# Patient Record
Sex: FEMALE | Race: WHITE | NOT HISPANIC OR LATINO | Employment: FULL TIME | URBAN - METROPOLITAN AREA
[De-identification: names, ages, dates, MRNs, and addresses within clinical notes are randomized per-mention and may not be internally consistent; named-entity substitution may affect disease eponyms.]

---

## 2024-11-04 ENCOUNTER — HOSPITAL ENCOUNTER (OUTPATIENT)
Dept: GENERAL RADIOLOGY | Facility: HOSPITAL | Age: 69
Discharge: HOME | End: 2024-11-04
Payer: MEDICARE

## 2024-11-04 DIAGNOSIS — R05.2 SUBACUTE COUGH: ICD-10-CM

## 2024-11-04 DIAGNOSIS — M15.0 PRIMARY OSTEOARTHRITIS INVOLVING MULTIPLE JOINTS: ICD-10-CM

## 2024-11-04 PROCEDURE — 73562 X-RAY EXAM OF KNEE 3: CPT

## 2024-11-04 PROCEDURE — 71046 X-RAY EXAM CHEST 2 VIEWS: CPT

## 2025-05-17 ENCOUNTER — TRANSCRIBE ORDERS (OUTPATIENT)
Dept: ADMINISTRATIVE | Facility: HOSPITAL | Age: 70
End: 2025-05-17
Payer: MEDICARE

## 2025-05-17 DIAGNOSIS — R94.31 ABNORMAL ELECTROCARDIOGRAM: Primary | ICD-10-CM

## 2025-05-22 ENCOUNTER — APPOINTMENT (OUTPATIENT)
Dept: CT IMAGING | Facility: HOSPITAL | Age: 70
DRG: 329 | End: 2025-05-22
Payer: MEDICARE

## 2025-05-22 ENCOUNTER — APPOINTMENT (OUTPATIENT)
Dept: GENERAL RADIOLOGY | Facility: HOSPITAL | Age: 70
DRG: 329 | End: 2025-05-22
Payer: MEDICARE

## 2025-05-22 ENCOUNTER — HOSPITAL ENCOUNTER (INPATIENT)
Facility: HOSPITAL | Age: 70
LOS: 15 days | Discharge: HOME | DRG: 329 | End: 2025-06-07
Attending: EMERGENCY MEDICINE | Admitting: INTERNAL MEDICINE
Payer: MEDICARE

## 2025-05-22 DIAGNOSIS — R79.89 ELEVATED LACTIC ACID LEVEL: ICD-10-CM

## 2025-05-22 DIAGNOSIS — K92.2 GASTROINTESTINAL HEMORRHAGE, UNSPECIFIED GASTROINTESTINAL HEMORRHAGE TYPE: Primary | ICD-10-CM

## 2025-05-22 DIAGNOSIS — R10.84 GENERALIZED ABDOMINAL PAIN: ICD-10-CM

## 2025-05-22 DIAGNOSIS — R79.9 ELEVATED BUN: ICD-10-CM

## 2025-05-22 DIAGNOSIS — D62 ACUTE BLOOD LOSS ANEMIA: ICD-10-CM

## 2025-05-22 LAB
ABO GROUP BLD: NORMAL
ALBUMIN SERPL-MCNC: 3.5 G/DL (ref 3.5–5.2)
ALBUMIN/GLOB SERPL: 1.9 G/DL
ALP SERPL-CCNC: 54 U/L (ref 39–117)
ALT SERPL W P-5'-P-CCNC: 14 U/L (ref 1–33)
ANION GAP SERPL CALCULATED.3IONS-SCNC: 10 MMOL/L (ref 5–15)
AST SERPL-CCNC: 13 U/L (ref 1–32)
BASOPHILS # BLD AUTO: 0.04 10*3/MM3 (ref 0–0.2)
BASOPHILS NFR BLD AUTO: 0.3 % (ref 0–1.5)
BILIRUB SERPL-MCNC: 0.5 MG/DL (ref 0–1.2)
BLD GP AB SCN SERPL QL: NEGATIVE
BUN SERPL-MCNC: 30 MG/DL (ref 8–23)
BUN/CREAT SERPL: 44.8 (ref 7–25)
CALCIUM SPEC-SCNC: 8.1 MG/DL (ref 8.6–10.5)
CHLORIDE SERPL-SCNC: 106 MMOL/L (ref 98–107)
CO2 SERPL-SCNC: 22 MMOL/L (ref 22–29)
CREAT SERPL-MCNC: 0.67 MG/DL (ref 0.57–1)
D-LACTATE SERPL-SCNC: 2.4 MMOL/L (ref 0.5–2)
DEPRECATED RDW RBC AUTO: 44.3 FL (ref 37–54)
EGFRCR SERPLBLD CKD-EPI 2021: 93.6 ML/MIN/1.73
EOSINOPHIL # BLD AUTO: 0.03 10*3/MM3 (ref 0–0.4)
EOSINOPHIL NFR BLD AUTO: 0.2 % (ref 0.3–6.2)
ERYTHROCYTE [DISTWIDTH] IN BLOOD BY AUTOMATED COUNT: 13 % (ref 12.3–15.4)
GEN 5 1HR TROPONIN T REFLEX: 7 NG/L
GLOBULIN UR ELPH-MCNC: 1.8 GM/DL
GLUCOSE SERPL-MCNC: 138 MG/DL (ref 65–99)
HCT VFR BLD AUTO: 22.1 % (ref 34–46.6)
HGB BLD-MCNC: 7.1 G/DL (ref 12–15.9)
HOLD SPECIMEN: NORMAL
HOLD SPECIMEN: NORMAL
IMM GRANULOCYTES # BLD AUTO: 0.04 10*3/MM3 (ref 0–0.05)
IMM GRANULOCYTES NFR BLD AUTO: 0.3 % (ref 0–0.5)
LIPASE SERPL-CCNC: 17 U/L (ref 13–60)
LYMPHOCYTES # BLD AUTO: 2.03 10*3/MM3 (ref 0.7–3.1)
LYMPHOCYTES NFR BLD AUTO: 15.6 % (ref 19.6–45.3)
MAGNESIUM SERPL-MCNC: 1.7 MG/DL (ref 1.6–2.4)
MCH RBC QN AUTO: 30.2 PG (ref 26.6–33)
MCHC RBC AUTO-ENTMCNC: 32.1 G/DL (ref 31.5–35.7)
MCV RBC AUTO: 94 FL (ref 79–97)
MONOCYTES # BLD AUTO: 0.73 10*3/MM3 (ref 0.1–0.9)
MONOCYTES NFR BLD AUTO: 5.6 % (ref 5–12)
NEUTROPHILS NFR BLD AUTO: 10.11 10*3/MM3 (ref 1.7–7)
NEUTROPHILS NFR BLD AUTO: 78 % (ref 42.7–76)
NRBC BLD AUTO-RTO: 0 /100 WBC (ref 0–0.2)
PLATELET # BLD AUTO: 222 10*3/MM3 (ref 140–450)
PMV BLD AUTO: 10.6 FL (ref 6–12)
POTASSIUM SERPL-SCNC: 3.8 MMOL/L (ref 3.5–5.2)
PROT SERPL-MCNC: 5.3 G/DL (ref 6–8.5)
RBC # BLD AUTO: 2.35 10*6/MM3 (ref 3.77–5.28)
RH BLD: POSITIVE
SODIUM SERPL-SCNC: 138 MMOL/L (ref 136–145)
T&S EXPIRATION DATE: NORMAL
TROPONIN T NUMERIC DELTA: NORMAL
TROPONIN T SERPL HS-MCNC: <6 NG/L
WBC NRBC COR # BLD AUTO: 12.98 10*3/MM3 (ref 3.4–10.8)
WHOLE BLOOD HOLD COAG: NORMAL
WHOLE BLOOD HOLD SPECIMEN: NORMAL

## 2025-05-22 PROCEDURE — 86923 COMPATIBILITY TEST ELECTRIC: CPT

## 2025-05-22 PROCEDURE — 74177 CT ABD & PELVIS W/CONTRAST: CPT

## 2025-05-22 PROCEDURE — 83690 ASSAY OF LIPASE: CPT | Performed by: EMERGENCY MEDICINE

## 2025-05-22 PROCEDURE — 36430 TRANSFUSION BLD/BLD COMPNT: CPT

## 2025-05-22 PROCEDURE — 86920 COMPATIBILITY TEST SPIN: CPT

## 2025-05-22 PROCEDURE — 71045 X-RAY EXAM CHEST 1 VIEW: CPT

## 2025-05-22 PROCEDURE — 99285 EMERGENCY DEPT VISIT HI MDM: CPT

## 2025-05-22 PROCEDURE — 84484 ASSAY OF TROPONIN QUANT: CPT | Performed by: EMERGENCY MEDICINE

## 2025-05-22 PROCEDURE — 25010000002 ONDANSETRON PER 1 MG: Performed by: EMERGENCY MEDICINE

## 2025-05-22 PROCEDURE — 85025 COMPLETE CBC W/AUTO DIFF WBC: CPT | Performed by: EMERGENCY MEDICINE

## 2025-05-22 PROCEDURE — 25510000001 IOPAMIDOL 61 % SOLUTION: Performed by: EMERGENCY MEDICINE

## 2025-05-22 PROCEDURE — G0378 HOSPITAL OBSERVATION PER HR: HCPCS

## 2025-05-22 PROCEDURE — 86901 BLOOD TYPING SEROLOGIC RH(D): CPT | Performed by: EMERGENCY MEDICINE

## 2025-05-22 PROCEDURE — 93010 ELECTROCARDIOGRAM REPORT: CPT | Performed by: INTERNAL MEDICINE

## 2025-05-22 PROCEDURE — 36415 COLL VENOUS BLD VENIPUNCTURE: CPT

## 2025-05-22 PROCEDURE — 83605 ASSAY OF LACTIC ACID: CPT | Performed by: EMERGENCY MEDICINE

## 2025-05-22 PROCEDURE — 83735 ASSAY OF MAGNESIUM: CPT | Performed by: EMERGENCY MEDICINE

## 2025-05-22 PROCEDURE — 80053 COMPREHEN METABOLIC PANEL: CPT | Performed by: EMERGENCY MEDICINE

## 2025-05-22 PROCEDURE — 86850 RBC ANTIBODY SCREEN: CPT | Performed by: EMERGENCY MEDICINE

## 2025-05-22 PROCEDURE — 25810000003 SODIUM CHLORIDE 0.9 % SOLUTION: Performed by: EMERGENCY MEDICINE

## 2025-05-22 PROCEDURE — 93005 ELECTROCARDIOGRAM TRACING: CPT | Performed by: EMERGENCY MEDICINE

## 2025-05-22 PROCEDURE — 86900 BLOOD TYPING SEROLOGIC ABO: CPT | Performed by: EMERGENCY MEDICINE

## 2025-05-22 PROCEDURE — P9016 RBC LEUKOCYTES REDUCED: HCPCS

## 2025-05-22 PROCEDURE — 86900 BLOOD TYPING SEROLOGIC ABO: CPT

## 2025-05-22 RX ORDER — ONDANSETRON 2 MG/ML
8 INJECTION INTRAMUSCULAR; INTRAVENOUS ONCE
Status: COMPLETED | OUTPATIENT
Start: 2025-05-22 | End: 2025-05-22

## 2025-05-22 RX ORDER — ONDANSETRON 4 MG/1
4 TABLET, ORALLY DISINTEGRATING ORAL EVERY 6 HOURS PRN
Status: DISCONTINUED | OUTPATIENT
Start: 2025-05-22 | End: 2025-05-31

## 2025-05-22 RX ORDER — SODIUM CHLORIDE 0.9 % (FLUSH) 0.9 %
10 SYRINGE (ML) INJECTION AS NEEDED
Status: DISCONTINUED | OUTPATIENT
Start: 2025-05-22 | End: 2025-06-04

## 2025-05-22 RX ORDER — ACETAMINOPHEN 160 MG/5ML
650 SOLUTION ORAL EVERY 4 HOURS PRN
Status: DISCONTINUED | OUTPATIENT
Start: 2025-05-22 | End: 2025-06-04

## 2025-05-22 RX ORDER — TRAZODONE HYDROCHLORIDE 150 MG/1
300 TABLET ORAL NIGHTLY
COMMUNITY

## 2025-05-22 RX ORDER — DICLOFENAC SODIUM 75 MG/1
75 TABLET, DELAYED RELEASE ORAL 2 TIMES DAILY
COMMUNITY
End: 2025-06-07 | Stop reason: HOSPADM

## 2025-05-22 RX ORDER — ATORVASTATIN CALCIUM 40 MG/1
40 TABLET, FILM COATED ORAL
COMMUNITY

## 2025-05-22 RX ORDER — ACETAMINOPHEN 650 MG/1
650 SUPPOSITORY RECTAL EVERY 4 HOURS PRN
Status: DISCONTINUED | OUTPATIENT
Start: 2025-05-22 | End: 2025-06-04

## 2025-05-22 RX ORDER — PANTOPRAZOLE SODIUM 40 MG/10ML
80 INJECTION, POWDER, LYOPHILIZED, FOR SOLUTION INTRAVENOUS ONCE
Status: COMPLETED | OUTPATIENT
Start: 2025-05-22 | End: 2025-05-22

## 2025-05-22 RX ORDER — SODIUM CHLORIDE 0.9 % (FLUSH) 0.9 %
10 SYRINGE (ML) INJECTION EVERY 12 HOURS SCHEDULED
Status: DISCONTINUED | OUTPATIENT
Start: 2025-05-22 | End: 2025-06-01 | Stop reason: SDUPTHER

## 2025-05-22 RX ORDER — SODIUM CHLORIDE 9 MG/ML
75 INJECTION, SOLUTION INTRAVENOUS CONTINUOUS
Status: DISCONTINUED | OUTPATIENT
Start: 2025-05-22 | End: 2025-05-23

## 2025-05-22 RX ORDER — ACETAMINOPHEN 325 MG/1
650 TABLET ORAL EVERY 4 HOURS PRN
Status: DISCONTINUED | OUTPATIENT
Start: 2025-05-22 | End: 2025-06-04

## 2025-05-22 RX ORDER — SODIUM CHLORIDE 9 MG/ML
40 INJECTION, SOLUTION INTRAVENOUS AS NEEDED
Status: DISCONTINUED | OUTPATIENT
Start: 2025-05-22 | End: 2025-06-04

## 2025-05-22 RX ORDER — IOPAMIDOL 612 MG/ML
100 INJECTION, SOLUTION INTRAVASCULAR
Status: COMPLETED | OUTPATIENT
Start: 2025-05-22 | End: 2025-05-22

## 2025-05-22 RX ORDER — ONDANSETRON 2 MG/ML
4 INJECTION INTRAMUSCULAR; INTRAVENOUS EVERY 6 HOURS PRN
Status: DISCONTINUED | OUTPATIENT
Start: 2025-05-22 | End: 2025-06-07 | Stop reason: HOSPADM

## 2025-05-22 RX ADMIN — IOPAMIDOL 85 ML: 612 INJECTION, SOLUTION INTRAVENOUS at 17:31

## 2025-05-22 RX ADMIN — PANTOPRAZOLE SODIUM 8 MG/HR: 40 INJECTION, POWDER, FOR SOLUTION INTRAVENOUS at 16:47

## 2025-05-22 RX ADMIN — ONDANSETRON 8 MG: 2 INJECTION, SOLUTION INTRAMUSCULAR; INTRAVENOUS at 16:15

## 2025-05-22 RX ADMIN — SODIUM CHLORIDE 1000 ML: 9 INJECTION, SOLUTION INTRAVENOUS at 16:14

## 2025-05-22 RX ADMIN — PANTOPRAZOLE SODIUM 80 MG: 40 INJECTION, POWDER, FOR SOLUTION INTRAVENOUS at 16:18

## 2025-05-22 NOTE — ED NOTES
Pt to ED from home due to generalized weakness and nausea and vomiting black. EMS stated pt did have an episode of vomit however emesis appeared to be stomach bile. Mild abd pain.     Pt was trying to come by [facility] but became too weak to get into the car and felt too weak and fell to the ground. Pt denies injury from fall.

## 2025-05-22 NOTE — ED PROVIDER NOTES
EMERGENCY DEPARTMENT ENCOUNTER  Room Number:  35/35  Date of encounter:  5/22/2025  PCP: Cristiano Logan DO  Patient Care Team:  Cristiano Logan DO as PCP - General (Family Medicine)     HPI:  Context: Samia Gaming is a 69 y.o. female who presents to the ED c/o chief complaint of abdominal pain with nausea vomiting.  Patient reports that she has been having symptoms since Monday.  Patient reports 3 times emesis today, emesis has been black with reddish tinge.  Patient denies any gross blood.  Patient endorses generalized abdominal pain, intermittent, denies any radiation of pain, no aggravating or ameliorating factors.  Patient reports that she had bowel movement this morning that was black and tarry and had traces of blood in it.  No urinary symptoms, no fever or systemic symptoms.  Patient denies any history of GI bleed in the past, no history of hepatitis or cirrhosis, patient reports she is not on anticoagulation, does not take NSAIDs daily, does not drink alcohol daily.    MEDICAL HISTORY REVIEW  Reviewed in EPIC    PAST MEDICAL HISTORY  Active Ambulatory Problems     Diagnosis Date Noted    No Active Ambulatory Problems     Resolved Ambulatory Problems     Diagnosis Date Noted    No Resolved Ambulatory Problems     No Additional Past Medical History       PAST SURGICAL HISTORY  No past surgical history on file.    FAMILY HISTORY  No family history on file.    SOCIAL HISTORY  Social History     Socioeconomic History    Marital status: Single       ALLERGIES  Patient has no known allergies.    The patient's allergies have been reviewed    PHYSICAL EXAM  I have reviewed the triage vital signs and nursing notes.  ED Triage Vitals [05/22/25 1528]   Temp Heart Rate Resp BP SpO2   97.7 °F (36.5 °C) 99 19 112/58 98 %      Temp src Heart Rate Source Patient Position BP Location FiO2 (%)   Oral -- -- -- --       General: No acute distress.  HENT: NCAT, PERRL, Nares patent.  Eyes: no scleral icterus.  Neck: trachea  midline, no ROM limitations.  CV: regular rhythm, regular rate.  Respiratory: normal effort, CTAB.  Abdomen: soft, nondistended, NTTP, no rebound tenderness, no guarding or rigidity.  Musculoskeletal: no deformity.  Neuro: alert, moves all extremities, follows commands.  Skin: warm, dry.    LAB RESULTS  Recent Results (from the past 24 hours)   ECG 12 Lead Other; NV    Collection Time: 05/22/25  3:58 PM   Result Value Ref Range    QT Interval 284 ms    QTC Interval 370 ms   Comprehensive Metabolic Panel    Collection Time: 05/22/25  4:11 PM    Specimen: Blood   Result Value Ref Range    Glucose 138 (H) 65 - 99 mg/dL    BUN 30 (H) 8 - 23 mg/dL    Creatinine 0.67 0.57 - 1.00 mg/dL    Sodium 138 136 - 145 mmol/L    Potassium 3.8 3.5 - 5.2 mmol/L    Chloride 106 98 - 107 mmol/L    CO2 22.0 22.0 - 29.0 mmol/L    Calcium 8.1 (L) 8.6 - 10.5 mg/dL    Total Protein 5.3 (L) 6.0 - 8.5 g/dL    Albumin 3.5 3.5 - 5.2 g/dL    ALT (SGPT) 14 1 - 33 U/L    AST (SGOT) 13 1 - 32 U/L    Alkaline Phosphatase 54 39 - 117 U/L    Total Bilirubin 0.5 0.0 - 1.2 mg/dL    Globulin 1.8 gm/dL    A/G Ratio 1.9 g/dL    BUN/Creatinine Ratio 44.8 (H) 7.0 - 25.0    Anion Gap 10.0 5.0 - 15.0 mmol/L    eGFR 93.6 >60.0 mL/min/1.73   Type & Screen    Collection Time: 05/22/25  4:11 PM    Specimen: Blood   Result Value Ref Range    ABO Type O     RH type Positive     Antibody Screen Negative     T&S Expiration Date 5/25/2025 11:59:59 PM    Lactic Acid, Plasma    Collection Time: 05/22/25  4:11 PM    Specimen: Blood   Result Value Ref Range    Lactate 2.4 (C) 0.5 - 2.0 mmol/L   Green Top (Gel)    Collection Time: 05/22/25  4:11 PM   Result Value Ref Range    Extra Tube Hold for add-ons.    Lavender Top    Collection Time: 05/22/25  4:11 PM   Result Value Ref Range    Extra Tube hold for add-on    Gold Top - SST    Collection Time: 05/22/25  4:11 PM   Result Value Ref Range    Extra Tube Hold for add-ons.    Light Blue Top    Collection Time: 05/22/25  4:11  PM   Result Value Ref Range    Extra Tube Hold for add-ons.    CBC Auto Differential    Collection Time: 05/22/25  4:11 PM    Specimen: Blood   Result Value Ref Range    WBC 12.98 (H) 3.40 - 10.80 10*3/mm3    RBC 2.35 (L) 3.77 - 5.28 10*6/mm3    Hemoglobin 7.1 (L) 12.0 - 15.9 g/dL    Hematocrit 22.1 (L) 34.0 - 46.6 %    MCV 94.0 79.0 - 97.0 fL    MCH 30.2 26.6 - 33.0 pg    MCHC 32.1 31.5 - 35.7 g/dL    RDW 13.0 12.3 - 15.4 %    RDW-SD 44.3 37.0 - 54.0 fl    MPV 10.6 6.0 - 12.0 fL    Platelets 222 140 - 450 10*3/mm3    Neutrophil % 78.0 (H) 42.7 - 76.0 %    Lymphocyte % 15.6 (L) 19.6 - 45.3 %    Monocyte % 5.6 5.0 - 12.0 %    Eosinophil % 0.2 (L) 0.3 - 6.2 %    Basophil % 0.3 0.0 - 1.5 %    Immature Grans % 0.3 0.0 - 0.5 %    Neutrophils, Absolute 10.11 (H) 1.70 - 7.00 10*3/mm3    Lymphocytes, Absolute 2.03 0.70 - 3.10 10*3/mm3    Monocytes, Absolute 0.73 0.10 - 0.90 10*3/mm3    Eosinophils, Absolute 0.03 0.00 - 0.40 10*3/mm3    Basophils, Absolute 0.04 0.00 - 0.20 10*3/mm3    Immature Grans, Absolute 0.04 0.00 - 0.05 10*3/mm3    nRBC 0.0 0.0 - 0.2 /100 WBC   Lipase    Collection Time: 05/22/25  4:11 PM    Specimen: Blood   Result Value Ref Range    Lipase 17 13 - 60 U/L   High Sensitivity Troponin T    Collection Time: 05/22/25  4:11 PM    Specimen: Blood   Result Value Ref Range    HS Troponin T <6 <14 ng/L       I ordered the above labs and reviewed the results.    RADIOLOGY  CT Abdomen Pelvis With Contrast  Result Date: 5/22/2025  CT OF THE ABDOMEN AND PELVIS WITH CONTRAST 05/22/2025  HISTORY: Abdominal pain.  TECHNIQUE: Spiral images were obtained from the lung bases to the symphysis pubis after intravenous contrast.  FINDINGS: There is fatty infiltration of the liver. Gallbladder has been removed. The spleen, pancreas, adrenals and kidneys appear unremarkable.  No bowel wall thickening or bowel dilatation is seen. There is colonic diverticulosis. Uterus and urinary bladder appear unremarkable. There are  degenerative changes in the spine. There is some aortic calcification with no evidence of aneurysm.      1. Colonic diverticulosis. There is no evidence of acute diverticulitis. 2. Status post cholecystectomy. 3. Fatty infiltration of the liver.  Radiation dose reduction techniques were utilized, including automated exposure control and exposure modulation based on body size.       XR Chest 1 View  Result Date: 5/22/2025  XR CHEST 1 VW-  HISTORY: Female who is 69 years-old, nausea, vomiting  TECHNIQUE: Frontal view of the chest  COMPARISON: 11/4/2024  FINDINGS: The heart size is borderline. Aorta is calcified. Pulmonary vasculature is unremarkable. No focal pulmonary consolidation, pleural effusion, or pneumothorax. No acute osseous process.      No evidence for acute pulmonary process. Follow-up as clinical indications persist.  This report was finalized on 5/22/2025 4:28 PM by Dr. Teja Mckinnon M.D on Workstation: [id number]        I ordered the above noted radiological studies. I reviewed the images and results. I agree with the radiologist interpretation.    PROCEDURES  Procedures    MEDICATIONS GIVEN IN ER  Medications   sodium chloride 0.9 % flush 10 mL (has no administration in time range)   pantoprazole (PROTONIX) 40 mg in sodium chloride 0.9 % 100 mL (0.4 mg/mL) MBP (8 mg/hr Intravenous Currently Infusing 5/22/25 1700)   sodium chloride 0.9 % flush 10 mL (has no administration in time range)   sodium chloride 0.9 % flush 10 mL (has no administration in time range)   sodium chloride 0.9 % infusion 40 mL (has no administration in time range)   Potassium Replacement - Follow Nurse / BPA Driven Protocol (has no administration in time range)   Magnesium Standard Dose Replacement - Follow Nurse / BPA Driven Protocol (has no administration in time range)   Phosphorus Replacement - Follow Nurse / BPA Driven Protocol (has no administration in time range)   Calcium Replacement - Follow Nurse / BPA Driven Protocol  (has no administration in time range)   acetaminophen (TYLENOL) tablet 650 mg (has no administration in time range)     Or   acetaminophen (TYLENOL) 160 MG/5ML oral solution 650 mg (has no administration in time range)     Or   acetaminophen (TYLENOL) suppository 650 mg (has no administration in time range)   ondansetron ODT (ZOFRAN-ODT) disintegrating tablet 4 mg (has no administration in time range)     Or   ondansetron (ZOFRAN) injection 4 mg (has no administration in time range)   ondansetron (ZOFRAN) injection 8 mg (8 mg Intravenous Given 5/22/25 1615)   sodium chloride 0.9 % bolus 1,000 mL (1,000 mL Intravenous New Bag 5/22/25 1614)   pantoprazole (PROTONIX) injection 80 mg (80 mg Intravenous Given 5/22/25 1618)   iopamidol (ISOVUE-300) 61 % injection 100 mL (85 mL Intravenous Given by Other 5/22/25 1731)       PROGRESS, DATA ANALYSIS, CONSULTS, AND MEDICAL DECISION MAKING  A complete history and physical exam have been performed.  All available laboratory and imaging results have been reviewed by myself prior to disposition.    MDM    After the initial H&P, I discussed pertinent information from history and physical exam with patient/family.  Discussed differential diagnosis.  Discussed plan for ED evaluation/workup/treatment.  All questions answered.  Patient/family is agreeable with plan.  ED Course as of 05/22/25 1821   Thu May 22, 2025   2465 My differential diagnosis for abdominal pain includes but is not limited to:  Gastritis, gastroenteritis, peptic ulcer disease, GERD, esophageal perforation, acute appendicitis, mesenteric adenitis, Meckel's diverticulum, epiploic appendagitis, diverticulitis, colon cancer, ulcerative colitis, Crohn's disease, intussusception, small bowel obstruction, adhesions, ischemic bowel, perforated viscus, ileus, obstipation, biliary colic, cholecystitis, cholelithiasis, Maxim-Nilesh Dequan, hepatitis, pancreatitis, common bile duct obstruction, cholangitis, bile leak, splenic  trauma, splenic rupture, splenic infarction, splenic abscess, abdominal abscess, ascites, spontaneous bacterial peritonitis, hernia, UTI, cystitis, prostatitis, ureterolithiasis, urinary obstruction, ovarian cyst, torsion, pregnancy, ectopic pregnancy, PID, pelvic abscess, mittelschmerz, endometriosis, AAA, myocardial infarction, pneumonia, cancer, porphyria, DKA, medications, sickle cell, viral syndrome, zoster   [JG]   1557 Rectal exam performed, ED tech present throughout as chaperone.  Digital rectal exam performed, stool is dark and tarry with traces of bright red blood, Hemoccult positive,  passed. [JG]   1600 EKG independently viewed and contemporaneously interpreted by ED physician. Time: 1558.  Rate 102.  Interpretation: Sinus tachycardia, normal axis, normal QRS, nonspecific ST changes. [JG]   1714 Reviewed chest x-ray in PACS, no pulmonary infiltrates per my read. [JG]   1752 I reviewed CT abdomen pelvis in PACS, no pancreatitis per my read. [JG]   1811 CT abdomen pelvis negative for acute pathology. [JG]   1813 Phone call with Yanira, NOHEMI with [facility].  Discussed the patient, relevant history, exam, diagnostics, ED findings/progress, and concerns. They agree to admit the patient to observation under Dr. Logan. Care assumed by the admitting physician at this time.     [JG]   1820 Phone call with COURTNEY Almendarez.  Discussed the patient, relevant history, exam, diagnostics, ED findings/progress, and concerns.  They agree to consult.    [JG]      ED Course User Index  [JG] Sanjeev Bennett MD       AS OF 18:21 EDT VITALS:    BP - 120/75  HR - 89  TEMP - 97.7 °F (36.5 °C) (Oral)  O2 SATS - 91%    DIAGNOSIS  Final diagnoses:   Gastrointestinal hemorrhage, unspecified gastrointestinal hemorrhage type   Acute blood loss anemia   Elevated BUN   Elevated lactic acid level   Generalized abdominal pain         DISPOSITION  ADMISSION    Discussed treatment plan and reason for admission with pt/family and  admitting physician.  Pt/family voiced understanding of the plan for admission for further testing/treatment as needed.        Sanjeev Bennett MD  05/22/25 1819       Sanjeev Bennett MD  05/22/25 1822

## 2025-05-23 ENCOUNTER — APPOINTMENT (OUTPATIENT)
Dept: CT IMAGING | Facility: HOSPITAL | Age: 70
DRG: 329 | End: 2025-05-23
Payer: MEDICARE

## 2025-05-23 ENCOUNTER — ANESTHESIA (OUTPATIENT)
Dept: GASTROENTEROLOGY | Facility: HOSPITAL | Age: 70
End: 2025-05-23
Payer: MEDICARE

## 2025-05-23 ENCOUNTER — APPOINTMENT (OUTPATIENT)
Dept: GENERAL RADIOLOGY | Facility: HOSPITAL | Age: 70
DRG: 329 | End: 2025-05-23
Payer: MEDICARE

## 2025-05-23 ENCOUNTER — ANESTHESIA EVENT (OUTPATIENT)
Dept: GASTROENTEROLOGY | Facility: HOSPITAL | Age: 70
End: 2025-05-23
Payer: MEDICARE

## 2025-05-23 PROBLEM — K92.2 GIB (GASTROINTESTINAL BLEEDING): Status: ACTIVE | Noted: 2025-05-23

## 2025-05-23 LAB
ALBUMIN SERPL-MCNC: 2.6 G/DL (ref 3.5–5.2)
ALBUMIN/GLOB SERPL: 1.7 G/DL
ALP SERPL-CCNC: 39 U/L (ref 39–117)
ALT SERPL W P-5'-P-CCNC: 9 U/L (ref 1–33)
ANION GAP SERPL CALCULATED.3IONS-SCNC: 7.2 MMOL/L (ref 5–15)
ANION GAP SERPL CALCULATED.3IONS-SCNC: 8 MMOL/L (ref 5–15)
AST SERPL-CCNC: 8 U/L (ref 1–32)
BASOPHILS # BLD AUTO: 0.03 10*3/MM3 (ref 0–0.2)
BASOPHILS # BLD AUTO: 0.04 10*3/MM3 (ref 0–0.2)
BASOPHILS NFR BLD AUTO: 0.2 % (ref 0–1.5)
BASOPHILS NFR BLD AUTO: 0.3 % (ref 0–1.5)
BH BB BLOOD EXPIRATION DATE: NORMAL
BH BB BLOOD EXPIRATION DATE: NORMAL
BH BB BLOOD TYPE BARCODE: 5100
BH BB BLOOD TYPE BARCODE: 5100
BH BB DISPENSE STATUS: NORMAL
BH BB DISPENSE STATUS: NORMAL
BH BB PRODUCT CODE: NORMAL
BH BB PRODUCT CODE: NORMAL
BH BB UNIT NUMBER: NORMAL
BH BB UNIT NUMBER: NORMAL
BILIRUB SERPL-MCNC: 0.7 MG/DL (ref 0–1.2)
BUN SERPL-MCNC: 24 MG/DL (ref 8–23)
BUN SERPL-MCNC: 30 MG/DL (ref 8–23)
BUN/CREAT SERPL: 38.1 (ref 7–25)
BUN/CREAT SERPL: 53.6 (ref 7–25)
CALCIUM SPEC-SCNC: 7.3 MG/DL (ref 8.6–10.5)
CALCIUM SPEC-SCNC: 7.5 MG/DL (ref 8.6–10.5)
CHLORIDE SERPL-SCNC: 111 MMOL/L (ref 98–107)
CHLORIDE SERPL-SCNC: 114 MMOL/L (ref 98–107)
CO2 SERPL-SCNC: 19 MMOL/L (ref 22–29)
CO2 SERPL-SCNC: 19.8 MMOL/L (ref 22–29)
CREAT SERPL-MCNC: 0.56 MG/DL (ref 0.57–1)
CREAT SERPL-MCNC: 0.63 MG/DL (ref 0.57–1)
CROSSMATCH INTERPRETATION: NORMAL
CROSSMATCH INTERPRETATION: NORMAL
D-LACTATE SERPL-SCNC: 1.4 MMOL/L (ref 0.5–2)
DEPRECATED RDW RBC AUTO: 48.5 FL (ref 37–54)
DEPRECATED RDW RBC AUTO: 48.7 FL (ref 37–54)
DEPRECATED RDW RBC AUTO: 49.7 FL (ref 37–54)
EGFRCR SERPLBLD CKD-EPI 2021: 95 ML/MIN/1.73
EGFRCR SERPLBLD CKD-EPI 2021: 97.7 ML/MIN/1.73
EOSINOPHIL # BLD AUTO: 0.01 10*3/MM3 (ref 0–0.4)
EOSINOPHIL # BLD AUTO: 0.02 10*3/MM3 (ref 0–0.4)
EOSINOPHIL NFR BLD AUTO: 0.1 % (ref 0.3–6.2)
EOSINOPHIL NFR BLD AUTO: 0.1 % (ref 0.3–6.2)
ERYTHROCYTE [DISTWIDTH] IN BLOOD BY AUTOMATED COUNT: 14.8 % (ref 12.3–15.4)
ERYTHROCYTE [DISTWIDTH] IN BLOOD BY AUTOMATED COUNT: 15 % (ref 12.3–15.4)
ERYTHROCYTE [DISTWIDTH] IN BLOOD BY AUTOMATED COUNT: 15.8 % (ref 12.3–15.4)
FERRITIN SERPL-MCNC: 160 NG/ML (ref 13–150)
FOLATE SERPL-MCNC: 10.6 NG/ML (ref 4.78–24.2)
GLOBULIN UR ELPH-MCNC: 1.5 GM/DL
GLUCOSE BLDC GLUCOMTR-MCNC: 137 MG/DL (ref 70–130)
GLUCOSE BLDC GLUCOMTR-MCNC: 153 MG/DL (ref 70–130)
GLUCOSE BLDC GLUCOMTR-MCNC: 155 MG/DL (ref 70–130)
GLUCOSE SERPL-MCNC: 150 MG/DL (ref 65–99)
GLUCOSE SERPL-MCNC: 156 MG/DL (ref 65–99)
HCT VFR BLD AUTO: 18.3 % (ref 34–46.6)
HCT VFR BLD AUTO: 22.9 % (ref 34–46.6)
HCT VFR BLD AUTO: 22.9 % (ref 34–46.6)
HGB BLD-MCNC: 6.1 G/DL (ref 12–15.9)
HGB BLD-MCNC: 7.5 G/DL (ref 12–15.9)
HGB BLD-MCNC: 7.6 G/DL (ref 12–15.9)
IMM GRANULOCYTES # BLD AUTO: 0.08 10*3/MM3 (ref 0–0.05)
IMM GRANULOCYTES # BLD AUTO: 0.19 10*3/MM3 (ref 0–0.05)
IMM GRANULOCYTES NFR BLD AUTO: 0.5 % (ref 0–0.5)
IMM GRANULOCYTES NFR BLD AUTO: 1.3 % (ref 0–0.5)
IRON 24H UR-MRATE: 85 MCG/DL (ref 37–145)
IRON SATN MFR SERPL: 44 % (ref 20–50)
LYMPHOCYTES # BLD AUTO: 3.16 10*3/MM3 (ref 0.7–3.1)
LYMPHOCYTES # BLD AUTO: 3.89 10*3/MM3 (ref 0.7–3.1)
LYMPHOCYTES NFR BLD AUTO: 20.8 % (ref 19.6–45.3)
LYMPHOCYTES NFR BLD AUTO: 25.7 % (ref 19.6–45.3)
MCH RBC QN AUTO: 28.7 PG (ref 26.6–33)
MCH RBC QN AUTO: 29.9 PG (ref 26.6–33)
MCH RBC QN AUTO: 29.9 PG (ref 26.6–33)
MCHC RBC AUTO-ENTMCNC: 32.8 G/DL (ref 31.5–35.7)
MCHC RBC AUTO-ENTMCNC: 33.2 G/DL (ref 31.5–35.7)
MCHC RBC AUTO-ENTMCNC: 33.3 G/DL (ref 31.5–35.7)
MCV RBC AUTO: 87.7 FL (ref 79–97)
MCV RBC AUTO: 89.7 FL (ref 79–97)
MCV RBC AUTO: 90.2 FL (ref 79–97)
MONOCYTES # BLD AUTO: 1.18 10*3/MM3 (ref 0.1–0.9)
MONOCYTES # BLD AUTO: 1.53 10*3/MM3 (ref 0.1–0.9)
MONOCYTES NFR BLD AUTO: 10.1 % (ref 5–12)
MONOCYTES NFR BLD AUTO: 7.8 % (ref 5–12)
NEUTROPHILS NFR BLD AUTO: 10.72 10*3/MM3 (ref 1.7–7)
NEUTROPHILS NFR BLD AUTO: 62.6 % (ref 42.7–76)
NEUTROPHILS NFR BLD AUTO: 70.5 % (ref 42.7–76)
NEUTROPHILS NFR BLD AUTO: 9.51 10*3/MM3 (ref 1.7–7)
NRBC BLD AUTO-RTO: 0.2 /100 WBC (ref 0–0.2)
PLAT MORPH BLD: NORMAL
PLATELET # BLD AUTO: 131 10*3/MM3 (ref 140–450)
PLATELET # BLD AUTO: 145 10*3/MM3 (ref 140–450)
PLATELET # BLD AUTO: 148 10*3/MM3 (ref 140–450)
PMV BLD AUTO: 11 FL (ref 6–12)
PMV BLD AUTO: 11.2 FL (ref 6–12)
PMV BLD AUTO: 11.7 FL (ref 6–12)
POTASSIUM SERPL-SCNC: 3.5 MMOL/L (ref 3.5–5.2)
POTASSIUM SERPL-SCNC: 4.1 MMOL/L (ref 3.5–5.2)
PROT SERPL-MCNC: 4.1 G/DL (ref 6–8.5)
QT INTERVAL: 284 MS
QT INTERVAL: 369 MS
QT INTERVAL: 370 MS
QTC INTERVAL: 370 MS
QTC INTERVAL: 455 MS
QTC INTERVAL: 456 MS
RBC # BLD AUTO: 2.04 10*6/MM3 (ref 3.77–5.28)
RBC # BLD AUTO: 2.54 10*6/MM3 (ref 3.77–5.28)
RBC # BLD AUTO: 2.61 10*6/MM3 (ref 3.77–5.28)
RBC MORPH BLD: NORMAL
SODIUM SERPL-SCNC: 138 MMOL/L (ref 136–145)
SODIUM SERPL-SCNC: 141 MMOL/L (ref 136–145)
TIBC SERPL-MCNC: 195 MCG/DL (ref 298–536)
TRANSFERRIN SERPL-MCNC: 131 MG/DL (ref 200–360)
UNIT  ABO: NORMAL
UNIT  ABO: NORMAL
UNIT  RH: NORMAL
UNIT  RH: NORMAL
VIT B12 BLD-MCNC: 206 PG/ML (ref 211–946)
WBC MORPH BLD: NORMAL
WBC NRBC COR # BLD AUTO: 15.16 10*3/MM3 (ref 3.4–10.8)
WBC NRBC COR # BLD AUTO: 15.2 10*3/MM3 (ref 3.4–10.8)
WBC NRBC COR # BLD AUTO: 22.19 10*3/MM3 (ref 3.4–10.8)

## 2025-05-23 PROCEDURE — 25810000003 SODIUM CHLORIDE 0.9 % SOLUTION: Performed by: INTERNAL MEDICINE

## 2025-05-23 PROCEDURE — 36430 TRANSFUSION BLD/BLD COMPNT: CPT

## 2025-05-23 PROCEDURE — 86901 BLOOD TYPING SEROLOGIC RH(D): CPT

## 2025-05-23 PROCEDURE — 99222 1ST HOSP IP/OBS MODERATE 55: CPT | Performed by: INTERNAL MEDICINE

## 2025-05-23 PROCEDURE — 85025 COMPLETE CBC W/AUTO DIFF WBC: CPT | Performed by: NURSE PRACTITIONER

## 2025-05-23 PROCEDURE — 83605 ASSAY OF LACTIC ACID: CPT | Performed by: EMERGENCY MEDICINE

## 2025-05-23 PROCEDURE — 86900 BLOOD TYPING SEROLOGIC ABO: CPT

## 2025-05-23 PROCEDURE — 84466 ASSAY OF TRANSFERRIN: CPT | Performed by: NURSE PRACTITIONER

## 2025-05-23 PROCEDURE — 25810000003 SODIUM CHLORIDE 0.9 % SOLUTION

## 2025-05-23 PROCEDURE — 25010000002 ONDANSETRON PER 1 MG: Performed by: EMERGENCY MEDICINE

## 2025-05-23 PROCEDURE — 25010000002 LIDOCAINE 2% SOLUTION: Performed by: NURSE ANESTHETIST, CERTIFIED REGISTERED

## 2025-05-23 PROCEDURE — 43235 EGD DIAGNOSTIC BRUSH WASH: CPT | Performed by: INTERNAL MEDICINE

## 2025-05-23 PROCEDURE — 83540 ASSAY OF IRON: CPT | Performed by: NURSE PRACTITIONER

## 2025-05-23 PROCEDURE — 70450 CT HEAD/BRAIN W/O DYE: CPT

## 2025-05-23 PROCEDURE — 82948 REAGENT STRIP/BLOOD GLUCOSE: CPT

## 2025-05-23 PROCEDURE — P9016 RBC LEUKOCYTES REDUCED: HCPCS

## 2025-05-23 PROCEDURE — 25810000003 SODIUM CHLORIDE 0.9 % SOLUTION: Performed by: NURSE PRACTITIONER

## 2025-05-23 PROCEDURE — 63710000001 INSULIN REGULAR HUMAN PER 5 UNITS: Performed by: INTERNAL MEDICINE

## 2025-05-23 PROCEDURE — 82746 ASSAY OF FOLIC ACID SERUM: CPT | Performed by: NURSE PRACTITIONER

## 2025-05-23 PROCEDURE — 80053 COMPREHEN METABOLIC PANEL: CPT | Performed by: NURSE PRACTITIONER

## 2025-05-23 PROCEDURE — 0DJ08ZZ INSPECTION OF UPPER INTESTINAL TRACT, VIA NATURAL OR ARTIFICIAL OPENING ENDOSCOPIC: ICD-10-PCS | Performed by: INTERNAL MEDICINE

## 2025-05-23 PROCEDURE — 73610 X-RAY EXAM OF ANKLE: CPT

## 2025-05-23 PROCEDURE — 25010000002 ONDANSETRON PER 1 MG: Performed by: INTERNAL MEDICINE

## 2025-05-23 PROCEDURE — 85007 BL SMEAR W/DIFF WBC COUNT: CPT | Performed by: NURSE PRACTITIONER

## 2025-05-23 PROCEDURE — 25010000002 METOCLOPRAMIDE PER 10 MG: Performed by: NURSE PRACTITIONER

## 2025-05-23 PROCEDURE — 93005 ELECTROCARDIOGRAM TRACING: CPT | Performed by: NURSE PRACTITIONER

## 2025-05-23 PROCEDURE — 25010000002 PROPOFOL 10 MG/ML EMULSION: Performed by: NURSE ANESTHETIST, CERTIFIED REGISTERED

## 2025-05-23 PROCEDURE — 93010 ELECTROCARDIOGRAM REPORT: CPT | Performed by: INTERNAL MEDICINE

## 2025-05-23 PROCEDURE — 82728 ASSAY OF FERRITIN: CPT | Performed by: NURSE PRACTITIONER

## 2025-05-23 PROCEDURE — 85027 COMPLETE CBC AUTOMATED: CPT | Performed by: INTERNAL MEDICINE

## 2025-05-23 PROCEDURE — 82607 VITAMIN B-12: CPT | Performed by: NURSE PRACTITIONER

## 2025-05-23 RX ORDER — IBUPROFEN 600 MG/1
1 TABLET ORAL
Status: DISCONTINUED | OUTPATIENT
Start: 2025-05-23 | End: 2025-06-07 | Stop reason: HOSPADM

## 2025-05-23 RX ORDER — DEXTROSE MONOHYDRATE 25 G/50ML
25 INJECTION, SOLUTION INTRAVENOUS
Status: DISCONTINUED | OUTPATIENT
Start: 2025-05-23 | End: 2025-06-07 | Stop reason: HOSPADM

## 2025-05-23 RX ORDER — LIDOCAINE HYDROCHLORIDE 20 MG/ML
INJECTION, SOLUTION INFILTRATION; PERINEURAL AS NEEDED
Status: DISCONTINUED | OUTPATIENT
Start: 2025-05-23 | End: 2025-05-23 | Stop reason: SURG

## 2025-05-23 RX ORDER — SODIUM CHLORIDE 9 MG/ML
30 INJECTION, SOLUTION INTRAVENOUS CONTINUOUS PRN
Status: DISCONTINUED | OUTPATIENT
Start: 2025-05-23 | End: 2025-05-23

## 2025-05-23 RX ORDER — PANTOPRAZOLE SODIUM 40 MG/1
40 TABLET, DELAYED RELEASE ORAL
Status: DISCONTINUED | OUTPATIENT
Start: 2025-05-23 | End: 2025-05-23

## 2025-05-23 RX ORDER — SODIUM CHLORIDE 9 MG/ML
40 INJECTION, SOLUTION INTRAVENOUS AS NEEDED
Status: DISCONTINUED | OUTPATIENT
Start: 2025-05-23 | End: 2025-06-04

## 2025-05-23 RX ORDER — PROPOFOL 10 MG/ML
VIAL (ML) INTRAVENOUS AS NEEDED
Status: DISCONTINUED | OUTPATIENT
Start: 2025-05-23 | End: 2025-05-23 | Stop reason: SURG

## 2025-05-23 RX ORDER — NITROGLYCERIN 0.4 MG/1
0.4 TABLET SUBLINGUAL
Status: DISCONTINUED | OUTPATIENT
Start: 2025-05-23 | End: 2025-06-07 | Stop reason: HOSPADM

## 2025-05-23 RX ORDER — SODIUM CHLORIDE 0.9 % (FLUSH) 0.9 %
10 SYRINGE (ML) INJECTION AS NEEDED
Status: DISCONTINUED | OUTPATIENT
Start: 2025-05-23 | End: 2025-06-04

## 2025-05-23 RX ORDER — SODIUM CHLORIDE 9 MG/ML
100 INJECTION, SOLUTION INTRAVENOUS CONTINUOUS
Status: ACTIVE | OUTPATIENT
Start: 2025-05-23 | End: 2025-05-23

## 2025-05-23 RX ORDER — SODIUM CHLORIDE 0.9 % (FLUSH) 0.9 %
10 SYRINGE (ML) INJECTION EVERY 12 HOURS SCHEDULED
Status: DISCONTINUED | OUTPATIENT
Start: 2025-05-23 | End: 2025-06-01 | Stop reason: SDUPTHER

## 2025-05-23 RX ORDER — POTASSIUM CHLORIDE 1500 MG/1
40 TABLET, EXTENDED RELEASE ORAL EVERY 4 HOURS
Status: COMPLETED | OUTPATIENT
Start: 2025-05-23 | End: 2025-05-24

## 2025-05-23 RX ORDER — METOCLOPRAMIDE HYDROCHLORIDE 5 MG/ML
5 INJECTION INTRAMUSCULAR; INTRAVENOUS EVERY 6 HOURS PRN
Status: DISCONTINUED | OUTPATIENT
Start: 2025-05-23 | End: 2025-06-04

## 2025-05-23 RX ORDER — NICOTINE POLACRILEX 4 MG
15 LOZENGE BUCCAL
Status: DISCONTINUED | OUTPATIENT
Start: 2025-05-23 | End: 2025-06-07 | Stop reason: HOSPADM

## 2025-05-23 RX ADMIN — Medication 10 ML: at 20:10

## 2025-05-23 RX ADMIN — PROPOFOL 100 MG: 10 INJECTION, EMULSION INTRAVENOUS at 11:59

## 2025-05-23 RX ADMIN — ONDANSETRON 4 MG: 2 INJECTION, SOLUTION INTRAMUSCULAR; INTRAVENOUS at 03:39

## 2025-05-23 RX ADMIN — Medication 10 ML: at 00:07

## 2025-05-23 RX ADMIN — INSULIN HUMAN 2 UNITS: 100 INJECTION, SOLUTION PARENTERAL at 21:11

## 2025-05-23 RX ADMIN — ONDANSETRON 4 MG: 2 INJECTION, SOLUTION INTRAMUSCULAR; INTRAVENOUS at 16:17

## 2025-05-23 RX ADMIN — ONDANSETRON 4 MG: 2 INJECTION, SOLUTION INTRAMUSCULAR; INTRAVENOUS at 08:27

## 2025-05-23 RX ADMIN — LIDOCAINE HYDROCHLORIDE 100 MG: 20 INJECTION, SOLUTION INFILTRATION; PERINEURAL at 11:59

## 2025-05-23 RX ADMIN — PANTOPRAZOLE SODIUM 8 MG/HR: 40 INJECTION, POWDER, FOR SOLUTION INTRAVENOUS at 00:11

## 2025-05-23 RX ADMIN — METOCLOPRAMIDE 5 MG: 5 INJECTION, SOLUTION INTRAMUSCULAR; INTRAVENOUS at 22:11

## 2025-05-23 RX ADMIN — SODIUM CHLORIDE 1000 ML: 9 INJECTION, SOLUTION INTRAVENOUS at 16:30

## 2025-05-23 RX ADMIN — PROPOFOL 20 MG: 10 INJECTION, EMULSION INTRAVENOUS at 12:03

## 2025-05-23 RX ADMIN — PANTOPRAZOLE SODIUM 8 MG/HR: 40 INJECTION, POWDER, FOR SOLUTION INTRAVENOUS at 04:32

## 2025-05-23 RX ADMIN — PANTOPRAZOLE SODIUM 8 MG/HR: 40 INJECTION, POWDER, FOR SOLUTION INTRAVENOUS at 22:11

## 2025-05-23 RX ADMIN — ONDANSETRON 4 MG: 2 INJECTION, SOLUTION INTRAMUSCULAR; INTRAVENOUS at 19:01

## 2025-05-23 RX ADMIN — ACETAMINOPHEN 650 MG: 325 TABLET, FILM COATED ORAL at 22:11

## 2025-05-23 RX ADMIN — ACETAMINOPHEN 650 MG: 325 TABLET, FILM COATED ORAL at 14:22

## 2025-05-23 RX ADMIN — Medication 10 ML: at 08:29

## 2025-05-23 RX ADMIN — PANTOPRAZOLE SODIUM 8 MG/HR: 40 INJECTION, POWDER, FOR SOLUTION INTRAVENOUS at 19:11

## 2025-05-23 RX ADMIN — POTASSIUM CHLORIDE 40 MEQ: 1500 TABLET, EXTENDED RELEASE ORAL at 20:08

## 2025-05-23 RX ADMIN — SODIUM CHLORIDE 100 ML/HR: 9 INJECTION, SOLUTION INTRAVENOUS at 20:08

## 2025-05-23 RX ADMIN — SODIUM CHLORIDE 75 ML/HR: 9 INJECTION, SOLUTION INTRAVENOUS at 00:32

## 2025-05-23 RX ADMIN — POLYETHYLENE GLYCOL-3350 AND ELECTROLYTES 2000 ML: 236; 6.74; 5.86; 2.97; 22.74 POWDER, FOR SOLUTION ORAL at 19:58

## 2025-05-23 RX ADMIN — SODIUM CHLORIDE 30 ML/HR: 9 INJECTION, SOLUTION INTRAVENOUS at 10:58

## 2025-05-23 RX ADMIN — PANTOPRAZOLE SODIUM 8 MG/HR: 40 INJECTION, POWDER, FOR SOLUTION INTRAVENOUS at 10:18

## 2025-05-23 NOTE — CONSULTS
Patient Care Team:  Cristiano Logan DO as PCP - General (Family Medicine)      Subjective     Patient is a 69 y.o. female.  Asked to transfer to ICU for GI bleed that becoming less stable.  Apparently patient was then got transfused, had EGD with a few small erosions in gastric antrum with no evidence of bleeding or stigmata of recent bleeding and a 4 mm duodenal bulb ulcer also with no evidence of active bleeding or stigmata of recent bleeding.  Apparently plan is for colonoscopy in the morning.  Tonight she had a syncopal spell going to the bathroom or near syncope fall hit her head had CT head that was negative, stool was bloody hemoglobin down from 7.5 this morning to 6.1.  Mildly tachycardic with a heart rate of 106 blood pressure 120/67.  Looks like other than some arthritis and super morbid obesity with a BMI greater than 50 does not have a whole lot of medical problems.  She does have a history of sleep apnea PAP intolerant.  No known medication allergies does not smoke drink or use illicit drug.  Her stools have been dark blood she does not know probably blackish with some clots.  Admixed with some brighter red blood.  She had a bowel movement yesterday and then 1 just recently.      Review of Systems:  Head is little sore where she fell and hit it no acute visual changes or motor changes.  When she fell she had a bit of pain in her right ankle that has resolved.  No chest pain or palpitation she has some mild shortness of breath currently no cough she has had chills for couple days no fever.  No kidney disease dysuria hematuria any urgency or frequency does bruise easily no history of liver disease hepatitis or yellow jaundice no she has had some nausea and some emesis of small amount of bilious looking material, no hematemesis.      History  Past Medical History:   Diagnosis Date    Arthritis     Elevated cholesterol      Past Surgical History:   Procedure Laterality Date    CHOLECYSTECTOMY  " 2005    COLONOSCOPY       Social History     Socioeconomic History    Marital status: Single   Tobacco Use    Smoking status: Never    Smokeless tobacco: Never   Vaping Use    Vaping status: Never Used   Substance and Sexual Activity    Alcohol use: Never    Drug use: Never    Sexual activity: Defer     Partners: Male     History reviewed. No pertinent family history.      Allergies:  Patient has no known allergies.    Medications:  Prior to Admission medications    Medication Sig Start Date End Date Taking? Authorizing Provider   atorvastatin (LIPITOR) 40 MG tablet Take 1 tablet by mouth every night at bedtime.   Yes ProviderRemi MD   diclofenac (VOLTAREN) 75 MG EC tablet Take 1 tablet by mouth 2 (Two) Times a Day.   Yes ProviderRemi MD   traZODone (DESYREL) 150 MG tablet Take 2 tablets by mouth Every Night.   Yes ProviderRemi MD     polyethylene glycol, 2,000 mL, Oral, Q12H  sodium chloride, 10 mL, Intravenous, Q12H      pantoprazole, 8 mg/hr        Objective     Vital Signs  Vital Sign Min/Max for last 24 hours  Temp  Min: 97.9 °F (36.6 °C)  Max: 98.8 °F (37.1 °C)   BP  Min: 96/70  Max: 136/74   Pulse  Min: 87  Max: 103   Resp  Min: 16  Max: 18   SpO2  Min: 92 %  Max: 100 %   Flow (L/min) (Oxygen Therapy)  Min: 10  Max: 10   Weight  Min: 124 kg (274 lb 6.4 oz)  Max: 124 kg (274 lb 6.4 oz)       Intake/Output Summary (Last 24 hours) at 5/23/2025 1835  Last data filed at 5/23/2025 0604  Gross per 24 hour   Intake 600 ml   Output --   Net 600 ml     No intake/output data recorded.  Last Weight and Admission Weight        05/22/25 2108   Weight: 124 kg (274 lb 6.4 oz)     Flowsheet Rows      Flowsheet Row First Filed Value   Admission Height 157.5 cm (62\") Documented at 05/22/2025 2108   Admission Weight 124 kg (274 lb 6.4 oz) Documented at 05/22/2025 2108            Body mass index is 50.19 kg/m².           Physical Exam:  General Appearance: Morbidly obese white female resting in bed " she does not look in acute distress  Eyes: Conjunctiva are clear and anicteric, pupils are equal and reactive to light  ENT: Mucous membranes are moist no erythema no exudates she has a Mallampati 2 almost 3 airway, nasal septum midline.  Small contusion right forehead just about the scalp line she has a Band-Aid over this I did not remove it  Neck: No palpable adenopathy or thyromegaly no jugular venous tension trachea midline  Lungs: Clear nonlabored symmetric expansion no wheezes no rales no rhonchi and oxygen saturations on room air 99%  Cardiac: Tachycardic heart rates right around 100 104 no murmur  Abdomen: Soft obese no palpable hepatosplenomegaly or masses she reports some mild tenderness all over when I push no rebound or guarding  : Not examined  Musculoskeletal: Morbidly obese no right ankle deformity no ankle tenderness or pain with motion  Skin: Warm and dry no jaundice no petechia  Neuro: She is alert and oriented cooperative following commands and grossly intact  Extremities/P Vascular: No clubbing no cyanosis no edema palpable radial and dorsalis pedis pulses  MSE: She seems to be appropriate      Labs:  Results from last 7 days   Lab Units 05/23/25  1629 05/23/25  0735 05/22/25  1802 05/22/25  1611   GLUCOSE mg/dL 150* 156*  --  138*   SODIUM mmol/L 138 141  --  138   POTASSIUM mmol/L 3.5 4.1  --  3.8   MAGNESIUM mg/dL  --   --  1.7  --    CO2 mmol/L 19.0* 19.8*  --  22.0   CHLORIDE mmol/L 111* 114*  --  106   ANION GAP mmol/L 8.0 7.2  --  10.0   CREATININE mg/dL 0.63 0.56*  --  0.67   BUN mg/dL 24* 30*  --  30*   BUN / CREAT RATIO  38.1* 53.6*  --  44.8*   CALCIUM mg/dL 7.5* 7.3*  --  8.1*   ALK PHOS U/L  --  39  --  54   TOTAL PROTEIN g/dL  --  4.1*  --  5.3*   ALT (SGPT) U/L  --  9  --  14   AST (SGOT) U/L  --  8  --  13   BILIRUBIN mg/dL  --  0.7  --  0.5   ALBUMIN g/dL  --  2.6*  --  3.5   GLOBULIN gm/dL  --  1.5  --  1.8     Estimated Creatinine Clearance: 103.1 mL/min (by C-G formula based  on SCr of 0.63 mg/dL).      Results from last 7 days   Lab Units 05/23/25  1628 05/23/25  0735 05/22/25  1611   WBC 10*3/mm3 15.16* 15.20* 12.98*   RBC 10*6/mm3 2.04* 2.61* 2.35*   HEMOGLOBIN g/dL 6.1* 7.5* 7.1*   HEMATOCRIT % 18.3* 22.9* 22.1*   MCV fL 89.7 87.7 94.0   MCH pg 29.9 28.7 30.2   MCHC g/dL 33.3 32.8 32.1   RDW % 15.0 15.8* 13.0   RDW-SD fl 48.7 49.7 44.3   MPV fL 11.2 11.7 10.6   PLATELETS 10*3/mm3 145 131* 222   NEUTROPHIL % % 62.6 70.5 78.0*   LYMPHOCYTE % % 25.7 20.8 15.6*   MONOCYTES % % 10.1 7.8 5.6   EOSINOPHIL % % 0.1* 0.1* 0.2*   BASOPHIL % % 0.2 0.3 0.3   IMM GRAN % % 1.3* 0.5 0.3   NEUTROS ABS 10*3/mm3 9.51* 10.72* 10.11*   LYMPHS ABS 10*3/mm3 3.89* 3.16* 2.03   MONOS ABS 10*3/mm3 1.53* 1.18* 0.73   EOS ABS 10*3/mm3 0.01 0.02 0.03   BASOS ABS 10*3/mm3 0.03 0.04 0.04   IMMATURE GRANS (ABS) 10*3/mm3 0.19* 0.08* 0.04   NRBC /100 WBC 0.2  --  0.0         Results from last 7 days   Lab Units 05/22/25  1802 05/22/25  1611   HSTROP T ng/L 7 <6             Results from last 7 days   Lab Units 05/23/25  0007 05/22/25  1611   LACTATE mmol/L 1.4 2.4*         Microbiology Results (last 10 days)       ** No results found for the last 240 hours. **              Diagnostics:  XR Ankle 3+ View Right  Result Date: 5/23/2025  XR ANKLE 3+ VW RIGHT-5/23/2025  HISTORY: Fell, right ankle pain.  There is artifact from the patient's sock. No fractures are seen. Calcaneal spurs are seen.   This report was finalized on 5/23/2025 6:25 PM by Dr. Tod Avina M.D on Workstation: [id number]      CT Head Without Contrast  Result Date: 5/23/2025  STAT CT SCAN OF THE HEAD WITHOUT CONTRAST ON 05/23/2025  CLINICAL HISTORY: This is a 69-year-old female patient who had a fall with head injury with an abrasion to the forehead.  TECHNIQUE: Spiral CTA images were obtained from the base of the skull to the vertex without intravenous contrast. The images were reformatted and are submitted in 3 mm thick axial, sagittal and  coronal CT sections with brain algorithm and 2 mm thick axial CT sections with high-resolution bone algorithm.  COMPARISON: There are no prior head CTs from [facility] for comparison.  FINDINGS: The brain parenchyma is normal in attenuation. The ventricles are normal in size. I see no focal mass effect. There is no midline shift. No extra axial fluid collections are identified. There is no evidence of acute intracranial hemorrhage. There is a scalp hematoma overlying the anterior right frontal bone from today's head trauma. No underlying acute skull fracture is identified. The calvarium and the skull base are normal in appearance. There is partial opacification of the visualized superior maxillary sinuses with fluid and mucosal thickening and mild anterior ethmoid sinus mucosal thickening. Otherwise, the paranasal sinuses, mastoid air cells and the middle ear cavities are clear. The dural venous sinuses are hypodense which can be seen if the patient is anemic.      1. No acute intracranial abnormality is identified.  2. There is partial opacification of the visualized superior aspect of the maxillary sinuses with fluid and mucosal thickening bilaterally.  3. The dural venous sinuses are hypodense suggesting that the patient is most probably anemic.  4. There is a small scalp hematoma over the anterior right frontal bone from today's head trauma. The remainder of the head CT is within normal limits with no acute skull fracture or intracranial hemorrhage identified.  Radiation dose reduction techniques were utilized, including automated exposure control and exposure modulation based on body size.       CT Abdomen Pelvis With Contrast  Result Date: 5/22/2025  CT OF THE ABDOMEN AND PELVIS WITH CONTRAST 05/22/2025  HISTORY: Abdominal pain.  TECHNIQUE: Spiral images were obtained from the lung bases to the symphysis pubis after intravenous contrast.  FINDINGS: There is fatty infiltration of the liver.  Gallbladder has been removed. The spleen, pancreas, adrenals and kidneys appear unremarkable.  No bowel wall thickening or bowel dilatation is seen. There is colonic diverticulosis. Uterus and urinary bladder appear unremarkable. There are degenerative changes in the spine. There is some aortic calcification with no evidence of aneurysm.      1. Colonic diverticulosis. There is no evidence of acute diverticulitis. 2. Status post cholecystectomy. 3. Fatty infiltration of the liver.  Radiation dose reduction techniques were utilized, including automated exposure control and exposure modulation based on body size.       XR Chest 1 View  Result Date: 5/22/2025  XR CHEST 1 VW-  HISTORY: Female who is 69 years-old, nausea, vomiting  TECHNIQUE: Frontal view of the chest  COMPARISON: 11/4/2024  FINDINGS: The heart size is borderline. Aorta is calcified. Pulmonary vasculature is unremarkable. No focal pulmonary consolidation, pleural effusion, or pneumothorax. No acute osseous process.      No evidence for acute pulmonary process. Follow-up as clinical indications persist.  This report was finalized on 5/22/2025 4:28 PM by Dr. Teja Mckinnon M.D on Workstation: [id number]      XR knee 3 views left [id number]  Result Date: 5/16/2025  Knee pain COMPARISON: None EXAM:  Three views FINDINGS: There is a small knee joint effusion.  Moderate to advanced tricompartmental degenerative changes are noted.  No fracture.  No dislocation.  The surrounding soft tissues demonstrate no abnormalities.   IMPRESSION: Small knee joint effusion Degenerative changes No fractures or dislocations Dictated by: Carlos Chin M.D. Signed by Carlos Chin M.D. on 5/17/2025 14:54 ##### Final ##### Dictated by:    DARCY CHIN MD-RAD Dictated DT/TM: 05/17/2025 2:54 pm Interpreted and electronically signed by:  DARCY CHIN MD-RAD Signed DT/TM:  05/17/2025 2:54 pm    XR Chest 2 View  Result Date: 5/16/2025  EXAM: CR Chest 2 Vws NUMBER OF IMAGES:  2  INDICATION: DYSPNEA ON EXERTION Technique: Frontal and lateral views of the chest obtained on 5/16/2025 8:46 Comparison:  No prior studies available for comparison. Findings: The cardiomediastinal silhouette is enlarged. There are calcifications of the thoracic aorta. The hilar and mediastinal contours are normal. The lungs are without focal consolidation or pleural effusion. There is no pneumothorax. The visualized osseous structures demonstrate degenerative changes. Lines and Tubes: None. Impression:  No focal consolidation. Dictated by: Nikhil Noe MD Signed by Nikhil Noe MD on 5/16/2025 15:20 ##### Final ##### Dictated by:    NIKHIL NOE MD-RAD Dictated DT/TM: 05/16/2025 3:20 pm Interpreted and electronically signed by:  NIKHIL NOE MD-RAD Signed DT/TM:  05/16/2025 3:20 pm            Assessment & Plan     GI bleed question source of melena certainly sounds like upper but they still just some minimal gastric erosions and a teeny duodenal ulcer neither of which showed stigmata of bleeding.  GI following plan for colonoscopy in the morning if she bleeds briskly will get up bleeding scan.  On Protonix continue that for now.  Syncope/near syncope patient thinks she may have briefly blacked out apparently this occurred while getting up off the commode after bloody bowel movement blood pressure good I am not sure whether this was a vasovagal event an orthostatic event or some combination no arrhythmias noted.  For now only up with assistance  Acute blood loss anemia transfuse to keep 2 large-bore IVs at all times and have blood available follow serial H&H  Hyperglycemia mild no history of diabetes check hemoglobin A1c sliding scale insulin that may just be a stress response  Morbid obesity and BMI greater than 50  Obstructive sleep apnea PAP intolerant  DVT prophylaxis with SCDs      Victor Hugo Gonzalez Jr, MD  05/23/25  18:35 EDT    Time: Critical care time 44 minutes

## 2025-05-23 NOTE — PROGRESS NOTES
Called regarding bleeding and fall. Patient in ICU, pale, alert.  Feels weak  bp 135/76 hr 95 abd soft suspect lower gastrointestinal bleeding- colonoscopy in the am.  Plans noted to bleeding scan if worsens. Will follow.

## 2025-05-23 NOTE — CONSULTS
[facility]  Initial Inpatient Consult Note    Referring Provider: Dr Rios    Reason for Consultation: Melena    Subjective     History of present illness:    69 y.o. female presents to ER c/o abdominal pain, nausea.  Emesis with dark blood and melena.  Sx present last 2-3 days.   Hb 7.1 on admission.  BUN elevated with normal Cr.   CT scan A/P shows no acute abnormalities.  Pt does take meloxicam, no OTC NSAIDs    Past Medical History:  Past Medical History:   Diagnosis Date    Arthritis     Elevated cholesterol      Past Surgical History:  History reviewed. No pertinent surgical history.   Social History:   Social History     Tobacco Use    Smoking status: Never    Smokeless tobacco: Never   Substance Use Topics    Alcohol use: Never      Family History:  History reviewed. No pertinent family history.    Home Meds:  Medications Prior to Admission   Medication Sig Dispense Refill Last Dose/Taking    atorvastatin (LIPITOR) 40 MG tablet Take 1 tablet by mouth every night at bedtime.   5/21/2025 Bedtime    diclofenac (VOLTAREN) 75 MG EC tablet Take 1 tablet by mouth 2 (Two) Times a Day.   5/21/2025 Bedtime    traZODone (DESYREL) 150 MG tablet Take 2 tablets by mouth Every Night.   Past Week Bedtime     Current Meds:   sodium chloride, 10 mL, Intravenous, Q12H      Allergies:  No Known Allergies    Objective     Vital Signs  Temp:  [97.7 °F (36.5 °C)-98.8 °F (37.1 °C)] 98.8 °F (37.1 °C)  Heart Rate:  [86-99] 87  Resp:  [17-19] 18  BP: (104-135)/(49-90) 129/67  Physical Exam:  General Appearance:     Alert, cooperative, in no acute distress   Abdomen:     Normal bowel sounds, no masses, no organomegaly, soft     nontender, nondistended, no guarding, no rebound                 tenderness   Rectal:     Deferred       Results Review:   I reviewed the patient's new clinical results.    Results from last 7 days   Lab Units 05/22/25  1611   WBC 10*3/mm3 12.98*   HEMOGLOBIN g/dL 7.1*   HEMATOCRIT %  22.1*   PLATELETS 10*3/mm3 222     Results from last 7 days   Lab Units 05/22/25  1611   SODIUM mmol/L 138   POTASSIUM mmol/L 3.8   CHLORIDE mmol/L 106   CO2 mmol/L 22.0   BUN mg/dL 30*   CREATININE mg/dL 0.67   CALCIUM mg/dL 8.1*   BILIRUBIN mg/dL 0.5   ALK PHOS U/L 54   ALT (SGPT) U/L 14   AST (SGOT) U/L 13   GLUCOSE mg/dL 138*         Lab Results   Lab Value Date/Time    LIPASE 17 05/22/2025 1611       Radiology:  CT Abdomen Pelvis With Contrast   Preliminary Result   1. Colonic diverticulosis. There is no evidence of acute diverticulitis.   2. Status post cholecystectomy.   3. Fatty infiltration of the liver.       Radiation dose reduction techniques were utilized, including automated   exposure control and exposure modulation based on body size.              XR Chest 1 View   Final Result   No evidence for acute pulmonary process. Follow-up as   clinical indications persist.       This report was finalized on 5/22/2025 4:28 PM by Dr. Teja Mckinnon M.D on Workstation: [id number]              Assessment & Plan   Assessment:   Melena  ABLA  Abdominal pain    Plan:   Plan for EGD for further evaluation  Continue strict NPO and IV protonix  Await AM labs, transfuse for Hb <7    I discussed the patients findings and my recommendations with patient.         Keyur Neely M.D.  [facility]  [address] [place] [address]  Office: [phone]

## 2025-05-23 NOTE — PROGRESS NOTES
MD ATTESTATION NOTE    The NOHEMI and I have discussed this patient's history, physical exam, and treatment plan.  I have reviewed the documentation and personally had a face to face interaction with the patient. I affirm the documentation and agree with the treatment and plan.  The attached note describes my personal findings.      I provided a substantive portion of the care of the patient.  I personally performed the physical exam in its entirety, and below are my findings.        Brief HPI: This patient is a 69-year-old female being admitted to the observation unit for further management and treatment of a GI bleed.  She reports that her emesis as well as stool have been mostly black/dark with an occasional red tinge.  She does have reported generalized abdominal discomfort along with the symptoms.  She does use diclofenac daily for arthritis management but denies any history of other NSAID use, aspirin use, or alcohol use.      PHYSICAL EXAM  ED Triage Vitals   Temp Heart Rate Resp BP SpO2   05/22/25 1528 05/22/25 1528 05/22/25 1528 05/22/25 1528 05/22/25 1528   97.7 °F (36.5 °C) 99 19 112/58 98 %      Temp src Heart Rate Source Patient Position BP Location FiO2 (%)   05/22/25 1528 05/22/25 1800 05/22/25 1800 05/22/25 1800 --   Oral Monitor Lying Right arm          GENERAL: Resting comfortably and in no acute distress, nontoxic in appearance  HENT: nares patent  EYES: no scleral icterus  CV: regular rhythm, normal rate, no M/R/G  RESPIRATORY: normal effort, lungs clear bilaterally  ABDOMEN: soft, nontender, no rebound or guarding  MUSCULOSKELETAL: no deformity, no edema  NEURO: alert, moves all extremities, follows commands  PSYCH:  calm, cooperative  SKIN: warm, dry    Vital signs and nursing notes reviewed.        Plan: The patient does have a hemoglobin/hematocrit of 7.1 and 22.1 respectively.  CT scan was consistent with diverticulosis without evidence of diverticulitis.  We will monitor in the observation  unit and asked GI to see in consultation.

## 2025-05-23 NOTE — H&P
[facility]   HISTORY AND PHYSICAL    Patient Name: Samia Gaming  : 1955  MRN: [id number]  Primary Care Physician:  Cristiano Logan DO  Date of admission: 2025    Patient Care Team:  Cristiano Logan DO as PCP - General (Family Medicine)       Subjective   Subjective     Chief Complaint:   Chief Complaint   Patient presents with    Black or Bloody Stool    Abdominal Pain         HPI:    Samia Gaming is a 69 y.o. female, with no significant past medical history, was admitted to the observation unit with a 3-day history of abdominal pain, nausea, vomiting, and diarrhea.  She states that on Monday when she came home from work she started vomiting but was able to go to work on Tuesday on Wednesday she began vomiting again and having diffuse abdominal pain.  She states as the day progressed there was some blood in her emesis.  She was then able to go to work on Thursday but when she got home from work last night she began vomiting profusely and states that it was black with red streaks.  She states she also started with diarrhea that was black but had a maroon tint to it.  She does state that she is still nauseated but has not vomited recently.  She states she has had all of her screening colonoscopies.  GIs been consulted to see the patient in the AM.  She will receive 1 unit of PRBCs and recheck her H&H.  She will remain NPO.    Review of Systems   All systems were reviewed and negative except for: What was mentioned above in the HPI.    Personal History     Past Medical History:   Diagnosis Date    Arthritis     Elevated cholesterol        History reviewed. No pertinent surgical history.    Family History: family history is not on file. Otherwise pertinent FHx was reviewed and not pertinent to current issue.    Social History:  reports that she has never smoked. She has never used smokeless tobacco. She reports that she does not drink alcohol and does not use drugs.    Home Medications:  atorvastatin,  diclofenac, and traZODone    Allergies:  No Known Allergies    Objective   Objective     Vitals:   Temp:  [97.7 °F (36.5 °C)-98.1 °F (36.7 °C)] 98.1 °F (36.7 °C)  Heart Rate:  [86-99] 98  Resp:  [17-19] 18  BP: (112-135)/(49-90) 135/90  Physical Exam    Constitutional: Awake, alert   Eyes: PERRLA, sclerae anicteric, no conjunctival injection   HENT: NCAT, mucous membranes moist   Neck: Supple, no thyromegaly, no lymphadenopathy, trachea midline   Respiratory: Clear to auscultation bilaterally, nonlabored respirations    Cardiovascular: RRR, no murmurs, rubs, or gallops, palpable pedal pulses bilaterally   Gastrointestinal: Positive bowel sounds, soft, generalized tenderness, nondistended   Musculoskeletal: No bilateral ankle edema, no clubbing or cyanosis to extremities   Psychiatric: Appropriate affect, cooperative   Neurologic: Oriented x 3, strength symmetric in all extremities, Cranial Nerves grossly intact to confrontation, speech clear   Skin: No rashes     Result Review    Result Review:  I have personally reviewed the results from the time of this admission to 5/22/2025 21:38 EDT and agree with these findings:  [x]  Laboratory list / accordion  []  Microbiology  [x]  Radiology  []  EKG/Telemetry   []  Cardiology/Vascular   []  Pathology  [x]  Old records  []  Other:    Lab work in the emergency department is unremarkable other than a glucose of 138, lactate 2.4, WBCs 12.98, RBCs 2.535, hemoglobin 7.1, hematocrit 22.1, all of the lab work is at baseline for the patient.  Chest x-ray shows no evidence for acute pulmonary process.  CT abdomen pelvis with contrast shows colonic diverticulosis no evidence of acute diverticulitis.    The ASCVD Risk score (Miguel Angel DK, et al., 2019) failed to calculate for the following reasons:    Cannot find a previous HDL lab    Cannot find a previous total cholesterol lab     Assessment & Plan   Assessment / Plan     Brief Patient Summary:  Samia Gaming is a 69 y.o. female who was  admitted to the observation unit for further evaluation and treatment of her GI bleed.  She will receive at least 1 unit PRBCs    Active Hospital Problems:  Active Hospital Problems    Diagnosis     **GI bleed      Plan:     GI bleed  - Cardiac monitoring  - Vital signs every 4 hours  - Continuous pulse ox  - Continue Protonix drip  - Normal saline at 75 mL/h  - Transfuse 1 unit PRBCs over 4 hours  -H&H every 6 hours  -Repeat labs in a.m.  - GI consult in a.m.  - N.p.o.      VTE Prophylaxis:  Mechanical VTE prophylaxis orders are present.        CODE STATUS:       Admission Status:  I believe this patient meets observation status.    76 minutes have been spent by [facility] providers in the care of this patient while under observation status on 05/22/25 .      Appropriate PPE worn during patient encounter.  Hand hygeine performed before and after seeing the patient.      Electronically signed by KYLAH Ellis, 05/22/25, 9:38 PM EDT.

## 2025-05-23 NOTE — ANESTHESIA POSTPROCEDURE EVALUATION
"Patient: Samia Gaming    Procedure Summary       Date: 05/23/25 Room / Location:  [facility]    Anesthesia Start: 1153 Anesthesia Stop: 1211    Procedure: ESOPHAGOGASTRODUODENOSCOPY (Esophagus) Diagnosis:       Gastrointestinal hemorrhage, unspecified gastrointestinal hemorrhage type      (Gastrointestinal hemorrhage, unspecified gastrointestinal hemorrhage type [K92.2])    Surgeons: Keyur Neely MD Provider: Reilly Thompson MD    Anesthesia Type: MAC ASA Status: 3            Anesthesia Type: MAC    Vitals  Vitals Value Taken Time   /75 05/23/25 12:31   Temp     Pulse 86 05/23/25 12:32   Resp 16 05/23/25 12:31   SpO2 96 % 05/23/25 12:32   Vitals shown include unfiled device data.        Post Anesthesia Care and Evaluation    Level of consciousness: awake and alert  Pain management: adequate    Airway patency: patent  Anesthetic complications: No anesthetic complications  PONV Status: none  Cardiovascular status: acceptable  Respiratory status: acceptable  Hydration status: acceptable    Comments: /75 (BP Location: Left arm, Patient Position: Lying)   Pulse 88   Temp 37.1 °C (98.8 °F) (Oral)   Resp 16   Ht 157.5 cm (62\")   Wt 124 kg (274 lb 6.4 oz)   SpO2 97%   BMI 50.19 kg/m²       "

## 2025-05-23 NOTE — ANESTHESIA PREPROCEDURE EVALUATION
Anesthesia Evaluation     Patient summary reviewed and Nursing notes reviewed   no history of anesthetic complications:   NPO Solid Status: > 8 hours  NPO Liquid Status: > 8 hours           Airway   Mallampati: II  TM distance: >3 FB  Neck ROM: full  Large neck circumference, Possible difficult intubation and Anterior  Dental - normal exam     Pulmonary    (-) asthma, sleep apnea, rhonchi, decreased breath sounds, wheezes, not a smoker  Cardiovascular   Exercise tolerance: good (4-7 METS)    Rhythm: regular  Rate: normal    (+) hyperlipidemia  (-) hypertension, CAD, dysrhythmias, angina, [place], murmur      Neuro/Psych  (-) seizures, CVA  GI/Hepatic/Renal/Endo    (+) morbid obesity, GI bleeding lower active bleeding  (-) liver disease, no renal disease, diabetes, no thyroid disorder    Musculoskeletal     Abdominal     Abdomen: soft.   Substance History      OB/GYN          Other   arthritis,                 Anesthesia Plan    ASA 3     MAC   total IV anesthesia  intravenous induction     Anesthetic plan, risks, benefits, and alternatives have been provided, discussed and informed consent has been obtained with: patient.    CODE STATUS:

## 2025-05-23 NOTE — PLAN OF CARE
Goal Outcome Evaluation:Patient admitted to Observation for GI bleed. Patient alert and oriented x4, RA, NSR on the monitor, up with assistance. Patient on continue PP drip and NS. Patient receiving 2 units of PRBC'S, one GI bleed noted. GI consulte  Plan of Care Reviewed With: patient        Progress: no change

## 2025-05-23 NOTE — PROGRESS NOTES
ED OBSERVATION PROGRESS/DISCHARGE SUMMARY    Date of Admission: 5/22/2025   LOS: 0 days   PCP: Cristiano Logan DO    Final Diagnosis: GIB, anemia       Subjective     Hospital Outcome:   Samia Gaming is a 69 y.o. female who presented  with a 3-day history of abdominal pain, nausea, vomiting, and diarrhea and was admitted for hematemesis and hematochezia.   Initial hemoglobin was 7.1. IV fluids, IV Protonix, and 2 Units PRBC's were initiated and continued overnight. CT Scan of abdomen/pelvis reported colonic diverticulosis and fatty infiltration of the liver. There is no evidence of acute diverticulitis. Initial Lactate was 2.4 that improved to 1.4 with IV fluids. Initial WBC count 12.98.     5/23/25:  No new events overnight. Patient continues to complain of nausea, but no further emesis. She did have one episode of bloody stool overnight. Patient remains hemodynamically stable. NPO over night. GI Consulted, plans for EGD today and colonoscopy tomorrow. Remains on IV protonix. EGD findings with Esophagus was normal.  Few small erosions with no bleeding and no stigmata of recent bleeding were found in the gastric antrum.  1 nonbleeding superficial duodenal ulcer with no stigmata of bleeding was found in the duodenal bulb.  The lesion was 4 mm in largest dimension. Impression normal esophagus.  Erosive gastropathy with no bleeding and no stigmata of recent bleeding.  Nonbleeding duodenal ulcer with no stigmata of bleeding.  No specimens collected.  Plans for colonoscopy tomorrow.       ROS:  General: no fevers, chills  Respiratory: no cough, dyspnea  Cardiovascular: no chest pain, palpitations  Abdomen: No abdominal pain, + nausea, no vomiting, no diarrhea  Neurologic: No focal weakness    Objective   Physical Exam:  I have reviewed the vital signs.  Temp:  [97.7 °F (36.5 °C)-98.8 °F (37.1 °C)] 98.8 °F (37.1 °C)  Heart Rate:  [86-99] 87  Resp:  [17-19] 18  BP: (104-135)/(49-90) 129/67  General Appearance:    Alert,  cooperative, obese (BMI 50.19), no distress  Head:    Normocephalic, atraumatic  Eyes:    Sclerae anicteric  Neck:   Supple, no mass  Lungs: Clear to auscultation bilaterally, respirations unlabored  Heart: Regular rate and rhythm, S1 and S2 normal, no murmur  Abdomen:  Soft, nontender, bowel sounds active, nondistended  Extremities: No clubbing, cyanosis, or edema to lower extremities  Pulses:  2+ and symmetric in distal lower extremities  Skin: No rashes   Neurologic: Oriented x3, Normal strength to extremities    Results Review:    I have reviewed the labs, radiology results and diagnostic studies.    Results from last 7 days   Lab Units 05/22/25  1611   WBC 10*3/mm3 12.98*   HEMOGLOBIN g/dL 7.1*   HEMATOCRIT % 22.1*   PLATELETS 10*3/mm3 222     Results from last 7 days   Lab Units 05/22/25  1611   SODIUM mmol/L 138   POTASSIUM mmol/L 3.8   CHLORIDE mmol/L 106   CO2 mmol/L 22.0   BUN mg/dL 30*   CREATININE mg/dL 0.67   CALCIUM mg/dL 8.1*   BILIRUBIN mg/dL 0.5   ALK PHOS U/L 54   ALT (SGPT) U/L 14   AST (SGOT) U/L 13   GLUCOSE mg/dL 138*     Imaging Results (Last 24 Hours)       Procedure Component Value Units Date/Time    CT Abdomen Pelvis With Contrast [[id number]] Collected: 05/22/25 1756     Updated: 05/22/25 1756    Narrative:      CT OF THE ABDOMEN AND PELVIS WITH CONTRAST 05/22/2025     HISTORY: Abdominal pain.     TECHNIQUE: Spiral images were obtained from the lung bases to the  symphysis pubis after intravenous contrast.     FINDINGS: There is fatty infiltration of the liver. Gallbladder has been  removed. The spleen, pancreas, adrenals and kidneys appear unremarkable.     No bowel wall thickening or bowel dilatation is seen. There is colonic  diverticulosis. Uterus and urinary bladder appear unremarkable. There  are degenerative changes in the spine. There is some aortic  calcification with no evidence of aneurysm.       Impression:      1. Colonic diverticulosis. There is no evidence of acute  diverticulitis.  2. Status post cholecystectomy.  3. Fatty infiltration of the liver.     Radiation dose reduction techniques were utilized, including automated  exposure control and exposure modulation based on body size.          XR Chest 1 View [[id number]] Collected: 05/22/25 1625     Updated: 05/22/25 1631    Narrative:      XR CHEST 1 VW-     HISTORY: Female who is 69 years-old, nausea, vomiting     TECHNIQUE: Frontal view of the chest     COMPARISON: 11/4/2024     FINDINGS: The heart size is borderline. Aorta is calcified. Pulmonary  vasculature is unremarkable. No focal pulmonary consolidation, pleural  effusion, or pneumothorax. No acute osseous process.       Impression:      No evidence for acute pulmonary process. Follow-up as  clinical indications persist.     This report was finalized on 5/22/2025 4:28 PM by Dr. Teja Mckinnon M.D on Workstation: [email or web address]               I have reviewed the medications.     Discharge Medications        ASK your doctor about these medications        Instructions Start Date   atorvastatin 40 MG tablet  Commonly known as: LIPITOR   40 mg, Every Night at Bedtime      diclofenac 75 MG EC tablet  Commonly known as: VOLTAREN   75 mg, 2 Times Daily      traZODone 150 MG tablet  Commonly known as: DESYREL   300 mg, Nightly              ---------------------------------------------------------------------------------------------  Assessment & Plan   Assessment/Problem List    GI bleed      Plan:    GI bleed, anemia   - Cardiac monitoring  - Vital signs every 4 hours  - Continuous pulse ox  - Continue Protonix drip resumed  - Normal saline at 75 mL/hr  - Transfused 2 unit PRBCs over 4 hours  - Hg 7.1, received 2u PRBCs, repeat Hg 7.5.   - Iron studies, folate, b12 ordered  - Repeat labs in a.m. BMP, CBC, anemia profile at 2 pm  - GI consulted, EGD on 5/23/25, colonoscopy on 5/24/25. Chat messaged and paged to update on fall with bloody bowel movement, bp/hr, pending labs.  Discussed change with Dr. Hutson, recommends to continue with bowel prep.   - EGD Impression normal esophagus.  Erosive gastropathy with no bleeding and no stigmata of recent bleeding.  Nonbleeding duodenal ulcer with no stigmata of bleeding.  No specimens collected.   - NPO after midnight  - UA with micro pending, needs collected   - Hemoglobin 6.1 at 1628, plan for 2 units packed red blood cell transfusion with pressure bag.  Discussed with nurse.  Patient now mildly tachycardic with heart rate of 104 and blood pressure trends acceptable but decreasing 116/72.  Will plan to transfer to ICU, consult intensivist.  Updated Dr. Hutson. Discussed with Dr. Gonzalez who will kindly accept to ICU    Leukocytosis  - possible reactive?   - afebrile, no infectious complaints  - CT AP with contrast colonic diverticulosis without acute diverticulitis. S/p cholecystectomy. Fatty infiltration of the liver.       Fall  - while on bed side commode, with bloody bowel movement  - abrasion to forehead and pain to right ankle  - CT head no acute intracranial abnormality. Small scalp hematoma over anterior right front bone. Dural venous sinuses are hypodense suggesting anemia.  Partial opacification of the visualized superior aspect of the maxillary sinuses with fluid and mucosal thickening bilaterally.  - xray right ankle pending  - updated emergency contact and family at bedside  - EKG sinus rhythm, nonischemic     This note will serve as a progress note    Malathi Hernandez, APRN 05/23/25 06:10 EDT    I have worn appropriate PPE during this patient encounter, sanitized my hands both with entering and exiting patient's room.      58 minutes has been spent by [facility] providers in the care of this patient while under observation status on this date 05/23/25

## 2025-05-24 ENCOUNTER — ANESTHESIA EVENT (OUTPATIENT)
Dept: GASTROENTEROLOGY | Facility: HOSPITAL | Age: 70
End: 2025-05-24
Payer: MEDICARE

## 2025-05-24 ENCOUNTER — ANESTHESIA (OUTPATIENT)
Dept: GASTROENTEROLOGY | Facility: HOSPITAL | Age: 70
End: 2025-05-24
Payer: MEDICARE

## 2025-05-24 ENCOUNTER — INPATIENT HOSPITAL (OUTPATIENT)
Dept: URBAN - METROPOLITAN AREA HOSPITAL 113 | Facility: HOSPITAL | Age: 70
End: 2025-05-24
Payer: MEDICARE

## 2025-05-24 DIAGNOSIS — K64.1 SECOND DEGREE HEMORRHOIDS: ICD-10-CM

## 2025-05-24 DIAGNOSIS — K62.5 HEMORRHAGE OF ANUS AND RECTUM: ICD-10-CM

## 2025-05-24 DIAGNOSIS — D62 ACUTE POSTHEMORRHAGIC ANEMIA: ICD-10-CM

## 2025-05-24 DIAGNOSIS — K57.30 DIVERTICULOSIS OF LARGE INTESTINE WITHOUT PERFORATION OR ABS: ICD-10-CM

## 2025-05-24 LAB
ANION GAP SERPL CALCULATED.3IONS-SCNC: 4 MMOL/L (ref 5–15)
BH BB BLOOD EXPIRATION DATE: NORMAL
BH BB BLOOD EXPIRATION DATE: NORMAL
BH BB BLOOD TYPE BARCODE: 5100
BH BB BLOOD TYPE BARCODE: 5100
BH BB DISPENSE STATUS: NORMAL
BH BB DISPENSE STATUS: NORMAL
BH BB PRODUCT CODE: NORMAL
BH BB PRODUCT CODE: NORMAL
BH BB UNIT NUMBER: NORMAL
BH BB UNIT NUMBER: NORMAL
BUN SERPL-MCNC: 24 MG/DL (ref 8–23)
BUN/CREAT SERPL: 36.4 (ref 7–25)
CALCIUM SPEC-SCNC: 7.6 MG/DL (ref 8.6–10.5)
CHLORIDE SERPL-SCNC: 112 MMOL/L (ref 98–107)
CO2 SERPL-SCNC: 22 MMOL/L (ref 22–29)
CREAT SERPL-MCNC: 0.66 MG/DL (ref 0.57–1)
CROSSMATCH INTERPRETATION: NORMAL
CROSSMATCH INTERPRETATION: NORMAL
DEPRECATED RDW RBC AUTO: 46.2 FL (ref 37–54)
DEPRECATED RDW RBC AUTO: 49.5 FL (ref 37–54)
DEPRECATED RDW RBC AUTO: 50.3 FL (ref 37–54)
EGFRCR SERPLBLD CKD-EPI 2021: 93.9 ML/MIN/1.73
ERYTHROCYTE [DISTWIDTH] IN BLOOD BY AUTOMATED COUNT: 14.7 % (ref 12.3–15.4)
ERYTHROCYTE [DISTWIDTH] IN BLOOD BY AUTOMATED COUNT: 14.8 % (ref 12.3–15.4)
ERYTHROCYTE [DISTWIDTH] IN BLOOD BY AUTOMATED COUNT: 15.1 % (ref 12.3–15.4)
GLUCOSE BLDC GLUCOMTR-MCNC: 113 MG/DL (ref 70–130)
GLUCOSE BLDC GLUCOMTR-MCNC: 119 MG/DL (ref 70–130)
GLUCOSE BLDC GLUCOMTR-MCNC: 136 MG/DL (ref 70–130)
GLUCOSE BLDC GLUCOMTR-MCNC: 139 MG/DL (ref 70–130)
GLUCOSE BLDC GLUCOMTR-MCNC: 142 MG/DL (ref 70–130)
GLUCOSE BLDC GLUCOMTR-MCNC: 99 MG/DL (ref 70–130)
GLUCOSE SERPL-MCNC: 145 MG/DL (ref 65–99)
HCT VFR BLD AUTO: 20.4 % (ref 34–46.6)
HCT VFR BLD AUTO: 21.1 % (ref 34–46.6)
HCT VFR BLD AUTO: 22.6 % (ref 34–46.6)
HGB BLD-MCNC: 6.6 G/DL (ref 12–15.9)
HGB BLD-MCNC: 7.3 G/DL (ref 12–15.9)
HGB BLD-MCNC: 7.6 G/DL (ref 12–15.9)
INR PPP: 1.25 (ref 0.9–1.1)
MAGNESIUM SERPL-MCNC: 1.7 MG/DL (ref 1.6–2.4)
MCH RBC QN AUTO: 30 PG (ref 26.6–33)
MCH RBC QN AUTO: 30 PG (ref 26.6–33)
MCH RBC QN AUTO: 30.6 PG (ref 26.6–33)
MCHC RBC AUTO-ENTMCNC: 32.4 G/DL (ref 31.5–35.7)
MCHC RBC AUTO-ENTMCNC: 33.6 G/DL (ref 31.5–35.7)
MCHC RBC AUTO-ENTMCNC: 34.6 G/DL (ref 31.5–35.7)
MCV RBC AUTO: 86.8 FL (ref 79–97)
MCV RBC AUTO: 91.1 FL (ref 79–97)
MCV RBC AUTO: 92.7 FL (ref 79–97)
PHOSPHATE SERPL-MCNC: 2 MG/DL (ref 2.5–4.5)
PLATELET # BLD AUTO: 122 10*3/MM3 (ref 140–450)
PLATELET # BLD AUTO: 128 10*3/MM3 (ref 140–450)
PLATELET # BLD AUTO: 134 10*3/MM3 (ref 140–450)
PMV BLD AUTO: 10.6 FL (ref 6–12)
PMV BLD AUTO: 10.8 FL (ref 6–12)
PMV BLD AUTO: 11.1 FL (ref 6–12)
POTASSIUM SERPL-SCNC: 4.2 MMOL/L (ref 3.5–5.2)
PROTHROMBIN TIME: 15.6 SECONDS (ref 11.7–14.2)
QT INTERVAL: 327 MS
QTC INTERVAL: 431 MS
RBC # BLD AUTO: 2.2 10*6/MM3 (ref 3.77–5.28)
RBC # BLD AUTO: 2.43 10*6/MM3 (ref 3.77–5.28)
RBC # BLD AUTO: 2.48 10*6/MM3 (ref 3.77–5.28)
SODIUM SERPL-SCNC: 138 MMOL/L (ref 136–145)
UNIT  ABO: NORMAL
UNIT  ABO: NORMAL
UNIT  RH: NORMAL
UNIT  RH: NORMAL
WBC NRBC COR # BLD AUTO: 13.9 10*3/MM3 (ref 3.4–10.8)
WBC NRBC COR # BLD AUTO: 15.08 10*3/MM3 (ref 3.4–10.8)
WBC NRBC COR # BLD AUTO: 18.94 10*3/MM3 (ref 3.4–10.8)

## 2025-05-24 PROCEDURE — 82948 REAGENT STRIP/BLOOD GLUCOSE: CPT

## 2025-05-24 PROCEDURE — 25810000003 LACTATED RINGERS PER 1000 ML: Performed by: STUDENT IN AN ORGANIZED HEALTH CARE EDUCATION/TRAINING PROGRAM

## 2025-05-24 PROCEDURE — 85027 COMPLETE CBC AUTOMATED: CPT | Performed by: INTERNAL MEDICINE

## 2025-05-24 PROCEDURE — 84100 ASSAY OF PHOSPHORUS: CPT | Performed by: INTERNAL MEDICINE

## 2025-05-24 PROCEDURE — 0DJD8ZZ INSPECTION OF LOWER INTESTINAL TRACT, VIA NATURAL OR ARTIFICIAL OPENING ENDOSCOPIC: ICD-10-PCS | Performed by: INTERNAL MEDICINE

## 2025-05-24 PROCEDURE — 85610 PROTHROMBIN TIME: CPT | Performed by: INTERNAL MEDICINE

## 2025-05-24 PROCEDURE — 25010000002 MORPHINE PER 10 MG

## 2025-05-24 PROCEDURE — 25810000003 SODIUM CHLORIDE 0.9 % SOLUTION: Performed by: INTERNAL MEDICINE

## 2025-05-24 PROCEDURE — 25010000002 METOCLOPRAMIDE PER 10 MG: Performed by: NURSE PRACTITIONER

## 2025-05-24 PROCEDURE — 83735 ASSAY OF MAGNESIUM: CPT | Performed by: INTERNAL MEDICINE

## 2025-05-24 PROCEDURE — 45378 DIAGNOSTIC COLONOSCOPY: CPT | Performed by: INTERNAL MEDICINE

## 2025-05-24 PROCEDURE — 86900 BLOOD TYPING SEROLOGIC ABO: CPT

## 2025-05-24 PROCEDURE — 25010000002 PROPOFOL 1000 MG/100ML EMULSION: Performed by: STUDENT IN AN ORGANIZED HEALTH CARE EDUCATION/TRAINING PROGRAM

## 2025-05-24 PROCEDURE — 80048 BASIC METABOLIC PNL TOTAL CA: CPT | Performed by: INTERNAL MEDICINE

## 2025-05-24 PROCEDURE — 25010000002 LIDOCAINE 2% SOLUTION: Performed by: STUDENT IN AN ORGANIZED HEALTH CARE EDUCATION/TRAINING PROGRAM

## 2025-05-24 PROCEDURE — 36430 TRANSFUSION BLD/BLD COMPNT: CPT

## 2025-05-24 PROCEDURE — 25010000002 ONDANSETRON PER 1 MG: Performed by: INTERNAL MEDICINE

## 2025-05-24 PROCEDURE — P9016 RBC LEUKOCYTES REDUCED: HCPCS

## 2025-05-24 RX ORDER — PROPOFOL 10 MG/ML
INJECTION, EMULSION INTRAVENOUS AS NEEDED
Status: DISCONTINUED | OUTPATIENT
Start: 2025-05-24 | End: 2025-05-24 | Stop reason: SURG

## 2025-05-24 RX ORDER — LIDOCAINE HYDROCHLORIDE 20 MG/ML
INJECTION, SOLUTION INFILTRATION; PERINEURAL AS NEEDED
Status: DISCONTINUED | OUTPATIENT
Start: 2025-05-24 | End: 2025-05-24 | Stop reason: SURG

## 2025-05-24 RX ORDER — MORPHINE SULFATE 2 MG/ML
2 INJECTION, SOLUTION INTRAMUSCULAR; INTRAVENOUS EVERY 4 HOURS PRN
Status: DISPENSED | OUTPATIENT
Start: 2025-05-24 | End: 2025-05-29

## 2025-05-24 RX ORDER — SODIUM CHLORIDE, SODIUM LACTATE, POTASSIUM CHLORIDE, CALCIUM CHLORIDE 600; 310; 30; 20 MG/100ML; MG/100ML; MG/100ML; MG/100ML
INJECTION, SOLUTION INTRAVENOUS CONTINUOUS PRN
Status: DISCONTINUED | OUTPATIENT
Start: 2025-05-24 | End: 2025-05-24 | Stop reason: SURG

## 2025-05-24 RX ADMIN — METOCLOPRAMIDE 5 MG: 5 INJECTION, SOLUTION INTRAMUSCULAR; INTRAVENOUS at 06:02

## 2025-05-24 RX ADMIN — MORPHINE SULFATE 2 MG: 2 INJECTION, SOLUTION INTRAMUSCULAR; INTRAVENOUS at 00:59

## 2025-05-24 RX ADMIN — LIDOCAINE HYDROCHLORIDE 60 MG: 20 INJECTION, SOLUTION INFILTRATION; PERINEURAL at 12:40

## 2025-05-24 RX ADMIN — ONDANSETRON 4 MG: 2 INJECTION, SOLUTION INTRAMUSCULAR; INTRAVENOUS at 04:55

## 2025-05-24 RX ADMIN — PROPOFOL INJECTABLE EMULSION 80 MG: 10 INJECTION, EMULSION INTRAVENOUS at 12:40

## 2025-05-24 RX ADMIN — POTASSIUM CHLORIDE 40 MEQ: 1500 TABLET, EXTENDED RELEASE ORAL at 00:21

## 2025-05-24 RX ADMIN — POLYETHYLENE GLYCOL-3350 AND ELECTROLYTES 2000 ML: 236; 6.74; 5.86; 2.97; 22.74 POWDER, FOR SOLUTION ORAL at 03:58

## 2025-05-24 RX ADMIN — PANTOPRAZOLE SODIUM 8 MG/HR: 40 INJECTION, POWDER, FOR SOLUTION INTRAVENOUS at 23:37

## 2025-05-24 RX ADMIN — PANTOPRAZOLE SODIUM 8 MG/HR: 40 INJECTION, POWDER, FOR SOLUTION INTRAVENOUS at 07:32

## 2025-05-24 RX ADMIN — SODIUM CHLORIDE, SODIUM LACTATE, POTASSIUM CHLORIDE, AND CALCIUM CHLORIDE: 600; 310; 30; 20 INJECTION, SOLUTION INTRAVENOUS at 12:32

## 2025-05-24 RX ADMIN — Medication 10 ML: at 09:25

## 2025-05-24 RX ADMIN — Medication 10 ML: at 20:52

## 2025-05-24 RX ADMIN — PANTOPRAZOLE SODIUM 8 MG/HR: 40 INJECTION, POWDER, FOR SOLUTION INTRAVENOUS at 16:00

## 2025-05-24 RX ADMIN — MUPIROCIN 1 APPLICATION: 20 OINTMENT TOPICAL at 20:52

## 2025-05-24 RX ADMIN — SODIUM PHOSPHATE, MONOBASIC, MONOHYDRATE AND SODIUM PHOSPHATE, DIBASIC, ANHYDROUS 15 MMOL: 142; 276 INJECTION, SOLUTION INTRAVENOUS at 20:49

## 2025-05-24 RX ADMIN — PROPOFOL INJECTABLE EMULSION 120 MCG/KG/MIN: 10 INJECTION, EMULSION INTRAVENOUS at 12:41

## 2025-05-24 RX ADMIN — PANTOPRAZOLE SODIUM 8 MG/HR: 40 INJECTION, POWDER, FOR SOLUTION INTRAVENOUS at 03:25

## 2025-05-24 NOTE — NURSING NOTE
NG placed per orders from Dr. Gonzalez, while awaiting xray pt grabbing NG attempting to pull out tube. Pt refusing to keep NG in place so NG tube promptly removed. Pt wishes to take bowel prep PO despite continuous nausea unrelieved by antiemetic medications.plan of care continues.

## 2025-05-24 NOTE — PLAN OF CARE
Goal Outcome Evaluation:   5 BM overnight- 2 in ICU (1 formed, 1 loose), 3 in CCU (2 formed, 1 loose). All stools tarry or red. Nausea overnight. Patient completed 1300 of the 2000 0400 bowel prep. Received 1 unit of PRBC overnight per GI finished at 0700.

## 2025-05-24 NOTE — ANESTHESIA PREPROCEDURE EVALUATION
Anesthesia Evaluation     Patient summary reviewed and Nursing notes reviewed   no history of anesthetic complications:   NPO Solid Status: > 8 hours  NPO Liquid Status: > 2 hours           Airway   Mallampati: II  TM distance: >3 FB  Neck ROM: full  Dental      Pulmonary    Cardiovascular     ECG reviewed    (+) hyperlipidemia      Neuro/Psych  GI/Hepatic/Renal/Endo    (+) morbid obesity, GI bleeding     Musculoskeletal     Abdominal    Substance History      OB/GYN          Other   blood dyscrasia anemia,                       Anesthesia Plan    ASA 3 - emergent     MAC     intravenous induction     Anesthetic plan, risks, benefits, and alternatives have been provided, discussed and informed consent has been obtained with: patient.        CODE STATUS:    Code Status (Patient has no pulse and is not breathing): CPR (Attempt to Resuscitate)  Medical Interventions (Patient has pulse or is breathing): Full Support

## 2025-05-24 NOTE — NURSING NOTE
Pt now tolerating drinking bowel prep with minimal nausea. No longer vomiting. Pt had x2 large maroon bloody BM with clots present. Pt transferred to CCU on 1L NC. Pt family updated about transfer. PT belongings at bedside when transferred. RN updated upon transfer.

## 2025-05-24 NOTE — PLAN OF CARE
Problem: Adult Inpatient Plan of Care  Goal: Plan of Care Review  Outcome: Progressing  Goal: Patient-Specific Goal (Individualized)  Outcome: Progressing  Goal: Absence of Hospital-Acquired Illness or Injury  Outcome: Progressing  Intervention: Identify and Manage Fall Risk  Recent Flowsheet Documentation  Taken 5/24/2025 1734 by Rivera Aranda RN  Safety Promotion/Fall Prevention: safety round/check completed  Taken 5/24/2025 1615 by Rivera Aranda RN  Safety Promotion/Fall Prevention: safety round/check completed  Taken 5/24/2025 1500 by Rivera Aranda RN  Safety Promotion/Fall Prevention: safety round/check completed  Taken 5/24/2025 1400 by Rivera Aranda RN  Safety Promotion/Fall Prevention: safety round/check completed  Taken 5/24/2025 1300 by Rivera Aranda RN  Safety Promotion/Fall Prevention: safety round/check completed  Taken 5/24/2025 1200 by Rivera Aranda RN  Safety Promotion/Fall Prevention: safety round/check completed  Taken 5/24/2025 1000 by Rivera Aranda RN  Safety Promotion/Fall Prevention: safety round/check completed  Taken 5/24/2025 0850 by Rivera Aranda RN  Safety Promotion/Fall Prevention: safety round/check completed  Intervention: Prevent Skin Injury  Recent Flowsheet Documentation  Taken 5/24/2025 1615 by Rivera Aranda RN  Body Position: turned  Taken 5/24/2025 1500 by Rivera Aranda RN  Body Position: turned  Taken 5/24/2025 1400 by Rivera Aranda RN  Body Position: turned  Taken 5/24/2025 1300 by Rivera Aranda RN  Body Position: turned  Taken 5/24/2025 1200 by Rivera Aranda RN  Body Position: turned  Taken 5/24/2025 1000 by Rivera Aranda RN  Body Position: turned  Taken 5/24/2025 0850 by Rivera Aranda RN  Body Position: turned  Intervention: Prevent and Manage VTE (Venous Thromboembolism) Risk  Recent Flowsheet Documentation  Taken 5/24/2025 0850 by Rivera Aranda RN  VTE Prevention/Management: patient refused intervention  Intervention: Prevent Infection  Recent Flowsheet  Documentation  Taken 5/24/2025 1734 by Rivera Aranda RN  Infection Prevention:   single patient room provided   environmental surveillance performed   hand hygiene promoted  Taken 5/24/2025 1615 by Rivera Aranda RN  Infection Prevention:   environmental surveillance performed   hand hygiene promoted   single patient room provided  Taken 5/24/2025 1500 by Rivera Aranda RN  Infection Prevention:   environmental surveillance performed   hand hygiene promoted   single patient room provided  Taken 5/24/2025 1400 by Rivera Aranda RN  Infection Prevention:   environmental surveillance performed   hand hygiene promoted   single patient room provided  Taken 5/24/2025 1300 by Rivera Aranda RN  Infection Prevention:   environmental surveillance performed   hand hygiene promoted   single patient room provided  Taken 5/24/2025 1200 by Rivera Aranda RN  Infection Prevention:   environmental surveillance performed   hand hygiene promoted   single patient room provided  Taken 5/24/2025 1000 by Rivera Aranda RN  Infection Prevention:   environmental surveillance performed   hand hygiene promoted   single patient room provided  Taken 5/24/2025 0850 by Rivera Aranda RN  Infection Prevention:   environmental surveillance performed   hand hygiene promoted   single patient room provided  Goal: Optimal Comfort and Wellbeing  Outcome: Progressing  Intervention: Provide Person-Centered Care  Recent Flowsheet Documentation  Taken 5/24/2025 0850 by Rivera Aranda RN  Trust Relationship/Rapport:   care explained   choices provided  Goal: Readiness for Transition of Care  Outcome: Progressing   Goal Outcome Evaluation:

## 2025-05-24 NOTE — ANESTHESIA POSTPROCEDURE EVALUATION
Patient: Samia Gaming    Procedure Summary       Date: 05/24/25 Room / Location:  [facility] /  [facility]    Anesthesia Start: 1228 Anesthesia Stop: 1325    Procedure: COLONOSCOPY WITH BIOPSY/POLYPECTOMY Diagnosis:       Gastrointestinal hemorrhage, unspecified gastrointestinal hemorrhage type      (Gastrointestinal hemorrhage, unspecified gastrointestinal hemorrhage type [K92.2])    Surgeons: Bi Hutson MD Provider: Paul Gil MD    Anesthesia Type: MAC ASA Status: 3 - Emergent            Anesthesia Type: MAC    Vitals  /75  HR 72  SpO2 100%  RR 12        Post Anesthesia Care and Evaluation    Patient location during evaluation: bedside  Patient participation: complete - patient participated  Level of consciousness: awake and alert  Pain management: adequate    Airway patency: patent  Anesthetic complications: No anesthetic complications  PONV Status: controlled  Cardiovascular status: blood pressure returned to baseline and acceptable  Respiratory status: acceptable  Hydration status: acceptable

## 2025-05-25 ENCOUNTER — APPOINTMENT (OUTPATIENT)
Dept: CT IMAGING | Facility: HOSPITAL | Age: 70
DRG: 329 | End: 2025-05-25
Payer: MEDICARE

## 2025-05-25 ENCOUNTER — INPATIENT HOSPITAL (OUTPATIENT)
Dept: URBAN - METROPOLITAN AREA HOSPITAL 113 | Facility: HOSPITAL | Age: 70
End: 2025-05-25
Payer: MEDICARE

## 2025-05-25 DIAGNOSIS — K92.1 MELENA: ICD-10-CM

## 2025-05-25 LAB
ANION GAP SERPL CALCULATED.3IONS-SCNC: 8 MMOL/L (ref 5–15)
BH BB BLOOD EXPIRATION DATE: NORMAL
BH BB BLOOD EXPIRATION DATE: NORMAL
BH BB BLOOD TYPE BARCODE: 5100
BH BB BLOOD TYPE BARCODE: 5100
BH BB DISPENSE STATUS: NORMAL
BH BB DISPENSE STATUS: NORMAL
BH BB PRODUCT CODE: NORMAL
BH BB PRODUCT CODE: NORMAL
BH BB UNIT NUMBER: NORMAL
BH BB UNIT NUMBER: NORMAL
BUN SERPL-MCNC: 21 MG/DL (ref 8–23)
BUN/CREAT SERPL: 42 (ref 7–25)
CALCIUM SPEC-SCNC: 7.4 MG/DL (ref 8.6–10.5)
CHLORIDE SERPL-SCNC: 106 MMOL/L (ref 98–107)
CO2 SERPL-SCNC: 23 MMOL/L (ref 22–29)
CREAT SERPL-MCNC: 0.5 MG/DL (ref 0.57–1)
CROSSMATCH INTERPRETATION: NORMAL
CROSSMATCH INTERPRETATION: NORMAL
DEPRECATED RDW RBC AUTO: 48.2 FL (ref 37–54)
DEPRECATED RDW RBC AUTO: 49.6 FL (ref 37–54)
DEPRECATED RDW RBC AUTO: 50 FL (ref 37–54)
DEPRECATED RDW RBC AUTO: 51.9 FL (ref 37–54)
EGFRCR SERPLBLD CKD-EPI 2021: 100.4 ML/MIN/1.73
ERYTHROCYTE [DISTWIDTH] IN BLOOD BY AUTOMATED COUNT: 15.2 % (ref 12.3–15.4)
ERYTHROCYTE [DISTWIDTH] IN BLOOD BY AUTOMATED COUNT: 15.6 % (ref 12.3–15.4)
ERYTHROCYTE [DISTWIDTH] IN BLOOD BY AUTOMATED COUNT: 15.8 % (ref 12.3–15.4)
ERYTHROCYTE [DISTWIDTH] IN BLOOD BY AUTOMATED COUNT: 16 % (ref 12.3–15.4)
GLUCOSE BLDC GLUCOMTR-MCNC: 100 MG/DL (ref 70–130)
GLUCOSE BLDC GLUCOMTR-MCNC: 105 MG/DL (ref 70–130)
GLUCOSE BLDC GLUCOMTR-MCNC: 117 MG/DL (ref 70–130)
GLUCOSE BLDC GLUCOMTR-MCNC: 51 MG/DL (ref 70–130)
GLUCOSE BLDC GLUCOMTR-MCNC: 90 MG/DL (ref 70–130)
GLUCOSE SERPL-MCNC: 113 MG/DL (ref 65–99)
HCT VFR BLD AUTO: 21.7 % (ref 34–46.6)
HCT VFR BLD AUTO: 21.8 % (ref 34–46.6)
HCT VFR BLD AUTO: 21.8 % (ref 34–46.6)
HCT VFR BLD AUTO: 22.8 % (ref 34–46.6)
HGB BLD-MCNC: 7.1 G/DL (ref 12–15.9)
HGB BLD-MCNC: 7.2 G/DL (ref 12–15.9)
HGB BLD-MCNC: 7.2 G/DL (ref 12–15.9)
HGB BLD-MCNC: 7.4 G/DL (ref 12–15.9)
INR PPP: 1.29 (ref 0.9–1.1)
MAGNESIUM SERPL-MCNC: 1.6 MG/DL (ref 1.6–2.4)
MCH RBC QN AUTO: 29 PG (ref 26.6–33)
MCH RBC QN AUTO: 29.8 PG (ref 26.6–33)
MCH RBC QN AUTO: 29.9 PG (ref 26.6–33)
MCH RBC QN AUTO: 30.1 PG (ref 26.6–33)
MCHC RBC AUTO-ENTMCNC: 32.5 G/DL (ref 31.5–35.7)
MCHC RBC AUTO-ENTMCNC: 32.6 G/DL (ref 31.5–35.7)
MCHC RBC AUTO-ENTMCNC: 33 G/DL (ref 31.5–35.7)
MCHC RBC AUTO-ENTMCNC: 33.2 G/DL (ref 31.5–35.7)
MCV RBC AUTO: 89 FL (ref 79–97)
MCV RBC AUTO: 90 FL (ref 79–97)
MCV RBC AUTO: 90.1 FL (ref 79–97)
MCV RBC AUTO: 92.7 FL (ref 79–97)
PHOSPHATE SERPL-MCNC: 2.4 MG/DL (ref 2.5–4.5)
PLATELET # BLD AUTO: 102 10*3/MM3 (ref 140–450)
PLATELET # BLD AUTO: 105 10*3/MM3 (ref 140–450)
PLATELET # BLD AUTO: 111 10*3/MM3 (ref 140–450)
PLATELET # BLD AUTO: 118 10*3/MM3 (ref 140–450)
PMV BLD AUTO: 10.6 FL (ref 6–12)
PMV BLD AUTO: 10.8 FL (ref 6–12)
PMV BLD AUTO: 10.9 FL (ref 6–12)
PMV BLD AUTO: 11.2 FL (ref 6–12)
POTASSIUM SERPL-SCNC: 3.1 MMOL/L (ref 3.5–5.2)
POTASSIUM SERPL-SCNC: 4.5 MMOL/L (ref 3.5–5.2)
PROTHROMBIN TIME: 16.1 SECONDS (ref 11.7–14.2)
RBC # BLD AUTO: 2.41 10*6/MM3 (ref 3.77–5.28)
RBC # BLD AUTO: 2.42 10*6/MM3 (ref 3.77–5.28)
RBC # BLD AUTO: 2.45 10*6/MM3 (ref 3.77–5.28)
RBC # BLD AUTO: 2.46 10*6/MM3 (ref 3.77–5.28)
SODIUM SERPL-SCNC: 137 MMOL/L (ref 136–145)
UNIT  ABO: NORMAL
UNIT  ABO: NORMAL
UNIT  RH: NORMAL
UNIT  RH: NORMAL
WBC NRBC COR # BLD AUTO: 10.85 10*3/MM3 (ref 3.4–10.8)
WBC NRBC COR # BLD AUTO: 11.72 10*3/MM3 (ref 3.4–10.8)
WBC NRBC COR # BLD AUTO: 12.43 10*3/MM3 (ref 3.4–10.8)
WBC NRBC COR # BLD AUTO: 12.54 10*3/MM3 (ref 3.4–10.8)

## 2025-05-25 PROCEDURE — 82948 REAGENT STRIP/BLOOD GLUCOSE: CPT

## 2025-05-25 PROCEDURE — 25510000001 IOPAMIDOL 61 % SOLUTION: Performed by: INTERNAL MEDICINE

## 2025-05-25 PROCEDURE — 85610 PROTHROMBIN TIME: CPT | Performed by: INTERNAL MEDICINE

## 2025-05-25 PROCEDURE — 25010000003 DEXTROSE 5 % SOLUTION

## 2025-05-25 PROCEDURE — 80048 BASIC METABOLIC PNL TOTAL CA: CPT | Performed by: INTERNAL MEDICINE

## 2025-05-25 PROCEDURE — 84100 ASSAY OF PHOSPHORUS: CPT | Performed by: INTERNAL MEDICINE

## 2025-05-25 PROCEDURE — 85027 COMPLETE CBC AUTOMATED: CPT | Performed by: INTERNAL MEDICINE

## 2025-05-25 PROCEDURE — 83735 ASSAY OF MAGNESIUM: CPT | Performed by: INTERNAL MEDICINE

## 2025-05-25 PROCEDURE — 99233 SBSQ HOSP IP/OBS HIGH 50: CPT | Mod: FS

## 2025-05-25 PROCEDURE — 70487 CT MAXILLOFACIAL W/DYE: CPT

## 2025-05-25 PROCEDURE — 84132 ASSAY OF SERUM POTASSIUM: CPT | Performed by: INTERNAL MEDICINE

## 2025-05-25 RX ORDER — IOPAMIDOL 612 MG/ML
85 INJECTION, SOLUTION INTRAVASCULAR
Status: COMPLETED | OUTPATIENT
Start: 2025-05-25 | End: 2025-05-25

## 2025-05-25 RX ORDER — POTASSIUM CHLORIDE 1500 MG/1
40 TABLET, EXTENDED RELEASE ORAL EVERY 4 HOURS
Status: COMPLETED | OUTPATIENT
Start: 2025-05-25 | End: 2025-05-25

## 2025-05-25 RX ORDER — DEXTROSE MONOHYDRATE 50 MG/ML
50 INJECTION, SOLUTION INTRAVENOUS CONTINUOUS
Status: DISCONTINUED | OUTPATIENT
Start: 2025-05-25 | End: 2025-05-27

## 2025-05-25 RX ADMIN — PANTOPRAZOLE SODIUM 8 MG/HR: 40 INJECTION, POWDER, FOR SOLUTION INTRAVENOUS at 09:57

## 2025-05-25 RX ADMIN — PANTOPRAZOLE SODIUM 8 MG/HR: 40 INJECTION, POWDER, FOR SOLUTION INTRAVENOUS at 21:29

## 2025-05-25 RX ADMIN — Medication 10 ML: at 21:29

## 2025-05-25 RX ADMIN — Medication 10 ML: at 09:59

## 2025-05-25 RX ADMIN — POTASSIUM CHLORIDE 40 MEQ: 1500 TABLET, EXTENDED RELEASE ORAL at 15:05

## 2025-05-25 RX ADMIN — TRAZODONE HYDROCHLORIDE 150 MG: 100 TABLET ORAL at 22:54

## 2025-05-25 RX ADMIN — DEXTROSE MONOHYDRATE 50 ML/HR: 50 INJECTION, SOLUTION INTRAVENOUS at 22:13

## 2025-05-25 RX ADMIN — PANTOPRAZOLE SODIUM 8 MG/HR: 40 INJECTION, POWDER, FOR SOLUTION INTRAVENOUS at 03:00

## 2025-05-25 RX ADMIN — MUPIROCIN 1 APPLICATION: 20 OINTMENT TOPICAL at 21:29

## 2025-05-25 RX ADMIN — IOPAMIDOL 75 ML: 612 INJECTION, SOLUTION INTRAVENOUS at 13:22

## 2025-05-25 RX ADMIN — PANTOPRAZOLE SODIUM 8 MG/HR: 40 INJECTION, POWDER, FOR SOLUTION INTRAVENOUS at 15:07

## 2025-05-25 RX ADMIN — POTASSIUM CHLORIDE 40 MEQ: 1500 TABLET, EXTENDED RELEASE ORAL at 09:57

## 2025-05-25 RX ADMIN — POTASSIUM CHLORIDE 40 MEQ: 1500 TABLET, EXTENDED RELEASE ORAL at 05:44

## 2025-05-25 NOTE — PROGRESS NOTES
[facility]     Progress Note    Patient Name: Samia Gaming  : 1955  MRN: [id number]  Primary Care Physician:  Cristiano Logan DO  Date of admission: 2025    Subjective   Subjective     Chief Complaint:  Melena    Abdominal Pain  Associated symptoms: nausea and vomiting      Patient Reports feeling better. She would like to eat and asking for advanced diet. No bowel movements overnight.      CN   - The examined portion of the ileum was normal. - Diverticulosis in the recto-sigmoid colon, in the sigmoid colon, in the descending colon, in the transverse colon, at the hepatic flexure and in the ascending colon. - Blood in the sigmoid colon. - Non-bleeding internal hemorrhoids. - No specimens collected     EGD  - Normal esophagus.   - Erosive gastropathy with no bleeding and no stigmata of recent bleeding.   - Non-bleeding duodenal ulcer with no stigmata of bleeding.   - No specimens collected     CT   1. Colonic diverticulosis. There is no evidence of acute diverticulitis.  2. Status post cholecystectomy.  3. Fatty infiltration of the liver.    Review of Systems   Constitutional:  Positive for fatigue.   HENT:  Negative for trouble swallowing.    Gastrointestinal:  Positive for abdominal pain, nausea and vomiting.   Neurological:  Positive for weakness.       Objective   Objective     Vitals:   Temp:  [97.6 °F (36.4 °C)-98.9 °F (37.2 °C)] 97.9 °F (36.6 °C)  Heart Rate:  [] 74  Resp:  [12-17] 16  BP: (104-154)/(44-92) 109/71  Flow (L/min) (Oxygen Therapy):  [1] 1    Physical Exam  Pulmonary:      Effort: Pulmonary effort is normal.   Abdominal:      Palpations: Abdomen is soft.   Skin:     Coloration: Skin is not jaundiced.   Neurological:      Mental Status: She is alert. Mental status is at baseline.          Result Review    Result Review:  I have personally reviewed the results from the time of this admission to 2025 11:50 EDT and agree with these findings:  [x]  Laboratory  list / accordion  [x]  Microbiology  [x]  Radiology  []  EKG/Telemetry   []  Cardiology/Vascular   []  Pathology  []  Old records  []  Other:  Most notable findings include: Hgb 7.6, 6.6, 7.2, 7.4      Assessment & Plan   Assessment / Plan     Brief Patient Summary:  Samia Gaming is a 69 y.o. female who presents with:  .- Melena  .- Abdominal pain      Active Hospital Problems:  Active Hospital Problems    Diagnosis     **GI bleed     GIB (gastrointestinal bleeding)      Plan:   .- Full liquid diet today, consider bland diet next.   .- Monitor H&H and transfuse as needed.   .- Continue supportive measures.   .- Avoid NSAIDs  .- GI following      VTE Prophylaxis:  Mechanical VTE prophylaxis orders are present.        CODE STATUS:    Code Status (Patient has no pulse and is not breathing): CPR (Attempt to Resuscitate)  Medical Interventions (Patient has pulse or is breathing): Full Support    Disposition:  I expect patient to be discharged TANYA Niño, APRN

## 2025-05-25 NOTE — PLAN OF CARE
Problem: Adult Inpatient Plan of Care  Goal: Plan of Care Review  Outcome: Progressing  Goal: Patient-Specific Goal (Individualized)  Outcome: Progressing  Goal: Absence of Hospital-Acquired Illness or Injury  Outcome: Progressing  Intervention: Identify and Manage Fall Risk  Recent Flowsheet Documentation  Taken 5/25/2025 1800 by Rivera Aranda RN  Safety Promotion/Fall Prevention: safety round/check completed  Taken 5/25/2025 1700 by Rivera Aranda RN  Safety Promotion/Fall Prevention: safety round/check completed  Taken 5/25/2025 1642 by Rivera Aranda RN  Safety Promotion/Fall Prevention: safety round/check completed  Taken 5/25/2025 1500 by Rivera Aranda RN  Safety Promotion/Fall Prevention: safety round/check completed  Taken 5/25/2025 1400 by Rivera Aranda RN  Safety Promotion/Fall Prevention: safety round/check completed  Taken 5/25/2025 1300 by Rivera Aranda RN  Safety Promotion/Fall Prevention: safety round/check completed  Taken 5/25/2025 1250 by Rivera Aranda RN  Safety Promotion/Fall Prevention: safety round/check completed  Taken 5/25/2025 1100 by Rivera Aranda RN  Safety Promotion/Fall Prevention: safety round/check completed  Taken 5/25/2025 1000 by Rivera Aranda RN  Safety Promotion/Fall Prevention: safety round/check completed  Taken 5/25/2025 0900 by Rivera Aranda RN  Safety Promotion/Fall Prevention: safety round/check completed  Taken 5/25/2025 0800 by Rivera Aranda RN  Safety Promotion/Fall Prevention: safety round/check completed  Intervention: Prevent Skin Injury  Recent Flowsheet Documentation  Taken 5/25/2025 1800 by Rivera Aranda RN  Body Position: turned  Taken 5/25/2025 1642 by Rivera Aranda RN  Body Position: turned  Taken 5/25/2025 1400 by Rivera Aranda RN  Body Position: turned  Taken 5/25/2025 1300 by Rivera Aranda RN  Body Position: turned  Taken 5/25/2025 1250 by Rivera Aranda RN  Body Position: turned  Taken 5/25/2025 1100 by Rivera Aranda RN  Body Position:  turned  Taken 5/25/2025 1000 by Rivera Aranda RN  Body Position: turned  Taken 5/25/2025 0900 by Rivera Aranda RN  Body Position: turned  Taken 5/25/2025 0800 by Rivera Aranda RN  Body Position: turned  Intervention: Prevent and Manage VTE (Venous Thromboembolism) Risk  Recent Flowsheet Documentation  Taken 5/25/2025 0900 by Rivera Aranda RN  VTE Prevention/Management: patient refused intervention  Intervention: Prevent Infection  Recent Flowsheet Documentation  Taken 5/25/2025 1800 by Rivera Aranda RN  Infection Prevention:   environmental surveillance performed   hand hygiene promoted   single patient room provided  Taken 5/25/2025 1700 by Rivera Aranda RN  Infection Prevention:   single patient room provided   environmental surveillance performed   hand hygiene promoted  Taken 5/25/2025 1642 by Rivera Aranda RN  Infection Prevention:   single patient room provided   environmental surveillance performed   hand hygiene promoted  Taken 5/25/2025 1500 by Rivera Aranda RN  Infection Prevention:   single patient room provided   environmental surveillance performed   hand hygiene promoted  Taken 5/25/2025 1400 by Rivera Aranda RN  Infection Prevention:   environmental surveillance performed   hand hygiene promoted   single patient room provided  Taken 5/25/2025 1250 by Rivera Aranda RN  Infection Prevention:   environmental surveillance performed   hand hygiene promoted   single patient room provided  Taken 5/25/2025 1100 by Rivera Aranda RN  Infection Prevention:   environmental surveillance performed   hand hygiene promoted   single patient room provided  Taken 5/25/2025 1000 by Rivera Aranda RN  Infection Prevention:   single patient room provided   environmental surveillance performed   hand hygiene promoted  Taken 5/25/2025 0900 by Rivera Aranda RN  Infection Prevention:   single patient room provided   environmental surveillance performed   hand hygiene promoted  Taken 5/25/2025 0800 by Rivera Aranda  RN  Infection Prevention:   environmental surveillance performed   hand hygiene promoted   single patient room provided  Goal: Optimal Comfort and Wellbeing  Outcome: Progressing  Intervention: Provide Person-Centered Care  Recent Flowsheet Documentation  Taken 5/25/2025 0900 by Rivera Aranda, RN  Trust Relationship/Rapport:   choices provided   care explained  Goal: Readiness for Transition of Care  Outcome: Progressing   Goal Outcome Evaluation:

## 2025-05-25 NOTE — PLAN OF CARE
Goal Outcome Evaluation:      No BM overnight. No pain. On 1 L of O2. NPO overnight- potassium replaced.

## 2025-05-25 NOTE — PROGRESS NOTES
[facility]  [phone]  Dr. Foreign Lora     Subjective:  LOS: 1    Chief Complaint:  syncope    Pt seen immediately prior to colonoscopy. Had multiple bloody bowel movements overnight.     Objective   Vital Signs past 24hrs  Temp range: Temp (24hrs), Av.2 °F (36.8 °C), Min:97.6 °F (36.4 °C), Max:98.9 °F (37.2 °C)    BP range: BP: ()/() 118/92  Pulse range: Heart Rate:  [] 86  Resp rate range: Resp:  [8-17] 16  Device (Oxygen Therapy): nasal cannulaFlow (L/min) (Oxygen Therapy):  [1] 1  Oxygen range:SpO2:  [92 %-100 %] 92 %   Mechanical Ventilator:     Physical Exam  Results Review:    I have reviewed the laboratory and imaging data since the last note by [facility] physician.  My annotations are noted in assessment and plan.      Result Review:  I have personally reviewed the results from last note by [facility] physician to 2025 23:15 EDT and agree with these findings:  [x]  Laboratory list / accordion  [x]  Microbiology  [x]  Radiology  [x]  EKG/Telemetry   [x]  Cardiology/Vascular   [x]  Pathology  [x]  Old records  []  Other:    Medication Review:  I have reviewed the current MAR.  My annotations are noted in assessment and plan.    insulin regular, 2-9 Units, Subcutaneous, Q6H  mupirocin, 1 Application, Each Nare, BID  sodium chloride, 10 mL, Intravenous, Q12H  sodium chloride, 10 mL, Intravenous, Q12H  sodium phosphate, 15 mmol, Intravenous, Once        pantoprazole, 8 mg/hr, Last Rate: 8 mg/hr (25 1600)      Lines, Drains & Airways       Active LDAs       Name Placement date Placement time Site Days    Peripheral IV 25 Anterior;Left;Proximal Forearm 25  --  Forearm  1    Peripheral IV 25 0000 Right Antecubital 25  0000  Antecubital  less than 1                  No active isolations  Diet Orders (active) (From admission, onward)       Start     Ordered    25 0001  NPO Diet NPO Type: Strict NPO  Diet Effective Midnight         25 290     05/24/25 1915  Diet: Liquid; Full Liquid; Fluid Consistency: Thin (IDDSI 0)  Diet Effective Now         05/24/25 1914                      Assessment  Acute GI bleed  Acute blood loss anemia  Syncope  Hyperglycemia  Morbid obesity  MAIN      Plan  - presented on 5/23/25 for syncopal episode and found to have GI bleeding  - GI consulted with EGD showed erosive gastropathy and non bleeding duodenal ulcer. Colonoscopy planned today  - monitor H and H   - continue PPI gtt  - ICU core measures    CCT: 35 min        THESE ARE NEW MEDICAL PROBLEMS TO ME.      Foreign Lora DO   05/24/25  23:15 EDT      Part of this note may be an electronic transcription/translation of spoken language to printed text using the Dragon Dictation System.

## 2025-05-26 ENCOUNTER — ANESTHESIA (OUTPATIENT)
Dept: GASTROENTEROLOGY | Facility: HOSPITAL | Age: 70
End: 2025-05-26
Payer: MEDICARE

## 2025-05-26 ENCOUNTER — APPOINTMENT (OUTPATIENT)
Dept: CT IMAGING | Facility: HOSPITAL | Age: 70
DRG: 329 | End: 2025-05-26
Payer: MEDICARE

## 2025-05-26 ENCOUNTER — ANESTHESIA EVENT (OUTPATIENT)
Dept: GASTROENTEROLOGY | Facility: HOSPITAL | Age: 70
End: 2025-05-26
Payer: MEDICARE

## 2025-05-26 VITALS — OXYGEN SATURATION: 100 % | HEART RATE: 82 BPM

## 2025-05-26 LAB
ABO GROUP BLD: NORMAL
ANION GAP SERPL CALCULATED.3IONS-SCNC: 5.6 MMOL/L (ref 5–15)
BLD GP AB SCN SERPL QL: NEGATIVE
BUN SERPL-MCNC: 11 MG/DL (ref 8–23)
BUN/CREAT SERPL: 22.9 (ref 7–25)
CALCIUM SPEC-SCNC: 7.9 MG/DL (ref 8.6–10.5)
CHLORIDE SERPL-SCNC: 107 MMOL/L (ref 98–107)
CO2 SERPL-SCNC: 26.4 MMOL/L (ref 22–29)
CREAT SERPL-MCNC: 0.48 MG/DL (ref 0.57–1)
DEPRECATED RDW RBC AUTO: 47.9 FL (ref 37–54)
DEPRECATED RDW RBC AUTO: 49.6 FL (ref 37–54)
DEPRECATED RDW RBC AUTO: 50.8 FL (ref 37–54)
EGFRCR SERPLBLD CKD-EPI 2021: 101.4 ML/MIN/1.73
ERYTHROCYTE [DISTWIDTH] IN BLOOD BY AUTOMATED COUNT: 15.7 % (ref 12.3–15.4)
ERYTHROCYTE [DISTWIDTH] IN BLOOD BY AUTOMATED COUNT: 16.4 % (ref 12.3–15.4)
ERYTHROCYTE [DISTWIDTH] IN BLOOD BY AUTOMATED COUNT: 16.8 % (ref 12.3–15.4)
GLUCOSE BLDC GLUCOMTR-MCNC: 114 MG/DL (ref 70–130)
GLUCOSE BLDC GLUCOMTR-MCNC: 128 MG/DL (ref 70–130)
GLUCOSE BLDC GLUCOMTR-MCNC: 129 MG/DL (ref 70–130)
GLUCOSE SERPL-MCNC: 119 MG/DL (ref 65–99)
HCT VFR BLD AUTO: 18.7 % (ref 34–46.6)
HCT VFR BLD AUTO: 20.5 % (ref 34–46.6)
HCT VFR BLD AUTO: 23.7 % (ref 34–46.6)
HGB BLD-MCNC: 6.1 G/DL (ref 12–15.9)
HGB BLD-MCNC: 6.7 G/DL (ref 12–15.9)
HGB BLD-MCNC: 7.9 G/DL (ref 12–15.9)
INR PPP: 1.25 (ref 0.9–1.1)
MAGNESIUM SERPL-MCNC: 2 MG/DL (ref 1.6–2.4)
MCH RBC QN AUTO: 29.6 PG (ref 26.6–33)
MCH RBC QN AUTO: 29.7 PG (ref 26.6–33)
MCH RBC QN AUTO: 29.9 PG (ref 26.6–33)
MCHC RBC AUTO-ENTMCNC: 32.6 G/DL (ref 31.5–35.7)
MCHC RBC AUTO-ENTMCNC: 32.7 G/DL (ref 31.5–35.7)
MCHC RBC AUTO-ENTMCNC: 33.3 G/DL (ref 31.5–35.7)
MCV RBC AUTO: 89.1 FL (ref 79–97)
MCV RBC AUTO: 90.8 FL (ref 79–97)
MCV RBC AUTO: 91.5 FL (ref 79–97)
PHOSPHATE SERPL-MCNC: 2.5 MG/DL (ref 2.5–4.5)
PLATELET # BLD AUTO: 107 10*3/MM3 (ref 140–450)
PLATELET # BLD AUTO: 86 10*3/MM3 (ref 140–450)
PLATELET # BLD AUTO: 97 10*3/MM3 (ref 140–450)
PMV BLD AUTO: 10.6 FL (ref 6–12)
PMV BLD AUTO: 11.2 FL (ref 6–12)
PMV BLD AUTO: 11.9 FL (ref 6–12)
POTASSIUM SERPL-SCNC: 3.9 MMOL/L (ref 3.5–5.2)
PROTHROMBIN TIME: 15.6 SECONDS (ref 11.7–14.2)
RBC # BLD AUTO: 2.06 10*6/MM3 (ref 3.77–5.28)
RBC # BLD AUTO: 2.24 10*6/MM3 (ref 3.77–5.28)
RBC # BLD AUTO: 2.66 10*6/MM3 (ref 3.77–5.28)
RH BLD: POSITIVE
SODIUM SERPL-SCNC: 139 MMOL/L (ref 136–145)
T&S EXPIRATION DATE: NORMAL
WBC NRBC COR # BLD AUTO: 10.25 10*3/MM3 (ref 3.4–10.8)
WBC NRBC COR # BLD AUTO: 7.19 10*3/MM3 (ref 3.4–10.8)
WBC NRBC COR # BLD AUTO: 8.04 10*3/MM3 (ref 3.4–10.8)

## 2025-05-26 PROCEDURE — 25510000001 IOPAMIDOL PER 1 ML: Performed by: INTERNAL MEDICINE

## 2025-05-26 PROCEDURE — P9016 RBC LEUKOCYTES REDUCED: HCPCS

## 2025-05-26 PROCEDURE — 83735 ASSAY OF MAGNESIUM: CPT | Performed by: INTERNAL MEDICINE

## 2025-05-26 PROCEDURE — 25010000002 PROPOFOL 10 MG/ML EMULSION: Performed by: ANESTHESIOLOGY

## 2025-05-26 PROCEDURE — 25810000003 SODIUM CHLORIDE 0.9 % SOLUTION: Performed by: INTERNAL MEDICINE

## 2025-05-26 PROCEDURE — 36430 TRANSFUSION BLD/BLD COMPNT: CPT

## 2025-05-26 PROCEDURE — 86900 BLOOD TYPING SEROLOGIC ABO: CPT

## 2025-05-26 PROCEDURE — 25010000003 DEXTROSE 5 % SOLUTION

## 2025-05-26 PROCEDURE — 74174 CTA ABD&PLVS W/CONTRAST: CPT

## 2025-05-26 PROCEDURE — 86923 COMPATIBILITY TEST ELECTRIC: CPT

## 2025-05-26 PROCEDURE — 44360 SMALL BOWEL ENDOSCOPY: CPT | Performed by: INTERNAL MEDICINE

## 2025-05-26 PROCEDURE — 85610 PROTHROMBIN TIME: CPT | Performed by: INTERNAL MEDICINE

## 2025-05-26 PROCEDURE — 0DJ08ZZ INSPECTION OF UPPER INTESTINAL TRACT, VIA NATURAL OR ARTIFICIAL OPENING ENDOSCOPIC: ICD-10-PCS | Performed by: INTERNAL MEDICINE

## 2025-05-26 PROCEDURE — 80048 BASIC METABOLIC PNL TOTAL CA: CPT | Performed by: INTERNAL MEDICINE

## 2025-05-26 PROCEDURE — 86901 BLOOD TYPING SEROLOGIC RH(D): CPT | Performed by: STUDENT IN AN ORGANIZED HEALTH CARE EDUCATION/TRAINING PROGRAM

## 2025-05-26 PROCEDURE — 25810000003 LACTATED RINGERS PER 1000 ML: Performed by: ANESTHESIOLOGY

## 2025-05-26 PROCEDURE — 86900 BLOOD TYPING SEROLOGIC ABO: CPT | Performed by: STUDENT IN AN ORGANIZED HEALTH CARE EDUCATION/TRAINING PROGRAM

## 2025-05-26 PROCEDURE — 0DJD8ZZ INSPECTION OF LOWER INTESTINAL TRACT, VIA NATURAL OR ARTIFICIAL OPENING ENDOSCOPIC: ICD-10-PCS | Performed by: INTERNAL MEDICINE

## 2025-05-26 PROCEDURE — 82948 REAGENT STRIP/BLOOD GLUCOSE: CPT

## 2025-05-26 PROCEDURE — 25010000002 HALOPERIDOL LACTATE PER 5 MG: Performed by: INTERNAL MEDICINE

## 2025-05-26 PROCEDURE — 86850 RBC ANTIBODY SCREEN: CPT | Performed by: STUDENT IN AN ORGANIZED HEALTH CARE EDUCATION/TRAINING PROGRAM

## 2025-05-26 PROCEDURE — 85027 COMPLETE CBC AUTOMATED: CPT | Performed by: STUDENT IN AN ORGANIZED HEALTH CARE EDUCATION/TRAINING PROGRAM

## 2025-05-26 PROCEDURE — 84100 ASSAY OF PHOSPHORUS: CPT | Performed by: INTERNAL MEDICINE

## 2025-05-26 RX ORDER — IOPAMIDOL 755 MG/ML
100 INJECTION, SOLUTION INTRAVASCULAR
Status: COMPLETED | OUTPATIENT
Start: 2025-05-27 | End: 2025-05-26

## 2025-05-26 RX ORDER — HALOPERIDOL 5 MG/ML
2 INJECTION INTRAMUSCULAR ONCE
Status: COMPLETED | OUTPATIENT
Start: 2025-05-27 | End: 2025-05-26

## 2025-05-26 RX ORDER — SODIUM CHLORIDE, SODIUM LACTATE, POTASSIUM CHLORIDE, CALCIUM CHLORIDE 600; 310; 30; 20 MG/100ML; MG/100ML; MG/100ML; MG/100ML
INJECTION, SOLUTION INTRAVENOUS CONTINUOUS PRN
Status: DISCONTINUED | OUTPATIENT
Start: 2025-05-26 | End: 2025-05-26 | Stop reason: SURG

## 2025-05-26 RX ORDER — SODIUM CHLORIDE 9 MG/ML
150 INJECTION, SOLUTION INTRAVENOUS CONTINUOUS
Status: DISCONTINUED | OUTPATIENT
Start: 2025-05-27 | End: 2025-05-27

## 2025-05-26 RX ADMIN — MUPIROCIN 1 APPLICATION: 20 OINTMENT TOPICAL at 09:09

## 2025-05-26 RX ADMIN — Medication 10 ML: at 09:09

## 2025-05-26 RX ADMIN — Medication 10 ML: at 20:32

## 2025-05-26 RX ADMIN — PANTOPRAZOLE SODIUM 8 MG/HR: 40 INJECTION, POWDER, FOR SOLUTION INTRAVENOUS at 05:53

## 2025-05-26 RX ADMIN — DEXTROSE MONOHYDRATE 50 ML/HR: 50 INJECTION, SOLUTION INTRAVENOUS at 19:00

## 2025-05-26 RX ADMIN — HALOPERIDOL LACTATE 2 MG: 5 INJECTION, SOLUTION INTRAMUSCULAR at 23:26

## 2025-05-26 RX ADMIN — IOPAMIDOL 85 ML: 755 INJECTION, SOLUTION INTRAVENOUS at 23:40

## 2025-05-26 RX ADMIN — SODIUM CHLORIDE, POTASSIUM CHLORIDE, SODIUM LACTATE AND CALCIUM CHLORIDE: 600; 310; 30; 20 INJECTION, SOLUTION INTRAVENOUS at 13:06

## 2025-05-26 RX ADMIN — SODIUM CHLORIDE 150 ML/HR: 9 INJECTION, SOLUTION INTRAVENOUS at 23:21

## 2025-05-26 RX ADMIN — PANTOPRAZOLE SODIUM 8 MG/HR: 40 INJECTION, POWDER, FOR SOLUTION INTRAVENOUS at 01:37

## 2025-05-26 RX ADMIN — PROPOFOL 200 MCG/KG/MIN: 10 INJECTION, EMULSION INTRAVENOUS at 13:03

## 2025-05-26 NOTE — CONSULTS
[facility]   Consult Note    Patient Name: Samia Gaming  : 1955  MRN: [id number]  Primary Care Physician:  Cristiano Logan DO  Referring Physician: Rocael Logan MD  Date of admission: 2025    Inpatient Oral & Maxillofacial Surgery Consult  Consult performed by: Cuate Corona DMD  Consult ordered by: Rosalinda Milan APRN        Subjective   Subjective     Reason for Consult/ Chief Complaint: numbness right face    History of Present Illness  Samia Gaming is a 69 y.o. female who fell while in hospital for GI bleed 2 days ago.  C/O numb right face    Review of Systems paresthesia right midface    Personal History     Past Medical History:   Diagnosis Date    Arthritis     Elevated cholesterol        Past Surgical History:   Procedure Laterality Date    CHOLECYSTECTOMY      COLONOSCOPY         Family History: family history is not on file. Otherwise pertinent FHx was reviewed and not pertinent to current issue.    Social History:  reports that she has never smoked. She has never used smokeless tobacco. She reports that she does not drink alcohol and does not use drugs.    Home Medications:   atorvastatin, diclofenac, and traZODone    Allergies:  No Known Allergies    Objective    Objective     Vitals:  Temp:  [97.5 °F (36.4 °C)-98.3 °F (36.8 °C)] 97.6 °F (36.4 °C)  Heart Rate:  [60-92] 60  Resp:  [18-24] 20  BP: ()/() 104/66  Flow (L/min) (Oxygen Therapy):  [1-2] 2    Physical Exam  Right periorbital ecchymosis, PERRLA, EOMI, no diploplia, no enophthalmos, no palpable steps along inferior orbital rim, paresthesia along distribution of right infraorbital nerve, NROM of mandible, no malocclusion, oral mucosa pink and healthy, nasal septum in midline  Result Review    Result Review:  I have personally reviewed the results from the time of this admission to 2025 10:46 EDT and agree with these findings:  []  Laboratory list / accordion  []  Microbiology  [x]  Radiology  []   EKG/Telemetry   []  Cardiology/Vascular   []  Pathology  []  Old records  []  Other:  Most notable findings include: right orbital floor fracture      Assessment & Plan   Assessment / Plan     Brief Patient Summary:  Samia Gaming is a 69 y.o. female who who has a right orbital floor fracture    Active Hospital Problems:  Active Hospital Problems    Diagnosis     **GI bleed     GIB (gastrointestinal bleeding)      Plan: Given lack of visual deficit or entrapment of the globe, no surgical intervention indicated.  Pt can follow up as need as outpatient.  Advised patient that paresthesia will likely gradually improve but may take months.      Cuate Corona, DMD

## 2025-05-26 NOTE — PLAN OF CARE
Problem: Adult Inpatient Plan of Care  Goal: Plan of Care Review  Outcome: Progressing  Flowsheets (Taken 5/26/2025 0544)  Progress: improving  Outcome Evaluation: Bowel movement this AM slightly red/tarry/small bowel movement. Up with one assist to bathroom. NPO at midnight. Dr Pat had suggested patient would have a small bowel scope/study. No orders in at this time.  Plan of Care Reviewed With: patient   Goal Outcome Evaluation:  Plan of Care Reviewed With: patient        Progress: improving  Outcome Evaluation: Bowel movement this AM slightly red/tarry/small bowel movement. Up with one assist to bathroom. NPO at midnight. Dr Pat had suggested patient would have a small bowel scope/study. No orders in at this time.

## 2025-05-26 NOTE — ANESTHESIA PREPROCEDURE EVALUATION
Anesthesia Evaluation     NPO Solid Status: > 8 hours             Airway   Mallampati: III  TM distance: >3 FB  Neck ROM: full  Possible difficult intubation  Dental      Pulmonary    (-) wheezes  Cardiovascular     Rhythm: regular    (+) hyperlipidemia      Neuro/Psych  GI/Hepatic/Renal/Endo    (+) GI bleeding active bleeding    ROS Comment: No hematemisis or N/V at this time  S/P transfusion, feels much better.  Recent EGD and colonoscopy    Musculoskeletal     Abdominal    Substance History      OB/GYN          Other                    Anesthesia Plan    ASA 3 - emergent     MAC     (D/W pt. MAC and possible awareness intra op.  Pt understands MAC and GA are not the same and the possibility of GA being required for failed MAC)    Anesthetic plan, risks, benefits, and alternatives have been provided, discussed and informed consent has been obtained with: patient.    CODE STATUS:    Code Status (Patient has no pulse and is not breathing): CPR (Attempt to Resuscitate)  Medical Interventions (Patient has pulse or is breathing): Full Support

## 2025-05-26 NOTE — ANESTHESIA POSTPROCEDURE EVALUATION
"Patient: Samia Gaming    Procedure Summary       Date: 05/26/25 Room / Location:  [facility] /  [facility]    Anesthesia Start: 1301 Anesthesia Stop: 1317    Procedure: ENTEROSCOPY SMALL BOWEL at bedside (Esophagus) Diagnosis:     Surgeons: Justin Garza MD Provider: Bobby Zuleta MD    Anesthesia Type: MAC ASA Status: 3 - Emergent            Anesthesia Type: MAC    Vitals  Vitals Value Taken Time   BP     Temp     Pulse 82 05/26/25 13:16   Resp     SpO2 100 % 05/26/25 13:16           Post Anesthesia Care and Evaluation    Patient location during evaluation: bedside  Patient participation: complete - patient participated  Level of consciousness: awake and alert  Pain management: adequate    Airway patency: patent  Anesthetic complications: No anesthetic complications    Cardiovascular status: acceptable  Respiratory status: acceptable  Hydration status: acceptable    Comments: /68   Pulse 74   Temp 36.7 °C (98 °F) (Oral)   Resp 14   Ht 157.5 cm (62\")   Wt 121 kg (266 lb 3.2 oz)   SpO2 97%   BMI 48.69 kg/m²     "

## 2025-05-26 NOTE — PROGRESS NOTES
[facility]  [phone]  Dr. Foreign Lora     Subjective:  LOS: 2    Chief Complaint:  syncompe    Pt doing well today. She is complaining of some facial numbness on the right side after her fall several days ago. No visual disturbances.     Objective   Vital Signs past 24hrs  Temp range: Temp (24hrs), Av.9 °F (36.6 °C), Min:97.5 °F (36.4 °C), Max:98.3 °F (36.8 °C)    BP range: BP: (104-169)/() 152/67  Pulse range: Heart Rate:  [] 86  Resp rate range: Resp:  [16-22] 22  Device (Oxygen Therapy): nasal cannulaFlow (L/min) (Oxygen Therapy):  [1-2] 2  Oxygen range:SpO2:  [75 %-100 %] 100 %   Mechanical Ventilator:     Physical Exam  Vitals reviewed.   Constitutional:       General: She is not in acute distress.     Appearance: Normal appearance.   HENT:      Head: Normocephalic and atraumatic.   Eyes:      Extraocular Movements: Extraocular movements intact.      Pupils: Pupils are equal, round, and reactive to light.   Cardiovascular:      Rate and Rhythm: Normal rate and regular rhythm.      Heart sounds: No murmur heard.     No friction rub. No gallop.   Pulmonary:      Effort: Pulmonary effort is normal. No respiratory distress.      Breath sounds: No wheezing, rhonchi or rales.   Skin:     General: Skin is warm and dry.      Findings: No rash.   Neurological:      General: No focal deficit present.      Mental Status: She is alert and oriented to person, place, and time.       Results Review:    I have reviewed the laboratory and imaging data since the last note by [facility] physician.  My annotations are noted in assessment and plan.      Result Review:  I have personally reviewed the results from last note by [facility] physician to 2025 22:30 EDT and agree with these findings:  [x]  Laboratory list / accordion  [x]  Microbiology  [x]  Radiology  [x]  EKG/Telemetry   [x]  Cardiology/Vascular   [x]  Pathology  [x]  Old records  []  Other:    Medication Review:  I have reviewed the  current MAR.  My annotations are noted in assessment and plan.    insulin regular, 2-9 Units, Subcutaneous, Q6H  mupirocin, 1 Application, Each Nare, BID  sodium chloride, 10 mL, Intravenous, Q12H  sodium chloride, 10 mL, Intravenous, Q12H        dextrose, 50 mL/hr, Last Rate: 50 mL/hr (05/25/25 2213)  pantoprazole, 8 mg/hr, Last Rate: 8 mg/hr (05/25/25 2129)      Lines, Drains & Airways       Active LDAs       Name Placement date Placement time Site Days    Peripheral IV 05/23/25 Anterior;Left;Proximal Forearm 05/23/25  --  Forearm  2    Peripheral IV 05/25/25 0116 20 G Anterior;Left;Upper Arm 05/25/25  0116  Arm  less than 1                  No active isolations  Diet Orders (active) (From admission, onward)       Start     Ordered    05/25/25 2228  NPO Diet NPO Type: Strict NPO, Sips with Meds  Diet Effective Now         05/25/25 2228                      Assessment  Acute GI bleed  Acute blood loss anemia  Orbital wall fracture, comminuted  Facial numbness due to right infraorbital nerve compromise from orbital fracture  Syncope  Fall  Hyperglycemia  Morbid obesity  MAIN        Plan  - presented on 5/23/25 for syncopal episode and found to have GI bleeding  - GI consulted with EGD showed erosive gastropathy and non bleeding duodenal ulcer. Colonoscopy with diverticulosis   - monitor H and H   - continue PPI gtt  - consulted OMFS STAT for the findings on CT facial bones. Not sure exactly which specialty will deal with this but will find out and get her appropriate care. Obviously her GI bleed is still most pressing issue at present.   - ICU core measures    CCT: 35 min    Foreign Lora DO   05/25/25  22:30 EDT      Part of this note may be an electronic transcription/translation of spoken language to printed text using the Dragon Dictation System.

## 2025-05-26 NOTE — PLAN OF CARE
Goal Outcome Evaluation: Pt remains in CCU in critical care status on room air. Morning Hgb 6.7; 1 unit RBCs transfused. Enteroscopy Small Bowel completed this shift (no intervention). Full liquid diet resumed and to be advanced as tolerated.

## 2025-05-27 ENCOUNTER — APPOINTMENT (OUTPATIENT)
Dept: NUCLEAR MEDICINE | Facility: HOSPITAL | Age: 70
DRG: 329 | End: 2025-05-27
Payer: MEDICARE

## 2025-05-27 LAB
ALBUMIN SERPL-MCNC: 2.1 G/DL (ref 3.5–5.2)
ALBUMIN/GLOB SERPL: 1.4 G/DL
ALP SERPL-CCNC: 36 U/L (ref 39–117)
ALT SERPL W P-5'-P-CCNC: 16 U/L (ref 1–33)
ANION GAP SERPL CALCULATED.3IONS-SCNC: 5.5 MMOL/L (ref 5–15)
APTT PPP: 23.3 SECONDS (ref 22.7–35.4)
AST SERPL-CCNC: 17 U/L (ref 1–32)
BH BB BLOOD EXPIRATION DATE: NORMAL
BH BB BLOOD TYPE BARCODE: 5100
BH BB DISPENSE STATUS: NORMAL
BH BB PRODUCT CODE: NORMAL
BH BB UNIT NUMBER: NORMAL
BILIRUB SERPL-MCNC: 0.7 MG/DL (ref 0–1.2)
BUN SERPL-MCNC: 14 MG/DL (ref 8–23)
BUN/CREAT SERPL: 30.4 (ref 7–25)
CALCIUM SPEC-SCNC: 7.1 MG/DL (ref 8.6–10.5)
CHLORIDE SERPL-SCNC: 107 MMOL/L (ref 98–107)
CO2 SERPL-SCNC: 24.5 MMOL/L (ref 22–29)
CREAT SERPL-MCNC: 0.46 MG/DL (ref 0.57–1)
CROSSMATCH INTERPRETATION: NORMAL
DEPRECATED RDW RBC AUTO: 47.5 FL (ref 37–54)
DEPRECATED RDW RBC AUTO: 49.6 FL (ref 37–54)
DEPRECATED RDW RBC AUTO: 52.1 FL (ref 37–54)
EGFRCR SERPLBLD CKD-EPI 2021: 102.5 ML/MIN/1.73
ERYTHROCYTE [DISTWIDTH] IN BLOOD BY AUTOMATED COUNT: 15.4 % (ref 12.3–15.4)
ERYTHROCYTE [DISTWIDTH] IN BLOOD BY AUTOMATED COUNT: 15.4 % (ref 12.3–15.4)
ERYTHROCYTE [DISTWIDTH] IN BLOOD BY AUTOMATED COUNT: 16.5 % (ref 12.3–15.4)
GLOBULIN UR ELPH-MCNC: 1.5 GM/DL
GLUCOSE BLDC GLUCOMTR-MCNC: 131 MG/DL (ref 70–130)
GLUCOSE BLDC GLUCOMTR-MCNC: 138 MG/DL (ref 70–130)
GLUCOSE BLDC GLUCOMTR-MCNC: 139 MG/DL (ref 70–130)
GLUCOSE SERPL-MCNC: 145 MG/DL (ref 65–99)
HCT VFR BLD AUTO: 21.7 % (ref 34–46.6)
HCT VFR BLD AUTO: 22.2 % (ref 34–46.6)
HCT VFR BLD AUTO: 23.7 % (ref 34–46.6)
HGB BLD-MCNC: 7.2 G/DL (ref 12–15.9)
HGB BLD-MCNC: 7.3 G/DL (ref 12–15.9)
HGB BLD-MCNC: 7.7 G/DL (ref 12–15.9)
MCH RBC QN AUTO: 30 PG (ref 26.6–33)
MCH RBC QN AUTO: 30 PG (ref 26.6–33)
MCH RBC QN AUTO: 30.4 PG (ref 26.6–33)
MCHC RBC AUTO-ENTMCNC: 32.5 G/DL (ref 31.5–35.7)
MCHC RBC AUTO-ENTMCNC: 32.9 G/DL (ref 31.5–35.7)
MCHC RBC AUTO-ENTMCNC: 33.2 G/DL (ref 31.5–35.7)
MCV RBC AUTO: 90.4 FL (ref 79–97)
MCV RBC AUTO: 92.2 FL (ref 79–97)
MCV RBC AUTO: 92.5 FL (ref 79–97)
PLATELET # BLD AUTO: 80 10*3/MM3 (ref 140–450)
PLATELET # BLD AUTO: 91 10*3/MM3 (ref 140–450)
PLATELET # BLD AUTO: 93 10*3/MM3 (ref 140–450)
PMV BLD AUTO: 10.9 FL (ref 6–12)
PMV BLD AUTO: 11.6 FL (ref 6–12)
PMV BLD AUTO: 11.7 FL (ref 6–12)
POTASSIUM SERPL-SCNC: 4.1 MMOL/L (ref 3.5–5.2)
PROT SERPL-MCNC: 3.6 G/DL (ref 6–8.5)
RBC # BLD AUTO: 2.4 10*6/MM3 (ref 3.77–5.28)
RBC # BLD AUTO: 2.4 10*6/MM3 (ref 3.77–5.28)
RBC # BLD AUTO: 2.57 10*6/MM3 (ref 3.77–5.28)
SODIUM SERPL-SCNC: 137 MMOL/L (ref 136–145)
UNIT  ABO: NORMAL
UNIT  RH: NORMAL
WBC NRBC COR # BLD AUTO: 7.56 10*3/MM3 (ref 3.4–10.8)
WBC NRBC COR # BLD AUTO: 8.42 10*3/MM3 (ref 3.4–10.8)
WBC NRBC COR # BLD AUTO: 8.59 10*3/MM3 (ref 3.4–10.8)

## 2025-05-27 PROCEDURE — 25010000002 MORPHINE PER 10 MG

## 2025-05-27 PROCEDURE — 80053 COMPREHEN METABOLIC PANEL: CPT | Performed by: INTERNAL MEDICINE

## 2025-05-27 PROCEDURE — 82948 REAGENT STRIP/BLOOD GLUCOSE: CPT

## 2025-05-27 PROCEDURE — 99222 1ST HOSP IP/OBS MODERATE 55: CPT | Performed by: SURGERY

## 2025-05-27 PROCEDURE — 99232 SBSQ HOSP IP/OBS MODERATE 35: CPT | Performed by: INTERNAL MEDICINE

## 2025-05-27 PROCEDURE — 25010000002 HEPARIN LOCK FLUSH PER 10 UNITS: Performed by: INTERNAL MEDICINE

## 2025-05-27 PROCEDURE — 78278 ACUTE GI BLOOD LOSS IMAGING: CPT

## 2025-05-27 PROCEDURE — 34310000005 TECHNETIUM LABELED RED BLOOD CELLS: Performed by: INTERNAL MEDICINE

## 2025-05-27 PROCEDURE — 85027 COMPLETE CBC AUTOMATED: CPT | Performed by: INTERNAL MEDICINE

## 2025-05-27 PROCEDURE — 85730 THROMBOPLASTIN TIME PARTIAL: CPT | Performed by: SURGERY

## 2025-05-27 PROCEDURE — 36430 TRANSFUSION BLD/BLD COMPNT: CPT

## 2025-05-27 PROCEDURE — 86900 BLOOD TYPING SEROLOGIC ABO: CPT

## 2025-05-27 PROCEDURE — A9560 TC99M LABELED RBC: HCPCS | Performed by: INTERNAL MEDICINE

## 2025-05-27 PROCEDURE — P9016 RBC LEUKOCYTES REDUCED: HCPCS

## 2025-05-27 RX ORDER — HEPARIN SODIUM (PORCINE) LOCK FLUSH IV SOLN 100 UNIT/ML 100 UNIT/ML
0.3 SOLUTION INTRAVENOUS
Status: COMPLETED | OUTPATIENT
Start: 2025-05-27 | End: 2025-05-27

## 2025-05-27 RX ADMIN — ACETAMINOPHEN 650 MG: 325 TABLET, FILM COATED ORAL at 21:26

## 2025-05-27 RX ADMIN — TRAZODONE HYDROCHLORIDE 150 MG: 100 TABLET ORAL at 21:25

## 2025-05-27 RX ADMIN — MUPIROCIN 1 APPLICATION: 20 OINTMENT TOPICAL at 21:14

## 2025-05-27 RX ADMIN — TRAZODONE HYDROCHLORIDE 150 MG: 100 TABLET ORAL at 01:03

## 2025-05-27 RX ADMIN — HEPARIN 30 UNITS: 100 SYRINGE at 07:17

## 2025-05-27 RX ADMIN — Medication 10 ML: at 09:14

## 2025-05-27 RX ADMIN — MORPHINE SULFATE 2 MG: 2 INJECTION, SOLUTION INTRAMUSCULAR; INTRAVENOUS at 09:14

## 2025-05-27 RX ADMIN — TECHNETIUM TC 99M-LABELED RED BLOOD CELLS 1 DOSE: KIT at 07:04

## 2025-05-27 RX ADMIN — Medication 10 ML: at 20:21

## 2025-05-27 RX ADMIN — Medication 10 ML: at 20:22

## 2025-05-27 NOTE — PLAN OF CARE
Goal Outcome Evaluation:         Remain in CCU A&Ox4. Continued having bloody stool hemoglobin is down to 6.1 overnight 2 unit of PRBC given. CTA obtained see results. Genera; surgery consulted plan is to NM blood scan this morning. Started on NS. VSS will continued to monitor.

## 2025-05-27 NOTE — PROGRESS NOTES
[facility]  [phone]  Dr. Foreign Lora     Subjective:  LOS: 3    Chief Complaint:  syncope    Pt doing same today. No change in facial numbness. No visual changes.     Objective   Vital Signs past 24hrs  Temp range: Temp (24hrs), Av.9 °F (36.6 °C), Min:97.6 °F (36.4 °C), Max:98.3 °F (36.8 °C)    BP range: BP: ()/(45-79) 124/60  Pulse range: Heart Rate:  [] 84  Resp rate range: Resp:  [14-24] 15  Device (Oxygen Therapy): room airFlow (L/min) (Oxygen Therapy):  [1-2] 2  Oxygen range:SpO2:  [89 %-100 %] 94 %   Mechanical Ventilator:     Physical Exam  Results Review:    I have reviewed the laboratory and imaging data since the last note by [facility] physician.  My annotations are noted in assessment and plan.      Result Review:  I have personally reviewed the results from last note by [facility] physician to 2025 21:44 EDT and agree with these findings:  [x]  Laboratory list / accordion  [x]  Microbiology  [x]  Radiology  [x]  EKG/Telemetry   [x]  Cardiology/Vascular   [x]  Pathology  [x]  Old records  []  Other:    Medication Review:  I have reviewed the current MAR.  My annotations are noted in assessment and plan.    insulin regular, 2-9 Units, Subcutaneous, Q6H  mupirocin, 1 Application, Each Nare, BID  sodium chloride, 10 mL, Intravenous, Q12H  sodium chloride, 10 mL, Intravenous, Q12H        dextrose, 50 mL/hr, Last Rate: 50 mL/hr (25 1900)      Lines, Drains & Airways       Active LDAs       Name Placement date Placement time Site Days    Peripheral IV 25 Anterior;Left;Proximal Forearm 25  --  Forearm  3    Peripheral IV 25 0116 20 G Anterior;Left;Upper Arm 25  0116  Arm  1                  No active isolations  Diet Orders (active) (From admission, onward)       Start     Ordered    25 1410  Diet: Liquid; Full Liquid; Fluid Consistency: Thin (IDDSI 0)  Diet Effective Now         25 1409                      Assessment  Acute GI  bleed  Acute blood loss anemia  Orbital wall fracture, comminuted  Facial numbness due to right infraorbital nerve compromise from orbital fracture  Syncope  Fall  Hyperglycemia  Morbid obesity  MAIN        Plan  - presented on 5/23/25 for syncopal episode and found to have GI bleeding  - GI consulted with EGD showed erosive gastropathy and non bleeding duodenal ulcer. Colonoscopy with diverticulosis. Getting push enteroscopy today  - monitor H and H   - continue PPI gtt  - consulted [facility] STAT for the findings on CT facial bones. Not sure exactly which specialty will deal with this but will find out and get her appropriate care. Obviously her GI bleed is still most pressing issue at present.   - ICU core measures      Foreign Lora DO   05/26/25  21:44 EDT      Part of this note may be an electronic transcription/translation of spoken language to printed text using the Dragon Dictation System.

## 2025-05-27 NOTE — CONSULTS
General Surgery H&P/Consultation      Impression/Plan: Ms. Gaming is a 69-year-old lady with a GI bleed that is nonlocalized.  She is hemodynamically stable.  Recommend nuclear medicine GI blood loss study as soon as possible for attempt at localizing bleeding.  Workup thus far has been unsuccessful in determining the source of her bleeding.  No acute surgical intervention indicated at this time. I discussed with her and her daughter indication for total abdominal colectomy with end ileostomy for empiric management of a nonlocalized GI bleed.  This is reserved for patients with hemodynamic instability with active bleeding and no other source can be identified.  General surgery will follow.      VTE Prophylaxis:  Mechanical VTE prophylaxis orders are present.        CC: GI bleed    HPI:   Miss Samia Gaming is a 69 y.o. female that presented to the hospital with hematemesis and melena on 5/23/2025.  She has undergone upper endoscopy on 5/23/2025 which showed erosive gastropathy and a single nonbleeding duodenal ulcer without any stigmata of recent bleeding.  She underwent colonoscopy on 5/24/2025 which showed normal terminal ileum, diverticulosis in the rectosigmoid, sigmoid, descending colon, transverse colon, hepatic flexure, and in the ascending colon, blood noted in the sigmoid colon without active source of bleeding.  Yesterday she underwent small bowel enteroscopy which was unrevealing.  She had another bloody bowel movement and hemoglobin today started at 6.7.  She was transfused 1 unit and it responded to 7.9.  Recheck this evening and it was back to 6.1.  She has a little bit of tachycardia but a slightly hypertensive blood pressure at time of my evaluation.  She only complains of back pain.  Denies any abdominal pain at present.    Past Medical History:   Past Medical History:   Diagnosis Date    Arthritis     Elevated cholesterol        Past Surgical History:   Past Surgical History:   Procedure Laterality  Date    CHOLECYSTECTOMY 2005    COLONOSCOPY         Medications:  Medications Prior to Admission   Medication Sig Dispense Refill Last Dose/Taking    atorvastatin (LIPITOR) 40 MG tablet Take 1 tablet by mouth every night at bedtime.   5/21/2025 Bedtime    diclofenac (VOLTAREN) 75 MG EC tablet Take 1 tablet by mouth 2 (Two) Times a Day.   5/21/2025 Bedtime    traZODone (DESYREL) 150 MG tablet Take 2 tablets by mouth Every Night.   Past Week Bedtime         Current Facility-Administered Medications:     acetaminophen (TYLENOL) tablet 650 mg, 650 mg, Oral, Q4H PRN, 650 mg at 05/23/25 2211 **OR** acetaminophen (TYLENOL) 160 MG/5ML oral solution 650 mg, 650 mg, Oral, Q4H PRN **OR** acetaminophen (TYLENOL) suppository 650 mg, 650 mg, Rectal, Q4H PRN, Keyur Neely MD    Calcium Replacement - Follow Nurse / BPA Driven Protocol, , Not Applicable, PRN, Keyur Neely MD    dextrose (D50W) (25 g/50 mL) IV injection 25 g, 25 g, Intravenous, Q15 Min PRN, Victor Hugo Gonzalez Jr., MD    dextrose (D5W) 5 % infusion, 50 mL/hr, Intravenous, Continuous, Rosalinda Milan APRN, Last Rate: 50 mL/hr at 05/26/25 1900, 50 mL/hr at 05/26/25 1900    dextrose (GLUTOSE) oral gel 15 g, 15 g, Oral, Q15 Min PRN, Victor Hugo Gonzalez Jr., MD    glucagon (GLUCAGEN) injection 1 mg, 1 mg, Intramuscular, Q15 Min PRN, Victor Hugo Gonzalez Jr., MD    insulin regular (humuLIN R,novoLIN R) injection 2-9 Units, 2-9 Units, Subcutaneous, Q6H, Victor Hugo Gonzalez Jr., MD, 2 Units at 05/23/25 2111    Magnesium Standard Dose Replacement - Follow Nurse / BPA Driven Protocol, , Not Applicable, PRN, Keyur Neely MD    metoclopramide (REGLAN) injection 5 mg, 5 mg, Intravenous, Q6H PRN, Farzana Rios APRN, 5 mg at 05/24/25 0602    morphine injection 2 mg, 2 mg, Intravenous, Q4H PRN, Rosalinda Milan, APRN, 2 mg at 05/24/25 0059    mupirocin (BACTROBAN) 2 % nasal ointment 1 Application, 1 Application, Each Nare, BID, Victor Hugo Gonzalez Jr.,  MD, 1 Application at 05/26/25 0909    nitroglycerin (NITROSTAT) SL tablet 0.4 mg, 0.4 mg, Sublingual, Q5 Min PRN, Victor Hugo Gonzalez Jr., MD    ondansetron ODT (ZOFRAN-ODT) disintegrating tablet 4 mg, 4 mg, Oral, Q6H PRN **OR** ondansetron (ZOFRAN) injection 4 mg, 4 mg, Intravenous, Q6H PRN, Keyur Neely MD, 4 mg at 05/24/25 0455    Phosphorus Replacement - Follow Nurse / BPA Driven Protocol, , Not Applicable, PRChin MASON Brian David, MD    Potassium Replacement - Follow Nurse / BPA Driven Protocol, , Not Applicable, Chin DOWELL Brian David, MD    sodium chloride 0.9 % flush 10 mL, 10 mL, Intravenous, PRN, Keyur Neely MD    sodium chloride 0.9 % flush 10 mL, 10 mL, Intravenous, Q12H, Keyur Neely MD, 10 mL at 05/26/25 2032    sodium chloride 0.9 % flush 10 mL, 10 mL, Intravenous, PRN, Keyur Neely MD    sodium chloride 0.9 % flush 10 mL, 10 mL, Intravenous, Q12H, Victor Hugo Gonzalez Jr., MD, 10 mL at 05/26/25 2032    sodium chloride 0.9 % flush 10 mL, 10 mL, Intravenous, PRN, Victor Hugo Gonzalez Jr., MD    sodium chloride 0.9 % infusion 40 mL, 40 mL, Intravenous, PRN, Keyur Neely MD    sodium chloride 0.9 % infusion 40 mL, 40 mL, Intravenous, PRN, Victor Hugo Gonzalez Jr., MD    sodium chloride 0.9 % infusion, 150 mL/hr, Intravenous, Continuous, Jerrell Anaya MD, Last Rate: 150 mL/hr at 05/26/25 2321, 150 mL/hr at 05/26/25 2321    traZODone (DESYREL) tablet 150 mg, 150 mg, Oral, Nightly PRN, Rosalinda Milan, APRN, 150 mg at 05/25/25 225     Allergies: No Known Allergies    Social History:   Social History     Socioeconomic History    Marital status: Single   Tobacco Use    Smoking status: Never    Smokeless tobacco: Never   Vaping Use    Vaping status: Never Used   Substance and Sexual Activity    Alcohol use: Never    Drug use: Never    Sexual activity: Defer     Partners: Male       Family History:   Family History   Family history unknown: Yes       Review  of Systems:  A comprehensive review of systems was negative except for the following positives: Per HPI    Physical Exam:   Vitals:    05/27/25 0014   BP: 138/90   Pulse: 107   Resp: 19   Temp: 99.4 °F (37.4 °C)   SpO2: 100%     BMI: Body mass index is 48.69 kg/m².   121 kg (266 lb 3.2 oz)      Intake/Output Summary (Last 24 hours) at 5/27/2025 0059  Last data filed at 5/26/2025 1324  Gross per 24 hour   Intake 900.17 ml   Output --   Net 900.17 ml       GENERAL: no acute distress, awake and alert  RESPIRATORY: symmetric excursion bilaterally, normal work of breathing  CARDIOVASCULAR: Cardia, well perfused  GASTROINTESTINAL: Obese, soft, nontender    Pertinent labs:   Results from last 7 days   Lab Units 05/26/25  2240 05/26/25  1440 05/26/25  0627 05/25/25 1955 05/25/25  1153 05/25/25  0550 05/25/25  0119   WBC 10*3/mm3 10.25 8.04 7.19 11.72* 10.85* 12.43* 12.54*   HEMOGLOBIN g/dL 6.1* 7.9* 6.7* 7.2* 7.1* 7.4* 7.2*   HEMATOCRIT % 18.7* 23.7* 20.5* 21.7* 21.8* 22.8* 21.8*   PLATELETS 10*3/mm3 86* 107* 97* 118* 105* 102* 111*     Results from last 7 days   Lab Units 05/26/25  0627 05/25/25 1955 05/25/25  0119 05/24/25  1113 05/23/25  1629 05/23/25  0735 05/22/25  1611   SODIUM mmol/L 139  --  137 138   < > 141 138   POTASSIUM mmol/L 3.9 4.5 3.1* 4.2   < > 4.1 3.8   CHLORIDE mmol/L 107  --  106 112*   < > 114* 106   CO2 mmol/L 26.4  --  23.0 22.0   < > 19.8* 22.0   BUN mg/dL 11  --  21 24*   < > 30* 30*   CREATININE mg/dL 0.48*  --  0.50* 0.66   < > 0.56* 0.67   CALCIUM mg/dL 7.9*  --  7.4* 7.6*   < > 7.3* 8.1*   BILIRUBIN mg/dL  --   --   --   --   --  0.7 0.5   ALK PHOS U/L  --   --   --   --   --  39 54   ALT (SGPT) U/L  --   --   --   --   --  9 14   AST (SGOT) U/L  --   --   --   --   --  8 13   GLUCOSE mg/dL 119*  --  113* 145*   < > 156* 138*    < > = values in this interval not displayed.       IMAGING:  CTA of abdomen and pelvis reviewed without identification of the source of bleeding.          Quan  MD Brandi  General and Endoscopic Surgery  [facility]    [address], [address]  [place] [address]  P: [phone]  F: [phone]

## 2025-05-27 NOTE — PROGRESS NOTES
"Nutrition Services    Patient Name: Samia Gaming  YOB: 1955  MRN: [id number]  Admission date: 5/22/2025    Assessment Date:  05/27/25    NUTRITION EVALUATION      Reason for Encounter NPO/Clear Liquid Diet Status   Diagnosis/Problem Admission Diagnosis:  Acute blood loss anemia [D62]  Generalized abdominal pain [R10.84]  GI bleed [K92.2]  Elevated BUN [R79.9]  Elevated lactic acid level [R79.89]  Gastrointestinal hemorrhage, unspecified gastrointestinal hemorrhage type [K92.2]  GIB (gastrointestinal bleeding) [K92.2]    Problem List:    GI bleed    GIB (gastrointestinal bleeding)     Narrative 5/27: Pt admitted to [facility] with abdominal pain, N/V, diarrhea. Eventual blood in emesis and maroon stools. S/p EGD, small bowel enteroscopy, and colonoscopy. Still unable to determine source of bleeding. Diet has been advanced to soft to chew: whole meat.        PO Diet Diet: Liquid; Full Liquid; Fluid Consistency: Thin (IDDSI 0)   Allergies NKFA   Supplements n/a   PO Intake % NPO/liquids x 5 days       Chewing/Swallowing Difficulty no issues identified at this time       Medications reviewed   Labs  reviewed       Physical Findings on oxygen therapy     Edema 1+ (trace)   Generalized    GI Function last bowel movement: 5/26   Skin Status skin intact    Lines/Drains none   I/O net fluid gain and amount/timeframe:~6.2L since admission per I/O documentation          Height  Weight  BMI  Weight Trend     Height: 157.5 cm (62\")  Weight: 128 kg (282 lb 13.6 oz) (05/27/25 0400)  Body mass index is 51.73 kg/m².  Unknown  Wt fluctuation this admission (266-282#)       NFPE Not indicated at this time       Nutrition Problem (PES) Problem: Inadequate Oral Intake  Etiology: Medical Diagnosis - GI bleed    Signs/Symptoms: Other (comment) NPO/liquids x 5 days this admission       Intervention/Plan Diet advanced to soft to chew: whole meat. Monitor for diet tolerance.     RD to follow up per protocol.     Results from last 7 " days   Lab Units 05/27/25  0542 05/26/25  0627 05/25/25  1955 05/25/25  0119 05/23/25  1629 05/23/25  0735 05/22/25  1611   SODIUM mmol/L 137 139  --  137   < > 141 138   POTASSIUM mmol/L 4.1 3.9 4.5 3.1*   < > 4.1 3.8   CHLORIDE mmol/L 107 107  --  106   < > 114* 106   CO2 mmol/L 24.5 26.4  --  23.0   < > 19.8* 22.0   BUN mg/dL 14 11  --  21   < > 30* 30*   CREATININE mg/dL 0.46* 0.48*  --  0.50*   < > 0.56* 0.67   CALCIUM mg/dL 7.1* 7.9*  --  7.4*   < > 7.3* 8.1*   BILIRUBIN mg/dL 0.7  --   --   --   --  0.7 0.5   ALK PHOS U/L 36*  --   --   --   --  39 54   ALT (SGPT) U/L 16  --   --   --   --  9 14   AST (SGOT) U/L 17  --   --   --   --  8 13   GLUCOSE mg/dL 145* 119*  --  113*   < > 156* 138*    < > = values in this interval not displayed.     Results from last 7 days   Lab Units 05/27/25  0542 05/26/25  1440 05/26/25  0627 05/25/25  0550 05/25/25  0119 05/24/25  1611 05/24/25  1113   MAGNESIUM mg/dL  --   --  2.0  --  1.6  --  1.7   PHOSPHORUS mg/dL  --   --  2.5  --  2.4*  --  2.0*   HEMOGLOBIN g/dL 7.7*   < > 6.7*   < > 7.2*   < > 7.6*   HEMATOCRIT % 23.7*   < > 20.5*   < > 21.8*   < > 22.6*    < > = values in this interval not displayed.     Lab Results   Component Value Date    HGBA1C 6.4 (H) 09/17/2024     Wt Readings from Last 10 Encounters:   05/27/25 128 kg (282 lb 13.6 oz)       Electronically signed by:  Azucena Santiago RD  05/27/25 09:15 EDT

## 2025-05-27 NOTE — CASE MANAGEMENT/SOCIAL WORK
Discharge Planning Assessment  [facility]     Patient Name: Samia Gaming  MRN: [id number]  Today's Date: 5/27/2025    Admit Date: 5/22/2025    Plan: Home, family to transport   Discharge Needs Assessment       Row Name 05/27/25 1330       Living Environment    People in Home child(jolly), adult    Current Living Arrangements home    Primary Care Provided by self    Provides Primary Care For no one    Family Caregiver if Needed child(jolly), adult    Able to Return to Prior Arrangements yes       Resource/Environmental Concerns    Resource/Environmental Concerns none       Transition Planning    Patient/Family Anticipates Transition to home with family    Patient/Family Anticipated Services at Transition none    Transportation Anticipated family or friend will provide       Discharge Needs Assessment    Equipment Currently Used at Home none    Concerns to be Addressed discharge planning                   Discharge Plan       Row Name 05/27/25 7410       Plan    Plan Home, family to transport    Patient/Family in Agreement with Plan yes    Plan Comments CCP spoke with patient at bedside; explained role, verified facesheet, and discussed dc plan. Patient uses no DME and is independent for mobility at baseline. Patient works full time. She lives with her daughter in a 4 level home with no steps to enter. She reports no trouble with ambulation throughout her home. She has no history of HH or SNF. Patient denies any HH or DME needs and reports plan is home via family transport. KEENA, CSW                    Expected Discharge Date and Time       Expected Discharge Date Expected Discharge Time    May 29, 2025            Demographic Summary       Row Name 05/27/25 1329       General Information    Admission Type inpatient    Arrived From home    Referral Source admission list    Reason for Consult discharge planning    Preferred Language English       Contact Information    Permission Granted to Share Info With family/designee                    Functional Status       Row Name 05/27/25 1329       Functional Status    Usual Activity Tolerance good    Current Activity Tolerance good       Functional Status, IADL    Medications independent    Meal Preparation independent    Housekeeping independent    Laundry independent    Shopping independent       Mental Status    General Appearance WDL WDL                   Psychosocial    No documentation.                  Abuse/Neglect    No documentation.                  Legal    No documentation.                  Substance Abuse    No documentation.                  Patient Forms    No documentation.                     SARIKA Araiza

## 2025-05-27 NOTE — PLAN OF CARE
Goal Outcome Evaluation: Pt remains in CCU in critical care status on room air. NM Blood Loss Scan completed this shift. CBC every 6 hours still ongoing. Diet advanced to regular/mechanical soft.No bloody bowel movements noted on this shift. However there is the presence of scant dark red blood upon wiping after use of the bathroom. Last Hgb 7.2; pt made a lab collect due to inability to draw off the existing IV's. PRN morphine given one time for complaints of back pain following tagged RBC scan.

## 2025-05-27 NOTE — PROGRESS NOTES
[facility]  Inpatient Progress Note    History of Present Illness    Her daughter reports that the patient was inconsolable last night, exhibiting signs of confusion and expressing distress over her condition. She has been hospitalized three times in her life. She continues to experience blood loss, with episodes of rectal bleeding occurring approximately an hour after each blood transfusion. Dark-colored blood was passed last night. Additionally, she expressed paranoid thoughts, believing that the hospital staff were attempting to harm her. Haldol was administered, which slightly alleviated her agitation, and subsequently she received trazodone, which induced sleep. No pain or nausea and vomiting    Subjective       Current Facility-Administered Medications:     acetaminophen (TYLENOL) tablet 650 mg, 650 mg, Oral, Q4H PRN, 650 mg at 05/23/25 2211 **OR** acetaminophen (TYLENOL) 160 MG/5ML oral solution 650 mg, 650 mg, Oral, Q4H PRN **OR** acetaminophen (TYLENOL) suppository 650 mg, 650 mg, Rectal, Q4H PRN, Keyur Neely MD    Calcium Replacement - Follow Nurse / BPA Driven Protocol, , Not Applicable, PRN, Keyur Neely MD    dextrose (D50W) (25 g/50 mL) IV injection 25 g, 25 g, Intravenous, Q15 Min PRN, Victor Hugo Gonzalez Jr., MD    dextrose (D5W) 5 % infusion, 50 mL/hr, Intravenous, Continuous, Rosalinda Milan, APRN, Last Rate: 50 mL/hr at 05/26/25 1900, 50 mL/hr at 05/26/25 1900    dextrose (GLUTOSE) oral gel 15 g, 15 g, Oral, Q15 Min PRN, Victor Hugo Gonzalez Jr., MD    glucagon (GLUCAGEN) injection 1 mg, 1 mg, Intramuscular, Q15 Min PRN, Victor Hugo Gonzalez Jr., MD    insulin regular (humuLIN R,novoLIN R) injection 2-9 Units, 2-9 Units, Subcutaneous, Q6H, Victor Hugo Gonzalez Jr., MD, 2 Units at 05/23/25 2111    Magnesium Standard Dose Replacement - Follow Nurse / BPA Driven Protocol, , Not Applicable, PRN, Keyur Neely MD    metoclopramide (REGLAN) injection 5  mg, 5 mg, Intravenous, Q6H PRN, Farzana Rios APRN, 5 mg at 05/24/25 0602    morphine injection 2 mg, 2 mg, Intravenous, Q4H PRN, Rosalinda Milan APRN, 2 mg at 05/24/25 0059    mupirocin (BACTROBAN) 2 % nasal ointment 1 Application, 1 Application, Each Nare, BID, Victor Hugo Gonzalez Jr., MD, 1 Application at 05/26/25 0909    nitroglycerin (NITROSTAT) SL tablet 0.4 mg, 0.4 mg, Sublingual, Q5 Min PRN, Victor Hugo Gonzalez Jr., MD    ondansetron ODT (ZOFRAN-ODT) disintegrating tablet 4 mg, 4 mg, Oral, Q6H PRN **OR** ondansetron (ZOFRAN) injection 4 mg, 4 mg, Intravenous, Q6H PRN, Keyur Neely MD, 4 mg at 05/24/25 0455    Phosphorus Replacement - Follow Nurse / BPA Driven Protocol, , Not Applicable, Chin DWOELL Brian David, MD    Potassium Replacement - Follow Nurse / BPA Driven Protocol, , Not Applicable, Chin DOWELL Brian David, MD    sodium chloride 0.9 % flush 10 mL, 10 mL, Intravenous, PRNChin Brian David, MD    sodium chloride 0.9 % flush 10 mL, 10 mL, Intravenous, Q12H, Keyur Neely MD, 10 mL at 05/26/25 2032    sodium chloride 0.9 % flush 10 mL, 10 mL, Intravenous, PRChin MASON Brian David, MD    sodium chloride 0.9 % flush 10 mL, 10 mL, Intravenous, Q12H, Victor Hugo Gonzalez Jr., MD, 10 mL at 05/26/25 2032    sodium chloride 0.9 % flush 10 mL, 10 mL, Intravenous, Lisa DOWELL Harold Dale Jr., MD    sodium chloride 0.9 % infusion 40 mL, 40 mL, Intravenous, PRChin MASON Brian David, MD    sodium chloride 0.9 % infusion 40 mL, 40 mL, Intravenous, PRNLisa Harold Dale Jr., MD    sodium chloride 0.9 % infusion, 150 mL/hr, Intravenous, Continuous, Jerrell Anaya MD, Last Rate: 150 mL/hr at 05/26/25 2321, 150 mL/hr at 05/26/25 2321    traZODone (DESYREL) tablet 150 mg, 150 mg, Oral, Nightly PRN, Rosalinda Milan, APRN, 150 mg at 05/27/25 0103  Review of Systems:    Review of systems could not be obtained due to  off floor    Objective     Vital Signs  Temp:  [97.4 °F (36.3  °C)-99.4 °F (37.4 °C)] 97.4 °F (36.3 °C)  Heart Rate:  [] 80  Resp:  [14-19] 16  BP: ()/(45-90) 122/76  Body mass index is 51.73 kg/m².    Intake/Output Summary (Last 24 hours) at 5/27/2025 0721  Last data filed at 5/27/2025 0500  Gross per 24 hour   Intake 1000 ml   Output --   Net 1000 ml     No intake/output data recorded.         Physical Exam          Results Review:     I reviewed the patient's new clinical results.    Results from last 7 days   Lab Units 05/27/25  0542 05/26/25  2240 05/26/25  1440   WBC 10*3/mm3 8.42 10.25 8.04   HEMOGLOBIN g/dL 7.7* 6.1* 7.9*   HEMATOCRIT % 23.7* 18.7* 23.7*   PLATELETS 10*3/mm3 91* 86* 107*     Results from last 7 days   Lab Units 05/27/25  0542 05/26/25  0627 05/25/25  1955 05/25/25  0119 05/23/25  1629 05/23/25  0735 05/22/25  1611   SODIUM mmol/L 137 139  --  137   < > 141 138   POTASSIUM mmol/L 4.1 3.9 4.5 3.1*   < > 4.1 3.8   CHLORIDE mmol/L 107 107  --  106   < > 114* 106   CO2 mmol/L 24.5 26.4  --  23.0   < > 19.8* 22.0   BUN mg/dL 14 11  --  21   < > 30* 30*   CREATININE mg/dL 0.46* 0.48*  --  0.50*   < > 0.56* 0.67   CALCIUM mg/dL 7.1* 7.9*  --  7.4*   < > 7.3* 8.1*   BILIRUBIN mg/dL 0.7  --   --   --   --  0.7 0.5   ALK PHOS U/L 36*  --   --   --   --  39 54   ALT (SGPT) U/L 16  --   --   --   --  9 14   AST (SGOT) U/L 17  --   --   --   --  8 13   GLUCOSE mg/dL 145* 119*  --  113*   < > 156* 138*    < > = values in this interval not displayed.     Results from last 7 days   Lab Units 05/26/25  0627 05/25/25  0119 05/24/25  1113   INR  1.25* 1.29* 1.25*     Lab Results   Lab Value Date/Time    LIPASE 17 05/22/2025 1611       Radiology:  [unfilled]    Results  Labs   - Hemoglobin: 7.7 g/dL    Imaging   - Colonoscopy: Some blood in the left side of the colon, indicating possible diverticula bleeding.   - Upper scope: No significant findings.    Assessment & Plan   Assessment:     Assessment & Plan  1. Gastrointestinal bleeding:  - Hemoglobin level is  7.7; monitor hemoglobin trends.  - Administer transfusions as needed.  - Follow up on nuclear medicine GI study results being completed this am  - Discuss next steps with general surgery.  - If nuclear scan is negative, consider repeating the scan during time of active bleeding.  - Discussed the possibility of a repeat colonoscopy and whether this would be of any utility.  So far the only site that had shown any blood was during the colonoscopy.  Unclear if this would add helpful information but can consider after results of nuclear study    Plan:       I discussed the patient's findings and my recommendations with patient, family, and nursing staff.    Patient or patient representative verbalized consent for the use of Ambient Listening during the visit for chart documentation.    Portions of this document were created with Dragon Dictation, a voice recognition software, and/or CostPrize, an AI-assisted transcription software.          Justin Garza M.D.  [facility], [place], operated [facility]  [address] [place] [address] [address] [address]  [phone]

## 2025-05-27 NOTE — NURSING NOTE
Pt off floor for NM Blood Loss Scan. This RN took report from nightshift RN in nuclear medicine and remains with the patient at this time.

## 2025-05-28 ENCOUNTER — APPOINTMENT (OUTPATIENT)
Dept: INTERVENTIONAL RADIOLOGY/VASCULAR | Facility: HOSPITAL | Age: 70
DRG: 329 | End: 2025-05-28
Payer: MEDICARE

## 2025-05-28 ENCOUNTER — ANESTHESIA (OUTPATIENT)
Dept: INTERVENTIONAL RADIOLOGY/VASCULAR | Facility: HOSPITAL | Age: 70
End: 2025-05-28
Payer: MEDICARE

## 2025-05-28 ENCOUNTER — APPOINTMENT (OUTPATIENT)
Dept: CT IMAGING | Facility: HOSPITAL | Age: 70
DRG: 329 | End: 2025-05-28
Payer: MEDICARE

## 2025-05-28 LAB
BH BB BLOOD EXPIRATION DATE: NORMAL
BH BB BLOOD TYPE BARCODE: 5100
BH BB DISPENSE STATUS: NORMAL
BH BB PRODUCT CODE: NORMAL
BH BB UNIT NUMBER: NORMAL
CROSSMATCH INTERPRETATION: NORMAL
DEPRECATED RDW RBC AUTO: 49.6 FL (ref 37–54)
DEPRECATED RDW RBC AUTO: 49.8 FL (ref 37–54)
DEPRECATED RDW RBC AUTO: 53.3 FL (ref 37–54)
DEPRECATED RDW RBC AUTO: 53.4 FL (ref 37–54)
ERYTHROCYTE [DISTWIDTH] IN BLOOD BY AUTOMATED COUNT: 15.8 % (ref 12.3–15.4)
ERYTHROCYTE [DISTWIDTH] IN BLOOD BY AUTOMATED COUNT: 16.1 % (ref 12.3–15.4)
ERYTHROCYTE [DISTWIDTH] IN BLOOD BY AUTOMATED COUNT: 16.3 % (ref 12.3–15.4)
ERYTHROCYTE [DISTWIDTH] IN BLOOD BY AUTOMATED COUNT: 17.1 % (ref 12.3–15.4)
GLUCOSE BLDC GLUCOMTR-MCNC: 124 MG/DL (ref 70–130)
GLUCOSE BLDC GLUCOMTR-MCNC: 125 MG/DL (ref 70–130)
GLUCOSE BLDC GLUCOMTR-MCNC: 149 MG/DL (ref 70–130)
HCT VFR BLD AUTO: 19.3 % (ref 34–46.6)
HCT VFR BLD AUTO: 21.7 % (ref 34–46.6)
HCT VFR BLD AUTO: 22.9 % (ref 34–46.6)
HCT VFR BLD AUTO: 26.1 % (ref 34–46.6)
HGB BLD-MCNC: 6.4 G/DL (ref 12–15.9)
HGB BLD-MCNC: 7.2 G/DL (ref 12–15.9)
HGB BLD-MCNC: 7.3 G/DL (ref 12–15.9)
HGB BLD-MCNC: 8.3 G/DL (ref 12–15.9)
MCH RBC QN AUTO: 29 PG (ref 26.6–33)
MCH RBC QN AUTO: 29.8 PG (ref 26.6–33)
MCH RBC QN AUTO: 30.5 PG (ref 26.6–33)
MCH RBC QN AUTO: 30.8 PG (ref 26.6–33)
MCHC RBC AUTO-ENTMCNC: 31.8 G/DL (ref 31.5–35.7)
MCHC RBC AUTO-ENTMCNC: 31.9 G/DL (ref 31.5–35.7)
MCHC RBC AUTO-ENTMCNC: 33.2 G/DL (ref 31.5–35.7)
MCHC RBC AUTO-ENTMCNC: 33.2 G/DL (ref 31.5–35.7)
MCV RBC AUTO: 89.7 FL (ref 79–97)
MCV RBC AUTO: 91.3 FL (ref 79–97)
MCV RBC AUTO: 92.8 FL (ref 79–97)
MCV RBC AUTO: 95.8 FL (ref 79–97)
PLATELET # BLD AUTO: 117 10*3/MM3 (ref 140–450)
PLATELET # BLD AUTO: 155 10*3/MM3 (ref 140–450)
PLATELET # BLD AUTO: 92 10*3/MM3 (ref 140–450)
PLATELET # BLD AUTO: 97 10*3/MM3 (ref 140–450)
PMV BLD AUTO: 10.9 FL (ref 6–12)
PMV BLD AUTO: 11.2 FL (ref 6–12)
PMV BLD AUTO: 11.5 FL (ref 6–12)
PMV BLD AUTO: 12.1 FL (ref 6–12)
RBC # BLD AUTO: 2.08 10*6/MM3 (ref 3.77–5.28)
RBC # BLD AUTO: 2.39 10*6/MM3 (ref 3.77–5.28)
RBC # BLD AUTO: 2.42 10*6/MM3 (ref 3.77–5.28)
RBC # BLD AUTO: 2.86 10*6/MM3 (ref 3.77–5.28)
UNIT  ABO: NORMAL
UNIT  RH: NORMAL
WBC NRBC COR # BLD AUTO: 13.04 10*3/MM3 (ref 3.4–10.8)
WBC NRBC COR # BLD AUTO: 13.05 10*3/MM3 (ref 3.4–10.8)
WBC NRBC COR # BLD AUTO: 7.65 10*3/MM3 (ref 3.4–10.8)
WBC NRBC COR # BLD AUTO: 8.74 10*3/MM3 (ref 3.4–10.8)

## 2025-05-28 PROCEDURE — 25010000002 ONDANSETRON PER 1 MG

## 2025-05-28 PROCEDURE — C1760 CLOSURE DEV, VASC: HCPCS

## 2025-05-28 PROCEDURE — P9016 RBC LEUKOCYTES REDUCED: HCPCS

## 2025-05-28 PROCEDURE — 25810000003 LACTATED RINGERS PER 1000 ML

## 2025-05-28 PROCEDURE — 82948 REAGENT STRIP/BLOOD GLUCOSE: CPT

## 2025-05-28 PROCEDURE — 75894 X-RAYS TRANSCATH THERAPY: CPT

## 2025-05-28 PROCEDURE — 85027 COMPLETE CBC AUTOMATED: CPT | Performed by: INTERNAL MEDICINE

## 2025-05-28 PROCEDURE — 25010000002 PHENYLEPHRINE 10 MG/ML SOLUTION 5 ML VIAL

## 2025-05-28 PROCEDURE — C1894 INTRO/SHEATH, NON-LASER: HCPCS

## 2025-05-28 PROCEDURE — 25010000002 GLUCAGON (RDNA) PER 1 MG

## 2025-05-28 PROCEDURE — 25810000003 SODIUM CHLORIDE 0.9 % SOLUTION 250 ML FLEX CONT

## 2025-05-28 PROCEDURE — 99233 SBSQ HOSP IP/OBS HIGH 50: CPT | Performed by: STUDENT IN AN ORGANIZED HEALTH CARE EDUCATION/TRAINING PROGRAM

## 2025-05-28 PROCEDURE — 74174 CTA ABD&PLVS W/CONTRAST: CPT

## 2025-05-28 PROCEDURE — C1769 GUIDE WIRE: HCPCS

## 2025-05-28 PROCEDURE — 25010000002 MORPHINE PER 10 MG

## 2025-05-28 PROCEDURE — 25010000002 DEXAMETHASONE SODIUM PHOSPHATE 20 MG/5ML SOLUTION

## 2025-05-28 PROCEDURE — 99232 SBSQ HOSP IP/OBS MODERATE 35: CPT | Performed by: INTERNAL MEDICINE

## 2025-05-28 PROCEDURE — 36430 TRANSFUSION BLD/BLD COMPNT: CPT

## 2025-05-28 PROCEDURE — 25010000002 PROPOFOL 10 MG/ML EMULSION

## 2025-05-28 PROCEDURE — 85027 COMPLETE CBC AUTOMATED: CPT

## 2025-05-28 PROCEDURE — C1887 CATHETER, GUIDING: HCPCS

## 2025-05-28 PROCEDURE — 25010000002 SUGAMMADEX 200 MG/2ML SOLUTION

## 2025-05-28 PROCEDURE — 25010000002 PHENYLEPHRINE 10 MG/ML SOLUTION

## 2025-05-28 PROCEDURE — 25510000001 IOPAMIDOL PER 1 ML: Performed by: INTERNAL MEDICINE

## 2025-05-28 PROCEDURE — 25010000002 ONDANSETRON PER 1 MG: Performed by: INTERNAL MEDICINE

## 2025-05-28 PROCEDURE — 86900 BLOOD TYPING SEROLOGIC ABO: CPT

## 2025-05-28 PROCEDURE — 76937 US GUIDE VASCULAR ACCESS: CPT

## 2025-05-28 PROCEDURE — 25010000002 LIDOCAINE 2% SOLUTION

## 2025-05-28 RX ORDER — SUCCINYLCHOLINE/SOD CL,ISO/PF 200MG/10ML
SYRINGE (ML) INTRAVENOUS AS NEEDED
Status: DISCONTINUED | OUTPATIENT
Start: 2025-05-28 | End: 2025-05-28 | Stop reason: SURG

## 2025-05-28 RX ORDER — HYDROCODONE BITARTRATE AND ACETAMINOPHEN 7.5; 325 MG/1; MG/1
1 TABLET ORAL EVERY 4 HOURS PRN
Status: DISCONTINUED | OUTPATIENT
Start: 2025-05-28 | End: 2025-05-28 | Stop reason: HOSPADM

## 2025-05-28 RX ORDER — NALOXONE HCL 0.4 MG/ML
0.2 VIAL (ML) INJECTION AS NEEDED
Status: DISCONTINUED | OUTPATIENT
Start: 2025-05-28 | End: 2025-05-28 | Stop reason: HOSPADM

## 2025-05-28 RX ORDER — PROMETHAZINE HYDROCHLORIDE 25 MG/1
25 SUPPOSITORY RECTAL ONCE AS NEEDED
Status: DISCONTINUED | OUTPATIENT
Start: 2025-05-28 | End: 2025-05-28 | Stop reason: HOSPADM

## 2025-05-28 RX ORDER — EPHEDRINE SULFATE 50 MG/ML
5 INJECTION, SOLUTION INTRAVENOUS ONCE AS NEEDED
Status: DISCONTINUED | OUTPATIENT
Start: 2025-05-28 | End: 2025-05-28 | Stop reason: HOSPADM

## 2025-05-28 RX ORDER — SODIUM CHLORIDE, SODIUM LACTATE, POTASSIUM CHLORIDE, CALCIUM CHLORIDE 600; 310; 30; 20 MG/100ML; MG/100ML; MG/100ML; MG/100ML
INJECTION, SOLUTION INTRAVENOUS CONTINUOUS PRN
Status: DISCONTINUED | OUTPATIENT
Start: 2025-05-28 | End: 2025-05-28 | Stop reason: SURG

## 2025-05-28 RX ORDER — IOPAMIDOL 510 MG/ML
300 INJECTION, SOLUTION INTRAVASCULAR
Status: COMPLETED | OUTPATIENT
Start: 2025-05-28 | End: 2025-05-28

## 2025-05-28 RX ORDER — IBUPROFEN 600 MG/1
TABLET ORAL AS NEEDED
Status: DISCONTINUED | OUTPATIENT
Start: 2025-05-28 | End: 2025-05-28 | Stop reason: SURG

## 2025-05-28 RX ORDER — DROPERIDOL 2.5 MG/ML
0.62 INJECTION, SOLUTION INTRAMUSCULAR; INTRAVENOUS
Status: DISCONTINUED | OUTPATIENT
Start: 2025-05-28 | End: 2025-05-28 | Stop reason: HOSPADM

## 2025-05-28 RX ORDER — DIPHENHYDRAMINE HYDROCHLORIDE 50 MG/ML
12.5 INJECTION, SOLUTION INTRAMUSCULAR; INTRAVENOUS
Status: DISCONTINUED | OUTPATIENT
Start: 2025-05-28 | End: 2025-05-28 | Stop reason: HOSPADM

## 2025-05-28 RX ORDER — LIDOCAINE HYDROCHLORIDE 20 MG/ML
INJECTION, SOLUTION INFILTRATION; PERINEURAL AS NEEDED
Status: DISCONTINUED | OUTPATIENT
Start: 2025-05-28 | End: 2025-05-28 | Stop reason: SURG

## 2025-05-28 RX ORDER — PROPOFOL 10 MG/ML
VIAL (ML) INTRAVENOUS AS NEEDED
Status: DISCONTINUED | OUTPATIENT
Start: 2025-05-28 | End: 2025-05-28 | Stop reason: SURG

## 2025-05-28 RX ORDER — HYDROCODONE BITARTRATE AND ACETAMINOPHEN 5; 325 MG/1; MG/1
1 TABLET ORAL ONCE AS NEEDED
Status: DISCONTINUED | OUTPATIENT
Start: 2025-05-28 | End: 2025-05-28 | Stop reason: HOSPADM

## 2025-05-28 RX ORDER — ATROPINE SULFATE 0.4 MG/ML
0.4 INJECTION, SOLUTION INTRAMUSCULAR; INTRAVENOUS; SUBCUTANEOUS ONCE AS NEEDED
Status: DISCONTINUED | OUTPATIENT
Start: 2025-05-28 | End: 2025-05-28 | Stop reason: HOSPADM

## 2025-05-28 RX ORDER — PHENYLEPHRINE HYDROCHLORIDE 10 MG/ML
INJECTION INTRAVENOUS AS NEEDED
Status: DISCONTINUED | OUTPATIENT
Start: 2025-05-28 | End: 2025-05-28 | Stop reason: SURG

## 2025-05-28 RX ORDER — ONDANSETRON 2 MG/ML
INJECTION INTRAMUSCULAR; INTRAVENOUS AS NEEDED
Status: DISCONTINUED | OUTPATIENT
Start: 2025-05-28 | End: 2025-05-28 | Stop reason: SURG

## 2025-05-28 RX ORDER — PROMETHAZINE HYDROCHLORIDE 25 MG/1
25 TABLET ORAL ONCE AS NEEDED
Status: DISCONTINUED | OUTPATIENT
Start: 2025-05-28 | End: 2025-05-28 | Stop reason: HOSPADM

## 2025-05-28 RX ORDER — SODIUM CHLORIDE 9 MG/ML
INJECTION, SOLUTION INTRAVENOUS CONTINUOUS PRN
Status: DISCONTINUED | OUTPATIENT
Start: 2025-05-28 | End: 2025-05-28 | Stop reason: SURG

## 2025-05-28 RX ORDER — PANTOPRAZOLE SODIUM 40 MG/10ML
40 INJECTION, POWDER, LYOPHILIZED, FOR SOLUTION INTRAVENOUS
Status: DISCONTINUED | OUTPATIENT
Start: 2025-05-28 | End: 2025-05-29

## 2025-05-28 RX ORDER — DEXAMETHASONE SODIUM PHOSPHATE 4 MG/ML
INJECTION, SOLUTION INTRA-ARTICULAR; INTRALESIONAL; INTRAMUSCULAR; INTRAVENOUS; SOFT TISSUE AS NEEDED
Status: DISCONTINUED | OUTPATIENT
Start: 2025-05-28 | End: 2025-05-28 | Stop reason: SURG

## 2025-05-28 RX ORDER — FENTANYL CITRATE 50 UG/ML
25 INJECTION, SOLUTION INTRAMUSCULAR; INTRAVENOUS
Status: DISCONTINUED | OUTPATIENT
Start: 2025-05-28 | End: 2025-05-28 | Stop reason: HOSPADM

## 2025-05-28 RX ORDER — IPRATROPIUM BROMIDE AND ALBUTEROL SULFATE 2.5; .5 MG/3ML; MG/3ML
3 SOLUTION RESPIRATORY (INHALATION) ONCE AS NEEDED
Status: DISCONTINUED | OUTPATIENT
Start: 2025-05-28 | End: 2025-05-28 | Stop reason: HOSPADM

## 2025-05-28 RX ORDER — ROCURONIUM BROMIDE 10 MG/ML
INJECTION, SOLUTION INTRAVENOUS AS NEEDED
Status: DISCONTINUED | OUTPATIENT
Start: 2025-05-28 | End: 2025-05-28 | Stop reason: SURG

## 2025-05-28 RX ORDER — ONDANSETRON 2 MG/ML
4 INJECTION INTRAMUSCULAR; INTRAVENOUS ONCE AS NEEDED
Status: DISCONTINUED | OUTPATIENT
Start: 2025-05-28 | End: 2025-05-28 | Stop reason: HOSPADM

## 2025-05-28 RX ORDER — HYDRALAZINE HYDROCHLORIDE 20 MG/ML
5 INJECTION INTRAMUSCULAR; INTRAVENOUS
Status: DISCONTINUED | OUTPATIENT
Start: 2025-05-28 | End: 2025-05-28 | Stop reason: HOSPADM

## 2025-05-28 RX ORDER — FLUMAZENIL 0.1 MG/ML
0.2 INJECTION INTRAVENOUS AS NEEDED
Status: DISCONTINUED | OUTPATIENT
Start: 2025-05-28 | End: 2025-05-28 | Stop reason: HOSPADM

## 2025-05-28 RX ORDER — IOPAMIDOL 755 MG/ML
100 INJECTION, SOLUTION INTRAVASCULAR
Status: COMPLETED | OUTPATIENT
Start: 2025-05-28 | End: 2025-05-28

## 2025-05-28 RX ORDER — LABETALOL HYDROCHLORIDE 5 MG/ML
5 INJECTION, SOLUTION INTRAVENOUS
Status: DISCONTINUED | OUTPATIENT
Start: 2025-05-28 | End: 2025-05-28 | Stop reason: HOSPADM

## 2025-05-28 RX ORDER — HYDROMORPHONE HYDROCHLORIDE 1 MG/ML
0.25 INJECTION, SOLUTION INTRAMUSCULAR; INTRAVENOUS; SUBCUTANEOUS
Status: DISCONTINUED | OUTPATIENT
Start: 2025-05-28 | End: 2025-05-28 | Stop reason: HOSPADM

## 2025-05-28 RX ADMIN — GLUCAGON 1 MG: KIT at 16:34

## 2025-05-28 RX ADMIN — SODIUM CHLORIDE: 9 INJECTION, SOLUTION INTRAVENOUS at 17:05

## 2025-05-28 RX ADMIN — SODIUM CHLORIDE: 900 IRRIGANT IRRIGATION at 16:35

## 2025-05-28 RX ADMIN — PHENYLEPHRINE HYDROCHLORIDE 100 MCG: 10 INJECTION INTRAVENOUS at 16:23

## 2025-05-28 RX ADMIN — ONDANSETRON 4 MG: 2 INJECTION, SOLUTION INTRAMUSCULAR; INTRAVENOUS at 17:39

## 2025-05-28 RX ADMIN — SODIUM CHLORIDE: 900 IRRIGANT IRRIGATION at 16:34

## 2025-05-28 RX ADMIN — PANTOPRAZOLE SODIUM 40 MG: 40 INJECTION, POWDER, FOR SOLUTION INTRAVENOUS at 08:37

## 2025-05-28 RX ADMIN — LIDOCAINE HYDROCHLORIDE 100 MG: 20 INJECTION, SOLUTION INFILTRATION; PERINEURAL at 16:15

## 2025-05-28 RX ADMIN — PHENYLEPHRINE HYDROCHLORIDE 150 MCG: 10 INJECTION INTRAVENOUS at 16:38

## 2025-05-28 RX ADMIN — Medication 140 MG: at 16:15

## 2025-05-28 RX ADMIN — Medication 10 ML: at 08:38

## 2025-05-28 RX ADMIN — PHENYLEPHRINE HYDROCHLORIDE 100 MCG: 10 INJECTION INTRAVENOUS at 17:22

## 2025-05-28 RX ADMIN — ROCURONIUM BROMIDE 10 MG: 10 INJECTION, SOLUTION INTRAVENOUS at 17:02

## 2025-05-28 RX ADMIN — PHENYLEPHRINE HYDROCHLORIDE 200 MCG: 10 INJECTION INTRAVENOUS at 16:29

## 2025-05-28 RX ADMIN — IOPAMIDOL 95 ML: 755 INJECTION, SOLUTION INTRAVENOUS at 13:29

## 2025-05-28 RX ADMIN — DEXAMETHASONE SODIUM PHOSPHATE 4 MG: 4 INJECTION, SOLUTION INTRAMUSCULAR; INTRAVENOUS at 17:05

## 2025-05-28 RX ADMIN — SODIUM CHLORIDE: 900 IRRIGANT IRRIGATION at 16:33

## 2025-05-28 RX ADMIN — PHENYLEPHRINE HYDROCHLORIDE 100 MCG: 10 INJECTION INTRAVENOUS at 16:33

## 2025-05-28 RX ADMIN — PHENYLEPHRINE HYDROCHLORIDE 100 MCG: 10 INJECTION INTRAVENOUS at 16:44

## 2025-05-28 RX ADMIN — ACETAMINOPHEN 650 MG: 325 TABLET, FILM COATED ORAL at 08:38

## 2025-05-28 RX ADMIN — MUPIROCIN 1 APPLICATION: 20 OINTMENT TOPICAL at 08:38

## 2025-05-28 RX ADMIN — Medication 10 ML: at 20:10

## 2025-05-28 RX ADMIN — PHENYLEPHRINE HYDROCHLORIDE 150 MCG: 10 INJECTION INTRAVENOUS at 16:21

## 2025-05-28 RX ADMIN — SUGAMMADEX 400 MG: 100 INJECTION, SOLUTION INTRAVENOUS at 17:42

## 2025-05-28 RX ADMIN — PHENYLEPHRINE HYDROCHLORIDE 0.6 MCG/KG/MIN: 10 INJECTION, SOLUTION INTRAVENOUS at 16:30

## 2025-05-28 RX ADMIN — ONDANSETRON 4 MG: 2 INJECTION, SOLUTION INTRAMUSCULAR; INTRAVENOUS at 08:47

## 2025-05-28 RX ADMIN — PROPOFOL 130 MG: 10 INJECTION, EMULSION INTRAVENOUS at 16:15

## 2025-05-28 RX ADMIN — MORPHINE SULFATE 2 MG: 2 INJECTION, SOLUTION INTRAMUSCULAR; INTRAVENOUS at 20:10

## 2025-05-28 RX ADMIN — IOPAMIDOL 170 ML: 510 INJECTION, SOLUTION INTRAVASCULAR at 16:30

## 2025-05-28 RX ADMIN — MORPHINE SULFATE 2 MG: 2 INJECTION, SOLUTION INTRAMUSCULAR; INTRAVENOUS at 08:38

## 2025-05-28 RX ADMIN — ROCURONIUM BROMIDE 30 MG: 10 INJECTION, SOLUTION INTRAVENOUS at 16:30

## 2025-05-28 RX ADMIN — ROCURONIUM BROMIDE 10 MG: 10 INJECTION, SOLUTION INTRAVENOUS at 16:15

## 2025-05-28 RX ADMIN — SODIUM CHLORIDE, POTASSIUM CHLORIDE, SODIUM LACTATE AND CALCIUM CHLORIDE: 600; 310; 30; 20 INJECTION, SOLUTION INTRAVENOUS at 16:15

## 2025-05-28 NOTE — ANESTHESIA POSTPROCEDURE EVALUATION
Patient: Samia Gaming    Procedure Summary       Date: 05/28/25 Room / Location: [facility] INTERVENTIONAL    Anesthesia Start: 1606 Anesthesia Stop: 1802    Procedure: IR EMBOLIZATION Diagnosis: (gi bleed)    Scheduled Providers:  Provider: Alex Villaseñor MD    Anesthesia Type: general ASA Status: 3 - Emergent            Anesthesia Type: general    Vitals  Vitals Value Taken Time   BP 88/56 05/28/25 18:15   Temp 37.1 °C (98.7 °F) 05/28/25 17:56   Pulse 93 05/28/25 18:17   Resp 16 05/28/25 18:15   SpO2 99 % 05/28/25 18:17   Vitals shown include unfiled device data.        Post Anesthesia Care and Evaluation      Comments: Discharge criteria met per RN

## 2025-05-28 NOTE — ANESTHESIA PREPROCEDURE EVALUATION
Anesthesia Evaluation     Patient summary reviewed and Nursing notes reviewed     NPO Liquid Status: Waived due to emergency           Airway   Mallampati: III  TM distance: >3 FB  Neck ROM: full  Anterior, Large neck circumference and Possible difficult intubation  Dental - normal exam     Pulmonary    (-) COPD, asthma, not a smoker  Cardiovascular     (+) hyperlipidemia  (-) past MI, dysrhythmias, angina      Neuro/Psych  (-) seizures, CVA  GI/Hepatic/Renal/Endo    (+) morbid obesity, GI bleeding active bleeding  (-) GERD, liver disease, no renal disease, diabetes, no thyroid disorder    Musculoskeletal     Abdominal    Substance History      OB/GYN          Other   arthritis, blood dyscrasia anemia,                 Anesthesia Plan    ASA 3 - emergent     general       Anesthetic plan, risks, benefits, and alternatives have been provided, discussed and informed consent has been obtained with: patient.    CODE STATUS:    Code Status (Patient has no pulse and is not breathing): CPR (Attempt to Resuscitate)  Medical Interventions (Patient has pulse or is breathing): Full Support

## 2025-05-28 NOTE — PROGRESS NOTES
Dr. PABLO Quiros    [facility] CORONARY CARE        Patient ID:  Name:  Samia Gaming  MRN:  [id number]  1955  69 y.o.  female            CC/Reason for visit: Acute GI bleed    Interval hx: Patient off the floor currently  Discussed with RN.  Still complaining of some bloody clots in stool, happened last night.  Chart reviewed  Discussed with GI and nurse    ROS: Some nausea, some abdominal pain    I reviewed old medical records.  Past medical history, social history and family history: Unchanged from admission H&P.      Vitals:  Vitals:    05/28/25 1011 05/28/25 1100 05/28/25 1200 05/28/25 1500   BP:  122/71 124/65    BP Location:       Patient Position:       Pulse: 90 85 101    Resp:       Temp:  98 °F (36.7 °C)  98.8 °F (37.1 °C)   TempSrc:    Oral   SpO2: 95% 98% 96%    Weight:       Height:               Body mass index is 51.73 kg/m².    Intake/Output Summary (Last 24 hours) at 5/28/2025 1616  Last data filed at 5/28/2025 0733  Gross per 24 hour   Intake 451.2 ml   Output --   Net 451.2 ml       Exam:  Patient off the floor      Scheduled meds:  insulin regular, 2-9 Units, Subcutaneous, Q6H  mupirocin, 1 Application, Each Nare, BID  pantoprazole, 40 mg, Intravenous, Q AM  sodium chloride, 10 mL, Intravenous, Q12H  sodium chloride, 10 mL, Intravenous, Q12H      IV meds:                           Data Review:   I reviewed the patient's medications and new clinical results.              Results from last 7 days   Lab Units 05/28/25  1152 05/28/25  0822 05/27/25  2350 05/27/25  1343 05/27/25  0542 05/26/25  1440 05/26/25  0627 05/25/25  1955 05/25/25  0550 05/25/25  0119 05/24/25  1611 05/24/25  1113 05/23/25  1628 05/23/25  0735 05/22/25  1611   SODIUM mmol/L  --   --   --   --  137  --  139  --   --  137  --  138   < > 141 138   POTASSIUM mmol/L  --   --   --   --  4.1  --  3.9 4.5  --  3.1*  --  4.2   < > 4.1 3.8   CHLORIDE mmol/L  --   --   --   --  107  --  107  --   --  106  --  112*   < >  114* 106   CO2 mmol/L  --   --   --   --  24.5  --  26.4  --   --  23.0  --  22.0   < > 19.8* 22.0   BUN mg/dL  --   --   --   --  14  --  11  --   --  21  --  24*   < > 30* 30*   CREATININE mg/dL  --   --   --   --  0.46*  --  0.48*  --   --  0.50*  --  0.66   < > 0.56* 0.67   CALCIUM mg/dL  --   --   --   --  7.1*  --  7.9*  --   --  7.4*  --  7.6*   < > 7.3* 8.1*   BILIRUBIN mg/dL  --   --   --   --  0.7  --   --   --   --   --   --   --   --  0.7 0.5   ALK PHOS U/L  --   --   --   --  36*  --   --   --   --   --   --   --   --  39 54   ALT (SGPT) U/L  --   --   --   --  16  --   --   --   --   --   --   --   --  9 14   AST (SGOT) U/L  --   --   --   --  17  --   --   --   --   --   --   --   --  8 13   GLUCOSE mg/dL  --   --   --   --  145*  --  119*  --   --  113*  --  145*   < > 156* 138*   WBC 10*3/mm3 13.05* 8.74 7.65   < > 8.42   < > 7.19 11.72*   < > 12.54*   < > 13.90*   < > 15.20* 12.98*   HEMOGLOBIN g/dL 7.3* 7.2* 6.4*   < > 7.7*   < > 6.7* 7.2*   < > 7.2*   < > 7.6*   < > 7.5* 7.1*   PLATELETS 10*3/mm3 155 117* 92*   < > 91*   < > 97* 118*   < > 111*   < > 128*   < > 131* 222   INR   --   --   --   --   --   --  1.25*  --   --  1.29*  --  1.25*  --   --   --     < > = values in this interval not displayed.            ASSESSMENT:   Acute GI bleed  Acute blood loss anemia  Orbital wall fracture, comminuted  Facial numbness due to right infraorbital nerve compromise from orbital fracture  Syncope  Fall  Hyperglycemia  Morbid obesity  MAIN      PLAN:  Patient and all problems new to me during this admission.  Discussed with GI.  CT angiogram was ordered stat since yesterday nuclear tagged red blood cell scan was negative for bleeding.  Today CT angiogram of abdomen and pelvis shows bleeding, likely below the duodenum.  For this reason, patient will be taken to the operating room by general surgery.  Consult oromaxillary facial surgeon for right orbital fracture seen on CT scan.  Insulin sliding scale for  hyperglycemia.  Avoid CPAP or BiPAP since patient has orbital fracture    This patient has several chronic medical conditions, and now several acute medical illnesses.  Extensive amount of data was reviewed.  Images were directly visualized by me.  This patient warrants high complexity medical decision-making.        I reviewed the chart and other providers notes and reviewed labs.  Copied text in this note has been reviewed and is accurate as of today      Gumaro Quiros MD  5/28/2025

## 2025-05-28 NOTE — PLAN OF CARE
Problem: Adult Inpatient Plan of Care  Goal: Plan of Care Review  Outcome: Progressing  Goal: Patient-Specific Goal (Individualized)  Outcome: Progressing  Goal: Absence of Hospital-Acquired Illness or Injury  Outcome: Progressing  Intervention: Identify and Manage Fall Risk  Recent Flowsheet Documentation  Taken 5/28/2025 1500 by Richar Sams RN  Safety Promotion/Fall Prevention:   safety round/check completed   clutter free environment maintained  Taken 5/28/2025 1400 by Richar Sams RN  Safety Promotion/Fall Prevention:   safety round/check completed   clutter free environment maintained  Taken 5/28/2025 1200 by Richar Sams RN  Safety Promotion/Fall Prevention:   safety round/check completed   room organization consistent   nonskid shoes/slippers when out of bed   lighting adjusted   fall prevention program maintained   clutter free environment maintained  Taken 5/28/2025 1100 by Richar Sams RN  Safety Promotion/Fall Prevention:   safety round/check completed   room organization consistent   nonskid shoes/slippers when out of bed   lighting adjusted   fall prevention program maintained   clutter free environment maintained  Taken 5/28/2025 1000 by Richar Sams RN  Safety Promotion/Fall Prevention:   safety round/check completed   room organization consistent   nonskid shoes/slippers when out of bed   lighting adjusted   fall prevention program maintained   clutter free environment maintained  Taken 5/28/2025 0800 by Richar Sams RN  Safety Promotion/Fall Prevention:   safety round/check completed   room organization consistent   nonskid shoes/slippers when out of bed   lighting adjusted   fall prevention program maintained   clutter free environment maintained  Intervention: Prevent Skin Injury  Recent Flowsheet Documentation  Taken 5/28/2025 1400 by Richar Sams RN  Body Position:   turned   lower extremity elevated   heels elevated  Taken 5/28/2025 1200 by Richar Sams RN  Body  Position:   turned   lower extremity elevated   heels elevated  Taken 5/28/2025 1000 by Richar Sams RN  Body Position:   turned   lower extremity elevated   heels elevated  Taken 5/28/2025 0800 by Richar Sams RN  Body Position:   turned   lower extremity elevated   heels elevated  Skin Protection: incontinence pads utilized  Intervention: Prevent Infection  Recent Flowsheet Documentation  Taken 5/28/2025 1500 by Richar Sams RN  Infection Prevention: visitors restricted/screened  Taken 5/28/2025 1400 by Richar Sams RN  Infection Prevention: visitors restricted/screened  Taken 5/28/2025 1200 by Richar Sams RN  Infection Prevention: visitors restricted/screened  Taken 5/28/2025 1100 by Richar Sams RN  Infection Prevention:   hand hygiene promoted   rest/sleep promoted  Taken 5/28/2025 1000 by Richar Sams RN  Infection Prevention:   hand hygiene promoted   rest/sleep promoted  Taken 5/28/2025 0900 by Richar Sams RN  Infection Prevention:   hand hygiene promoted   rest/sleep promoted  Taken 5/28/2025 0800 by Richar Sams RN  Infection Prevention:   hand hygiene promoted   rest/sleep promoted  Goal: Optimal Comfort and Wellbeing  Outcome: Progressing  Intervention: Provide Person-Centered Care  Recent Flowsheet Documentation  Taken 5/28/2025 0800 by Richar Sams RN  Trust Relationship/Rapport:   care explained   choices provided  Goal: Readiness for Transition of Care  Outcome: Progressing   Goal Outcome Evaluation:

## 2025-05-28 NOTE — ANESTHESIA PROCEDURE NOTES
Airway  Reason: emergent    Date/Time: 5/28/2025 4:17 PM  Airway not difficult    General Information and Staff    Patient location during procedure: OR  Anesthesiologist: Zi Mccall MD  CRNA/CAA: Angeli Sam CRNA    Consent for Airway (if performed for an anesthetic, see related documentation for consents)   Patient identity confirmed: verbally with patient, arm band, hospital-assigned identification number and provided demographic data  Consent: Verbal consent obtained. Written consent obtained  Risks and benefits: risks, benefits and alternatives were discussed  Consent given by: patient    Indications and Patient Condition  Indications for airway management: airway protection    Preoxygenated: yes    Mask difficulty assessment: 0 - not attempted    Final Airway Details    Final airway type: endotracheal airway      Successful airway: ETT  Cuffed: yes   Successful intubation technique: video laryngoscopy and RSI  Adjuncts used in placement: intubating stylet and cricoid pressure  Endotracheal tube insertion site: oral  Blade: CMAC  Blade size: 3  ETT size (mm): 7.5  Cormack-Lehane Classification: grade I - full view of glottis  Placement verified by: chest auscultation and capnometry   Cuff volume (mL): 7  Measured from: teeth  ETT/EBT  to teeth (cm): 21  Number of attempts at approach: 1  Assessment: lips, teeth, and gum same as pre-op and atraumatic intubation

## 2025-05-28 NOTE — PROGRESS NOTES
General Surgery Note    Summary:  69-year-old lady with GI bleed that has been unable to be localized, having undergone EGD with nonbleeding duodenal ulcer, colonoscopy with blood in the sigmoid colon but no active source, and PEG and negative tagged red blood cell scan. CT angiogram today demonstrates possible bleeding within small bowel diverticulum    Interval Status:  Comfortable resting in bed.  Passed some blood clots overnight with some associated dizziness but has not had any bowel movement today.  No abdominal pain.  Hemoglobin has been stable and she is hemodynamically normal.    Physical Exam:    Not sick  Afebrile, heart rate 85, /71  Nonlabored breathing on room air  Abdomen soft, nontender, nondistended, obese      Labs:  Results from last 7 days   Lab Units 05/28/25  1152 05/28/25  0822 05/27/25  2350 05/27/25  2036 05/27/25  1343 05/27/25  0542 05/26/25  2240   WBC 10*3/mm3 13.05* 8.74 7.65 8.59 7.56 8.42 10.25   HEMOGLOBIN g/dL 7.3* 7.2* 6.4* 7.3* 7.2* 7.7* 6.1*   PLATELETS 10*3/mm3 155 117* 92* 80* 93* 91* 86*     Results from last 7 days   Lab Units 05/27/25  0542 05/26/25  0627 05/25/25  1955 05/25/25  0119 05/23/25  1629 05/23/25  0735 05/22/25  1611   SODIUM mmol/L 137 139  --  137   < > 141 138   POTASSIUM mmol/L 4.1 3.9 4.5 3.1*   < > 4.1 3.8   CHLORIDE mmol/L 107 107  --  106   < > 114* 106   CO2 mmol/L 24.5 26.4  --  23.0   < > 19.8* 22.0   BUN mg/dL 14 11  --  21   < > 30* 30*   CREATININE mg/dL 0.46* 0.48*  --  0.50*   < > 0.56* 0.67   CALCIUM mg/dL 7.1* 7.9*  --  7.4*   < > 7.3* 8.1*   BILIRUBIN mg/dL 0.7  --   --   --   --  0.7 0.5   ALK PHOS U/L 36*  --   --   --   --  39 54   ALT (SGPT) U/L 16  --   --   --   --  9 14   AST (SGOT) U/L 17  --   --   --   --  8 13   GLUCOSE mg/dL 145* 119*  --  113*   < > 156* 138*    < > = values in this interval not displayed.       Plan:  - CTA reviewed, with possible bleeding within suspected small bowel diverticulum  - Agree with  interventional radiology consultation for formal angiogram with possible embolization  - If this is unsuccessful and the patient continues to bleed from identified small bowel source will discuss proceeding with surgery        Jose G Ny MD  [facility]  [address], [address]  [place], [place], [address]  P: [phone]  F: [phone]

## 2025-05-28 NOTE — PROGRESS NOTES
[facility]  [phone]  Dr. Foreign Lora     Subjective:  LOS: 5    Chief Complaint:  syncope    Pt doing well today. Had more bloody Bms overnight with drop in Hgb. Scans ordered and surgery consulted.     Objective   Vital Signs past 24hrs  Temp range: Temp (24hrs), Av °F (36.7 °C), Min:97.4 °F (36.3 °C), Max:98.5 °F (36.9 °C)    BP range: BP: (101-158)/(49-88) 101/60  Pulse range: Heart Rate:  [] 87  Resp rate range: Resp:  [15-20] 20  Device (Oxygen Therapy): room airFlow (L/min) (Oxygen Therapy):  [2] 2  Oxygen range:SpO2:  [92 %-100 %] 95 %   Mechanical Ventilator:     Physical Exam  Vitals and nursing note reviewed.   Constitutional:       General: She is not in acute distress.  HENT:      Head: Normocephalic and atraumatic.   Cardiovascular:      Rate and Rhythm: Normal rate and regular rhythm.      Heart sounds: No murmur heard.  Pulmonary:      Effort: Pulmonary effort is normal. No respiratory distress.      Breath sounds: Normal breath sounds. No wheezing, rhonchi or rales.   Abdominal:      General: Abdomen is flat.      Tenderness: There is no abdominal tenderness.   Skin:     General: Skin is warm and dry.      Findings: No rash.   Neurological:      Mental Status: She is alert.       Results Review:    I have reviewed the laboratory and imaging data since the last note by [facility] physician.  My annotations are noted in assessment and plan.      Result Review:  I have personally reviewed the results from last note by [facility] physician to 2025 00:26 EDT and agree with these findings:  [x]  Laboratory list / accordion  [x]  Microbiology  [x]  Radiology  [x]  EKG/Telemetry   [x]  Cardiology/Vascular   [x]  Pathology  [x]  Old records  []  Other:    Medication Review:  I have reviewed the current MAR.  My annotations are noted in assessment and plan.    insulin regular, 2-9 Units, Subcutaneous, Q6H  mupirocin, 1 Application, Each Nare, BID  sodium chloride, 10 mL, Intravenous,  Q12H  sodium chloride, 10 mL, Intravenous, Q12H           Lines, Drains & Airways       Active LDAs       Name Placement date Placement time Site Days    Peripheral IV 05/23/25 Anterior;Left;Proximal Forearm 05/23/25  --  Forearm  5    Peripheral IV 05/25/25 0116 20 G Anterior;Left;Upper Arm 05/25/25  0116  Arm  2    Peripheral IV 05/27/25 0035 20 G Anterior;Right Forearm 05/27/25  0035  Forearm  less than 1                  No active isolations  Diet Orders (active) (From admission, onward)       Start     Ordered    05/27/25 1145  Diet: Regular/House; Texture: Soft to Chew (NDD 3); Soft to Chew: Whole Meat; Fluid Consistency: Thin (IDDSI 0)  Diet Effective Now         05/27/25 1146                      Assessment  Acute GI bleed  Acute blood loss anemia  Orbital wall fracture, comminuted  Facial numbness due to right infraorbital nerve compromise from orbital fracture  Syncope  Fall  Hyperglycemia  Morbid obesity  MAIN        Plan  - presented on 5/23/25 for syncopal episode and found to have GI bleeding  - GI consulted with EGD showed erosive gastropathy and normal duodenum. Colonoscopy with diverticulosis. Push enteroscopy also without source of bleeding. tagged RBC scan without active bleed. CTA A/P showed no active bleed. Capsule done outpatient therefore recs are to advance diet.   - general surgery consulted with no acute surgical intervention needed.   - consulted [facility] STAT for the findings on CT facial bones. Not sure exactly which specialty will deal with this but will find out and get her appropriate care. Obviously her GI bleed is still most pressing issue at present.   - monitor serial CBCs.   - ICU core measures      Foreign Lora DO   05/28/25  00:26 EDT      Part of this note may be an electronic transcription/translation of spoken language to printed text using the Dragon Dictation System.

## 2025-05-28 NOTE — POST-PROCEDURE NOTE
POST PROCEDURE NOTE    Procedure: Mesenteric angiography    Pre-Procedure Diagnosis: GI bleeding, localized to small bowel on CTA earlier today.  Negative upper endoscopy and colonoscopy. Anemia requiring multiple transfusions.    Post-procedure Diagnosis: same    Findings: Mesenteric angiography performed with attention to the small bowel. No active bleeding was identified angiographically. No embolization performed. Hemostasis of right common femoral artery achieved with Angioseal. Distal pulses palpable following procedure. Full report to follow. Discussed with Dr. Garza after procedure.    Complications: None immediate    Blood loss: Mimimal    Specimen Removed: None    Disposition:   PACU

## 2025-05-28 NOTE — PROGRESS NOTES
PT AWAKE AND FOLLOWING COMMANDS , PRIOR TO TRANSFERRED OUT OF IR  SUITE , RT FEMORAL SITE SOFT , OCCLUSIVE DRESSING IN PLACE , CLEAN AND DRY. RLE PULSES PALP, BOUNDING.

## 2025-05-28 NOTE — PROGRESS NOTES
[facility]  Inpatient Progress Note    History of Present Illness  The patient presents for evaluation of gastrointestinal bleeding.  She underwent a nuclear study yesterday that showed no evidence of active GI bleeding during the course of the study. Her hemoglobin this morning is pending, with previous values trending between 7.2, 7.3, and 6.4. The case has been discussed with general surgery. A capsule study could be helpful in this situation, and she will need to have this done as an outpatient. She has been on a regular mechanical soft diet since 6 PM last night, and there were no bloody bowel movements during the shift. The nursing staff reported a small trace of blood in her stool last night, but no significant bloody bowel movements were observed. She continues to exhibit melena intermittently. She received a unit of blood last night. She experienced dizziness upon standing last night, which she attributes to the ongoing blood transfusion at that time.Her last bowel movement was last night, and she has not had any bowel movements this morning.    She reports experiencing lower back pain following the test conducted yesterday.    SOCIAL HISTORY  Diet: Regular mechanical soft diet as of 6 PM last night; full liquid diet.    Subjective       Current Facility-Administered Medications:     acetaminophen (TYLENOL) tablet 650 mg, 650 mg, Oral, Q4H PRN, 650 mg at 05/27/25 2126 **OR** acetaminophen (TYLENOL) 160 MG/5ML oral solution 650 mg, 650 mg, Oral, Q4H PRN **OR** acetaminophen (TYLENOL) suppository 650 mg, 650 mg, Rectal, Q4H PRN, Keyur Neely MD    Calcium Replacement - Follow Nurse / BPA Driven Protocol, , Not Applicable, PRN, Keyur Neley MD    dextrose (D50W) (25 g/50 mL) IV injection 25 g, 25 g, Intravenous, Q15 Min PRN, Victor Hugo Gonzalez Jr., MD    dextrose (GLUTOSE) oral gel 15 g, 15 g, Oral, Q15 Min PRN, Victor Hugo Gonzalez Jr., MD    glucagon (GLUCAGEN)  injection 1 mg, 1 mg, Intramuscular, Q15 Min PRN, Victor Hugo Gonzalez Jr., MD    insulin regular (humuLIN R,novoLIN R) injection 2-9 Units, 2-9 Units, Subcutaneous, Q6H, Victor Hugo Gonzalez Jr., MD, 2 Units at 05/23/25 2111    Magnesium Standard Dose Replacement - Follow Nurse / BPA Driven Protocol, , Not Applicable, PRN, Keyur Neely MD    metoclopramide (REGLAN) injection 5 mg, 5 mg, Intravenous, Q6H PRN, Farzana Rios APRN, 5 mg at 05/24/25 0602    morphine injection 2 mg, 2 mg, Intravenous, Q4H PRN, Rosalinda Milan APRN, 2 mg at 05/27/25 0914    mupirocin (BACTROBAN) 2 % nasal ointment 1 Application, 1 Application, Each Nare, BID, Victor Hugo Gonzalez Jr., MD, 1 Application at 05/27/25 2114    nitroglycerin (NITROSTAT) SL tablet 0.4 mg, 0.4 mg, Sublingual, Q5 Min PRN, Victor Hugo Gonzalez Jr., MD    ondansetron ODT (ZOFRAN-ODT) disintegrating tablet 4 mg, 4 mg, Oral, Q6H PRN **OR** ondansetron (ZOFRAN) injection 4 mg, 4 mg, Intravenous, Q6H PRN, Keyur Neely MD, 4 mg at 05/24/25 0455    pantoprazole (PROTONIX) injection 40 mg, 40 mg, Intravenous, Q AM, Justin Garza MD    Phosphorus Replacement - Follow Nurse / BPA Driven Protocol, , Not Applicable, PRN, Keyur Neely MD    Potassium Replacement - Follow Nurse / BPA Driven Protocol, , Not Applicable, Chin DOWELL Brian David, MD    sodium chloride 0.9 % flush 10 mL, 10 mL, Intravenous, PRN, Keyur Neely MD    sodium chloride 0.9 % flush 10 mL, 10 mL, Intravenous, Q12H, Keyur Neely MD, 10 mL at 05/27/25 2021    sodium chloride 0.9 % flush 10 mL, 10 mL, Intravenous, PRN, Keyur Neely MD    sodium chloride 0.9 % flush 10 mL, 10 mL, Intravenous, Q12H, Victor Hugo Gonzalez Jr., MD, 10 mL at 05/27/25 2022    sodium chloride 0.9 % flush 10 mL, 10 mL, Intravenous, PRN, Victor Hugo Gonzalez Jr., MD    sodium chloride 0.9 % infusion 40 mL, 40 mL, Intravenous, PRN, Keyur Neely MD    sodium chloride  0.9 % infusion 40 mL, 40 mL, Intravenous, PRN, Victor Hugo Gonzalez Jr., MD    traZODone (DESYREL) tablet 150 mg, 150 mg, Oral, Nightly PRN, Rosalinda Milan, KYLAH, 150 mg at 05/27/25 2125  Review of Systems:    The following systems were reviewed and negative;  constitution    Objective     Vital Signs  Temp:  [97.8 °F (36.6 °C)-98.8 °F (37.1 °C)] 98.8 °F (37.1 °C)  Heart Rate:  [71-99] 81  Resp:  [16-20] 16  BP: (101-151)/(49-88) 118/61  Body mass index is 51.73 kg/m².    Intake/Output Summary (Last 24 hours) at 5/28/2025 0827  Last data filed at 5/28/2025 0345  Gross per 24 hour   Intake 211.2 ml   Output --   Net 211.2 ml     No intake/output data recorded.     Physical Exam  Abdomen: No tenderness on palpation.        Results Review:     I reviewed the patient's new clinical results.    Results from last 7 days   Lab Units 05/27/25  2350 05/27/25 2036 05/27/25  1343   WBC 10*3/mm3 7.65 8.59 7.56   HEMOGLOBIN g/dL 6.4* 7.3* 7.2*   HEMATOCRIT % 19.3* 22.2* 21.7*   PLATELETS 10*3/mm3 92* 80* 93*     Results from last 7 days   Lab Units 05/27/25  0542 05/26/25  0627 05/25/25  1955 05/25/25  0119 05/23/25  1629 05/23/25  0735 05/22/25  1611   SODIUM mmol/L 137 139  --  137   < > 141 138   POTASSIUM mmol/L 4.1 3.9 4.5 3.1*   < > 4.1 3.8   CHLORIDE mmol/L 107 107  --  106   < > 114* 106   CO2 mmol/L 24.5 26.4  --  23.0   < > 19.8* 22.0   BUN mg/dL 14 11  --  21   < > 30* 30*   CREATININE mg/dL 0.46* 0.48*  --  0.50*   < > 0.56* 0.67   CALCIUM mg/dL 7.1* 7.9*  --  7.4*   < > 7.3* 8.1*   BILIRUBIN mg/dL 0.7  --   --   --   --  0.7 0.5   ALK PHOS U/L 36*  --   --   --   --  39 54   ALT (SGPT) U/L 16  --   --   --   --  9 14   AST (SGOT) U/L 17  --   --   --   --  8 13   GLUCOSE mg/dL 145* 119*  --  113*   < > 156* 138*    < > = values in this interval not displayed.     Results from last 7 days   Lab Units 05/26/25  0627 05/25/25  0119 05/24/25  1113   INR  1.25* 1.29* 1.25*     Lab Results   Lab Value Date/Time    LIPASE  17 05/22/2025 1611       Radiology:  [unfilled]    Results  Labs   - Hemoglobin: 7.2, 7.3, 6.4 g/dL    Imaging   - Nuclear study: 05/27/2025, No evidence of active GI bleeding    Assessment & Plan   Assessment:     Assessment & Plan  1. Gastrointestinal bleeding:  - Nuclear study showed no active GI bleeding.  - Hemoglobin levels fluctuating between 7.2, 7.3, and 6.4.  - Consultation with general surgery ongoing.  - Regular mechanical soft diet since 6 PM last night; no bloody bowel movements reported.  - Blood observed in colon during recent colonoscopy; small intestine showed no bleeding.  - Two previous upper endoscopic examinations did not identify active bleeding sites.  - Protonix intravenously once daily  - Capsule study recommended as outpatient procedure for further evaluation.  - Repeat nuclear study if further bleeding occurs during time of bleeding.  - Consider repeat colonoscopy if bleeding persists and source remains unidentified but unclear if this will add additional information?  Want to be thoughtful about putting through procedures if limited information to be obtained..  - Close monitoring of blood counts throughout the day.    Plan:       I discussed the patient's findings and my recommendations with patient, family, and nursing staff.    Patient or patient representative verbalized consent for the use of Ambient Listening during the visit for chart documentation.    Addendum patient persisted with bleeding today we could not get a tagged red blood cell scan as it was too close to the previous one we got a CT angiogram that is positive.  I have reviewed the results with the patient the patient's daughter the nursing staff the ICU attending and interventional radiology.  Plans are for attempted IR embolization.  This is a difficult location if successful we will continue to monitor.  If unsuccessful or rebleeding would need to consider surgical options or possible transfer to [facility]  [place] for deep spiral or single balloon enteroscopy if review of that case on their end deems a possible option    Portions of this document were created with Dragon Dictation, a voice recognition software, and/or [employer], an AI-assisted transcription software.          Justin Garza M.D.  [facility]  [address] [place] [address].[facility]. [facility]  [phone]

## 2025-05-28 NOTE — ANESTHESIA POSTPROCEDURE EVALUATION
"Patient: Samia Gaming    Procedure Summary       Date: 05/28/25 Room / Location: [facility] INTERVENTIONAL    Anesthesia Start: 1606 Anesthesia Stop: 1802    Procedure: IR EMBOLIZATION Diagnosis: (gi bleed)    Scheduled Providers:  Provider: Alex Villaseñor MD    Anesthesia Type: general ASA Status: 3 - Emergent            Anesthesia Type: general    Vitals  Vitals Value Taken Time   /59 05/28/25 19:15   Temp 37.1 °C (98.7 °F) 05/28/25 17:56   Pulse 85 05/28/25 19:24   Resp 16 05/28/25 19:15   SpO2 100 % 05/28/25 19:24   Vitals shown include unfiled device data.        Post Anesthesia Care and Evaluation    Patient location during evaluation: bedside  Patient participation: complete - patient participated  Level of consciousness: awake  Pain management: adequate    Airway patency: patent  Anesthetic complications: No anesthetic complications    Cardiovascular status: acceptable  Respiratory status: acceptable  Hydration status: acceptable    Comments: */59   Pulse 87   Temp 37.1 °C (98.7 °F) (Oral)   Resp 16   Ht 157.5 cm (62\")   Wt 128 kg (282 lb 13.6 oz)   SpO2 100%   BMI 51.73 kg/m²       "

## 2025-05-29 ENCOUNTER — ANESTHESIA EVENT (OUTPATIENT)
Dept: PERIOP | Facility: HOSPITAL | Age: 70
End: 2025-05-29
Payer: MEDICARE

## 2025-05-29 ENCOUNTER — ANESTHESIA (OUTPATIENT)
Dept: PERIOP | Facility: HOSPITAL | Age: 70
End: 2025-05-29
Payer: MEDICARE

## 2025-05-29 LAB
ABO GROUP BLD: NORMAL
BH BB BLOOD EXPIRATION DATE: NORMAL
BH BB BLOOD TYPE BARCODE: 5100
BH BB DISPENSE STATUS: NORMAL
BH BB PRODUCT CODE: NORMAL
BH BB UNIT NUMBER: NORMAL
BLD GP AB SCN SERPL QL: NEGATIVE
CROSSMATCH INTERPRETATION: NORMAL
DEPRECATED RDW RBC AUTO: 55.9 FL (ref 37–54)
DEPRECATED RDW RBC AUTO: 57.3 FL (ref 37–54)
DEPRECATED RDW RBC AUTO: 59.4 FL (ref 37–54)
DEPRECATED RDW RBC AUTO: 63.9 FL (ref 37–54)
ERYTHROCYTE [DISTWIDTH] IN BLOOD BY AUTOMATED COUNT: 18 % (ref 12.3–15.4)
ERYTHROCYTE [DISTWIDTH] IN BLOOD BY AUTOMATED COUNT: 18 % (ref 12.3–15.4)
ERYTHROCYTE [DISTWIDTH] IN BLOOD BY AUTOMATED COUNT: 18.7 % (ref 12.3–15.4)
ERYTHROCYTE [DISTWIDTH] IN BLOOD BY AUTOMATED COUNT: 18.8 % (ref 12.3–15.4)
GLUCOSE BLDC GLUCOMTR-MCNC: 100 MG/DL (ref 70–130)
GLUCOSE BLDC GLUCOMTR-MCNC: 112 MG/DL (ref 70–130)
GLUCOSE BLDC GLUCOMTR-MCNC: 131 MG/DL (ref 70–130)
GLUCOSE BLDC GLUCOMTR-MCNC: 157 MG/DL (ref 70–130)
HCT VFR BLD AUTO: 17.5 % (ref 34–46.6)
HCT VFR BLD AUTO: 23.3 % (ref 34–46.6)
HCT VFR BLD AUTO: 24.7 % (ref 34–46.6)
HCT VFR BLD AUTO: 30 % (ref 34–46.6)
HGB BLD-MCNC: 5.7 G/DL (ref 12–15.9)
HGB BLD-MCNC: 7.3 G/DL (ref 12–15.9)
HGB BLD-MCNC: 7.7 G/DL (ref 12–15.9)
HGB BLD-MCNC: 9.2 G/DL (ref 12–15.9)
MCH RBC QN AUTO: 28.5 PG (ref 26.6–33)
MCH RBC QN AUTO: 28.9 PG (ref 26.6–33)
MCH RBC QN AUTO: 29.2 PG (ref 26.6–33)
MCH RBC QN AUTO: 29.4 PG (ref 26.6–33)
MCHC RBC AUTO-ENTMCNC: 30.7 G/DL (ref 31.5–35.7)
MCHC RBC AUTO-ENTMCNC: 31.2 G/DL (ref 31.5–35.7)
MCHC RBC AUTO-ENTMCNC: 31.3 G/DL (ref 31.5–35.7)
MCHC RBC AUTO-ENTMCNC: 32.6 G/DL (ref 31.5–35.7)
MCV RBC AUTO: 90.2 FL (ref 79–97)
MCV RBC AUTO: 91.5 FL (ref 79–97)
MCV RBC AUTO: 92.1 FL (ref 79–97)
MCV RBC AUTO: 95.2 FL (ref 79–97)
PLATELET # BLD AUTO: 107 10*3/MM3 (ref 140–450)
PLATELET # BLD AUTO: 113 10*3/MM3 (ref 140–450)
PLATELET # BLD AUTO: 123 10*3/MM3 (ref 140–450)
PLATELET # BLD AUTO: 232 10*3/MM3 (ref 140–450)
PMV BLD AUTO: 10.8 FL (ref 6–12)
PMV BLD AUTO: 11.5 FL (ref 6–12)
PMV BLD AUTO: 11.7 FL (ref 6–12)
RBC # BLD AUTO: 1.94 10*6/MM3 (ref 3.77–5.28)
RBC # BLD AUTO: 2.53 10*6/MM3 (ref 3.77–5.28)
RBC # BLD AUTO: 2.7 10*6/MM3 (ref 3.77–5.28)
RBC # BLD AUTO: 3.15 10*6/MM3 (ref 3.77–5.28)
RH BLD: POSITIVE
T&S EXPIRATION DATE: NORMAL
UNIT  ABO: NORMAL
UNIT  RH: NORMAL
WBC NRBC COR # BLD AUTO: 10.3 10*3/MM3 (ref 3.4–10.8)
WBC NRBC COR # BLD AUTO: 11.22 10*3/MM3 (ref 3.4–10.8)
WBC NRBC COR # BLD AUTO: 11.45 10*3/MM3 (ref 3.4–10.8)
WBC NRBC COR # BLD AUTO: 8.61 10*3/MM3 (ref 3.4–10.8)

## 2025-05-29 PROCEDURE — 86923 COMPATIBILITY TEST ELECTRIC: CPT

## 2025-05-29 PROCEDURE — 44120 REMOVAL OF SMALL INTESTINE: CPT | Performed by: STUDENT IN AN ORGANIZED HEALTH CARE EDUCATION/TRAINING PROGRAM

## 2025-05-29 PROCEDURE — 25010000002 BUPIVACAINE LIPOSOME 1.3 % SUSPENSION 20 ML VIAL: Performed by: STUDENT IN AN ORGANIZED HEALTH CARE EDUCATION/TRAINING PROGRAM

## 2025-05-29 PROCEDURE — 82948 REAGENT STRIP/BLOOD GLUCOSE: CPT

## 2025-05-29 PROCEDURE — 36430 TRANSFUSION BLD/BLD COMPNT: CPT

## 2025-05-29 PROCEDURE — 99232 SBSQ HOSP IP/OBS MODERATE 35: CPT | Performed by: INTERNAL MEDICINE

## 2025-05-29 PROCEDURE — 25010000002 MORPHINE PER 10 MG

## 2025-05-29 PROCEDURE — 86900 BLOOD TYPING SEROLOGIC ABO: CPT

## 2025-05-29 PROCEDURE — 0DBA0ZZ EXCISION OF JEJUNUM, OPEN APPROACH: ICD-10-PCS | Performed by: STUDENT IN AN ORGANIZED HEALTH CARE EDUCATION/TRAINING PROGRAM

## 2025-05-29 PROCEDURE — 44310 ILEOSTOMY/JEJUNOSTOMY: CPT | Performed by: STUDENT IN AN ORGANIZED HEALTH CARE EDUCATION/TRAINING PROGRAM

## 2025-05-29 PROCEDURE — 86850 RBC ANTIBODY SCREEN: CPT | Performed by: STUDENT IN AN ORGANIZED HEALTH CARE EDUCATION/TRAINING PROGRAM

## 2025-05-29 PROCEDURE — 85027 COMPLETE CBC AUTOMATED: CPT | Performed by: INTERNAL MEDICINE

## 2025-05-29 PROCEDURE — 0D1B0Z4 BYPASS ILEUM TO CUTANEOUS, OPEN APPROACH: ICD-10-PCS | Performed by: STUDENT IN AN ORGANIZED HEALTH CARE EDUCATION/TRAINING PROGRAM

## 2025-05-29 PROCEDURE — 25010000002 LIDOCAINE 2% SOLUTION: Performed by: NURSE ANESTHETIST, CERTIFIED REGISTERED

## 2025-05-29 PROCEDURE — P9016 RBC LEUKOCYTES REDUCED: HCPCS

## 2025-05-29 PROCEDURE — 0DBB0ZZ EXCISION OF ILEUM, OPEN APPROACH: ICD-10-PCS | Performed by: STUDENT IN AN ORGANIZED HEALTH CARE EDUCATION/TRAINING PROGRAM

## 2025-05-29 PROCEDURE — 85018 HEMOGLOBIN: CPT

## 2025-05-29 PROCEDURE — 63710000001 INSULIN REGULAR HUMAN PER 5 UNITS: Performed by: INTERNAL MEDICINE

## 2025-05-29 PROCEDURE — 86900 BLOOD TYPING SEROLOGIC ABO: CPT | Performed by: STUDENT IN AN ORGANIZED HEALTH CARE EDUCATION/TRAINING PROGRAM

## 2025-05-29 PROCEDURE — 82803 BLOOD GASES ANY COMBINATION: CPT

## 2025-05-29 PROCEDURE — 88307 TISSUE EXAM BY PATHOLOGIST: CPT | Performed by: STUDENT IN AN ORGANIZED HEALTH CARE EDUCATION/TRAINING PROGRAM

## 2025-05-29 PROCEDURE — 25810000003 LACTATED RINGERS PER 1000 ML: Performed by: ANESTHESIOLOGY

## 2025-05-29 PROCEDURE — 99233 SBSQ HOSP IP/OBS HIGH 50: CPT | Performed by: STUDENT IN AN ORGANIZED HEALTH CARE EDUCATION/TRAINING PROGRAM

## 2025-05-29 PROCEDURE — B4151ZZ FLUOROSCOPY OF INFERIOR MESENTERIC ARTERY USING LOW OSMOLAR CONTRAST: ICD-10-PCS | Performed by: RADIOLOGY

## 2025-05-29 PROCEDURE — 25010000002 MIDAZOLAM PER 1 MG: Performed by: NURSE ANESTHETIST, CERTIFIED REGISTERED

## 2025-05-29 PROCEDURE — 86901 BLOOD TYPING SEROLOGIC RH(D): CPT | Performed by: STUDENT IN AN ORGANIZED HEALTH CARE EDUCATION/TRAINING PROGRAM

## 2025-05-29 PROCEDURE — 85014 HEMATOCRIT: CPT

## 2025-05-29 PROCEDURE — 25010000002 PROPOFOL 10 MG/ML EMULSION: Performed by: NURSE ANESTHETIST, CERTIFIED REGISTERED

## 2025-05-29 PROCEDURE — B4141ZZ FLUOROSCOPY OF SUPERIOR MESENTERIC ARTERY USING LOW OSMOLAR CONTRAST: ICD-10-PCS | Performed by: RADIOLOGY

## 2025-05-29 PROCEDURE — 25010000002 ESMOLOL 100 MG/10ML SOLUTION: Performed by: NURSE ANESTHETIST, CERTIFIED REGISTERED

## 2025-05-29 PROCEDURE — 82947 ASSAY GLUCOSE BLOOD QUANT: CPT

## 2025-05-29 DEVICE — HORIZON TI LG 6 CLIPS/CART
Type: IMPLANTABLE DEVICE | Site: ABDOMEN | Status: FUNCTIONAL
Brand: WECK

## 2025-05-29 DEVICE — PROXIMATE LINEAR CUTTER RELOAD (STNADARD) , BLUE, 55MM
Type: IMPLANTABLE DEVICE | Site: ABDOMEN | Status: FUNCTIONAL
Brand: PROXIMATE

## 2025-05-29 DEVICE — PROXIMATE RELOADABLE LINEAR CUTTER WITH SAFETY LOCK-OUT.  55MM LINEAR CUTTER.
Type: IMPLANTABLE DEVICE | Site: ABDOMEN | Status: FUNCTIONAL
Brand: PROXIMATE

## 2025-05-29 RX ORDER — CEFOXITIN 2 G/1
INJECTION, POWDER, FOR SOLUTION INTRAVENOUS AS NEEDED
Status: DISCONTINUED | OUTPATIENT
Start: 2025-05-29 | End: 2025-05-30 | Stop reason: SURG

## 2025-05-29 RX ORDER — ROCURONIUM BROMIDE 10 MG/ML
INJECTION, SOLUTION INTRAVENOUS AS NEEDED
Status: DISCONTINUED | OUTPATIENT
Start: 2025-05-29 | End: 2025-05-30 | Stop reason: SURG

## 2025-05-29 RX ORDER — FENTANYL CITRATE 50 UG/ML
50 INJECTION, SOLUTION INTRAMUSCULAR; INTRAVENOUS ONCE AS NEEDED
Status: DISCONTINUED | OUTPATIENT
Start: 2025-05-29 | End: 2025-05-30 | Stop reason: HOSPADM

## 2025-05-29 RX ORDER — FAMOTIDINE 10 MG/ML
20 INJECTION, SOLUTION INTRAVENOUS ONCE
Status: DISCONTINUED | OUTPATIENT
Start: 2025-05-29 | End: 2025-05-30 | Stop reason: HOSPADM

## 2025-05-29 RX ORDER — CALCIUM CHLORIDE 100 MG/ML
INJECTION INTRAVENOUS; INTRAVENTRICULAR AS NEEDED
Status: DISCONTINUED | OUTPATIENT
Start: 2025-05-29 | End: 2025-05-30 | Stop reason: SURG

## 2025-05-29 RX ORDER — MAGNESIUM HYDROXIDE 1200 MG/15ML
LIQUID ORAL AS NEEDED
Status: DISCONTINUED | OUTPATIENT
Start: 2025-05-29 | End: 2025-05-30 | Stop reason: HOSPADM

## 2025-05-29 RX ORDER — PROPOFOL 10 MG/ML
VIAL (ML) INTRAVENOUS AS NEEDED
Status: DISCONTINUED | OUTPATIENT
Start: 2025-05-29 | End: 2025-05-30 | Stop reason: SURG

## 2025-05-29 RX ORDER — PANTOPRAZOLE SODIUM 40 MG/10ML
40 INJECTION, POWDER, LYOPHILIZED, FOR SOLUTION INTRAVENOUS EVERY 12 HOURS SCHEDULED
Status: DISCONTINUED | OUTPATIENT
Start: 2025-05-29 | End: 2025-06-04

## 2025-05-29 RX ORDER — SODIUM CHLORIDE 0.9 % (FLUSH) 0.9 %
3-10 SYRINGE (ML) INJECTION AS NEEDED
Status: DISCONTINUED | OUTPATIENT
Start: 2025-05-29 | End: 2025-05-30 | Stop reason: HOSPADM

## 2025-05-29 RX ORDER — LIDOCAINE HYDROCHLORIDE 20 MG/ML
INJECTION, SOLUTION INFILTRATION; PERINEURAL AS NEEDED
Status: DISCONTINUED | OUTPATIENT
Start: 2025-05-29 | End: 2025-05-30 | Stop reason: SURG

## 2025-05-29 RX ORDER — KETAMINE HCL IN NACL, ISO-OSM 100MG/10ML
SYRINGE (ML) INJECTION AS NEEDED
Status: DISCONTINUED | OUTPATIENT
Start: 2025-05-29 | End: 2025-05-30 | Stop reason: SURG

## 2025-05-29 RX ORDER — LIDOCAINE HYDROCHLORIDE 10 MG/ML
0.5 INJECTION, SOLUTION INFILTRATION; PERINEURAL ONCE AS NEEDED
Status: DISCONTINUED | OUTPATIENT
Start: 2025-05-29 | End: 2025-05-30 | Stop reason: HOSPADM

## 2025-05-29 RX ORDER — MIDAZOLAM HYDROCHLORIDE 1 MG/ML
INJECTION, SOLUTION INTRAMUSCULAR; INTRAVENOUS AS NEEDED
Status: DISCONTINUED | OUTPATIENT
Start: 2025-05-29 | End: 2025-05-30 | Stop reason: SURG

## 2025-05-29 RX ORDER — ESMOLOL HYDROCHLORIDE 10 MG/ML
INJECTION INTRAVENOUS AS NEEDED
Status: DISCONTINUED | OUTPATIENT
Start: 2025-05-29 | End: 2025-05-30 | Stop reason: SURG

## 2025-05-29 RX ORDER — BUPIVACAINE HCL/0.9 % NACL/PF 0.125 %
PLASTIC BAG, INJECTION (ML) EPIDURAL AS NEEDED
Status: DISCONTINUED | OUTPATIENT
Start: 2025-05-29 | End: 2025-05-30 | Stop reason: SURG

## 2025-05-29 RX ORDER — SODIUM CHLORIDE 9 MG/ML
INJECTION, SOLUTION INTRAVENOUS CONTINUOUS PRN
Status: DISCONTINUED | OUTPATIENT
Start: 2025-05-29 | End: 2025-05-30 | Stop reason: SURG

## 2025-05-29 RX ORDER — SODIUM CHLORIDE 0.9 % (FLUSH) 0.9 %
3 SYRINGE (ML) INJECTION EVERY 12 HOURS SCHEDULED
Status: DISCONTINUED | OUTPATIENT
Start: 2025-05-29 | End: 2025-05-30 | Stop reason: HOSPADM

## 2025-05-29 RX ORDER — SODIUM CHLORIDE, SODIUM LACTATE, POTASSIUM CHLORIDE, CALCIUM CHLORIDE 600; 310; 30; 20 MG/100ML; MG/100ML; MG/100ML; MG/100ML
9 INJECTION, SOLUTION INTRAVENOUS CONTINUOUS
Status: ACTIVE | OUTPATIENT
Start: 2025-05-29 | End: 2025-05-30

## 2025-05-29 RX ORDER — FENTANYL CITRATE 50 UG/ML
INJECTION, SOLUTION INTRAMUSCULAR; INTRAVENOUS AS NEEDED
Status: DISCONTINUED | OUTPATIENT
Start: 2025-05-29 | End: 2025-05-30 | Stop reason: SURG

## 2025-05-29 RX ADMIN — CEFOXITIN SODIUM 2 G: 2 POWDER, FOR SOLUTION INTRAVENOUS at 23:36

## 2025-05-29 RX ADMIN — ROCURONIUM BROMIDE 15 MG: 10 INJECTION, SOLUTION INTRAVENOUS at 23:39

## 2025-05-29 RX ADMIN — INSULIN HUMAN 2 UNITS: 100 INJECTION, SOLUTION PARENTERAL at 00:10

## 2025-05-29 RX ADMIN — CALCIUM CHLORIDE 0.5 G: 100 INJECTION INTRAVENOUS; INTRAVENTRICULAR at 22:30

## 2025-05-29 RX ADMIN — MIDAZOLAM 1 MG: 1 INJECTION INTRAMUSCULAR; INTRAVENOUS at 21:35

## 2025-05-29 RX ADMIN — Medication 35 MG: at 22:30

## 2025-05-29 RX ADMIN — MORPHINE SULFATE 2 MG: 2 INJECTION, SOLUTION INTRAMUSCULAR; INTRAVENOUS at 00:10

## 2025-05-29 RX ADMIN — FENTANYL CITRATE 25 MCG: 50 INJECTION, SOLUTION INTRAMUSCULAR; INTRAVENOUS at 22:22

## 2025-05-29 RX ADMIN — PANTOPRAZOLE SODIUM 40 MG: 40 INJECTION, POWDER, FOR SOLUTION INTRAVENOUS at 05:13

## 2025-05-29 RX ADMIN — Medication 10 ML: at 09:23

## 2025-05-29 RX ADMIN — SODIUM CHLORIDE, POTASSIUM CHLORIDE, SODIUM LACTATE AND CALCIUM CHLORIDE: 600; 310; 30; 20 INJECTION, SOLUTION INTRAVENOUS at 21:20

## 2025-05-29 RX ADMIN — ESMOLOL HYDROCHLORIDE 25 MG: 100 INJECTION, SOLUTION INTRAVENOUS at 23:55

## 2025-05-29 RX ADMIN — Medication 100 MCG: at 21:40

## 2025-05-29 RX ADMIN — PROPOFOL 80 MG: 10 INJECTION, EMULSION INTRAVENOUS at 21:26

## 2025-05-29 RX ADMIN — Medication 100 MCG: at 21:29

## 2025-05-29 RX ADMIN — CALCIUM CHLORIDE 0.5 G: 100 INJECTION INTRAVENOUS; INTRAVENTRICULAR at 22:40

## 2025-05-29 RX ADMIN — ROCURONIUM BROMIDE 100 MG: 10 INJECTION, SOLUTION INTRAVENOUS at 21:26

## 2025-05-29 RX ADMIN — LIDOCAINE HYDROCHLORIDE 100 MG: 20 INJECTION, SOLUTION INFILTRATION; PERINEURAL at 21:26

## 2025-05-29 RX ADMIN — CEFOXITIN SODIUM 2 G: 2 POWDER, FOR SOLUTION INTRAVENOUS at 21:40

## 2025-05-29 RX ADMIN — FENTANYL CITRATE 25 MCG: 50 INJECTION, SOLUTION INTRAMUSCULAR; INTRAVENOUS at 21:49

## 2025-05-29 RX ADMIN — Medication 100 MCG: at 21:35

## 2025-05-29 RX ADMIN — Medication 15 MG: at 23:11

## 2025-05-29 RX ADMIN — ESMOLOL HYDROCHLORIDE 20 MG: 100 INJECTION, SOLUTION INTRAVENOUS at 23:25

## 2025-05-29 RX ADMIN — Medication 10 ML: at 09:24

## 2025-05-29 RX ADMIN — FENTANYL CITRATE 50 MCG: 50 INJECTION, SOLUTION INTRAMUSCULAR; INTRAVENOUS at 23:23

## 2025-05-29 RX ADMIN — MIDAZOLAM 1 MG: 1 INJECTION INTRAMUSCULAR; INTRAVENOUS at 22:01

## 2025-05-29 RX ADMIN — SODIUM CHLORIDE: 9 INJECTION, SOLUTION INTRAVENOUS at 22:20

## 2025-05-29 NOTE — PROGRESS NOTES
General Surgery Note    Summary:  69-year-old lady with GI bleed having undergone EGD with nonbleeding duodenal ulcer, colonoscopy with blood in the sigmoid colon but no active source, and negative tagged red blood cell scan.   CTA with suspected bleeding in small bowel, possibly within a small bowel diverticulum.  Subsequent arteriogram without active extravasation.    Interval Status:  Passed a dark blood clot today.  No abdominal pain.  Denies nausea.  Hemoglobin stable.    Physical Exam:    Not sick  Afebrile, heart rate 85, /54  Nonlabored breathing on room air  Abdomen soft, nontender, nondistended, incision clean and dry       Labs:  Results from last 7 days   Lab Units 05/29/25  1149 05/29/25  0612 05/29/25  0000 05/28/25  1918 05/28/25  1152 05/28/25  0822 05/27/25  2350   WBC 10*3/mm3 11.45* 8.61 11.22* 13.04* 13.05* 8.74 7.65   HEMOGLOBIN g/dL 9.2* 7.3* 7.7* 8.3* 7.3* 7.2* 6.4*   PLATELETS 10*3/mm3 232 113* 107* 97* 155 117* 92*     Results from last 7 days   Lab Units 05/27/25  0542 05/26/25  0627 05/25/25  1955 05/25/25  0119 05/23/25  1629 05/23/25  0735   SODIUM mmol/L 137 139  --  137   < > 141   POTASSIUM mmol/L 4.1 3.9 4.5 3.1*   < > 4.1   CHLORIDE mmol/L 107 107  --  106   < > 114*   CO2 mmol/L 24.5 26.4  --  23.0   < > 19.8*   BUN mg/dL 14 11  --  21   < > 30*   CREATININE mg/dL 0.46* 0.48*  --  0.50*   < > 0.56*   CALCIUM mg/dL 7.1* 7.9*  --  7.4*   < > 7.3*   BILIRUBIN mg/dL 0.7  --   --   --   --  0.7   ALK PHOS U/L 36*  --   --   --   --  39   ALT (SGPT) U/L 16  --   --   --   --  9   AST (SGOT) U/L 17  --   --   --   --  8   GLUCOSE mg/dL 145* 119*  --  113*   < > 156*    < > = values in this interval not displayed.       Plan:  -Unclear if blood clot she passed today signifies active bleeding given increasing hemoglobin.  - Okay for liquid diet, advance as tolerated  - Discussed with gastroenterology, single balloon endoscopy unlikely to be helpful  - If has recurrent bleeding with  drop in hemoglobin would recommend TPN and strongly consider proceeding with laparoscopic possibly open small bowel resection if able to identify the bleeding segment, although this may not be apparent intraoperatively.        Jose G Ny MD  [facility]  [address], [address]  [place], [place], [address]  P: [phone]  F: [phone]

## 2025-05-29 NOTE — PROGRESS NOTES
[facility]  Inpatient Progress Note    History of Present Illness  The patient presents for evaluation of gastrointestinal bleeding.    She reports a cessation of bleeding since the previous night, with no observed blood in her stool since 05/28/2025. She is not experiencing any abdominal pain.    Subjective   H/H 7.7, 7.3  IR Embolization (05/28/2025 17:40) no source found/treated  CT Angiogram Abdomen Pelvis (05/28/2025 13:29)   1. Accumulation of high density contrast within the lumen of a short  segment of small bowel in the central and left upper hemipelvis, likely  jejunum, and within a small bowel diverticulum in the central upper  pelvis, likely representing active GI bleed.  2. 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    Current Facility-Administered Medications:     acetaminophen (TYLENOL) tablet 650 mg, 650 mg, Oral, Q4H PRN, 650 mg at 05/28/25 0838 **OR** acetaminophen (TYLENOL) 160 MG/5ML oral solution 650 mg, 650 mg, Oral, Q4H PRN **OR** acetaminophen (TYLENOL) suppository 650 mg, 650 mg, Rectal, Q4H PRN, Keyur Neely MD    Calcium Replacement - Follow Nurse / BPA Driven Protocol, , Not Applicable, PRN, Keyur Neely MD    dextrose (D50W) (25 g/50 mL) IV injection 25 g, 25 g, Intravenous, Q15 Min PRN, Victor Hugo Gonzalez Jr., MD    dextrose (GLUTOSE) oral gel 15 g, 15 g, Oral, Q15 Min PRN, Victor Hugo Gonzalez Jr., MD    glucagon (GLUCAGEN) injection 1 mg, 1 mg, Intramuscular, Q15 Min PRN, Victor Hugo Gonzalez Jr., MD    insulin regular (humuLIN R,novoLIN R) injection 2-9 Units, 2-9 Units, Subcutaneous, Q6H, Victor Hugo Gonzalez Jr., MD, 2 Units at 05/29/25 0010    Magnesium Standard Dose Replacement - Follow Nurse / BPA Driven Protocol, , Not Applicable, PRN, Keyur Neely MD    metoclopramide (REGLAN) injection 5 mg, 5 mg, Intravenous, Q6H PRN, Farzana Rios, APRN, 5  mg at 05/24/25 0602    nitroglycerin (NITROSTAT) SL tablet 0.4 mg, 0.4 mg, Sublingual, Q5 Min PRN, Victor Hugo Gonzalez Jr., MD    ondansetron ODT (ZOFRAN-ODT) disintegrating tablet 4 mg, 4 mg, Oral, Q6H PRN **OR** ondansetron (ZOFRAN) injection 4 mg, 4 mg, Intravenous, Q6H PRN, Keyur Neely MD, 4 mg at 05/28/25 0847    pantoprazole (PROTONIX) injection 40 mg, 40 mg, Intravenous, Q AM, Justin Garza MD, 40 mg at 05/29/25 0513    Phosphorus Replacement - Follow Nurse / BPA Driven Protocol, , Not Applicable, Chin DOWELL Brian David, MD    Potassium Replacement - Follow Nurse / BPA Driven Protocol, , Not Applicable, Chin DOWELL Brian David, MD    sodium chloride 0.9 % flush 10 mL, 10 mL, Intravenous, PRN, Keyur Neely MD    sodium chloride 0.9 % flush 10 mL, 10 mL, Intravenous, Q12H, Keyur Neely MD, 10 mL at 05/28/25 2010    sodium chloride 0.9 % flush 10 mL, 10 mL, Intravenous, PRNChin Brian David, MD    sodium chloride 0.9 % flush 10 mL, 10 mL, Intravenous, Q12H, Victor Hugo Gonzalez Jr., MD, 10 mL at 05/28/25 2010    sodium chloride 0.9 % flush 10 mL, 10 mL, Intravenous, PRNLisa Harold Dale Jr., MD    sodium chloride 0.9 % infusion 40 mL, 40 mL, Intravenous, Chin DOWELL Brian David, MD    sodium chloride 0.9 % infusion 40 mL, 40 mL, Intravenous, PRN, Victor Hugo Gonzalez Jr., MD    traZODone (DESYREL) tablet 150 mg, 150 mg, Oral, Nightly PRN, Rosalinda Milan, APRN, 150 mg at 05/27/25 2125  Review of Systems:    The following systems were reviewed and negative;  gastrointestinal    Objective     Vital Signs  Temp:  [97.2 °F (36.2 °C)-98.8 °F (37.1 °C)] 98.1 °F (36.7 °C)  Heart Rate:  [] 86  Resp:  [12-20] 18  BP: ()/(47-90) 113/54  Body mass index is 51.73 kg/m².    Intake/Output Summary (Last 24 hours) at 5/29/2025 0657  Last data filed at 5/29/2025 0500  Gross per 24 hour   Intake 1140 ml   Output 700 ml   Net 440 ml     I/O this shift:  In: -   Out:  700 [Urine:700]     Physical Exam  Abdomen: Abdomen is non-tender on palpation.        Results Review:     I reviewed the patient's new clinical results.    Results from last 7 days   Lab Units 05/29/25  0612 05/29/25  0000 05/28/25  1918   WBC 10*3/mm3 8.61 11.22* 13.04*   HEMOGLOBIN g/dL 7.3* 7.7* 8.3*   HEMATOCRIT % 23.3* 24.7* 26.1*   PLATELETS 10*3/mm3 113* 107* 97*     Results from last 7 days   Lab Units 05/27/25  0542 05/26/25  0627 05/25/25  1955 05/25/25  0119 05/23/25  1629 05/23/25  0735 05/22/25  1611   SODIUM mmol/L 137 139  --  137   < > 141 138   POTASSIUM mmol/L 4.1 3.9 4.5 3.1*   < > 4.1 3.8   CHLORIDE mmol/L 107 107  --  106   < > 114* 106   CO2 mmol/L 24.5 26.4  --  23.0   < > 19.8* 22.0   BUN mg/dL 14 11  --  21   < > 30* 30*   CREATININE mg/dL 0.46* 0.48*  --  0.50*   < > 0.56* 0.67   CALCIUM mg/dL 7.1* 7.9*  --  7.4*   < > 7.3* 8.1*   BILIRUBIN mg/dL 0.7  --   --   --   --  0.7 0.5   ALK PHOS U/L 36*  --   --   --   --  39 54   ALT (SGPT) U/L 16  --   --   --   --  9 14   AST (SGOT) U/L 17  --   --   --   --  8 13   GLUCOSE mg/dL 145* 119*  --  113*   < > 156* 138*    < > = values in this interval not displayed.     Results from last 7 days   Lab Units 05/26/25 0627 05/25/25  0119 05/24/25  1113   INR  1.25* 1.29* 1.25*     Lab Results   Lab Value Date/Time    LIPASE 17 05/22/2025 1611       Radiology:  [unfilled]    Results  Imaging   - CTA: Showed a diverticulum that might be bleeding.    Assessment & Plan   Assessment:     Assessment & Plan  1. Gastrointestinal bleeding:  - No bleeding since 05/28/2025.  - Small bowel lesions difficult to detect and treat.  - Consultation with a colleague at the [facility] to see if single balloon would be useful to avoid surgery--after consultation they did not feel this would rovide therapeutic help and recommended surgical consideration.  - If bleeding recurs, surgical intervention may be required.  - If rebleeding occurs, further  testing or surgery may be necessary to identify and address the source of the bleeding.  - trend hemoglobin  - transfuse as needed    Plan:   Long discussion with patient and daughter.  Difficult location for intervention.  Correlation between IR, GI, interventional GI and surgery to determine least invasive way to manage.  Surgery may be required if recurrent bleeding.  Today, the patient has a stable hemoglobin and no overt bleeding.    I discussed the patient's findings and my recommendations with patient, family, nursing staff, and consulting provider.    Patient or patient representative verbalized consent for the use of Ambient Listening during the visit for chart documentation.    Portions of this document were created with Dragon Dictation, a voice recognition software, and/or [employer], an AI-assisted transcription software.          Justin Garza M.D.  [facility]  [address] [place] [address]. 350  [phone]

## 2025-05-29 NOTE — CONSULTS
Chp visited Pt. Pt expressed frustrations over illness and awaiting answers. Chp offered supportive presence and active listening. Chp remains available at Pt's request.

## 2025-05-29 NOTE — PROGRESS NOTES
Dr. PABLO Quiros    [facility] CORONARY CARE        Patient ID:  Name:  Samia Gaming  MRN:  [id number]  1955  69 y.o.  female            CC/Reason for visit: Acute GI bleed    Interval hx: No new complaints  No abdominal pain  No bloody stools since yesterday evening  IR could not find source to embolize yesterday    ROS: No cough, no shortness of breath    Vitals:  Vitals:    05/29/25 0650 05/29/25 0725 05/29/25 1127 05/29/25 1500   BP: 113/54      Pulse: 86 79 85    Resp:   18    Temp:  97.7 °F (36.5 °C) 97.8 °F (36.6 °C) 98.2 °F (36.8 °C)   TempSrc:   Axillary    SpO2: 100% 100%     Weight:       Height:               Body mass index is 51.73 kg/m².    Intake/Output Summary (Last 24 hours) at 5/29/2025 1739  Last data filed at 5/29/2025 0500  Gross per 24 hour   Intake 500 ml   Output 700 ml   Net -200 ml       Exam:  GEN:  No distress  Alert, oriented x 3.   LUNGS: Clear breath sounds bilat, no use of accessory muscles  CV:  Normal S1S2, without murmur, there is some leg edema  ABD:  Non tender, obesity hampers exam      Scheduled meds:  insulin regular, 2-9 Units, Subcutaneous, Q6H  pantoprazole, 40 mg, Intravenous, Q AM  sodium chloride, 10 mL, Intravenous, Q12H  sodium chloride, 10 mL, Intravenous, Q12H      IV meds:                           Data Review:   I reviewed the patient's medications and new clinical results.              Results from last 7 days   Lab Units 05/29/25  1149 05/29/25  0612 05/29/25  0000 05/27/25  1343 05/27/25  0542 05/26/25  1440 05/26/25  0627 05/25/25  1955 05/25/25  0550 05/25/25  0119 05/24/25  1611 05/24/25  1113 05/23/25  1628 05/23/25  0735   SODIUM mmol/L  --   --   --   --  137  --  139  --   --  137  --  138   < > 141   POTASSIUM mmol/L  --   --   --   --  4.1  --  3.9 4.5  --  3.1*  --  4.2   < > 4.1   CHLORIDE mmol/L  --   --   --   --  107  --  107  --   --  106  --  112*   < > 114*   CO2 mmol/L  --   --   --   --  24.5  --  26.4  --   --  23.0  --  22.0    < > 19.8*   BUN mg/dL  --   --   --   --  14  --  11  --   --  21  --  24*   < > 30*   CREATININE mg/dL  --   --   --   --  0.46*  --  0.48*  --   --  0.50*  --  0.66   < > 0.56*   CALCIUM mg/dL  --   --   --   --  7.1*  --  7.9*  --   --  7.4*  --  7.6*   < > 7.3*   BILIRUBIN mg/dL  --   --   --   --  0.7  --   --   --   --   --   --   --   --  0.7   ALK PHOS U/L  --   --   --   --  36*  --   --   --   --   --   --   --   --  39   ALT (SGPT) U/L  --   --   --   --  16  --   --   --   --   --   --   --   --  9   AST (SGOT) U/L  --   --   --   --  17  --   --   --   --   --   --   --   --  8   GLUCOSE mg/dL  --   --   --   --  145*  --  119*  --   --  113*  --  145*   < > 156*   WBC 10*3/mm3 11.45* 8.61 11.22*   < > 8.42   < > 7.19 11.72*   < > 12.54*   < > 13.90*   < > 15.20*   HEMOGLOBIN g/dL 9.2* 7.3* 7.7*   < > 7.7*   < > 6.7* 7.2*   < > 7.2*   < > 7.6*   < > 7.5*   PLATELETS 10*3/mm3 232 113* 107*   < > 91*   < > 97* 118*   < > 111*   < > 128*   < > 131*   INR   --   --   --   --   --   --  1.25*  --   --  1.29*  --  1.25*  --   --     < > = values in this interval not displayed.        ASSESSMENT:   Acute GI bleed  Acute blood loss anemia  Orbital wall fracture, comminuted  Facial numbness due to right infraorbital nerve compromise from orbital fracture  Syncope  Fall  Hyperglycemia  Morbid obesity  MAIN      PLAN:  Has improved.  Despite CT angiogram of the abdomen showing bleeding source, IR could not located to embolize it yesterday.  Patient followed by surgery, GI and interventional radiology.  Has no ongoing pulmonary issues.  Was seen for orbital fracture by OMFS but this will heal on its own.  Hemoglobin stable after transfusion.  We will sign off  Transfer to telemetry  Ask [facility] to take over care        Gumaro Quiros MD  5/29/2025

## 2025-05-29 NOTE — PROGRESS NOTES
Nutrition Services    Patient Name: Samia Gaming  YOB: 1955  MRN: [id number]  Admission date: 5/22/2025    Need to consider TPN if unable to use gut for nutrition as today is day 7 of this admission of minimal nutrition plus reported 7 days of limited nutrition and frequent emesis PTA.     PROGRESS NOTE      Encounter Information: Checking in on nutrition POC. Pt made NPO for repeat colonoscopy on 5/28. No observed blood in stool since 5/28. RD visited pt at bedside, family present. Pt and family report poor PO intake and vomiting for 1 week PTA so today mrks ~2 weeks of little to no nutrition. NFPE completed and not consistent with nutrition diagnosis of malnutrition at this time using AND/ASPEN criteria.          PO Diet: NPO Diet NPO Type: Sips with Meds   PO Supplements: n/a   PO Intake:  NPO       Current nutrition support: -   Nutrition support review: -       Weight: Weight:  (bed broken) (05/29/25 0617)       Medications: reviewed protonix   Labs: reviewed        GI Function:  last bowel movement: 5/28       Nutrition Intervention Updates: Need to consider TPN if unable to use gut for nutrition as today is day 7 of this admission of minimal nutrition plus reported 7 days of limited nutrition and frequent emesis PTA.        Results from last 7 days   Lab Units 05/27/25  0542 05/26/25  0627 05/25/25  1955 05/25/25  0119 05/23/25  1629 05/23/25  0735 05/22/25  1611   SODIUM mmol/L 137 139  --  137   < > 141 138   POTASSIUM mmol/L 4.1 3.9 4.5 3.1*   < > 4.1 3.8   CHLORIDE mmol/L 107 107  --  106   < > 114* 106   CO2 mmol/L 24.5 26.4  --  23.0   < > 19.8* 22.0   BUN mg/dL 14 11  --  21   < > 30* 30*   CREATININE mg/dL 0.46* 0.48*  --  0.50*   < > 0.56* 0.67   CALCIUM mg/dL 7.1* 7.9*  --  7.4*   < > 7.3* 8.1*   BILIRUBIN mg/dL 0.7  --   --   --   --  0.7 0.5   ALK PHOS U/L 36*  --   --   --   --  39 54   ALT (SGPT) U/L 16  --   --   --   --  9 14   AST (SGOT) U/L 17  --   --   --   --  8 13    GLUCOSE mg/dL 145* 119*  --  113*   < > 156* 138*    < > = values in this interval not displayed.     Results from last 7 days   Lab Units 05/29/25  1149 05/26/25  1440 05/26/25  0627 05/25/25  0550 05/25/25  0119 05/24/25  1611 05/24/25  1113   MAGNESIUM mg/dL  --   --  2.0  --  1.6  --  1.7   PHOSPHORUS mg/dL  --   --  2.5  --  2.4*  --  2.0*   HEMOGLOBIN g/dL 9.2*   < > 6.7*   < > 7.2*   < > 7.6*   HEMATOCRIT % 30.0*   < > 20.5*   < > 21.8*   < > 22.6*    < > = values in this interval not displayed.     External COVID19   Date Value Ref Range Status   12/18/2020 Detected (A) Not Detected Final     Coronavirus (COVID-19)   Date Value Ref Range Status   12/18/2020 Acceptable Acceptable Final     Lab Results   Component Value Date    HGBA1C 6.4 (H) 09/17/2024       RD to follow up per protocol.    Electronically signed by:  Azucena Santiago RD  05/29/25 12:58 EDT

## 2025-05-29 NOTE — PLAN OF CARE
Goal Outcome Evaluation:      Pt in CCU. Bp sys 130s. No BM overnight. 700ml urine output. Up to bedside commode x1, no dizziness. Hgb 7.7, awaiting recollect.

## 2025-05-30 ENCOUNTER — APPOINTMENT (OUTPATIENT)
Dept: GENERAL RADIOLOGY | Facility: HOSPITAL | Age: 70
DRG: 329 | End: 2025-05-30
Payer: MEDICARE

## 2025-05-30 LAB
ALBUMIN SERPL-MCNC: 2.1 G/DL (ref 3.5–5.2)
ALBUMIN/GLOB SERPL: 1.3 G/DL
ALP SERPL-CCNC: 36 U/L (ref 39–117)
ALT SERPL W P-5'-P-CCNC: 10 U/L (ref 1–33)
ANION GAP SERPL CALCULATED.3IONS-SCNC: 9.1 MMOL/L (ref 5–15)
ARTERIAL PATENCY WRIST A: ABNORMAL
AST SERPL-CCNC: 14 U/L (ref 1–32)
ATMOSPHERIC PRESS: 747.8 MMHG
BASE EXCESS BLDA CALC-SCNC: -1 MMOL/L (ref -5–5)
BASE EXCESS BLDA CALC-SCNC: -13 MMOL/L (ref -5–5)
BASE EXCESS BLDA CALC-SCNC: -2.7 MMOL/L (ref 0–2)
BASOPHILS # BLD AUTO: 0.03 10*3/MM3 (ref 0–0.2)
BASOPHILS NFR BLD AUTO: 0.2 % (ref 0–1.5)
BDY SITE: ABNORMAL
BH BB BLOOD EXPIRATION DATE: NORMAL
BH BB BLOOD TYPE BARCODE: 5100
BH BB DISPENSE STATUS: NORMAL
BH BB PRODUCT CODE: NORMAL
BH BB UNIT NUMBER: NORMAL
BILIRUB CONJ SERPL-MCNC: 0.3 MG/DL (ref 0–0.3)
BILIRUB SERPL-MCNC: 0.7 MG/DL (ref 0–1.2)
BUN SERPL-MCNC: 25 MG/DL (ref 8–23)
BUN/CREAT SERPL: 30.5 (ref 7–25)
CA-I SERPL ISE-MCNC: 1.13 MMOL/L (ref 1.15–1.35)
CALCIUM SPEC-SCNC: 7.6 MG/DL (ref 8.6–10.5)
CHLORIDE SERPL-SCNC: 107 MMOL/L (ref 98–107)
CHOLEST SERPL-MCNC: 118 MG/DL (ref 0–200)
CO2 BLDA-SCNC: 15 MMOL/L (ref 24–29)
CO2 BLDA-SCNC: 25 MMOL/L (ref 24–29)
CO2 SERPL-SCNC: 21.9 MMOL/L (ref 22–29)
CREAT SERPL-MCNC: 0.82 MG/DL (ref 0.57–1)
CROSSMATCH INTERPRETATION: NORMAL
CRP SERPL-MCNC: 1.89 MG/DL (ref 0–0.5)
DEPRECATED RDW RBC AUTO: 48.2 FL (ref 37–54)
DEPRECATED RDW RBC AUTO: 48.8 FL (ref 37–54)
DEPRECATED RDW RBC AUTO: 49 FL (ref 37–54)
DEVICE COMMENT: ABNORMAL
EGFRCR SERPLBLD CKD-EPI 2021: 76.6 ML/MIN/1.73
EOSINOPHIL # BLD AUTO: 0 10*3/MM3 (ref 0–0.4)
EOSINOPHIL NFR BLD AUTO: 0 % (ref 0.3–6.2)
ERYTHROCYTE [DISTWIDTH] IN BLOOD BY AUTOMATED COUNT: 15.7 % (ref 12.3–15.4)
ERYTHROCYTE [DISTWIDTH] IN BLOOD BY AUTOMATED COUNT: 16 % (ref 12.3–15.4)
ERYTHROCYTE [DISTWIDTH] IN BLOOD BY AUTOMATED COUNT: 16.1 % (ref 12.3–15.4)
GLOBULIN UR ELPH-MCNC: 1.6 GM/DL
GLUCOSE BLDC GLUCOMTR-MCNC: 105 MG/DL (ref 70–130)
GLUCOSE BLDC GLUCOMTR-MCNC: 119 MG/DL (ref 70–130)
GLUCOSE BLDC GLUCOMTR-MCNC: 129 MG/DL (ref 70–130)
GLUCOSE BLDC GLUCOMTR-MCNC: 149 MG/DL (ref 70–130)
GLUCOSE BLDC GLUCOMTR-MCNC: 177 MG/DL (ref 70–130)
GLUCOSE BLDC GLUCOMTR-MCNC: 182 MG/DL (ref 70–130)
GLUCOSE BLDC GLUCOMTR-MCNC: 95 MG/DL (ref 70–130)
GLUCOSE SERPL-MCNC: 169 MG/DL (ref 65–99)
HCO3 BLDA-SCNC: 14.3 MMOL/L (ref 22–26)
HCO3 BLDA-SCNC: 24 MMOL/L (ref 22–26)
HCO3 BLDA-SCNC: 24.8 MMOL/L (ref 22–28)
HCT VFR BLD AUTO: 29.6 % (ref 34–46.6)
HCT VFR BLD AUTO: 34.6 % (ref 34–46.6)
HCT VFR BLD AUTO: 38.4 % (ref 34–46.6)
HCT VFR BLDA CALC: 18 % (ref 38–51)
HCT VFR BLDA CALC: 24 % (ref 38–51)
HEMODILUTION: NO
HGB BLD-MCNC: 11.7 G/DL (ref 12–15.9)
HGB BLD-MCNC: 13.1 G/DL (ref 12–15.9)
HGB BLD-MCNC: 9.7 G/DL (ref 12–15.9)
HGB BLDA-MCNC: 6.1 G/DL (ref 12–17)
HGB BLDA-MCNC: 8.2 G/DL (ref 12–17)
IMM GRANULOCYTES # BLD AUTO: 0.11 10*3/MM3 (ref 0–0.05)
IMM GRANULOCYTES NFR BLD AUTO: 0.7 % (ref 0–0.5)
INHALED O2 CONCENTRATION: 100 %
LYMPHOCYTES # BLD AUTO: 0.65 10*3/MM3 (ref 0.7–3.1)
LYMPHOCYTES NFR BLD AUTO: 4.1 % (ref 19.6–45.3)
Lab: ABNORMAL
MAGNESIUM SERPL-MCNC: 1.7 MG/DL (ref 1.6–2.4)
MCH RBC QN AUTO: 29.6 PG (ref 26.6–33)
MCH RBC QN AUTO: 30.3 PG (ref 26.6–33)
MCH RBC QN AUTO: 30.5 PG (ref 26.6–33)
MCHC RBC AUTO-ENTMCNC: 32.8 G/DL (ref 31.5–35.7)
MCHC RBC AUTO-ENTMCNC: 33.8 G/DL (ref 31.5–35.7)
MCHC RBC AUTO-ENTMCNC: 34.1 G/DL (ref 31.5–35.7)
MCV RBC AUTO: 89.3 FL (ref 79–97)
MCV RBC AUTO: 89.6 FL (ref 79–97)
MCV RBC AUTO: 90.2 FL (ref 79–97)
MODALITY: ABNORMAL
MONOCYTES # BLD AUTO: 1.05 10*3/MM3 (ref 0.1–0.9)
MONOCYTES NFR BLD AUTO: 6.6 % (ref 5–12)
NEUTROPHILS NFR BLD AUTO: 14 10*3/MM3 (ref 1.7–7)
NEUTROPHILS NFR BLD AUTO: 88.4 % (ref 42.7–76)
NOTIFIED WHO: ABNORMAL
NRBC BLD AUTO-RTO: 0.3 /100 WBC (ref 0–0.2)
O2 A-A PPRESDIFF RESPIRATORY: 0.2 MMHG
PCO2 BLDA: 34.8 MM HG (ref 35–45)
PCO2 BLDA: 42 MM HG (ref 35–45)
PCO2 BLDA: 54.4 MM HG (ref 35–45)
PEEP RESPIRATORY: 8 CM[H2O]
PH BLDA: 7.22 PH UNITS (ref 7.35–7.6)
PH BLDA: 7.27 PH UNITS (ref 7.35–7.45)
PH BLDA: 7.38 PH UNITS (ref 7.35–7.6)
PHOSPHATE SERPL-MCNC: 5 MG/DL (ref 2.5–4.5)
PLATELET # BLD AUTO: 111 10*3/MM3 (ref 140–450)
PLATELET # BLD AUTO: 128 10*3/MM3 (ref 140–450)
PLATELET # BLD AUTO: 95 10*3/MM3 (ref 140–450)
PMV BLD AUTO: 10.5 FL (ref 6–12)
PMV BLD AUTO: 11.1 FL (ref 6–12)
PMV BLD AUTO: 11.1 FL (ref 6–12)
PO2 BLD: 147 MM[HG] (ref 0–500)
PO2 BLDA: 117 MMHG (ref 80–105)
PO2 BLDA: 147.2 MM HG (ref 80–100)
PO2 BLDA: 196 MMHG (ref 80–105)
POTASSIUM BLDA-SCNC: 2.7 MMOL/L (ref 3.5–4.9)
POTASSIUM BLDA-SCNC: 3.9 MMOL/L (ref 3.5–4.9)
POTASSIUM SERPL-SCNC: 4.6 MMOL/L (ref 3.5–5.2)
PREALB SERPL-MCNC: 14.7 MG/DL (ref 20–40)
PROT SERPL-MCNC: 3.7 G/DL (ref 6–8.5)
RBC # BLD AUTO: 3.28 10*6/MM3 (ref 3.77–5.28)
RBC # BLD AUTO: 3.86 10*6/MM3 (ref 3.77–5.28)
RBC # BLD AUTO: 4.3 10*6/MM3 (ref 3.77–5.28)
READ BACK: YES
SAO2 % BLDA: 100 % (ref 95–98)
SAO2 % BLDA: 98 % (ref 95–98)
SAO2 % BLDCOA: 98.9 % (ref 92–98.5)
SET MECH RESP RATE: 18
SODIUM SERPL-SCNC: 138 MMOL/L (ref 136–145)
TOTAL RATE: 18 BREATHS/MINUTE
TRIGL SERPL-MCNC: 194 MG/DL (ref 0–150)
UNIT  ABO: NORMAL
UNIT  RH: NORMAL
VENTILATOR MODE: AC
VT ON VENT VENT: 400 ML
WBC NRBC COR # BLD AUTO: 11.12 10*3/MM3 (ref 3.4–10.8)
WBC NRBC COR # BLD AUTO: 15.44 10*3/MM3 (ref 3.4–10.8)
WBC NRBC COR # BLD AUTO: 15.84 10*3/MM3 (ref 3.4–10.8)

## 2025-05-30 PROCEDURE — 82803 BLOOD GASES ANY COMBINATION: CPT

## 2025-05-30 PROCEDURE — 83735 ASSAY OF MAGNESIUM: CPT | Performed by: STUDENT IN AN ORGANIZED HEALTH CARE EDUCATION/TRAINING PROGRAM

## 2025-05-30 PROCEDURE — 74018 RADEX ABDOMEN 1 VIEW: CPT

## 2025-05-30 PROCEDURE — 63710000001 INSULIN REGULAR HUMAN PER 5 UNITS: Performed by: INTERNAL MEDICINE

## 2025-05-30 PROCEDURE — 25010000002 FENTANYL CITRATE (PF) 2500 MCG/50ML SOLUTION

## 2025-05-30 PROCEDURE — 80053 COMPREHEN METABOLIC PANEL: CPT | Performed by: STUDENT IN AN ORGANIZED HEALTH CARE EDUCATION/TRAINING PROGRAM

## 2025-05-30 PROCEDURE — C1751 CATH, INF, PER/CENT/MIDLINE: HCPCS

## 2025-05-30 PROCEDURE — 82947 ASSAY GLUCOSE BLOOD QUANT: CPT

## 2025-05-30 PROCEDURE — 88307 TISSUE EXAM BY PATHOLOGIST: CPT | Performed by: STUDENT IN AN ORGANIZED HEALTH CARE EDUCATION/TRAINING PROGRAM

## 2025-05-30 PROCEDURE — 94002 VENT MGMT INPAT INIT DAY: CPT

## 2025-05-30 PROCEDURE — 86140 C-REACTIVE PROTEIN: CPT | Performed by: STUDENT IN AN ORGANIZED HEALTH CARE EDUCATION/TRAINING PROGRAM

## 2025-05-30 PROCEDURE — 99232 SBSQ HOSP IP/OBS MODERATE 35: CPT | Performed by: INTERNAL MEDICINE

## 2025-05-30 PROCEDURE — 25010000002 CALCIUM GLUCONATE PER 10 ML: Performed by: STUDENT IN AN ORGANIZED HEALTH CARE EDUCATION/TRAINING PROGRAM

## 2025-05-30 PROCEDURE — 94799 UNLISTED PULMONARY SVC/PX: CPT

## 2025-05-30 PROCEDURE — 25010000002 POTASSIUM CHLORIDE 10 MEQ/100ML SOLUTION: Performed by: ANESTHESIOLOGY

## 2025-05-30 PROCEDURE — 25010000002 POTASSIUM CHLORIDE PER 2 MEQ OF POTASSIUM: Performed by: STUDENT IN AN ORGANIZED HEALTH CARE EDUCATION/TRAINING PROGRAM

## 2025-05-30 PROCEDURE — 85018 HEMOGLOBIN: CPT

## 2025-05-30 PROCEDURE — 99024 POSTOP FOLLOW-UP VISIT: CPT | Performed by: STUDENT IN AN ORGANIZED HEALTH CARE EDUCATION/TRAINING PROGRAM

## 2025-05-30 PROCEDURE — 25010000002 PROPOFOL 10 MG/ML EMULSION

## 2025-05-30 PROCEDURE — 94003 VENT MGMT INPAT SUBQ DAY: CPT

## 2025-05-30 PROCEDURE — 84134 ASSAY OF PREALBUMIN: CPT | Performed by: STUDENT IN AN ORGANIZED HEALTH CARE EDUCATION/TRAINING PROGRAM

## 2025-05-30 PROCEDURE — 85014 HEMATOCRIT: CPT

## 2025-05-30 PROCEDURE — 82330 ASSAY OF CALCIUM: CPT | Performed by: STUDENT IN AN ORGANIZED HEALTH CARE EDUCATION/TRAINING PROGRAM

## 2025-05-30 PROCEDURE — 25010000002 FENTANYL CITRATE (PF) 50 MCG/ML SOLUTION: Performed by: NURSE ANESTHETIST, CERTIFIED REGISTERED

## 2025-05-30 PROCEDURE — 84100 ASSAY OF PHOSPHORUS: CPT | Performed by: STUDENT IN AN ORGANIZED HEALTH CARE EDUCATION/TRAINING PROGRAM

## 2025-05-30 PROCEDURE — 82948 REAGENT STRIP/BLOOD GLUCOSE: CPT

## 2025-05-30 PROCEDURE — 82465 ASSAY BLD/SERUM CHOLESTEROL: CPT | Performed by: STUDENT IN AN ORGANIZED HEALTH CARE EDUCATION/TRAINING PROGRAM

## 2025-05-30 PROCEDURE — 94760 N-INVAS EAR/PLS OXIMETRY 1: CPT

## 2025-05-30 PROCEDURE — 85025 COMPLETE CBC W/AUTO DIFF WBC: CPT | Performed by: STUDENT IN AN ORGANIZED HEALTH CARE EDUCATION/TRAINING PROGRAM

## 2025-05-30 PROCEDURE — 25810000003 SODIUM CHLORIDE 0.9 % SOLUTION: Performed by: INTERNAL MEDICINE

## 2025-05-30 PROCEDURE — 05HY33Z INSERTION OF INFUSION DEVICE INTO UPPER VEIN, PERCUTANEOUS APPROACH: ICD-10-PCS | Performed by: STUDENT IN AN ORGANIZED HEALTH CARE EDUCATION/TRAINING PROGRAM

## 2025-05-30 PROCEDURE — 25010000002 MAGNESIUM SULFATE PER 500 MG OF MAGNESIUM: Performed by: STUDENT IN AN ORGANIZED HEALTH CARE EDUCATION/TRAINING PROGRAM

## 2025-05-30 PROCEDURE — 71045 X-RAY EXAM CHEST 1 VIEW: CPT

## 2025-05-30 PROCEDURE — 82248 BILIRUBIN DIRECT: CPT | Performed by: STUDENT IN AN ORGANIZED HEALTH CARE EDUCATION/TRAINING PROGRAM

## 2025-05-30 PROCEDURE — 84478 ASSAY OF TRIGLYCERIDES: CPT | Performed by: STUDENT IN AN ORGANIZED HEALTH CARE EDUCATION/TRAINING PROGRAM

## 2025-05-30 PROCEDURE — 85027 COMPLETE CBC AUTOMATED: CPT | Performed by: INTERNAL MEDICINE

## 2025-05-30 PROCEDURE — 25010000002 CEFOXITIN PER 1 G: Performed by: NURSE ANESTHETIST, CERTIFIED REGISTERED

## 2025-05-30 PROCEDURE — 25010000002 POTASSIUM CHLORIDE 10 MEQ/100ML SOLUTION

## 2025-05-30 PROCEDURE — 94761 N-INVAS EAR/PLS OXIMETRY MLT: CPT

## 2025-05-30 PROCEDURE — 25010000002 ONDANSETRON PER 1 MG: Performed by: NURSE ANESTHETIST, CERTIFIED REGISTERED

## 2025-05-30 PROCEDURE — 36600 WITHDRAWAL OF ARTERIAL BLOOD: CPT

## 2025-05-30 RX ORDER — POTASSIUM CHLORIDE 7.45 MG/ML
INJECTION INTRAVENOUS
Status: COMPLETED
Start: 2025-05-30 | End: 2025-05-30

## 2025-05-30 RX ORDER — HYDRALAZINE HYDROCHLORIDE 20 MG/ML
5 INJECTION INTRAMUSCULAR; INTRAVENOUS
Status: DISCONTINUED | OUTPATIENT
Start: 2025-05-30 | End: 2025-06-01

## 2025-05-30 RX ORDER — SODIUM CHLORIDE 9 MG/ML
100 INJECTION, SOLUTION INTRAVENOUS CONTINUOUS
Status: ACTIVE | OUTPATIENT
Start: 2025-05-30 | End: 2025-05-31

## 2025-05-30 RX ORDER — INDOMETHACIN 25 MG/1
CAPSULE ORAL AS NEEDED
Status: DISCONTINUED | OUTPATIENT
Start: 2025-05-30 | End: 2025-05-30 | Stop reason: SURG

## 2025-05-30 RX ORDER — SODIUM CHLORIDE 0.9 % (FLUSH) 0.9 %
10 SYRINGE (ML) INJECTION EVERY 12 HOURS SCHEDULED
Status: DISCONTINUED | OUTPATIENT
Start: 2025-05-30 | End: 2025-06-01 | Stop reason: SDUPTHER

## 2025-05-30 RX ORDER — EPHEDRINE SULFATE 50 MG/ML
5 INJECTION, SOLUTION INTRAVENOUS ONCE AS NEEDED
Status: DISCONTINUED | OUTPATIENT
Start: 2025-05-30 | End: 2025-06-01

## 2025-05-30 RX ORDER — LABETALOL HYDROCHLORIDE 5 MG/ML
5 INJECTION, SOLUTION INTRAVENOUS
Status: DISCONTINUED | OUTPATIENT
Start: 2025-05-30 | End: 2025-06-01

## 2025-05-30 RX ORDER — ATROPINE SULFATE 0.4 MG/ML
0.4 INJECTION, SOLUTION INTRAMUSCULAR; INTRAVENOUS; SUBCUTANEOUS ONCE AS NEEDED
Status: DISCONTINUED | OUTPATIENT
Start: 2025-05-30 | End: 2025-06-01

## 2025-05-30 RX ORDER — SODIUM CHLORIDE 0.9 % (FLUSH) 0.9 %
10 SYRINGE (ML) INJECTION EVERY 12 HOURS SCHEDULED
Status: DISCONTINUED | OUTPATIENT
Start: 2025-05-30 | End: 2025-06-04

## 2025-05-30 RX ORDER — IPRATROPIUM BROMIDE AND ALBUTEROL SULFATE 2.5; .5 MG/3ML; MG/3ML
3 SOLUTION RESPIRATORY (INHALATION) ONCE AS NEEDED
Status: DISCONTINUED | OUTPATIENT
Start: 2025-05-30 | End: 2025-06-01

## 2025-05-30 RX ORDER — ONDANSETRON 2 MG/ML
4 INJECTION INTRAMUSCULAR; INTRAVENOUS ONCE AS NEEDED
Status: DISCONTINUED | OUTPATIENT
Start: 2025-05-30 | End: 2025-06-01

## 2025-05-30 RX ORDER — HYDROCODONE BITARTRATE AND ACETAMINOPHEN 7.5; 325 MG/1; MG/1
1 TABLET ORAL EVERY 4 HOURS PRN
Status: DISCONTINUED | OUTPATIENT
Start: 2025-05-30 | End: 2025-05-31

## 2025-05-30 RX ORDER — SODIUM CHLORIDE 0.9 % (FLUSH) 0.9 %
10 SYRINGE (ML) INJECTION AS NEEDED
Status: DISCONTINUED | OUTPATIENT
Start: 2025-05-30 | End: 2025-06-04

## 2025-05-30 RX ORDER — DIPHENHYDRAMINE HYDROCHLORIDE 50 MG/ML
12.5 INJECTION, SOLUTION INTRAMUSCULAR; INTRAVENOUS
Status: DISCONTINUED | OUTPATIENT
Start: 2025-05-30 | End: 2025-06-01

## 2025-05-30 RX ORDER — SODIUM CHLORIDE 0.9 % (FLUSH) 0.9 %
20 SYRINGE (ML) INJECTION AS NEEDED
Status: DISCONTINUED | OUTPATIENT
Start: 2025-05-30 | End: 2025-06-04

## 2025-05-30 RX ORDER — HYDROMORPHONE HYDROCHLORIDE 1 MG/ML
0.25 INJECTION, SOLUTION INTRAMUSCULAR; INTRAVENOUS; SUBCUTANEOUS
Status: DISCONTINUED | OUTPATIENT
Start: 2025-05-30 | End: 2025-05-31

## 2025-05-30 RX ORDER — PROPOFOL 10 MG/ML
VIAL (ML) INTRAVENOUS
Status: COMPLETED
Start: 2025-05-30 | End: 2025-05-30

## 2025-05-30 RX ORDER — FLUMAZENIL 0.1 MG/ML
0.2 INJECTION INTRAVENOUS AS NEEDED
Status: DISCONTINUED | OUTPATIENT
Start: 2025-05-30 | End: 2025-06-01

## 2025-05-30 RX ORDER — FENTANYL CITRATE 50 UG/ML
25 INJECTION, SOLUTION INTRAMUSCULAR; INTRAVENOUS
Status: DISCONTINUED | OUTPATIENT
Start: 2025-05-30 | End: 2025-05-31

## 2025-05-30 RX ORDER — POTASSIUM CHLORIDE 7.45 MG/ML
10 INJECTION INTRAVENOUS
Status: COMPLETED | OUTPATIENT
Start: 2025-05-30 | End: 2025-05-30

## 2025-05-30 RX ORDER — NALOXONE HCL 0.4 MG/ML
0.2 VIAL (ML) INJECTION AS NEEDED
Status: DISCONTINUED | OUTPATIENT
Start: 2025-05-30 | End: 2025-06-01

## 2025-05-30 RX ORDER — DEXMEDETOMIDINE HYDROCHLORIDE 4 UG/ML
.2-1.5 INJECTION, SOLUTION INTRAVENOUS
Status: DISCONTINUED | OUTPATIENT
Start: 2025-05-30 | End: 2025-05-31

## 2025-05-30 RX ORDER — FENTANYL CITRATE-0.9 % NACL/PF 10 MCG/ML
50-300 PLASTIC BAG, INJECTION (ML) INTRAVENOUS
Status: DISCONTINUED | OUTPATIENT
Start: 2025-05-30 | End: 2025-05-31

## 2025-05-30 RX ORDER — SODIUM CHLORIDE 9 MG/ML
40 INJECTION, SOLUTION INTRAVENOUS AS NEEDED
Status: DISCONTINUED | OUTPATIENT
Start: 2025-05-30 | End: 2025-06-04

## 2025-05-30 RX ORDER — NOREPINEPHRINE BITARTRATE 0.03 MG/ML
.02-.3 INJECTION, SOLUTION INTRAVENOUS
Status: DISCONTINUED | OUTPATIENT
Start: 2025-05-30 | End: 2025-06-01

## 2025-05-30 RX ORDER — FENTANYL CITRATE-0.9 % NACL/PF 10 MCG/ML
PLASTIC BAG, INJECTION (ML) INTRAVENOUS
Status: COMPLETED
Start: 2025-05-30 | End: 2025-05-30

## 2025-05-30 RX ORDER — ONDANSETRON 2 MG/ML
INJECTION INTRAMUSCULAR; INTRAVENOUS AS NEEDED
Status: DISCONTINUED | OUTPATIENT
Start: 2025-05-30 | End: 2025-05-30 | Stop reason: SURG

## 2025-05-30 RX ORDER — HYDROCODONE BITARTRATE AND ACETAMINOPHEN 5; 325 MG/1; MG/1
1 TABLET ORAL ONCE AS NEEDED
Status: DISCONTINUED | OUTPATIENT
Start: 2025-05-30 | End: 2025-05-31

## 2025-05-30 RX ADMIN — SODIUM CHLORIDE 100 ML/HR: 9 INJECTION, SOLUTION INTRAVENOUS at 17:08

## 2025-05-30 RX ADMIN — PROPOFOL 45 MCG/KG/MIN: 10 INJECTION, EMULSION INTRAVENOUS at 08:27

## 2025-05-30 RX ADMIN — PANTOPRAZOLE SODIUM 40 MG: 40 INJECTION, POWDER, FOR SOLUTION INTRAVENOUS at 20:27

## 2025-05-30 RX ADMIN — CEFOXITIN SODIUM 2 G: 2 POWDER, FOR SOLUTION INTRAVENOUS at 01:32

## 2025-05-30 RX ADMIN — PROPOFOL 50 MCG/KG/MIN: 10 INJECTION, EMULSION INTRAVENOUS at 04:03

## 2025-05-30 RX ADMIN — Medication 10 ML: at 20:38

## 2025-05-30 RX ADMIN — INSULIN HUMAN 2 UNITS: 100 INJECTION, SOLUTION PARENTERAL at 23:50

## 2025-05-30 RX ADMIN — INSULIN HUMAN 2 UNITS: 100 INJECTION, SOLUTION PARENTERAL at 12:25

## 2025-05-30 RX ADMIN — DEXMEDETOMIDINE HYDROCHLORIDE 0.5 MCG/KG/HR: 400 INJECTION INTRAVENOUS at 20:00

## 2025-05-30 RX ADMIN — Medication 10 ML: at 20:37

## 2025-05-30 RX ADMIN — Medication 10 ML: at 09:50

## 2025-05-30 RX ADMIN — ROCURONIUM BROMIDE 20 MG: 10 INJECTION, SOLUTION INTRAVENOUS at 01:17

## 2025-05-30 RX ADMIN — FENTANYL CITRATE 50 MCG/HR: 50 INJECTION, SOLUTION INTRAMUSCULAR; INTRAVENOUS at 03:53

## 2025-05-30 RX ADMIN — PANTOPRAZOLE SODIUM 40 MG: 40 INJECTION, POWDER, FOR SOLUTION INTRAVENOUS at 09:39

## 2025-05-30 RX ADMIN — FENTANYL CITRATE 50 MCG/HR: 50 INJECTION, SOLUTION INTRAMUSCULAR; INTRAVENOUS at 18:54

## 2025-05-30 RX ADMIN — POTASSIUM CHLORIDE 10 MEQ: 7.46 INJECTION, SOLUTION INTRAVENOUS at 06:37

## 2025-05-30 RX ADMIN — MAGNESIUM SULFATE HEPTAHYDRATE: 500 INJECTION, SOLUTION INTRAMUSCULAR; INTRAVENOUS at 17:36

## 2025-05-30 RX ADMIN — POTASSIUM CHLORIDE 10 MEQ: 7.46 INJECTION, SOLUTION INTRAVENOUS at 05:24

## 2025-05-30 RX ADMIN — PROPOFOL 45 MCG/KG/MIN: 10 INJECTION, EMULSION INTRAVENOUS at 10:12

## 2025-05-30 RX ADMIN — SODIUM BICARBONATE 50 MEQ: 84 INJECTION INTRAVENOUS at 02:05

## 2025-05-30 RX ADMIN — POTASSIUM CHLORIDE 10 MEQ: 7.46 INJECTION, SOLUTION INTRAVENOUS at 04:08

## 2025-05-30 RX ADMIN — ONDANSETRON 4 MG: 2 INJECTION, SOLUTION INTRAMUSCULAR; INTRAVENOUS at 01:34

## 2025-05-30 RX ADMIN — SODIUM BICARBONATE 50 MEQ: 84 INJECTION INTRAVENOUS at 01:38

## 2025-05-30 RX ADMIN — SODIUM CHLORIDE 1000 ML: 9 INJECTION, SOLUTION INTRAVENOUS at 15:47

## 2025-05-30 RX ADMIN — PROPOFOL 50 MCG/KG/MIN: 10 INJECTION, EMULSION INTRAVENOUS at 02:20

## 2025-05-30 RX ADMIN — PROPOFOL 45 MCG/KG/MIN: 10 INJECTION, EMULSION INTRAVENOUS at 05:26

## 2025-05-30 RX ADMIN — ROCURONIUM BROMIDE 15 MG: 10 INJECTION, SOLUTION INTRAVENOUS at 00:21

## 2025-05-30 RX ADMIN — NOREPINEPHRINE BITARTRATE 0.02 MCG/KG/MIN: 0.03 INJECTION, SOLUTION INTRAVENOUS at 19:21

## 2025-05-30 RX ADMIN — DEXMEDETOMIDINE HYDROCHLORIDE 0.2 MCG/KG/HR: 400 INJECTION INTRAVENOUS at 11:00

## 2025-05-30 RX ADMIN — DEXMEDETOMIDINE HYDROCHLORIDE 0.4 MCG/KG/HR: 400 INJECTION INTRAVENOUS at 15:01

## 2025-05-30 RX ADMIN — POTASSIUM CHLORIDE 10 MEQ: 7.46 INJECTION, SOLUTION INTRAVENOUS at 08:28

## 2025-05-30 RX ADMIN — PROPOFOL 10 MCG/KG/MIN: 10 INJECTION, EMULSION INTRAVENOUS at 14:21

## 2025-05-30 RX ADMIN — FENTANYL CITRATE 50 MCG: 50 INJECTION, SOLUTION INTRAMUSCULAR; INTRAVENOUS at 00:13

## 2025-05-30 RX ADMIN — POTASSIUM CHLORIDE 10 MEQ: 10 INJECTION, SOLUTION INTRAVENOUS at 04:08

## 2025-05-30 NOTE — PROGRESS NOTES
[facility]  Inpatient Progress Note    History of Present Illness  The patient is seen in follow up    Her hemoglobin level was reported to be 5, prompting a discussion about the necessity of immediate surgery.  Patient was taken to the operating room.  I discussed the surgery directly with Dr. Ny.  Portion of the small bowel was removed and a double barrel ostomy in the right lower quadrant was performed     she remains intubated post-surgery, with plans to gradually wean her off the ventilator. A blood transfusion was administered during the surgery.      Subjective       Current Facility-Administered Medications:     acetaminophen (TYLENOL) tablet 650 mg, 650 mg, Oral, Q4H PRN, 650 mg at 05/28/25 0838 **OR** acetaminophen (TYLENOL) 160 MG/5ML oral solution 650 mg, 650 mg, Oral, Q4H PRN **OR** acetaminophen (TYLENOL) suppository 650 mg, 650 mg, Rectal, Q4H PRN, Gumaro Quiros MD    Atropine Sulfate (PF) injection 0.4 mg, 0.4 mg, Intravenous, Once PRN, Mima Sam CRNA    Calcium Replacement - Follow Nurse / BPA Driven Protocol, , Not Applicable, PRN, Gumaro Quiros MD    dextrose (D50W) (25 g/50 mL) IV injection 25 g, 25 g, Intravenous, Q15 Min PRN, Gumaro Quiros MD    dextrose (GLUTOSE) oral gel 15 g, 15 g, Oral, Q15 Min PRN, Gumaro Quiros MD    diphenhydrAMINE (BENADRYL) injection 12.5 mg, 12.5 mg, Intravenous, Q15 Min PRN, Mima Sam CRNA    ePHEDrine injection 5 mg, 5 mg, Intravenous, Once PRN, Mima Sam CRNA    fentaNYL 1000 mcg in 100 mL NS infusion,  mcg/hr, Intravenous, Titrated, Chelsey Baumann, APRN, Last Rate: 5 mL/hr at 05/30/25 0353, 50 mcg/hr at 05/30/25 0353    fentaNYL citrate (PF) (SUBLIMAZE) injection 25 mcg, 25 mcg, Intravenous, Q5 Min PRN, Mima Sam CRNA    flumazenil (ROMAZICON) injection 0.2 mg, 0.2 mg, Intravenous, PRN, Mima Sam CRNA    glucagon (GLUCAGEN) injection 1 mg, 1 mg, Intramuscular,  Q15 Min PRN, Gumaro Quiros MD    hydrALAZINE (APRESOLINE) injection 5 mg, 5 mg, Intravenous, Q10 Min PRN, Mima Sam CRNA    HYDROcodone-acetaminophen (NORCO) 5-325 MG per tablet 1 tablet, 1 tablet, Oral, Once PRN, Mima Sam CRNA    HYDROcodone-acetaminophen (NORCO) 7.5-325 MG per tablet 1 tablet, 1 tablet, Oral, Q4H PRN, Mima Sam CRNA    HYDROmorphone (DILAUDID) injection 0.25 mg, 0.25 mg, Intravenous, Q5 Min PRN, Mima Sam CRNA    insulin regular (humuLIN R,novoLIN R) injection 2-9 Units, 2-9 Units, Subcutaneous, Q6H, Gumaro Quiros MD, 2 Units at 05/29/25 0010    ipratropium-albuterol (DUO-NEB) nebulizer solution 3 mL, 3 mL, Nebulization, Once PRN, Mima Sam CRNA    labetalol (NORMODYNE,TRANDATE) injection 5 mg, 5 mg, Intravenous, Q5 Min PRN, Mima Sam CRNA    lactated ringers infusion, 9 mL/hr, Intravenous, Continuous, Reilly Westbrook MD, Stopped at 05/30/25 0223    Magnesium Standard Dose Replacement - Follow Nurse / BPA Driven Protocol, , Not Applicable, PRN, Gumaro Quiros MD    metoclopramide (REGLAN) injection 5 mg, 5 mg, Intravenous, Q6H PRN, Gumaro Quiros MD, 5 mg at 05/24/25 0602    naloxone (NARCAN) injection 0.2 mg, 0.2 mg, Intravenous, PRN, Mima Sam CRNA    nitroglycerin (NITROSTAT) SL tablet 0.4 mg, 0.4 mg, Sublingual, Q5 Min PRN, Gumaro Quiros MD    ondansetron ODT (ZOFRAN-ODT) disintegrating tablet 4 mg, 4 mg, Oral, Q6H PRN **OR** ondansetron (ZOFRAN) injection 4 mg, 4 mg, Intravenous, Q6H PRN, Gumaro Quiros MD, 4 mg at 05/28/25 0847    ondansetron (ZOFRAN) injection 4 mg, 4 mg, Intravenous, Once PRN, Mima Sam CRNA    pantoprazole (PROTONIX) injection 40 mg, 40 mg, Intravenous, Q12H, Jose G Ny MD    Pharmacy to Dose TPN, , Not Applicable, Continuous PRN, Jose G Ny MD    Phosphorus Replacement - Follow Nurse / BPA Driven Protocol, , Not Applicable, PRN, Gumaro Quiros MD     potassium chloride 10 mEq in 100 mL IVPB, 10 mEq, Intravenous, Q1H, Zi Mccall MD, Last Rate: 100 mL/hr at 05/30/25 0828, 10 mEq at 05/30/25 0828    Potassium Replacement - Follow Nurse / BPA Driven Protocol, , Not Applicable, Chula DOWELL Ervin H., MD    propofol (DIPRIVAN) infusion 10 mg/mL 100 mL, 5-50 mcg/kg/min, Intravenous, Titrated, Chelsey Baumann, APRN, Last Rate: 34.6 mL/hr at 05/30/25 0827, 45 mcg/kg/min at 05/30/25 0827    sodium chloride 0.9 % flush 10 mL, 10 mL, Intravenous, PRN, Gumaro Quiros MD    sodium chloride 0.9 % flush 10 mL, 10 mL, Intravenous, Q12H, Gumaro Quiros MD, 10 mL at 05/29/25 0924    sodium chloride 0.9 % flush 10 mL, 10 mL, Intravenous, PRN, Gumaro Quiros MD    sodium chloride 0.9 % flush 10 mL, 10 mL, Intravenous, Q12H, Gumaro Quiros MD, 10 mL at 05/29/25 0923    sodium chloride 0.9 % flush 10 mL, 10 mL, Intravenous, PRN, Gumaro Quiros MD    sodium chloride 0.9 % infusion 40 mL, 40 mL, Intravenous, PRN, Gumaro Quiros MD    sodium chloride 0.9 % infusion 40 mL, 40 mL, Intravenous, PRNChula Ervin H., MD    traZODone (DESYREL) tablet 150 mg, 150 mg, Oral, Nightly PRN, Gumaro Quiros MD, 150 mg at 05/27/25 2125  Review of Systems:    Review of systems could not be obtained due to  patient intubated.    Objective     Vital Signs  Temp:  [97.7 °F (36.5 °C)-99.6 °F (37.6 °C)] 98.4 °F (36.9 °C)  Heart Rate:  [] 93  Resp:  [16-30] 23  BP: ()/(41-89) 107/87  Arterial Line BP: (80-88)/(68-83) 81/68  FiO2 (%):  [60 %] 60 %  Body mass index is 52.74 kg/m².    Intake/Output Summary (Last 24 hours) at 5/30/2025 0836  Last data filed at 5/30/2025 0500  Gross per 24 hour   Intake 3131 ml   Output 225 ml   Net 2906 ml     No intake/output data recorded.     Physical Exam  Abdomen: The abdomen is soft.  There is a right lower quadrant ostomy with dark fluid no luis alberto blood  Patient is intubated and sedated        Results Review:     I reviewed the patient's new  clinical results.    Results from last 7 days   Lab Units 05/30/25  0626 05/30/25  0134 05/29/25  2246 05/29/25  1753 05/29/25  1149   WBC 10*3/mm3 15.84*  --   --  10.30 11.45*   HEMOGLOBIN g/dL 13.1  --   --  5.7* 9.2*   HEMOGLOBIN, POC g/dL  --  8.2* 6.1*  --   --    HEMATOCRIT % 38.4  --   --  17.5* 30.0*   HEMATOCRIT POC %  --  24* 18*  --   --    PLATELETS 10*3/mm3 111*  --   --  123* 232     Results from last 7 days   Lab Units 05/27/25  0542 05/26/25  0627 05/25/25 1955 05/25/25  0119   SODIUM mmol/L 137 139  --  137   POTASSIUM mmol/L 4.1 3.9 4.5 3.1*   CHLORIDE mmol/L 107 107  --  106   CO2 mmol/L 24.5 26.4  --  23.0   BUN mg/dL 14 11  --  21   CREATININE mg/dL 0.46* 0.48*  --  0.50*   CALCIUM mg/dL 7.1* 7.9*  --  7.4*   BILIRUBIN mg/dL 0.7  --   --   --    ALK PHOS U/L 36*  --   --   --    ALT (SGPT) U/L 16  --   --   --    AST (SGOT) U/L 17  --   --   --    GLUCOSE mg/dL 145* 119*  --  113*     Results from last 7 days   Lab Units 05/26/25  0627 05/25/25  0119 05/24/25  1113   INR  1.25* 1.29* 1.25*     Lab Results   Lab Value Date/Time    LIPASE 17 05/22/2025 1611       Radiology:  [unfilled]    Results  Labs   - Hemoglobin: 05/30/2025, 13.1 g/dL, elevated from previous 8.2 g/dL   - White count: 05/30/2025, 15.84 x10^9/L    Assessment & Plan   Assessment:     Assessment & Plan  1. 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  - Elevated white blood cell count at 15.84.  - Day 1 from the exploratory laparotomy and bowel resection with double barrel ileostomy  - Currently intubated post-surgery; abdomen is soft,   -Surgical management per the ICU and surgical teams  - Monitor progress closely.  - Trend hemoglobin  - Appreciate  IR, surgery and ICU teams assistance  - Discussed with daughter at the bedside    Plan:       I discussed the patient's findings and my recommendations with patient, family, nursing staff, and consulting provider.    Patient or patient representative verbalized consent for the use of Ambient Listening during the visit for chart documentation.    Portions of this document were created with Dragon Dictation, a voice recognition software, and/or Sessions, an AI-assisted transcription software.          Justin Garza M.D.  [facility]  [address] [place] [address] 350  [phone]

## 2025-05-30 NOTE — ANESTHESIA POSTPROCEDURE EVALUATION
Patient: Samia Gaming    Procedure Summary       Date: 05/29/25 Room / Location: [facility] OR [address] [place] [address] MAIN OR    Anesthesia Start: 2119 Anesthesia Stop: 05/30/25 0235    Procedure: LAPAROTOMY EXPLORATORY, SMALL BOWEL RESECTION DISTAL AND PROXIMAL, ILEOSTOMY (Abdomen) Diagnosis:       Gastrointestinal hemorrhage, unspecified gastrointestinal hemorrhage type      (Gastrointestinal hemorrhage, unspecified gastrointestinal hemorrhage type [K92.2])    Surgeons: Jose G Ny MD Provider: Zi Mccall MD    Anesthesia Type: general ASA Status: 4 - Emergent            Anesthesia Type: general    Vitals  Vitals Value Taken Time   /63 05/30/25 03:00   Temp 37.2 °C (99 °F) 05/30/25 03:00   Pulse 109 05/30/25 03:06   Resp 18 05/30/25 02:39   SpO2 99 % 05/30/25 03:00   Vitals shown include unfiled device data.        Anesthesia Post Evaluation

## 2025-05-30 NOTE — ANESTHESIA PROCEDURE NOTES
Arterial Line    Pre-sedation assessment completed: 5/29/2025 9:15 PM    Patient reassessed immediately prior to procedure    Patient location during procedure: holding area  Start time: 5/29/2025 9:15 PM  Stop Time:5/29/2025 9:20 PM       Line placed for hemodynamic monitoring and ABGs/Labs/ISTAT.  Performed By   Anesthesiologist: Reilly Westbrook MD   Preanesthetic Checklist  Completed: patient identified, IV checked, site marked, risks and benefits discussed, surgical consent, monitors and equipment checked, pre-op evaluation and timeout performed  Arterial Line Prep    Sterile Tech: cap, gloves, mask and sterile barriers  Prep: ChloraPrep  Patient monitoring: blood pressure monitoring, continuous pulse oximetry and EKG  Arterial Line Procedure   Laterality:left  Location:  radial artery  Catheter size: 20 G   Guidance: landmark technique and palpation technique  Number of attempts: 1  Successful placement: yes Images: still images obtained, printed/placed on the chart  Post Assessment   Dressing Type: occlusive dressing applied, secured with tape and wrist guard applied.   Complications no  Circ/Move/Sens Assessment: unchanged.   Patient Tolerance: patient tolerated the procedure well with no apparent complications  Additional Notes  Ultrasound was used to identify the radial artery and surrounding structures.  The left radial artery was assessed and patent but small.  Ultrasound was used to visualize vascular needle entry into the left radial artery.  This vessel appeared anatomically normal and there were no apparent abnormal findings.  A permanent ultrasound image was saved in the patient's record.  It is  also used to visualize the wire and the catheter within the lumen of the artery.

## 2025-05-30 NOTE — PLAN OF CARE
Goal Outcome Evaluation:  Plan of Care Reviewed With: child        Progress: no change  Outcome Evaluation: Pt in  CCU on ventilator. Precedex and fent infusing per MAR. NG inserted. PICC line placed. Barone remains in place. Daughter updated at bedside.

## 2025-05-30 NOTE — PROGRESS NOTES
General Surgery Note    Summary:  69-year-old lady with GI bleed initially localized to small bowel source, postoperative day #0 status post small bowel resection x 2, repair of diagnostic enterotomies, and end-loop ileostomy    Interval Status:  Unable to extubate today.  Agitated this afternoon.  Marginal blood pressure with low urine output.  Did have a 2 point drop in hemoglobin, however no blood from ileostomy.     Physical Exam:    Agitated  Afebrile, heart rate 59, /59  Mechanically ventilated on assist-control, tachypneic and biting the tube  Abdomen soft and appropriate, incision dressing dry, drain serosanguineous, ileostomy pink with dark bilious output.     Labs:  WBC 11.12, hemoglobin 9.7 from 11.7, platelets 95  Creatinine 0.82, BUN 25, HCO3 21.9, albumin 2.1  Prealbumin 14.7    Urine output 300    Plan:  - Work towards extubation, keep nasogastric tube in place  - TPN, anticipate profound ileus and has proximal anastomosis  - Monitor ileostomy output closely for any evidence of recurrent bleeding  - SCDs for DVT prophylaxis, prophylactic heparin tomorrow if stable          Jose G Ny MD  [facility]  [address], [address]  [place], [place], [address]  P: [phone]  F: [phone]

## 2025-05-30 NOTE — PLAN OF CARE
Goal Outcome Evaluation:      Patient sent down to OR at the beginning of shift due to hgb drop. Patient presented symptomatic, and 2 units of PRBCs given. Upon return, patient returned intubated and on sedation. Still arousable to stimuli and able to follow commands. Barone was placed, urine output adequate. SARA drain with minimal output. Dressings dry and intact. Unable to visualize surgical site.      Progress: declining

## 2025-05-30 NOTE — CONSULTS
Nutrition Services    Patient Name: Samia Gaming  YOB: 1955  MRN: [id number]  Admission date: 5/22/2025    Assessment Date:  05/30/25    Recommend TPN initiation at half strength r/t risk of refeeding with limited nutrition for ~2 weeks.    Goal TPN:    100 g amino acids, 250 mL 20% lipids 3X weekly (avg 214 kcal/day), 700 kcal dextrose    1314 total kcal with assumption that propofol will be discontinued as discussed    Dextrose infusion rate: 1.1 mg/kg/min    COMPREHENSIVE NUTRITION EVALUATION      Reason for Encounter TPN Assessment   Diagnosis/Problem Admission Diagnosis:  Acute blood loss anemia [D62]  Generalized abdominal pain [R10.84]  GI bleed [K92.2]  Elevated BUN [R79.9]  Elevated lactic acid level [R79.89]  Gastrointestinal hemorrhage, unspecified gastrointestinal hemorrhage type [K92.2]  GIB (gastrointestinal bleeding) [K92.2]    Problem List:    GI bleed    GIB (gastrointestinal bleeding)     Narrative 5/27: Pt admitted to [facility] with abdominal pain, N/V, diarrhea. Eventual blood in emesis and maroon stools. S/p EGD, small bowel enteroscopy, and colonoscopy. Still unable to determine source of bleeding. Diet has been advanced to soft to chew: whole meat.     5/30: CT angiogram showed possible bleeding within small bowel diverticulum, no active source. Pt hadn't had bloody stools since 5/28 evening but Hgb dropped from 9.2 to 5.7 on 5/29. 2 units of PRBCs given. Pt taken to OR for ex lap, small bowel resection distal and proximal, ileostomy. Greater than 200 cm small bowel remaining. Pt remains intubated. RD consulted to initiate TPN.     Addendum 1119: Pt discussed in multidisciplinary rounds. Planning to wean off propofol and initiate precedex. Will change goal TPN with plan to discontinue propofol.        PO Diet NPO Diet NPO Type: Strict NPO   Allergies NKFA   Supplements n/a   PO Intake % NPO/liquids x 8 days.        Chewing/Swallowing Difficulty other:intubated, no issues prior to  "intubation        Nutrition Access  Pending     TF/TPN Order Plan to initiate TPN 5/30   TF/TPN Observation Other: Plan to initiate TPN 5/30       Medications reviewed protonix, KCl, propofol at 26.9 mL/hr providing 710 kcal.    Labs  reviewed       Physical Findings ventilator support     Edema 2+ (mild) generalized    GI Function bloody, last bowel movement: 5/29   Skin Status skin intact    Lines/Drains ileostomy, other: ETT   I/O net fluid gain and amount/timeframe:~9.7L since admission per I/O documentation         Height  Weight  BMI  Weight Trend     Height: 157.5 cm (62\")  Weight: 131 kg (288 lb 5.8 oz) (05/30/25 4759)  Body mass index is 52.74 kg/m².  Other: wt fluctuating ~265-290# this admission          Estimated Nutrition Needs      Weight used  50 kg, IBW    Calories  0836-0263 kcal/day (25 kcal/kg, 30 kcal/kg)    Protein  100-125 g/day (2.0 gm/kg, 2.5 gm/kg)    Fluid   (1 mL/kcal)      Enteral Nutrition Prescription -   Parenteral Nutrition Prescription 100 g amino acids, 250 mL 20% lipids 3X weekly (avg 214 kcal/day), 700 kcal dextrose    1314 total kcal with assumption that propofol will be discontinued as discussed    Dextrose infusion rate: 1.1 mg/kg/min       NFPE No clinical signs of muscle wasting or fat loss, Date Completed: 5/29       Nutrition Problem (PES) Problem: Altered GI Function  Etiology: Other: s/p ex lap, small bowel resection, ileostomy formation, prior GI bleed and unable to feed pt via gut   Signs/Symptoms: NPO and PN Intake/Delivery       Intervention/Plan Recommend TPN initiation at half strength r/t risk of refeeding with limited nutrition for ~2 weeks.    Goal TPN:   100 g amino acids, 250 mL 20% lipids 3X weekly (avg 214 kcal/day), 700 kcal dextrose    1314 total kcal with assumption that propofol will be discontinued as discussed    Dextrose infusion rate: 1.1 mg/kg/min    RD to follow up per protocol.     Results from last 7 days   Lab Units 05/27/25  0542 05/26/25  0627 " 05/25/25  1955 05/25/25  0119 05/23/25  1629 05/23/25  0735   SODIUM mmol/L 137 139  --  137   < > 141   POTASSIUM mmol/L 4.1 3.9 4.5 3.1*   < > 4.1   CHLORIDE mmol/L 107 107  --  106   < > 114*   CO2 mmol/L 24.5 26.4  --  23.0   < > 19.8*   BUN mg/dL 14 11  --  21   < > 30*   CREATININE mg/dL 0.46* 0.48*  --  0.50*   < > 0.56*   CALCIUM mg/dL 7.1* 7.9*  --  7.4*   < > 7.3*   BILIRUBIN mg/dL 0.7  --   --   --   --  0.7   ALK PHOS U/L 36*  --   --   --   --  39   ALT (SGPT) U/L 16  --   --   --   --  9   AST (SGOT) U/L 17  --   --   --   --  8   GLUCOSE mg/dL 145* 119*  --  113*   < > 156*    < > = values in this interval not displayed.     Results from last 7 days   Lab Units 05/30/25  0626 05/26/25  1440 05/26/25  0627 05/25/25  0550 05/25/25  0119 05/24/25  1611 05/24/25  1113   MAGNESIUM mg/dL  --   --  2.0  --  1.6  --  1.7   PHOSPHORUS mg/dL  --   --  2.5  --  2.4*  --  2.0*   HEMOGLOBIN g/dL 13.1   < > 6.7*   < > 7.2*   < > 7.6*   HEMOGLOBIN, POC   --    < >  --   --   --   --   --    HEMATOCRIT % 38.4   < > 20.5*   < > 21.8*   < > 22.6*   HEMATOCRIT POC   --    < >  --   --   --   --   --    TRIGLYCERIDES mg/dL 194*  --   --   --   --   --   --     < > = values in this interval not displayed.     Lab Results   Component Value Date    HGBA1C 6.4 (H) 09/17/2024     Wt Readings from Last 10 Encounters:   05/30/25 131 kg (288 lb 5.8 oz)       Electronically signed by:  Azucena Santiago RD  05/30/25 07:26 EDT

## 2025-05-30 NOTE — OP NOTE
OPERATIVE REPORT    DATE: 05/29/2025     SURGEON: Jose G Ny MD     ASSISTANT: HUGO YOU , who was present for necessary suctioning, retracting, and closure assistance throughout the procedure     OPERATION PERFORMED: Exploratory laparotomy with jejunal small bowel resection with anastomosis,  ileal small bowel resection, and end loop ileostomy creation    PREOPERATIVE DIAGNOSIS: Gastrointestinal bleeding from small bowel diverticulum with symptomatic acute blood loss anemia    INTRAOPERATIVE FINDINGS: dark blood throughout ileum and entire colon, multiple jejunal diverticula without evidence of active hemorrhage, greater than 200 cm small bowel remaining    ANESTHESIA: General    SPECIMEN: Distal small bowel resection (ileum, 65 cm), proximal small bowel resection (jejunal diverticula, 10 cm)    DRAINS: 19 Kiswahili Chet drain in subcutaneous space    BLOOD LOSS: 50 cc operatively, in addition to large amount of dark blood throughout ileum and entire colon    INDICATION FOR OPERATION: This is a 69 y.o. lady with recurrent GI bleed localized to small bowel diverticulum with symptomatic anemia and ongoing transfusion requirement refractory to endoscopic and endovascular interventions.  With 4 gram drop in hemoglobin in 6 hours with active GI bleed and symptomatic anemia, she brought to the operating room emergently for laparotomy with bowel resection.  We discussed the details of laparotomy with bowel resection and possible ostomy, including risks of not being able to identify bleeding source, anastomotic leak, need for reoperation, wound complications and hernia attributed to severe obesity, as well as postoperative expectations. They demonstrated understanding and agreed to proceed.     OPERATIVE COURSE: The patient was taken to the operating room and positioned supine. General endotracheal anesthesia was induced. SCDs were applied and a temporary day catheter was inserted. The abdomen was prepped and  draped in sterile fashion. Preoperative antibiotics were administered. A pre-procedural timeout was performed.  Midline laparotomy incision was made and the abdomen entered uneventfully. A wound protector was placed and the small bowel was eviscerated.  There was dark intraluminal contents seen throughout the ileum and the entire colon.  There was significant diverticulosis of the colon.  There was 3 large diverticula in the proximal to mid jejunum between 15 and 45 cm distal to the ligament of Treitz.  Longitudinal enterotomy was made on the antimesenteric border at the location of a large jejunal diverticula approximately 45 cm beyond ligament of Treitz.  Green bile was observed with no evidence of blood or obvious visible vessel.  Contents were milked both proximally and distally without any blood return.  The hysterotomy was closed transversely in 2 layers with interrupted layer of running 3-0 Vicryl suture and outer layer of interrupted 2-0 silk suture.  A transverse enterotomy was made 60 cm distally where intraluminal contents appeared darker on the outside.  Green bile was observed and there was no blood staining of the mucosa or any melena.  The enterotomy was closed with interrupted 2-0 silk sutures.  4 additional enterotomies were made sequentially more distally and the distal jejunum, proximal ileum, and distal ileum.  There was dark blood and blood clots consistent with melena in the proximal and distal ileum.  The contents in the proximal ileum were more red, consistent with recent bleeding.  An additional enterotomy was made for approximately an intraluminal contents were bilious with no blood staining of the mucosa.  An additional enterotomy was made 40 cm proximal to the ileocecal valve, revealing dark clotted blood, normal mucosa.  There were no diverticula in the distal jejunum or ileum.  There were no palpable areas of thickening or ulceration, no palpable masses, or other abnormalities on the  serosal surface.  Small bowel was divided with AMY stapler in the distal jejunum/proximal ileum, proximal to an enterotomy with nonbloody bilious contents observed.  The small bowel was divided with a AMY stapler 30 cm proximal to the ileocecal valve, given review of imaging show the area of bleeding to be certainly proximal to this and in the left lower quadrant.  The mesentery was divided with the energy device.  A 65 cm was removed and opened longitudinally along the length of the specimen, there were no obvious areas of ulceration or notable diverticula, with bilious contents proximally and dark bloody contents throughout most of the specimen.  Additional 5 cm longitudinal enterotomy was made proximally from the staple line , again revealing no blood clots and only bilious contents, the mucosa appeared normal with no evidence of blood staining.  Likewise the terminal ileum was opened longitudinally 5 cm distal to the resected area, and the remaining terminal ileum was irrigated with normal saline, revealing dark clotted blood and normal-appearing mucosa.  Both of these areas were resected and sent along with the specimen.  Decision was made to also resect the jejunal diverticula between 35 and 45 cm distal to the ligament of Treitz, since CT angiogram demonstrated bleeding and what appeared to be small bowel diverticulum.  The bowel was divided proximally and distally with a AMY stapler to include jejunal diverticula like a potentially reach the left lower quadrant as indicated on imaging and the mesentery was divided with the Enseal energy device.  A 2 layer antiperistaltic auhy-ae-ssno handsewn anastomosis was fashioned with outer layer of interrupted 2-0 silk sutures in an inner layer of running 3-0 chromic suture.  The mesenteric defect was closed with interrupted silk sutures.  20 cc of Exparel mixed with 20 cc of 0.5% Marcaine with epinephrine was injected at the linea semilunaris on both sides to perform  a transversus abdominis plane block.  The abdomen was irrigated with small amount of Betadine and 2 L of warm normal saline.  Given I was not 100% confident that the area of bleeding was resected, I decided to perform an end loop ileostomy, in order to facilitate localization and endoscopic evaluation of any recurrent bleeding.   The mesenteric defect was reapproximated with interrupted 2-0 silk sutures an ileostomy aperture was created in the right upper quadrant, anterior rectus sheath was icised longitudinally, the rectus muscle was split along the direction of its fibers, and posterior rectus sheath was likewise opened.  The proximal and distal staple lines were brought through the abdominal wall and secured in place after confirming no twisting of the mesentery.  The midline abdominal fascia was closed with running #0 PDS suture.  A 19 Nigerien Chet drain was brought through the left lower quadrant and positioned in subcutaneous space given the patient's extreme obesity and risk for wound complications.  The wound was closed with 3-0 Vicryl deep dermal sutures and the skin was closed with staples.  A double barrel ileostomy was fashioned with the proximal lumen superomedially and distal lumen inferolaterally.  A nenita Ileostomy was matured with interrupted 3-0 Vicryl sutures.  A stoma appliance was cut to size and applied.  The incision was covered with island dressing. All needle, sponge, and instrument counts were correct at case conclusion. The patient intubated and transported to the ICU in critical condition.      Jose G Ny MD   General Surgery    [address] [facility] [address], [address]  [place], [place], [address]  P: [phone]  F: [phone]

## 2025-05-30 NOTE — PROGRESS NOTES
The patient passed a large amouunt of dark blook per rectum this evening. She is pale, tachycardic, and lightheaded. Hemoglobin has dropped from 9.2 at noon to 5.7 at 6pm. Lab has confirmed this is accurate and a good specimen. Will give her 2 units pRBCs now and have 2 more available, and proceed to the OR for laparotomy and small bowel resection. Discussed with interventional radiology and we agree limited utility in repeating arteriogram, and are confident in CTA localization to small bowel in left pelvis. Discussed with the patient details of the procedure, associated risks including not being able to identify the area of bleeding intraoperatively and wound complications related to her obesity, and postoperative expectations. Will return to ICU postoperatively and plan for PICC and TPN tomorrow.

## 2025-05-30 NOTE — NURSING NOTE
"   05/30/25 0939   Ileostomy Loop RUQ   Placement date: If unknown, DO NOT use \"Add Comment\" note: 05/29/25   Inserted by: Dr Ny  Ileostomy Type: Loop  Location: RUQ   Stomal Appliance 2 piece;Changed   Stoma Appearance irregular;rosebud appearance;moist;pink;protruding above skin level   Peristomal Assessment Clean;Intact   Accessories/Skin Care cleansed with water;skin barrier ring;convex wafer  ([place] 2 3/4\" soft convex wafer and pouch with barrier ring)   Stoma Function flatus;stool   Stool Color green   Stool Consistency liquid   Treatment Bag change;Site care   Output (mL) 50 mL     Ostomy education provided to the patient/family: Pt remains intubated in sedated in CCU. 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 Daughter observed the pouch change and all teaching today was done with the daughter.     [x]Pouch changed   []Pt observed   []Pt participated    [x]CG observed           [x]Instructed in frequency of pouch change  [x]Pouch emptied and cleaned, demonstrated use of pouch closure    []Pt observed   []Pt participated    [x]CG observed    [x]Instructed to empty pouch when 1/3 to 1/2 full  []Teaching packet/handouts provided/reviewed  [x]Return to ADL's, showering, clothing  [x]Peristomal skin care, avoiding use of soaps with moisturizers  [x]Diet   [x]Adequate po fluid intake   []S/S of dehydration   []Foods to thicken stool   []Foods to avoid to prevent blockage  [x]Supplies at bedside  []Samples ordered    Current Supplies: Xuan 2 3/4\" soft convex wafer and pouch with barrier ring. One extra set of supplies left at bedside since she is in CCU    WOC Team follow up plan: 5x week for teaching, will begin teaching pt once she is off the vent and able to participate.     "

## 2025-05-30 NOTE — ANESTHESIA PROCEDURE NOTES
Airway  Reason: elective    Date/Time: 5/29/2025 9:28 PM    General Information and Staff    Patient location during procedure: OR  CRNA/CAA: Mima Sam CRNA    Indications and Patient Condition  Indications for airway management: airway protection    Preoxygenated: yes    Mask difficulty assessment: 0 - not attempted    Final Airway Details    Final airway type: endotracheal airway      Successful airway: ETT and Microcuff Subglottic Suctioning ETT  Cuffed: yes   Successful intubation technique: video laryngoscopy and RSI  Adjuncts used in placement: intubating stylet  Endotracheal tube insertion site: oral  Blade: CMAC  Blade size: D  ETT size (mm): 7.0  Cormack-Lehane Classification: grade I - full view of glottis  Placement verified by: capnometry   Measured from: lips  ETT/EBT  to lips (cm): 22  Number of attempts at approach: 1  Assessment: lips, teeth, and gum same as pre-op and atraumatic intubation

## 2025-05-30 NOTE — NURSING NOTE
IV team consulted for PICC line placement for TPN.  I spoke to primary RN and she will contact IV team when consent and behavorial contract has been signed.  IV team to follow.

## 2025-05-30 NOTE — PROGRESS NOTES
"[facility] Clinical Pharmacy Services: Total Parental Nutrition Initial Consult    Indication: Inaccessible GI Tract, prolonged paralytic ileus  Route: central  Type: standard    Relevant clinical data and objective history reviewed:  69 y.o. female 157.5 cm (62\") 131 kg (288 lb 5.8 oz)    Results from last 7 days   Lab Units 05/30/25  0626 05/27/25  0542 05/26/25  0627   SODIUM mmol/L  --  137 139   POTASSIUM mmol/L  --  4.1 3.9   CHLORIDE mmol/L  --  107 107   CO2 mmol/L  --  24.5 26.4   BUN mg/dL  --  14 11   CREATININE mg/dL  --  0.46* 0.48*   CALCIUM mg/dL  --  7.1* 7.9*   ALBUMIN g/dL  --  2.1*  --    BILIRUBIN mg/dL  --  0.7  --    ALK PHOS U/L  --  36*  --    ALT (SGPT) U/L  --  16  --    AST (SGOT) U/L  --  17  --    GLUCOSE mg/dL  --  145* 119*   MAGNESIUM mg/dL  --   --  2.0   PHOSPHORUS mg/dL  --   --  2.5   TRIGLYCERIDES mg/dL 194*  --   --         Estimated Creatinine Clearance: 146.1 mL/min (A) (by C-G formula based on SCr of 0.46 mg/dL (L)).    Dietary Orders (From admission, onward)       Start     Ordered    05/30/25 0630  NPO Diet NPO Type: Strict NPO  Diet Effective Now        Question:  NPO Type  Answer:  Strict NPO    05/30/25 0630                  Assessment  Ideal Body Weight: 50.1 kg  Actual Body Weight: 131kg    Goal   Protein: 100 grams/day   Dextrose: 700 Kcal/day   Lipids: 250 ml of 20% lipid infusion 3 days/week    Plan  Will start TPN at half goal and will taper up as appropriate. Will initiate TPN with the following macros:   Protein/Dextrose/Lipids: 50g/400kcal/0mL    Volume: 75 mL/hr over 24 hrs daily  Note pt was on propofol this AM and is currently being weaned off and changed to dexmedetomidine. Holding lipids until propofol is off.  Pt received total 40mEq KCL IV this AM.     Based on the above labs, will add the following electrolytes/additives to the TPN.    Sodium Chloride: 70 mEq   Sodium Acetate: 0 mEq   Sodium Phosphate: 0 mEq   Potassium Chloride: 60 mEq   Potassium " Acetate: 0 mEq   Potassium Phosphate: 0 mEq   Calcium Gluconate: 9 mEq   Magnesium Sulfate: 10 mEq   MVI for TPN   Trace Elements              Other Additives:     Labs to be ordered: CMP, Mg, phos in AM    Pharmacy will continue to follow.     Sandy Amezcua, PharmD  Clinical Pharmacist

## 2025-05-30 NOTE — SIGNIFICANT NOTE
"   05/30/25 1439   PICC Triple Lumen 05/30/25 Left Cephalic   Placement date: If unknown, DO NOT use \"Add Comment\" note/Placement time: If unknown, DO NOT use \"Add Comment\" note: 05/30/25 1437   Hand Hygiene Completed: Yes  Size (Fr): 5  Description (optional): power picc  Length (cm): 49 cm  Orientation: Left  ...   Site Assessment Clean;Dry;Intact   #1 Lumen Status Blood return noted;Capped;Flushed;Normal saline locked   #2 Lumen Status Blood return noted;Flushed;Capped;Normal saline locked   #3 Lumen Status Blood return noted;Capped;Flushed;Normal saline locked   Length reyes (cm) 49 cm  (3 cm external)   Line Care Connections checked and tightened   Extremity Circumference (cm) 50 cm   Dressing Type Border Dressing;Securing device;Antimicrobial dressing/disc   Dressing Status Clean;Dry;Intact   Dressing Intervention New dressing   Liquid Adhesive Applied   Dressing Change Due 06/06/25     3 needles, 2 guidewires, and 1 scalpel accounted for and disposed of.      "

## 2025-05-30 NOTE — SIGNIFICANT NOTE
05/30/25 1400   B = Both Spontaneous Awakening and Breathing Trials   Was patient receiving mechanical ventilation? Yes   Safety Screen Spontaneous Breathing Trial (SBT)  MD ordered/requested;Lack of inspiratory efforts  (low tidal volumes and increased respiratory rate and agitation.)     Pt is unable to perform spontaneous breathing trial at this time. Will continue to monitor.

## 2025-05-30 NOTE — ANESTHESIA PREPROCEDURE EVALUATION
Anesthesia Evaluation     Patient summary reviewed and Nursing notes reviewed   NPO Solid Status: Waived due to emergency  NPO Liquid Status: Waived due to emergency           Airway   Mallampati: III  TM distance: >3 FB  Neck ROM: full  Anterior, Large neck circumference and Possible difficult intubation  Dental - normal exam     Pulmonary    (-) COPD, asthma, not a smoker  Cardiovascular     (+) hyperlipidemia  (-) past MI, dysrhythmias, angina      Neuro/Psych  (-) seizures, CVA  GI/Hepatic/Renal/Endo    (+) morbid obesity, GI bleeding active bleeding  (-) GERD, liver disease, no renal disease, diabetes, no thyroid disorder    Musculoskeletal     Abdominal    Substance History      OB/GYN          Other   arthritis, blood dyscrasia anemia,                 Anesthesia Plan    ASA 4 - emergent     general       Anesthetic plan, risks, benefits, and alternatives have been provided, discussed and informed consent has been obtained with: patient.    CODE STATUS:    Code Status (Patient has no pulse and is not breathing): CPR (Attempt to Resuscitate)  Medical Interventions (Patient has pulse or is breathing): Full Support

## 2025-05-30 NOTE — NURSING NOTE
IV team attempted to place PICC line in patient RUTOMMY on multiple attempts to get the catheter to thread and was unsuccessful.  The catheter would stop advancing at the tip of the patients shoulder.  IV team will attempt on the left side for placement. Primary RN notified.

## 2025-05-30 NOTE — PROGRESS NOTES
LOS: 7 days   Patient Care Team:  Cristiano Logan DO as PCP - General (Family Medicine)    Subjective     Pulmonary critical care coverage from Dr. Quiros patient with acute GI bleed hemoglobin of 5 she went to the OR and had a partial small bowel resection and double barrel ostomy right lower quadrant, she has been intubated they have been unable to wean her off the ventilator  Patient is awake we have changed her sedation she is more awake off of propofol and on Precedex.  She is following simple commands.  Bedside apparently she is probably not compliant with the PAP machine.  Review of Systems:          Objective     Vital Signs  Vital Sign Min/Max for last 24 hours  Temp  Min: 97.7 °F (36.5 °C)  Max: 99.6 °F (37.6 °C)   BP  Min: 88/67  Max: 141/71   Pulse  Min: 79  Max: 112   Resp  Min: 16  Max: 30   SpO2  Min: 73 %  Max: 100 %   No data recorded   Weight  Min: 131 kg (288 lb 5.8 oz)  Max: 131 kg (288 lb 5.8 oz)        Ventilator/Non-Invasive Ventilation Settings (From admission, onward)       Start     Ordered    05/30/25 0452  Ventilator - Vent Mode: AC/VC; Rate: 20; FiO2: 40%; PEEP: 7.5; Tidal Volume: mL; TV: 450  Continuous        Question Answer Comment   Vent Mode AC/VC    Rate 20    FiO2 40%    PEEP 7.5    Tidal Volume mL            05/30/25 0453                        Arterial Line BP: 81/68  Arterial Line MAP (mmHg): 316 mmHg     Body mass index is 52.74 kg/m².  I/O last 3 completed shifts:  In: 1390 [P.O.:490; I.V.:600; Blood:300]  Out: 700 [Urine:700]  I/O this shift:  In: 2881 [I.V.:1831; Blood:900; Other:150]  Out: 225 [Urine:225]        Physical Exam:  General Appearance: Well-developed morbidly obese white female she is lying in bed she is orally intubated endotracheal tubes at about somewhere between 23 and 24 cm she is on assist-control rate of 20 tidal volume 450 PEEP of 7.5 FiO2 40% SpO2 96% her peak airway pressures are in the low to mid 20s she is on Precedex at 1 gianna per  kilogram per minute and fentanyl at 50 mics per hour  Eyes: Conjunctivae are clear and anicteric, pupils are equal  ENT: Mucous membranes are little dry oral endotracheal tube in a nasal gastric type tube  Neck: No adenopathy trachea midline, no crepitance  Lungs: Pretty clear bilaterally no wheezes or rales  Cardiac: Regular rate rhythm no murmur  Abdomen: Soft does not really seem to be tender she has a bulb drain with serosanguineous fluid she has a right lower quadrant ostomy that is pink  : Not examined  Musculoskeletal: She has some edema of all 4 extremities not too much obesity profound  Skin: Warm and dry no jaundice no petechiae  Neuro: She is following simple commands with all 4 extremities but not really attempting to communicate very well  Extremities/P Vascular: Palpable radial and dorsalis pedis pulses bilaterally  MSE: Hard to tell       Labs:  Results from last 7 days   Lab Units 05/27/25  0542 05/26/25  0627 05/25/25  1955 05/25/25  0119 05/24/25  1113 05/23/25  1629 05/23/25  0735   GLUCOSE mg/dL 145* 119*  --  113* 145* 150* 156*   SODIUM mmol/L 137 139  --  137 138 138 141   POTASSIUM mmol/L 4.1 3.9 4.5 3.1* 4.2 3.5 4.1   MAGNESIUM mg/dL  --  2.0  --  1.6 1.7  --   --    CO2 mmol/L 24.5 26.4  --  23.0 22.0 19.0* 19.8*   CHLORIDE mmol/L 107 107  --  106 112* 111* 114*   ANION GAP mmol/L 5.5 5.6  --  8.0 4.0* 8.0 7.2   CREATININE mg/dL 0.46* 0.48*  --  0.50* 0.66 0.63 0.56*   BUN mg/dL 14 11  --  21 24* 24* 30*   BUN / CREAT RATIO  30.4* 22.9  --  42.0* 36.4* 38.1* 53.6*   CALCIUM mg/dL 7.1* 7.9*  --  7.4* 7.6* 7.5* 7.3*   ALK PHOS U/L 36*  --   --   --   --   --  39   TOTAL PROTEIN g/dL 3.6*  --   --   --   --   --  4.1*   ALT (SGPT) U/L 16  --   --   --   --   --  9   AST (SGOT) U/L 17  --   --   --   --   --  8   BILIRUBIN mg/dL 0.7  --   --   --   --   --  0.7   ALBUMIN g/dL 2.1*  --   --   --   --   --  2.6*   GLOBULIN gm/dL 1.5  --   --   --   --   --  1.5     Estimated Creatinine  Clearance: 146.1 mL/min (A) (by C-G formula based on SCr of 0.46 mg/dL (L)).      Results from last 7 days   Lab Units 05/30/25  0134 05/29/25  2246 05/29/25  1753 05/29/25  1149 05/29/25  0612 05/29/25  0000 05/28/25  1918 05/28/25  1152 05/28/25  0822 05/27/25  2350 05/23/25 2011 05/23/25  1628 05/23/25  0735   WBC 10*3/mm3  --   --  10.30 11.45* 8.61 11.22* 13.04* 13.05* 8.74 7.65   < > 15.16* 15.20*   RBC 10*6/mm3  --   --  1.94* 3.15* 2.53* 2.70* 2.86* 2.39* 2.42* 2.08*   < > 2.04* 2.61*   HEMOGLOBIN g/dL  --   --  5.7* 9.2* 7.3* 7.7* 8.3* 7.3* 7.2* 6.4*   < > 6.1* 7.5*   HEMOGLOBIN, POC g/dL 8.2* 6.1*  --   --   --   --   --   --   --   --   --   --   --    HEMATOCRIT %  --   --  17.5* 30.0* 23.3* 24.7* 26.1* 22.9* 21.7* 19.3*   < > 18.3* 22.9*   HEMATOCRIT POC % 24* 18*  --   --   --   --   --   --   --   --   --   --   --    MCV fL  --   --  90.2 95.2 92.1 91.5 91.3 95.8 89.7 92.8   < > 89.7 87.7   MCH pg  --   --  29.4 29.2 28.9 28.5 29.0 30.5 29.8 30.8   < > 29.9 28.7   MCHC g/dL  --   --  32.6 30.7* 31.3* 31.2* 31.8 31.9 33.2 33.2   < > 33.3 32.8   RDW %  --   --  18.0* 18.7* 18.8* 18.0* 17.1* 16.1* 16.3* 15.8*   < > 15.0 15.8*   RDW-SD fl  --   --  55.9* 63.9* 59.4* 57.3* 53.4 53.3 49.8 49.6   < > 48.7 49.7   MPV fL  --   --  10.8  --  11.7 11.5 12.1* 11.5 11.2 10.9   < > 11.2 11.7   PLATELETS 10*3/mm3  --   --  123* 232 113* 107* 97* 155 117* 92*   < > 145 131*   NEUTROPHIL % %  --   --   --   --   --   --   --   --   --   --   --  62.6 70.5   LYMPHOCYTE % %  --   --   --   --   --   --   --   --   --   --   --  25.7 20.8   MONOCYTES % %  --   --   --   --   --   --   --   --   --   --   --  10.1 7.8   EOSINOPHIL % %  --   --   --   --   --   --   --   --   --   --   --  0.1* 0.1*   BASOPHIL % %  --   --   --   --   --   --   --   --   --   --   --  0.2 0.3   IMM GRAN % %  --   --   --   --   --   --   --   --   --   --   --  1.3* 0.5   NEUTROS ABS 10*3/mm3  --   --   --   --   --   --   --   --   --    --   --  9.51* 10.72*   LYMPHS ABS 10*3/mm3  --   --   --   --   --   --   --   --   --   --   --  3.89* 3.16*   MONOS ABS 10*3/mm3  --   --   --   --   --   --   --   --   --   --   --  1.53* 1.18*   EOS ABS 10*3/mm3  --   --   --   --   --   --   --   --   --   --   --  0.01 0.02   BASOS ABS 10*3/mm3  --   --   --   --   --   --   --   --   --   --   --  0.03 0.04   IMMATURE GRANS (ABS) 10*3/mm3  --   --   --   --   --   --   --   --   --   --   --  0.19* 0.08*   NRBC /100 WBC  --   --   --   --   --   --   --   --   --   --   --  0.2  --     < > = values in this interval not displayed.     Results from last 7 days   Lab Units 05/30/25  0304   PH, ARTERIAL pH units 7.267*   PO2 ART mm Hg 147.2*   PCO2, ARTERIAL mm Hg 54.4*   HCO3 ART mmol/L 24.8                     Results from last 7 days   Lab Units 05/26/25  0627 05/25/25  0119 05/24/25  1113   INR  1.25* 1.29* 1.25*     Microbiology Results (last 10 days)       ** No results found for the last 240 hours. **                insulin regular, 2-9 Units, Subcutaneous, Q6H  pantoprazole, 40 mg, Intravenous, Q12H  potassium chloride, 10 mEq, Intravenous, Q1H  sodium chloride, 10 mL, Intravenous, Q12H  sodium chloride, 10 mL, Intravenous, Q12H      fentanyl 10 mcg/mL,  mcg/hr, Last Rate: 50 mcg/hr (05/30/25 0353)  lactated ringers, 9 mL/hr, Last Rate: Stopped (05/30/25 0223)  Pharmacy to Dose TPN,   propofol, 5-50 mcg/kg/min, Last Rate: 35 mcg/kg/min (05/30/25 0629)        Diagnostics:  XR Chest 1 View  Result Date: 5/30/2025  SINGLE VIEW OF THE CHEST  HISTORY: Postop intubation  COMPARISON: May 22, 2025  FINDINGS: There is cardiomegaly. There is vascular congestion. Endotracheal tube terminates at the elizabeth. Retraction by approximately 2 to 3 cm would allow for more optimal positioning. There is bibasilar atelectasis. No pneumothorax is seen. No large effusion is identified.      Endotracheal tube terminates at the elizabeth. Retraction by approximately 2 to 3 cm  would allow for more optimal positioning.  This report was finalized on 5/30/2025 4:23 AM by Dr. Jennifer England M.D on Workstation: [id number]      Arterial Line  Result Date: 5/29/2025  Reilly Westbrook MD     5/29/2025 10:20 PM Arterial Line Pre-sedation assessment completed: 5/29/2025 9:15 PM Patient reassessed immediately prior to procedure Patient location during procedure: holding area Start time: 5/29/2025 9:15 PM Stop Time:5/29/2025 9:20 PM  Line placed for hemodynamic monitoring and ABGs/Labs/ISTAT. Performed By Anesthesiologist: Reilly Westbrook MD Preanesthetic Checklist Completed: patient identified, IV checked, site marked, risks and benefits discussed, surgical consent, monitors and equipment checked, pre-op evaluation and timeout performed Arterial Line Prep  Sterile Tech: cap, gloves, mask and sterile barriers Prep: ChloraPrep Patient monitoring: blood pressure monitoring, continuous pulse oximetry and EKG Arterial Line Procedure Laterality:left Location:  radial artery Catheter size: 20 G Guidance: landmark technique and palpation technique Number of attempts: 1 Successful placement: yes Images: still images obtained, printed/placed on the chart Post Assessment Dressing Type: occlusive dressing applied, secured with tape and wrist guard applied. Complications no Circ/Move/Sens Assessment: unchanged. Patient Tolerance: patient tolerated the procedure well with no apparent complications Additional Notes Ultrasound was used to identify the radial artery and surrounding structures.  The left radial artery was assessed and patent but small.  Ultrasound was used to visualize vascular needle entry into the left radial artery.  This vessel appeared anatomically normal and there were no apparent abnormal findings.  A permanent ultrasound image was saved in the patient's record.  It is  also used to visualize the wire and the catheter within the lumen of the artery.     IR Embolization  Result Date:  5/29/2025  PROCEDURE: Mesenteric angiogram  INDICATION: GI bleed localized to the small bowel on CT angiography earlier in the day. Persistent bloody stools today. Anemia requiring transfusion. No active bleeding identified on recent upper endoscopy and colonoscopy.   Reference air kerma: 4033.10 mGy Contrast: Approximately 170 mL Isovue intra-arterial 1 mg IV glucagon administered to decrease bowel motility Procedure was performed under general anesthesia   The risks, benefits, and alternatives of the procedure were discussed with the patient, and informed consent was obtained. In the procedure room a timeout was performed confirming correct patient and procedure.  Maximum sterile barrier technique was used including sterile cap, mask, gloves, gown and large sterile sheet as well as pre-procedure hand washing and cutaneous antisepsis with 2 % chlorhexidine. Real-time sterile ultrasound guidance was utilized with sterile gel and sterile probe cover.  TECHNIQUE: Ultrasound of the right common femoral artery was performed. It was patent, and an image was saved. The right common femoral artery was accessed under ultrasound guidance with a 4 Nigerien micropuncture introducer set. This was upsized to a 5 Nigerien Alexis 1 sheath. A 5 Nigerien C2 glide catheter was then advanced into the superior mesenteric artery and digital subtraction angiography of the superior mesenteric artery was performed. Next, in a coaxial fashion, a 2.4 Nigerien Progreat microcatheter was advanced into five  separate jejunal and iliac branch arteries arising from the superior mesenteric artery. Selective angiography of each of these vessels was performed. The microcatheter was removed. Repeat digital subtraction angiography of the superior mesenteric artery through the C2 catheter was then performed in multiple obliquities. The C2 catheter was removed. Angiography of the right common femoral artery through the sheath was performed. The sheath was  removed and was exchanged for an Angio-Seal vascular closure device. Hemostasis of the right common femoral artery was achieved utilizing the Angio-Seal. Patient tolerated procedure well without immediate complication. Distal pulses were palpable following the procedure.  FINDINGS: The study is somewhat limited due to artifact from bowel peristalsis and gas (despite the usage of IV glucagon) and also somewhat limited due to attenuation from body habitus. The superior mesenteric artery and its branches are patent. There is enlargement of the gastroduodenal artery and multiple pancreaticoduodenal arteries with retrograde flow filling the celiac artery branches consistent with a stenosis of the celiac artery as demonstrated on the comparison CT angiogram. There is no active contrast extravasation identified from the superior mesenteric artery or its branches. Selective angiography of multiple jejunal and iliac branch vessels was then performed and again showed no evidence of active contrast extravasation into the small bowel. Findings were discussed with Dr. Garza following the procedure.      Angiography of the superior mesenteric artery and its branches as described above. No evidence of active contrast extravasation into the small bowel is demonstrated.    This report was finalized on 5/29/2025 8:10 AM by Dr. Jose G Pemberton M.D on Workstation: [id number]      CT Angiogram Abdomen Pelvis  Result Date: 5/28/2025  CT ANGIOGRAM ABDOMEN PELVIS-  DATE OF EXAM: 5/28/2025 1:14 PM  INDICATION: GI bleed.  COMPARISON: Nuclear medicine GI bleed study 5/27/2025. CT abdomen and pelvis 5/26/2025 and 5/22/2025.  TECHNIQUE: Multiple contiguous axial images were acquired through the abdomen and pelvis before and following the intravenous administration of 95 mL of Isovue-370. Reformatted coronal and sagittal sequences were also reviewed. Reformatted coronal, sagittal, and 3D reconstruction images were also reviewed. Radiation dose  reduction techniques were utilized, including automated exposure control and exposure modulation based on body size.  FINDINGS: Mild multifocal bibasilar subsegmental atelectasis and/or scarring. Partially imaged small pericardial effusion, measuring up to 5 mm in thickness.  The abdominal aorta is normal in size. Mild calcified atherosclerotic disease without evidence of hemodynamically significant stenosis or dissection. 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 Stable 1 cm splenic artery aneurysm. Calcified atherosclerotic disease at the origins of the SMA resulting in mild stenosis. No hemodynamically significant stenosis of either renal artery. The STACIE is widely patent. The bilateral common iliac, internal iliac, external iliac, common femoral, and proximal superficial femoral arteries demonstrate normal course and caliber without evidence of hemodynamically significant stenosis or injury.  Mild diffuse gastric wall thickening is likely accentuated by underdistention. Multiple jejunal diverticula including a fluid and lower dilated 4.5 cm diverticulum in the anterior left upper quadrant (axial series 5 image 83), and a 3 cm fluid in air dilated diverticulum in the left lower quadrant (coronal series 6 image 55). Accumulation of high density contrast within the lumen of a short segment of small bowel in the central and left upper hemipelvis, likely jejunum, and within a small bowel diverticulum in the central upper pelvis (axial series 5 images 140 through 148), likely representing active bleeding. Tiny distal ileal diverticula. Mild colorectal stool. Colonic diverticula, without CT evidence of diverticulitis. No bowel obstruction or significant bowel wall thickening. The appendix is normal.  Stable postoperative changes from cholecystectomy with likely postoperative dilatation of the extrahepatic biliary ducts. Diffuse decreased hepatic attenuation/enhancement,  suggesting hepatic steatosis. The spleen, pancreas, adrenal glands, and kidneys are unremarkable. The urinary bladder is nondistended. Diffuse urinary bladder wall thickening may be accentuated by under distention. The uterus and adnexa are unremarkable in CT appearance.  No free fluid in the abdomen or pelvis. No free intraperitoneal air. No pathologically enlarged lymph nodes in the abdomen or pelvis.  Partially imaged fluid collection in the subcutaneous fat lateral to the right hip. Similar-appearing rotary lumbar dextroscoliosis and multilevel lumbar spondylosis. Flowing multilevel lower thoracic and upper lumbar anterior osteophyte formation. Mild to moderate bilateral hip and SI joint DJD with right SI joint bridging osteophyte formation. Mild to moderate DJD at the pubic symphysis. No acute osseous abnormality or concerning osseous lesion.       1. Accumulation of high density contrast within the lumen of a short segment of small bowel in the central and left upper hemipelvis, likely jejunum, and within a small bowel diverticulum in the central upper pelvis, likely representing active GI bleed. 2. 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 3. Small bowel and colonic diverticula without evidence of diverticulitis.  This report was finalized on 5/28/2025 2:40 PM by Johnson Garcia MD on Workstation: [id number]      NM GI Blood Loss  Result Date: 5/27/2025  NM GI BLOOD LOSS-  TECHNIQUE: 35.5 mCi of technetium 99m tagged RBCs was injected intravenously with dynamic imaging of the abdomen pelvis performed for approximately 60 minutes after.  INDICATION: Nonlocalized GI bleed  COMPARISON: Multiphase CT abdomen pelvis 5/26/2025.   FINDINGS: Prompt activity seen within the circulatory system following radiotracer injection. No dynamic mobile tubular focus of activity overlying the abdomen or pelvis during the course of the study.  Accumulation of urinary activity within the bladder.       No scintigraphic evidence of active gastrointestinal bleeding during the course of the study.  This report was finalized on 5/27/2025 8:52 AM by Dr. Scott Leahy M.D on Workstation: [id number]      CT Abdomen Pelvis With Contrast  Result Date: 5/27/2025  CT OF THE ABDOMEN AND PELVIS WITH CONTRAST 05/22/2025  HISTORY: Abdominal pain.  TECHNIQUE: Spiral images were obtained from the lung bases to the symphysis pubis after intravenous contrast.  FINDINGS: There is fatty infiltration of the liver. Gallbladder has been removed. The spleen, pancreas, adrenals and kidneys appear unremarkable.  No bowel wall thickening or bowel dilatation is seen. There is colonic diverticulosis. Uterus and urinary bladder appear unremarkable. There are degenerative changes in the spine. There is some aortic calcification with no evidence of aneurysm.      1. Colonic diverticulosis. There is no evidence of acute diverticulitis. 2. Status post cholecystectomy. 3. Fatty infiltration of the liver.  Radiation dose reduction techniques were utilized, including automated exposure control and exposure modulation based on body size.   This report was finalized on 5/27/2025 7:54 AM by Dr. Tod Avina M.D on Workstation: [id number]      CT Angiogram Abdomen Pelvis  Result Date: 5/27/2025  CT ANGIOGRAM OF THE ABDOMEN AND PELVIS  HISTORY: GI bleed  COMPARISON: May 22, 2025  TECHNIQUE: Axial precontrast CT imaging was obtained through the abdomen and pelvis. Subsequently, arterial and venous delayed phase imaging was performed through the abdomen and pelvis. Three-D reformatted images were obtained.  FINDINGS: Images through the lung bases demonstrate mild scarring and atelectasis.  The patient either has an occlusion or near occlusive stenosis involving the origin of the celiac axis. The splenic artery, left gastric artery, and common hepatic artery appear to be patent. The patient  also appears to have moderate stenosis at the origin of the superior mesenteric artery. The inferior mesenteric artery is patent. There is some calcified plaque at the origin of the right renal artery, without hemodynamically significant stenosis. The iliac vessels appear widely patent. No active extravasation is seen. There is a small splenic artery aneurysm, measuring 7 mm.  There is a small hiatal hernia. The duodenum, adrenal glands, spleen, and pancreas appear normal. No suspicious hepatic lesions are seen. The gallbladder is absent. The kidneys enhance symmetrically. No hydronephrosis is seen. The uterus appears normal. No adnexal masses are seen. There is colonic diverticulosis. There is no bowel obstruction. The patient does have liquid stool within the colon, which could reflect diarrhea. The appendix is normal. No acute osseous abnormalities are seen. There is lumbar scoliosis, with convexity to the right. The patient has a fluid collection within the right gluteal region laterally. This measures up to 2.4 x 6.6 cm. Exact etiology is uncertain. It appears stable when compared to the exam from May 22. It could reflect a hematoma or seroma.       1. No contrast extravasation is identified. 2. Either occlusion or near occlusive stenosis of the origin of the celiac axis. Patient also appears to have moderate stenosis at the origin of the SMA. Inferior mesenteric artery is patent. 3. Liquid stool noted within the colon.  Radiation dose reduction techniques were utilized, including automated exposure control and exposure modulation based on body size.   This report was finalized on 5/27/2025 12:24 AM by Dr. Jennifer England M.D on Workstation: [id number]      CT Facial Bones With Contrast  Result Date: 5/25/2025  CONTRAST-ENHANCED CT SCAN OF THE FACIAL BONES ON 05/25/2025  CLINICAL HISTORY: This is a 69-year-old female patient who had a recent fall with head injury and abrasion to her forehead and the patient  has some facial numbness.  TECHNIQUE: Spiral CT images were obtained through the facial bones in the axial imaging plane following intravenous contrast. The images were reformatted and are submitted in 2 mm thick axial, sagittal and coronal CT sections with soft tissue algorithm and 1 mm thick axial sagittal and coronal CT sections with high-resolution bone algorithm.  COMPARISON: There are no prior facial CTs for comparison. This is correlated to head CT on 05/23/2025, 2 days ago.  FINDINGS: On the head CT 2 days ago there was a small scalp hematoma with bubbles of air in the scalp over the anterior right frontal bone from a scalp hematoma and scalp laceration from trauma. Unfortunately on the prior head CT of the right orbital floor was not adequately assessed as the images started above the orbital floor. This patient does have an acute mildly comminuted fracture of the right inferior orbital wall. There is a 9 x 12 mm fracture defect in the central aspect of the mid to anterior aspect of the inferior right orbital wall with inferior displacement of inferior orbital wall fracture fragments along with some inferior extraconal fat by up to 7 mm into the superior aspect of the right maxillary sinus. The fracture plane extends into the right inferior orbital canal and could compromise the right infraorbital nerve. There is some tenting of the inferior margin of the right inferior rectus muscle which minimally bulges into the superior aspect of the right maxillary sinus. No additional acute facial fractures are seen. There is moderate circumferential mucosal thickening partially opacifying the left maxillary sinus, there is a thin sliver of hyperdense fluid or hemorrhage in the posterior right maxillary sinus. The remainder of the paranasal sinuses, the mastoid air cells and middle ear cavities are clear. The nasopharynx, oropharynx, hypopharynx are normal in appearance. The parotid , parapharyngeal and  submandibular spaces are symmetric and are normal in appearance.      1. On 05/23/2025, this patient had a small scalp hematoma with bubbles of air in the scalp over the anterior right frontal bone from associated scalp laceration from head trauma. On the head CT on 5/23/2025, the right orbital floor-right inferior orbital wall was not assessed as it was excluded from the field of imaging and not assessed. On the current facial CT there is an acute comminuted fracture of the central aspect of the mid to anterior portion of the right inferior orbital wall with a 12 x 9 mm bony defect in the central to anterior aspect of the right inferior orbital wall with inferior displacement of inferior orbital wall fracture fragments along with inferior extraconal fat of the right orbit by 7 mm into the superior aspect of the right maxillary sinus, there is some inferior tenting of the inferior margin of the right inferior rectus muscle at the level of the fracture plane. The acute comminuted fracture extends into the right inferior orbital canal and could compromise the right infraorbital nerve. There is a tiny amount of hemorrhage in the posterior right maxillary sinus. Maxillofacial surgical consultation is warranted.  There is moderate circumferential mucosal thickening in the left maxillary sinus. The remainder of the facial CT is unremarkable. I communicated the results to Foreign Lora DO, by telephone on 05/25/2025 at 2:00 p.m.  Radiation dose reduction techniques were utilized, including automated exposure control and exposure modulation based on body size.   This report was finalized on 5/25/2025 8:34 PM by Dr. Nikhil Colón M.D on Workstation: [id number]      CT Head Without Contrast  Result Date: 5/24/2025  STAT CT SCAN OF THE HEAD WITHOUT CONTRAST ON 05/23/2025  CLINICAL HISTORY: This is a 69-year-old female patient who had a fall with head injury with an abrasion to the forehead.  TECHNIQUE: Spiral CTA  images were obtained from the base of the skull to the vertex without intravenous contrast. The images were reformatted and are submitted in 3 mm thick axial, sagittal and coronal CT sections with brain algorithm and 2 mm thick axial CT sections with high-resolution bone algorithm.  COMPARISON: There are no prior head CTs from [facility] for comparison.  FINDINGS: The brain parenchyma is normal in attenuation. The ventricles are normal in size. I see no focal mass effect. There is no midline shift. No extra axial fluid collections are identified. There is no evidence of acute intracranial hemorrhage. There is a scalp hematoma overlying the anterior right frontal bone from today's head trauma. No underlying acute skull fracture is identified. The calvarium and the skull base are normal in appearance. There is partial opacification of the visualized superior maxillary sinuses with fluid and mucosal thickening and mild anterior ethmoid sinus mucosal thickening. Otherwise, the paranasal sinuses, mastoid air cells and the middle ear cavities are clear. The dural venous sinuses are hypodense which can be seen if the patient is anemic.      1. No acute intracranial abnormality is identified.  2. There is partial opacification of the visualized superior aspect of the maxillary sinuses with fluid and mucosal thickening bilaterally.  3. The dural venous sinuses are hypodense suggesting that the patient has a low hematocrit and is most probably anemic and correlate clinically.  4. There is a small scalp hematoma over the anterior right frontal bone from today's head trauma. The remainder of the head CT is within normal limits with no acute skull fracture or intracranial hemorrhage identified.  Radiation dose reduction techniques were utilized, including automated exposure control and exposure modulation based on body size.   This report was finalized on 5/24/2025 8:11 AM by Dr. Nikhil Colón M.D on  Workstation: [id number]      XR Ankle 3+ View Right  Result Date: 5/23/2025  XR ANKLE 3+ VW RIGHT-5/23/2025  HISTORY: Fell, right ankle pain.  There is artifact from the patient's sock. No fractures are seen. Calcaneal spurs are seen.   This report was finalized on 5/23/2025 6:25 PM by Dr. Tod Avina M.D on Workstation: [id number]      XR Chest 1 View  Result Date: 5/22/2025  XR CHEST 1 VW-  HISTORY: Female who is 69 years-old, nausea, vomiting  TECHNIQUE: Frontal view of the chest  COMPARISON: 11/4/2024  FINDINGS: The heart size is borderline. Aorta is calcified. Pulmonary vasculature is unremarkable. No focal pulmonary consolidation, pleural effusion, or pneumothorax. No acute osseous process.      No evidence for acute pulmonary process. Follow-up as clinical indications persist.  This report was finalized on 5/22/2025 4:28 PM by Dr. Teja Mckinnon M.D on Workstation: [id number]      XR knee 3 views left [id number]  Result Date: 5/16/2025  Knee pain COMPARISON: None EXAM:  Three views FINDINGS: There is a small knee joint effusion.  Moderate to advanced tricompartmental degenerative changes are noted.  No fracture.  No dislocation.  The surrounding soft tissues demonstrate no abnormalities.   IMPRESSION: Small knee joint effusion Degenerative changes No fractures or dislocations Dictated by: Carlos Chin M.D. Signed by Carlos Chin M.D. on 5/17/2025 14:54 ##### Final ##### Dictated by:    DARCY CHIN MD-RAD Dictated DT/TM: 05/17/2025 2:54 pm Interpreted and electronically signed by:  DARCY CHIN MD-RAD Signed DT/TM:  05/17/2025 2:54 pm    XR Chest 2 View  Result Date: 5/16/2025  EXAM: CR Chest 2 Vws NUMBER OF IMAGES:  2 INDICATION: DYSPNEA ON EXERTION Technique: Frontal and lateral views of the chest obtained on 5/16/2025 8:46 Comparison:  No prior studies available for comparison. Findings: The cardiomediastinal silhouette is enlarged. There are calcifications of the thoracic aorta. The  hilar and mediastinal contours are normal. The lungs are without focal consolidation or pleural effusion. There is no pneumothorax. The visualized osseous structures demonstrate degenerative changes. Lines and Tubes: None. Impression:  No focal consolidation. Dictated by: Nikhil Noe MD Signed by Nikhil Noe MD on 5/16/2025 15:20 ##### Final ##### Dictated by:    NIKHIL NOE MD-RAD Dictated DT/TM: 05/16/2025 3:20 pm Interpreted and electronically signed by:  NIKHIL NOE MD-RAD Signed DT/TM:  05/16/2025 3:20 pm            Active Hospital Problems    Diagnosis  POA    **GI bleed [K92.2]  Yes    GIB (gastrointestinal bleeding) [K92.2]  Yes      Resolved Hospital Problems   No resolved problems to display.         Assessment & Plan     GI bleed status post 5/29/2025 exploratory laparotomy with jejunal small bowel resection with anastomosis ileal small bowel resection and end loop ileostomy creation.  Bleeding was from small bowel diverticulum  Postop respiratory failure weaning trial this morning I am thinking she will be better and we are going to try and decrease her fentanyl little more get her more awake and hopefully in the morning we can do another spontaneous trial and get her off the ventilator.  Comminuted orbital wall fracture with facial numbness due to right infraorbital nerve compromise from fracture.  She has been seen by Dr. Corona and he just recommends conservative therapy expects paresthesias to gradually resolve over months  Syncope with fall  Morbid obesity with a BMI greater than 52  Obstructive sleep apnea sounds like she is not real compliant with PAP therapy will have to be extra careful with extubating her had like to use PAP after extubation but if she is not used to it we may get into more trouble with air insufflation etc. left to see how she does.  Hyperglycemia on sliding scale insulin  Fluids/electrolytes/nutrition replace electrolytes per protocol    Plan for disposition:    Victor Hugo MONTAÑO  Jr Gonzalez MD  05/30/25  06:51 EDT    Time: Critical care time 38 minutes

## 2025-05-30 NOTE — CASE MANAGEMENT/SOCIAL WORK
Continued Stay Note  [facility]     Patient Name: Samia Gaming  MRN: [id number]  Today's Date: 5/30/2025    Admit Date: 5/22/2025    Plan: Pending clinical course   Discharge Plan       Row Name 05/30/25 1305       Plan    Plan Pending clinical course    Plan Comments CCP noted patient was taken to OR and ileostomy was placed. Currently patient is on ventilator. CCP following clinical course for extubation and PT evaluation/recommendation to assist with discharge planning. Noted plans for PICC line placement and TPN initiation. Anticipate IRF/SNF recommendation once patient is able to work with therapies, but if patient returns home, will need HH arranged for ileostomy supplies. CCP following.                   Discharge Codes    No documentation.                 Expected Discharge Date and Time       Expected Discharge Date Expected Discharge Time    Jun 2, 2025               Kristin Tejeda

## 2025-05-30 NOTE — PROGRESS NOTES
Patient was downgraded earlier this evening but had a repeat hemoglobin of 5.7 which is a 3-1/2 point drop from earlier today.  Patient is tachycardic and appears pale although she remains otherwise hemodynamically stable.  Dr. Ny at bedside would like the patient to be reupgraded to ICU status due to need for significant transfusion.  Unfortunately the interventional radiologist was unable to locate the source of her bleeding and does not feel repeat angiogram would be beneficial at this time.  Discussed the case at bedside with Dr. Ny, the patient and family and decision was made to take the patient to the OR for ex lap.    She returns to CCU for continued postoperative care.  She had a bowel resection with ileostomy formation.  She is intubated and sedated and hemodynamically stable.    Critical care time 45 minutes

## 2025-05-31 ENCOUNTER — APPOINTMENT (OUTPATIENT)
Dept: GENERAL RADIOLOGY | Facility: HOSPITAL | Age: 70
DRG: 329 | End: 2025-05-31
Payer: MEDICARE

## 2025-05-31 LAB
ALBUMIN SERPL-MCNC: 1.9 G/DL (ref 3.5–5.2)
ALBUMIN/GLOB SERPL: 1.1 G/DL
ALP SERPL-CCNC: 38 U/L (ref 39–117)
ALT SERPL W P-5'-P-CCNC: 8 U/L (ref 1–33)
ANION GAP SERPL CALCULATED.3IONS-SCNC: 6.8 MMOL/L (ref 5–15)
ARTERIAL PATENCY WRIST A: POSITIVE
AST SERPL-CCNC: 12 U/L (ref 1–32)
ATMOSPHERIC PRESS: 743 MMHG
BASE EXCESS BLDA CALC-SCNC: -2.4 MMOL/L (ref 0–2)
BDY SITE: ABNORMAL
BILIRUB SERPL-MCNC: 0.9 MG/DL (ref 0–1.2)
BUN SERPL-MCNC: 25 MG/DL (ref 8–23)
BUN/CREAT SERPL: 49 (ref 7–25)
CALCIUM SPEC-SCNC: 7 MG/DL (ref 8.6–10.5)
CHLORIDE SERPL-SCNC: 111 MMOL/L (ref 98–107)
CO2 SERPL-SCNC: 21.2 MMOL/L (ref 22–29)
CREAT SERPL-MCNC: 0.51 MG/DL (ref 0.57–1)
DEPRECATED RDW RBC AUTO: 50.3 FL (ref 37–54)
DEPRECATED RDW RBC AUTO: 53.1 FL (ref 37–54)
DEPRECATED RDW RBC AUTO: 53.7 FL (ref 37–54)
DEVICE COMMENT: ABNORMAL
EGFRCR SERPLBLD CKD-EPI 2021: 99.9 ML/MIN/1.73
ERYTHROCYTE [DISTWIDTH] IN BLOOD BY AUTOMATED COUNT: 15.5 % (ref 12.3–15.4)
ERYTHROCYTE [DISTWIDTH] IN BLOOD BY AUTOMATED COUNT: 16.1 % (ref 12.3–15.4)
ERYTHROCYTE [DISTWIDTH] IN BLOOD BY AUTOMATED COUNT: 16.2 % (ref 12.3–15.4)
GLOBULIN UR ELPH-MCNC: 1.7 GM/DL
GLUCOSE BLDC GLUCOMTR-MCNC: 140 MG/DL (ref 70–130)
GLUCOSE BLDC GLUCOMTR-MCNC: 143 MG/DL (ref 70–130)
GLUCOSE BLDC GLUCOMTR-MCNC: 155 MG/DL (ref 70–130)
GLUCOSE SERPL-MCNC: 154 MG/DL (ref 65–99)
HCO3 BLDA-SCNC: 20.5 MMOL/L (ref 22–28)
HCT VFR BLD AUTO: 25.1 % (ref 34–46.6)
HCT VFR BLD AUTO: 27.9 % (ref 34–46.6)
HCT VFR BLD AUTO: 28.8 % (ref 34–46.6)
HEMODILUTION: NO
HGB BLD-MCNC: 8.2 G/DL (ref 12–15.9)
HGB BLD-MCNC: 8.6 G/DL (ref 12–15.9)
HGB BLD-MCNC: 9.3 G/DL (ref 12–15.9)
INHALED O2 CONCENTRATION: 21 %
MAGNESIUM SERPL-MCNC: 1.9 MG/DL (ref 1.6–2.4)
MCH RBC QN AUTO: 29.2 PG (ref 26.6–33)
MCH RBC QN AUTO: 30.1 PG (ref 26.6–33)
MCH RBC QN AUTO: 31.7 PG (ref 26.6–33)
MCHC RBC AUTO-ENTMCNC: 29.9 G/DL (ref 31.5–35.7)
MCHC RBC AUTO-ENTMCNC: 32.7 G/DL (ref 31.5–35.7)
MCHC RBC AUTO-ENTMCNC: 33.3 G/DL (ref 31.5–35.7)
MCV RBC AUTO: 90.3 FL (ref 79–97)
MCV RBC AUTO: 96.9 FL (ref 79–97)
MCV RBC AUTO: 97.6 FL (ref 79–97)
MODALITY: ABNORMAL
O2 A-A PPRESDIFF RESPIRATORY: 0.7 MMHG
PCO2 BLDA: 27.1 MM HG (ref 35–45)
PEEP RESPIRATORY: 8 CM[H2O]
PH BLDA: 7.49 PH UNITS (ref 7.35–7.45)
PHOSPHATE SERPL-MCNC: 2.3 MG/DL (ref 2.5–4.5)
PLATELET # BLD AUTO: 101 10*3/MM3 (ref 140–450)
PLATELET # BLD AUTO: 120 10*3/MM3 (ref 140–450)
PLATELET # BLD AUTO: 98 10*3/MM3 (ref 140–450)
PMV BLD AUTO: 10.6 FL (ref 6–12)
PMV BLD AUTO: 11 FL (ref 6–12)
PMV BLD AUTO: 11.4 FL (ref 6–12)
PO2 BLD: 399 MM[HG] (ref 0–500)
PO2 BLDA: 83.7 MM HG (ref 80–100)
POTASSIUM SERPL-SCNC: 4 MMOL/L (ref 3.5–5.2)
PROT SERPL-MCNC: 3.6 G/DL (ref 6–8.5)
PSV: 8 CMH2O
RBC # BLD AUTO: 2.59 10*6/MM3 (ref 3.77–5.28)
RBC # BLD AUTO: 2.95 10*6/MM3 (ref 3.77–5.28)
RBC # BLD AUTO: 3.09 10*6/MM3 (ref 3.77–5.28)
SAO2 % BLDCOA: 97.2 % (ref 92–98.5)
SET MECH RESP RATE: 18
SODIUM SERPL-SCNC: 139 MMOL/L (ref 136–145)
VENTILATOR MODE: ABNORMAL
VT ON VENT VENT: 578 ML
WBC NRBC COR # BLD AUTO: 11.31 10*3/MM3 (ref 3.4–10.8)
WBC NRBC COR # BLD AUTO: 12.02 10*3/MM3 (ref 3.4–10.8)
WBC NRBC COR # BLD AUTO: 12.63 10*3/MM3 (ref 3.4–10.8)

## 2025-05-31 PROCEDURE — 25010000002 POTASSIUM CHLORIDE PER 2 MEQ OF POTASSIUM: Performed by: STUDENT IN AN ORGANIZED HEALTH CARE EDUCATION/TRAINING PROGRAM

## 2025-05-31 PROCEDURE — 82948 REAGENT STRIP/BLOOD GLUCOSE: CPT

## 2025-05-31 PROCEDURE — 82803 BLOOD GASES ANY COMBINATION: CPT

## 2025-05-31 PROCEDURE — 25010000002 MAGNESIUM SULFATE PER 500 MG OF MAGNESIUM: Performed by: STUDENT IN AN ORGANIZED HEALTH CARE EDUCATION/TRAINING PROGRAM

## 2025-05-31 PROCEDURE — 85027 COMPLETE CBC AUTOMATED: CPT | Performed by: INTERNAL MEDICINE

## 2025-05-31 PROCEDURE — 80053 COMPREHEN METABOLIC PANEL: CPT | Performed by: STUDENT IN AN ORGANIZED HEALTH CARE EDUCATION/TRAINING PROGRAM

## 2025-05-31 PROCEDURE — 25810000003 SODIUM CHLORIDE 0.9 % SOLUTION: Performed by: INTERNAL MEDICINE

## 2025-05-31 PROCEDURE — 94761 N-INVAS EAR/PLS OXIMETRY MLT: CPT

## 2025-05-31 PROCEDURE — 63710000001 INSULIN REGULAR HUMAN PER 5 UNITS: Performed by: INTERNAL MEDICINE

## 2025-05-31 PROCEDURE — 25010000002 CALCIUM GLUCONATE PER 10 ML: Performed by: STUDENT IN AN ORGANIZED HEALTH CARE EDUCATION/TRAINING PROGRAM

## 2025-05-31 PROCEDURE — 25010000002 HYDROMORPHONE PER 4 MG: Performed by: STUDENT IN AN ORGANIZED HEALTH CARE EDUCATION/TRAINING PROGRAM

## 2025-05-31 PROCEDURE — 25010000002 HEPARIN (PORCINE) PER 1000 UNITS: Performed by: INTERNAL MEDICINE

## 2025-05-31 PROCEDURE — 99231 SBSQ HOSP IP/OBS SF/LOW 25: CPT | Performed by: INTERNAL MEDICINE

## 2025-05-31 PROCEDURE — 84100 ASSAY OF PHOSPHORUS: CPT | Performed by: STUDENT IN AN ORGANIZED HEALTH CARE EDUCATION/TRAINING PROGRAM

## 2025-05-31 PROCEDURE — 94799 UNLISTED PULMONARY SVC/PX: CPT

## 2025-05-31 PROCEDURE — 71045 X-RAY EXAM CHEST 1 VIEW: CPT

## 2025-05-31 PROCEDURE — 25010000002 HYDROMORPHONE PER 4 MG: Performed by: NURSE ANESTHETIST, CERTIFIED REGISTERED

## 2025-05-31 PROCEDURE — 94003 VENT MGMT INPAT SUBQ DAY: CPT

## 2025-05-31 PROCEDURE — 25010000002 FENTANYL CITRATE (PF) 50 MCG/ML SOLUTION: Performed by: NURSE ANESTHETIST, CERTIFIED REGISTERED

## 2025-05-31 PROCEDURE — 36600 WITHDRAWAL OF ARTERIAL BLOOD: CPT

## 2025-05-31 PROCEDURE — 83735 ASSAY OF MAGNESIUM: CPT | Performed by: STUDENT IN AN ORGANIZED HEALTH CARE EDUCATION/TRAINING PROGRAM

## 2025-05-31 PROCEDURE — 99024 POSTOP FOLLOW-UP VISIT: CPT | Performed by: STUDENT IN AN ORGANIZED HEALTH CARE EDUCATION/TRAINING PROGRAM

## 2025-05-31 RX ORDER — HYDROMORPHONE HYDROCHLORIDE 1 MG/ML
0.5 INJECTION, SOLUTION INTRAMUSCULAR; INTRAVENOUS; SUBCUTANEOUS
Refills: 0 | Status: DISCONTINUED | OUTPATIENT
Start: 2025-05-31 | End: 2025-06-03

## 2025-05-31 RX ORDER — HEPARIN SODIUM 5000 [USP'U]/ML
5000 INJECTION, SOLUTION INTRAVENOUS; SUBCUTANEOUS EVERY 12 HOURS SCHEDULED
Status: COMPLETED | OUTPATIENT
Start: 2025-05-31 | End: 2025-06-01

## 2025-05-31 RX ADMIN — PANTOPRAZOLE SODIUM 40 MG: 40 INJECTION, POWDER, FOR SOLUTION INTRAVENOUS at 08:36

## 2025-05-31 RX ADMIN — Medication 10 ML: at 08:26

## 2025-05-31 RX ADMIN — DEXMEDETOMIDINE HYDROCHLORIDE 0.8 MCG/KG/HR: 400 INJECTION INTRAVENOUS at 06:04

## 2025-05-31 RX ADMIN — SODIUM CHLORIDE 100 ML/HR: 9 INJECTION, SOLUTION INTRAVENOUS at 02:11

## 2025-05-31 RX ADMIN — HYDROMORPHONE HYDROCHLORIDE 0.25 MG: 1 INJECTION, SOLUTION INTRAMUSCULAR; INTRAVENOUS; SUBCUTANEOUS at 18:27

## 2025-05-31 RX ADMIN — POTASSIUM PHOSPHATE, MONOBASIC POTASSIUM PHOSPHATE, DIBASIC: 224; 236 INJECTION, SOLUTION, CONCENTRATE INTRAVENOUS at 18:10

## 2025-05-31 RX ADMIN — Medication 10 ML: at 20:38

## 2025-05-31 RX ADMIN — SODIUM CHLORIDE 100 ML/HR: 9 INJECTION, SOLUTION INTRAVENOUS at 13:13

## 2025-05-31 RX ADMIN — HYDROMORPHONE HYDROCHLORIDE 0.25 MG: 1 INJECTION, SOLUTION INTRAMUSCULAR; INTRAVENOUS; SUBCUTANEOUS at 14:48

## 2025-05-31 RX ADMIN — HYDROMORPHONE HYDROCHLORIDE 0.5 MG: 1 INJECTION, SOLUTION INTRAMUSCULAR; INTRAVENOUS; SUBCUTANEOUS at 21:32

## 2025-05-31 RX ADMIN — INSULIN HUMAN 2 UNITS: 100 INJECTION, SOLUTION PARENTERAL at 06:04

## 2025-05-31 RX ADMIN — Medication 10 ML: at 08:25

## 2025-05-31 RX ADMIN — HEPARIN SODIUM 5000 UNITS: 5000 INJECTION INTRAVENOUS; SUBCUTANEOUS at 14:48

## 2025-05-31 RX ADMIN — I.V. FAT EMULSION 20 G: 20 EMULSION INTRAVENOUS at 18:11

## 2025-05-31 RX ADMIN — PANTOPRAZOLE SODIUM 40 MG: 40 INJECTION, POWDER, FOR SOLUTION INTRAVENOUS at 20:38

## 2025-05-31 RX ADMIN — FENTANYL CITRATE 25 MCG: 50 INJECTION, SOLUTION INTRAMUSCULAR; INTRAVENOUS at 02:11

## 2025-05-31 RX ADMIN — DEXMEDETOMIDINE HYDROCHLORIDE 0.8 MCG/KG/HR: 400 INJECTION INTRAVENOUS at 10:15

## 2025-05-31 RX ADMIN — HEPARIN SODIUM 5000 UNITS: 5000 INJECTION INTRAVENOUS; SUBCUTANEOUS at 20:38

## 2025-05-31 RX ADMIN — DEXMEDETOMIDINE HYDROCHLORIDE 0.6 MCG/KG/HR: 400 INJECTION INTRAVENOUS at 01:18

## 2025-05-31 NOTE — PROGRESS NOTES
[facility]  Inpatient Progress Note    Reason for Follow Up: GI bleed    Subjective     Interval History:   Patient underwent small bowel resection because of recurrent obscure GI bleeding source presumed secondary to diverticulum.  Current hemoglobin is 9.3 hematocrit 27.9 with a white count of 12.6.  Patient on ventilator but awake and frustrated, wants to be extubated.  I advised her and her family that this would have to be determined by her postoperative progress.    Current Facility-Administered Medications:     acetaminophen (TYLENOL) tablet 650 mg, 650 mg, Oral, Q4H PRN, 650 mg at 05/28/25 0838 **OR** acetaminophen (TYLENOL) 160 MG/5ML oral solution 650 mg, 650 mg, Oral, Q4H PRN **OR** acetaminophen (TYLENOL) suppository 650 mg, 650 mg, Rectal, Q4H PRN, Gumaro Quiros MD    Adult Central 2-in-1 TPN, , Intravenous, Continuous, Jose G Ny MD, Last Rate: 75 mL/hr at 05/30/25 1736, New Bag at 05/30/25 1736    Atropine Sulfate (PF) injection 0.4 mg, 0.4 mg, Intravenous, Once PRN, Mima Sam CRNA    Calcium Replacement - Follow Nurse / BPA Driven Protocol, , Not Applicable, PRN, Gumaro Quiros MD    dexmedetomidine (PRECEDEX) 400 mcg in 100 mL NS infusion, 0.2-1.5 mcg/kg/hr, Intravenous, Titrated, Victor Hugo Gonzalez Jr., MD, Last Rate: 26.2 mL/hr at 05/31/25 0604, 0.8 mcg/kg/hr at 05/31/25 0604    dextrose (D50W) (25 g/50 mL) IV injection 25 g, 25 g, Intravenous, Q15 Min PRN, Gumaro Quiros MD    dextrose (GLUTOSE) oral gel 15 g, 15 g, Oral, Q15 Min PRN, Gumaro Quiros MD    diphenhydrAMINE (BENADRYL) injection 12.5 mg, 12.5 mg, Intravenous, Q15 Min PRN, Mima Sam CRNA    ePHEDrine injection 5 mg, 5 mg, Intravenous, Once PRN, Mima Sam CRNA    fentaNYL 1000 mcg in 100 mL NS infusion,  mcg/hr, Intravenous, Titrated, Chelsey Buamann, APRN, Last Rate: 5 mL/hr at 05/30/25 1854, 50 mcg/hr at 05/30/25 1854    fentaNYL citrate (PF) (SUBLIMAZE)  injection 25 mcg, 25 mcg, Intravenous, Q5 Min PRN, Mima Sam CRNA, 25 mcg at 05/31/25 0211    flumazenil (ROMAZICON) injection 0.2 mg, 0.2 mg, Intravenous, PRN, Mima Sam CRNA    glucagon (GLUCAGEN) injection 1 mg, 1 mg, Intramuscular, Q15 Min PRN, Gumaro Quiros MD    hydrALAZINE (APRESOLINE) injection 5 mg, 5 mg, Intravenous, Q10 Min PRN, Mima Sam CRNA    HYDROcodone-acetaminophen (NORCO) 5-325 MG per tablet 1 tablet, 1 tablet, Oral, Once PRN, Mima Sam CRNA    HYDROcodone-acetaminophen (NORCO) 7.5-325 MG per tablet 1 tablet, 1 tablet, Oral, Q4H PRN, Mima Sam CRNA    HYDROmorphone (DILAUDID) injection 0.25 mg, 0.25 mg, Intravenous, Q5 Min PRN, Mima Sam CRNA    insulin regular (humuLIN R,novoLIN R) injection 2-9 Units, 2-9 Units, Subcutaneous, Q6H, Gumaro Quiros MD, 2 Units at 05/31/25 0604    ipratropium-albuterol (DUO-NEB) nebulizer solution 3 mL, 3 mL, Nebulization, Once PRN, Mima Sam CRNA    labetalol (NORMODYNE,TRANDATE) injection 5 mg, 5 mg, Intravenous, Q5 Min PRN, Mima Sam CRNA    Magnesium Standard Dose Replacement - Follow Nurse / BPA Driven Protocol, , Not Applicable, PRN, Gumaro Quiros MD    metoclopramide (REGLAN) injection 5 mg, 5 mg, Intravenous, Q6H PRN, Gumaro Quiros MD, 5 mg at 05/24/25 0602    naloxone (NARCAN) injection 0.2 mg, 0.2 mg, Intravenous, PRN, Mima Sam CRNA    nitroglycerin (NITROSTAT) SL tablet 0.4 mg, 0.4 mg, Sublingual, Q5 Min PRN, Gumaro Quiros MD    norepinephrine (LEVOPHED) 8 mg in 250 mL NS infusion (premix), 0.02-0.3 mcg/kg/min, Intravenous, Titrated, Chelsey Baumann, APRN, Last Rate: 2.46 mL/hr at 05/31/25 0842, 0.01 mcg/kg/min at 05/31/25 0842    ondansetron ODT (ZOFRAN-ODT) disintegrating tablet 4 mg, 4 mg, Oral, Q6H PRN **OR** ondansetron (ZOFRAN) injection 4 mg, 4 mg, Intravenous, Q6H PRN, Gumaro Quiros MD, 4 mg at 05/28/25 0847    ondansetron (ZOFRAN)  injection 4 mg, 4 mg, Intravenous, Once PRN, Mima Sam CRNA    pantoprazole (PROTONIX) injection 40 mg, 40 mg, Intravenous, Q12H, Jose G Ny MD, 40 mg at 05/31/25 0836    Pharmacy to Dose TPN, , Not Applicable, Continuous PRN, Jose G Ny MD    Phosphorus Replacement - Follow Nurse / BPA Driven Protocol, , Not Applicable, Chula DOWELL Ervin H., MD    Potassium Replacement - Follow Nurse / BPA Driven Protocol, , Not Applicable, Chula DOWELL Ervin H., MD    propofol (DIPRIVAN) infusion 10 mg/mL 100 mL, 5-50 mcg/kg/min, Intravenous, Titrated, Chelsey Baumann, APRN, Stopped at 05/30/25 1526    sodium chloride 0.9 % flush 10 mL, 10 mL, Intravenous, PRN, Gumaro Quiros MD    sodium chloride 0.9 % flush 10 mL, 10 mL, Intravenous, Q12H, Gumaro Quiros MD, 10 mL at 05/31/25 0825    sodium chloride 0.9 % flush 10 mL, 10 mL, Intravenous, PRN, Gumaro Quiros MD    sodium chloride 0.9 % flush 10 mL, 10 mL, Intravenous, Q12H, Gumaro Quiros MD, 10 mL at 05/31/25 0825    sodium chloride 0.9 % flush 10 mL, 10 mL, Intravenous, PRN, Gumaro Quiros MD    sodium chloride 0.9 % flush 10 mL, 10 mL, Intravenous, Q12H, Jose G Ny MD, 10 mL at 05/31/25 0826    sodium chloride 0.9 % flush 10 mL, 10 mL, Intravenous, Q12H, Jose G Ny MD, 10 mL at 05/31/25 0826    sodium chloride 0.9 % flush 10 mL, 10 mL, Intravenous, Q12H, Jose G Ny MD, 10 mL at 05/31/25 0826    sodium chloride 0.9 % flush 10 mL, 10 mL, Intravenous, PRN, Jose G Ny MD    sodium chloride 0.9 % flush 10 mL, 10 mL, Intravenous, Q12H, Jose G Ny MD, 10 mL at 05/31/25 0826    sodium chloride 0.9 % flush 10 mL, 10 mL, Intravenous, Q12H, Jose G Ny MD, 10 mL at 05/31/25 0826    sodium chloride 0.9 % flush 10 mL, 10 mL, Intravenous, Q12H, Jose G Ny MD, 10 mL at 05/31/25 0826    sodium chloride 0.9 % flush 10 mL, 10 mL, Intravenous, PRN, Jose G Ny MD    sodium chloride 0.9 % flush 20 mL, 20 mL,  Intravenous, PRN, Jose G Ny MD    sodium chloride 0.9 % flush 20 mL, 20 mL, Intravenous, PRNAnant Andrew Z, MD    sodium chloride 0.9 % infusion 40 mL, 40 mL, Intravenous, PRNChula Ervin H., MD    sodium chloride 0.9 % infusion 40 mL, 40 mL, Intravenous, PRNChula Ervin H., MD    sodium chloride 0.9 % infusion 40 mL, 40 mL, Intravenous, PRN, Jose G Ny MD    sodium chloride 0.9 % infusion 40 mL, 40 mL, Intravenous, PRNAnant Andrew Z, MD    sodium chloride 0.9 % infusion, 100 mL/hr, Intravenous, Continuous, Victor Hugo Gonzalez Jr., MD, Last Rate: 100 mL/hr at 05/31/25 0211, 100 mL/hr at 05/31/25 0211    traZODone (DESYREL) tablet 150 mg, 150 mg, Oral, Nightly PRN, Gumaro Quiros MD, 150 mg at 05/27/25 2125  Review of Systems:    Review of systems could not be obtained due to  patient intubated.    Objective     Vital Signs  Temp:  [97.4 °F (36.3 °C)-99.8 °F (37.7 °C)] 99.8 °F (37.7 °C)  Heart Rate:  [54-93] 55  Resp:  [20-28] 28  BP: ()/() 119/57  FiO2 (%):  [30 %-40 %] 30 %  Body mass index is 52 kg/m².    Intake/Output Summary (Last 24 hours) at 5/31/2025 0853  Last data filed at 5/31/2025 0604  Gross per 24 hour   Intake 4072 ml   Output 735 ml   Net 3337 ml     No intake/output data recorded.     Physical Exam:   General: patient awake, alert and cooperative   Eyes: Normal lids and lashes, no scleral icterus   Neck: supple, normal ROM   Skin: warm and dry, not jaundiced   Cardiovascular: regular rhythm and rate, no murmurs auscultated   Pulm: clear to auscultation bilaterally, regular and unlabored   Abdomen: soft, nontender, nondistended; normal bowel sounds   Extremities: no rash or edema   Psychiatric: Normal mood and behavior; memory intact     Results Review:     I reviewed the patient's new clinical results.    Results from last 7 days   Lab Units 05/31/25  0527 05/30/25  2358 05/30/25  1746   WBC 10*3/mm3 12.63* 12.02* 11.12*   HEMOGLOBIN g/dL 9.3* 8.6* 9.7*    HEMATOCRIT % 27.9* 28.8* 29.6*   PLATELETS 10*3/mm3 120* 101* 95*     Results from last 7 days   Lab Units 05/31/25  0527 05/30/25  1238 05/27/25  0542   SODIUM mmol/L 139 138 137   POTASSIUM mmol/L 4.0 4.6 4.1   CHLORIDE mmol/L 111* 107 107   CO2 mmol/L 21.2* 21.9* 24.5   BUN mg/dL 25.0* 25.0* 14   CREATININE mg/dL 0.51* 0.82 0.46*   CALCIUM mg/dL 7.0* 7.6* 7.1*   BILIRUBIN mg/dL 0.9 0.7 0.7   ALK PHOS U/L 38* 36* 36*   ALT (SGPT) U/L 8 10 16   AST (SGOT) U/L 12 14 17   GLUCOSE mg/dL 154* 169* 145*     Results from last 7 days   Lab Units 05/26/25  0627 05/25/25  0119 05/24/25  1113   INR  1.25* 1.29* 1.25*     Lab Results   Lab Value Date/Time    LIPASE 17 05/22/2025 1611       Radiology:  XR Chest 1 View   Final Result   As described.       This report was finalized on 5/31/2025 6:58 AM by Dr. Teja Mckinnon M.D on Workstation: [id number]          XR Abdomen KUB   Final Result   Enteric tube overlying the stomach.       This report was finalized on 5/30/2025 10:59 AM by Dr. Scott Leahy M.D on Workstation: [id number]          XR Chest 1 View   Final Result   Endotracheal tube terminates at the elizabeth. Retraction by approximately   2 to 3 cm would allow for more optimal positioning.       This report was finalized on 5/30/2025 4:23 AM by Dr. Jennifer England M.D on Workstation: [id number]          IR Embolization   Final Result   Angiography of the superior mesenteric artery and its branches as   described above. No evidence of active contrast extravasation into the   small bowel is demonstrated.               This report was finalized on 5/29/2025 8:10 AM by Dr. Jose G Pemberton M.D on Workstation: [id number]          CT Angiogram Abdomen Pelvis   Final Result       1. Accumulation of high density contrast within the lumen of a short   segment of small bowel in the central and left upper hemipelvis, likely   jejunum, and within a small bowel diverticulum in the central upper   pelvis, likely  representing active GI bleed.   2. 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   3. Small bowel and colonic diverticula without evidence of   diverticulitis.       This report was finalized on 5/28/2025 2:40 PM by Johnson Garcia MD on   Workstation: [id number]          NM GI Blood Loss   Final Result   No scintigraphic evidence of active gastrointestinal   bleeding during the course of the study.       This report was finalized on 5/27/2025 8:52 AM by Dr. Scott Leahy M.D on Workstation: [id number]          CT Angiogram Abdomen Pelvis   Final Result       1. No contrast extravasation is identified.   2. Either occlusion or near occlusive stenosis of the origin of the   celiac axis. Patient also appears to have moderate stenosis at the   origin of the SMA. Inferior mesenteric artery is patent.   3. Liquid stool noted within the colon.       Radiation dose reduction techniques were utilized, including automated   exposure control and exposure modulation based on body size.           This report was finalized on 5/27/2025 12:24 AM by Dr. Jennifer England M.D on Workstation: [id number]          CT Facial Bones With Contrast   Final Result   1. On 05/23/2025, this patient had a small scalp hematoma with bubbles   of air in the scalp over the anterior right frontal bone from associated   scalp laceration from head trauma. On the head CT on 5/23/2025, the   right orbital floor-right inferior orbital wall was not assessed as it   was excluded from the field of imaging and not assessed. On the current   facial CT there is an acute comminuted fracture of the central aspect of   the mid to anterior portion of the right inferior orbital wall with a 12   x 9 mm bony defect in the central to anterior aspect of the right   inferior orbital wall with inferior displacement of inferior orbital   wall fracture fragments along with inferior  extraconal fat of the right   orbit by 7 mm into the superior aspect of the right maxillary sinus,   there is some inferior tenting of the inferior margin of the right   inferior rectus muscle at the level of the fracture plane. The acute   comminuted fracture extends into the right inferior orbital canal and   could compromise the right infraorbital nerve. There is a tiny amount of   hemorrhage in the posterior right maxillary sinus. Maxillofacial   surgical consultation is warranted.       There is moderate circumferential mucosal thickening in the left   maxillary sinus. The remainder of the facial CT is unremarkable. I   communicated the results to Foreign Lora DO, by telephone on   05/25/2025 at 2:00 p.m.       Radiation dose reduction techniques were utilized, including automated   exposure control and exposure modulation based on body size.           This report was finalized on 5/25/2025 8:34 PM by Dr. Nikhil Colón M.D   on Workstation: [id number]          XR Ankle 3+ View Right   Final Result      CT Head Without Contrast   Final Result   1. No acute intracranial abnormality is identified.       2. There is partial opacification of the visualized superior aspect of   the maxillary sinuses with fluid and mucosal thickening bilaterally.       3. The dural venous sinuses are hypodense suggesting that the patient   has a low hematocrit and is most probably anemic and correlate   clinically.       4. There is a small scalp hematoma over the anterior right frontal bone   from today's head trauma. The remainder of the head CT is within normal   limits with no acute skull fracture or intracranial hemorrhage   identified.       Radiation dose reduction techniques were utilized, including automated   exposure control and exposure modulation based on body size.           This report was finalized on 5/24/2025 8:11 AM by Dr. Nikhil Colón M.D   on Workstation: [id number]          CT Abdomen Pelvis With  Contrast   Final Result   1. Colonic diverticulosis. There is no evidence of acute diverticulitis.   2. Status post cholecystectomy.   3. Fatty infiltration of the liver.       Radiation dose reduction techniques were utilized, including automated   exposure control and exposure modulation based on body size.           This report was finalized on 5/27/2025 7:54 AM by Dr. Tod Avina M.D on Workstation: [id number]          XR Chest 1 View   Final Result   No evidence for acute pulmonary process. Follow-up as   clinical indications persist.       This report was finalized on 5/22/2025 4:28 PM by Dr. Teja Mckinnon M.D on Workstation: [id number]              Assessment & Plan     Active Hospital Problems    Diagnosis     **GI bleed     GIB (gastrointestinal bleeding)        Assessment:  History of obscure GI bleeding status post surgical intervention  Intubated and post surgical setting  H&H relatively stable      Plan:  Continue to monitor parameters  Defer extubation to the ICU team  I discussed the patients findings and my recommendations with patient and family.    Jaxon Berger MD

## 2025-05-31 NOTE — PROGRESS NOTES
[facility] Clinical Pharmacy Services: TPN Daily Progress Note    TPN Day # 2   Indication: Inaccessible GI Tract, prolonged paralytic ileus  Route: central  Type: standard    Subjective/Objective  Results from last 7 days   Lab Units 25  0527 25  1238 25  0626   SODIUM mmol/L 139 138  --    POTASSIUM mmol/L 4.0 4.6  --    CHLORIDE mmol/L 111* 107  --    CO2 mmol/L 21.2* 21.9*  --    BUN mg/dL 25.0* 25.0*  --    CREATININE mg/dL 0.51* 0.82  --    CALCIUM mg/dL 7.0* 7.6*  --    ALBUMIN g/dL 1.9* 2.1*  --    BILIRUBIN mg/dL 0.9 0.7  --    ALK PHOS U/L 38* 36*  --    ALT (SGPT) U/L 8 10  --    AST (SGOT) U/L 12 14  --    GLUCOSE mg/dL 154* 169*  --    MAGNESIUM mg/dL 1.9 1.7  --    PHOSPHORUS mg/dL 2.3* 5.0*  --    TRIGLYCERIDES mg/dL  --   --  194*   PREALBUMIN mg/dL  --  14.7*  --         Diet Orders (active) (From admission, onward)       Start     Ordered    25 06  NPO Diet NPO Type: Strict NPO  Diet Effective Now         25 0630                  Additional insulin administration while previous TPN infusin units SSI  Additional electrolyte administration while previous TPN infusing: none  Acid suppression: IV protonix    Goal TPN Formula Recommendations: 100g/700kcal/250mL 3x weekly AA/Dex/Lipids; 1314 kcal/day  Current TPN Formula: 50g/400kcal/0mL AA/Dex/Lipids  Rate 75ml/hr  On NS @ 100ml/hr overnight  Propofol currently off    Assessment/Plan    Macronutrients: increase amino acid to 80g, dextrose to 600kcal. Add lipids 250mL 3x weekly. Plan increase to goals tomorrow as tolerated.  Electrolytes/Additives: added back some phos today  Sodium Chloride: 70 mEq   Sodium Acetate: 0 mEq  Sodium Phosphate: 0 mEq  Potassium Chloride: 50 mEq  Potassium Acetate: 0 mEq   Potassium Phosphate: 22 mEq   Calcium Gluconate: 9 mEq  Magnesium Sulfate: 10 mEq  Trace Elements  MVI  Other Additives: none  Labs: BMP, Mg, phos in AM    Sandy Amezcua, PharmD  Clinical Pharmacist

## 2025-05-31 NOTE — SIGNIFICANT NOTE
05/31/25 0656   B = Both Spontaneous Awakening and Breathing Trials   Was patient receiving mechanical ventilation? Yes   Safety Screen Spontaneous Breathing Trial (SBT)  Lack of inspiratory efforts

## 2025-05-31 NOTE — SIGNIFICANT NOTE
05/31/25 1206   B = Both Spontaneous Awakening and Breathing Trials   Spontaneous Breathing Trial (SBT) Outcome SBT Passed   $ SBT Readiness to Wean yes   Vt Spontaneous (mL) 578 mL   RSBI 32   Negative Inspiratory Force (cm H2O) 39   Effort (NIF) good   Resp Rate (Obs) 18

## 2025-05-31 NOTE — PROGRESS NOTES
LOS: 8 days   Patient Care Team:  Cristiano Logan DO as PCP - General (Family Medicine)    Subjective     Pulmonary critical care coverage from Dr. Quiros patient with acute GI bleed hemoglobin of 5 she went to the OR and had a partial small bowel resection and double barrel ostomy right lower quadrant, she has been intubated they have been unable to wean her off the ventilator  Patient is awake we have changed her sedation she is more awake off of propofol and on Precedex.  She is following simple commands.  Bedside apparently she is probably not compliant with the PAP machine.  Review of Systems:          Objective     Vital Signs  Vital Sign Min/Max for last 24 hours  Temp  Min: 97.4 °F (36.3 °C)  Max: 99.8 °F (37.7 °C)   BP  Min: 79/44  Max: 141/64   Pulse  Min: 54  Max: 82   Resp  Min: 20  Max: 28   SpO2  Min: 94 %  Max: 100 %   No data recorded   Weight  Min: 129 kg (284 lb 6.3 oz)  Max: 132 kg (292 lb 1.8 oz)        Ventilator/Non-Invasive Ventilation Settings (From admission, onward)       Start     Ordered    05/30/25 0452  Ventilator - Vent Mode: AC/VC; Rate: 20; FiO2: 40%; PEEP: 7.5; Tidal Volume: mL; TV: 450  Continuous        Question Answer Comment   Vent Mode AC/VC    Rate 20    FiO2 40%    PEEP 7.5    Tidal Volume mL            05/30/25 0453                        Arterial Line BP: 81/68  Arterial Line MAP (mmHg): 316 mmHg     Body mass index is 52 kg/m².  I/O last 3 completed shifts:  In: 6953 [I.V.:5197; Blood:900; Other:150]  Out: 960 [Urine:825; Drains:10; Stool:125]  No intake/output data recorded.        Physical Exam:  General Appearance: Well-developed morbidly obese white female she is lying in bed she is orally intubated endotracheal tubes at about somewhere between 23 and 24 cm she is on assist-control rate of 20 tidal volume 450 PEEP of 7.5 FiO2 40% SpO2 96% her peak airway pressures are in the low to mid 20s she is on Precedex at 1 gianna per kilogram per minute and fentanyl  at 50 mics per hour  Eyes: Conjunctivae are clear and anicteric, pupils are equal  ENT: Mucous membranes are little dry oral endotracheal tube in a nasal gastric type tube  Neck: No adenopathy trachea midline, no crepitance  Lungs: Pretty clear bilaterally no wheezes or rales  Cardiac: Regular rate rhythm no murmur  Abdomen: Soft does not really seem to be tender she has a bulb drain with serosanguineous fluid she has a right lower quadrant ostomy that is pink  : Not examined  Musculoskeletal: She has some edema of all 4 extremities not too much obesity profound  Skin: Warm and dry no jaundice no petechiae  Neuro: She is following simple commands with all 4 extremities but not really attempting to communicate very well  Extremities/P Vascular: Palpable radial and dorsalis pedis pulses bilaterally  MSE: Hard to tell       Labs:  Results from last 7 days   Lab Units 05/31/25  0527 05/30/25  1238 05/27/25  0542 05/26/25  0627 05/25/25  1955 05/25/25  0119   GLUCOSE mg/dL 154* 169* 145* 119*  --  113*   SODIUM mmol/L 139 138 137 139  --  137   POTASSIUM mmol/L 4.0 4.6 4.1 3.9 4.5 3.1*   MAGNESIUM mg/dL 1.9 1.7  --  2.0  --  1.6   CO2 mmol/L 21.2* 21.9* 24.5 26.4  --  23.0   CHLORIDE mmol/L 111* 107 107 107  --  106   ANION GAP mmol/L 6.8 9.1 5.5 5.6  --  8.0   CREATININE mg/dL 0.51* 0.82 0.46* 0.48*  --  0.50*   BUN mg/dL 25.0* 25.0* 14 11  --  21   BUN / CREAT RATIO  49.0* 30.5* 30.4* 22.9  --  42.0*   CALCIUM mg/dL 7.0* 7.6* 7.1* 7.9*  --  7.4*   ALK PHOS U/L 38* 36* 36*  --   --   --    TOTAL PROTEIN g/dL 3.6* 3.7* 3.6*  --   --   --    ALT (SGPT) U/L 8 10 16  --   --   --    AST (SGOT) U/L 12 14 17  --   --   --    BILIRUBIN mg/dL 0.9 0.7 0.7  --   --   --    ALBUMIN g/dL 1.9* 2.1* 2.1*  --   --   --    GLOBULIN gm/dL 1.7 1.6 1.5  --   --   --      Estimated Creatinine Clearance: 130.5 mL/min (A) (by C-G formula based on SCr of 0.51 mg/dL (L)).      Results from last 7 days   Lab Units 05/31/25  6020  05/30/25  2358 05/30/25  1746 05/30/25  1238 05/30/25  0626 05/30/25  0134 05/29/25  2246 05/29/25  1753 05/29/25  1149 05/29/25  0612   WBC 10*3/mm3 12.63* 12.02* 11.12* 15.44* 15.84*  --   --  10.30 11.45* 8.61   RBC 10*6/mm3 3.09* 2.95* 3.28* 3.86 4.30  --   --  1.94* 3.15* 2.53*   HEMOGLOBIN g/dL 9.3* 8.6* 9.7* 11.7* 13.1  --   --  5.7* 9.2* 7.3*   HEMOGLOBIN, POC g/dL  --   --   --   --   --  8.2* 6.1*  --   --   --    HEMATOCRIT % 27.9* 28.8* 29.6* 34.6 38.4  --   --  17.5* 30.0* 23.3*   HEMATOCRIT POC %  --   --   --   --   --  24* 18*  --   --   --    MCV fL 90.3 97.6* 90.2 89.6 89.3  --   --  90.2 95.2 92.1   MCH pg 30.1 29.2 29.6 30.3 30.5  --   --  29.4 29.2 28.9   MCHC g/dL 33.3 29.9* 32.8 33.8 34.1  --   --  32.6 30.7* 31.3*   RDW % 16.1* 16.2* 16.1* 15.7* 16.0*  --   --  18.0* 18.7* 18.8*   RDW-SD fl 50.3 53.7 48.8 48.2 49.0  --   --  55.9* 63.9* 59.4*   MPV fL 10.6 11.4 10.5 11.1 11.1  --   --  10.8  --  11.7   PLATELETS 10*3/mm3 120* 101* 95* 128* 111*  --   --  123* 232 113*   NEUTROPHIL % %  --   --   --   --  88.4*  --   --   --   --   --    LYMPHOCYTE % %  --   --   --   --  4.1*  --   --   --   --   --    MONOCYTES % %  --   --   --   --  6.6  --   --   --   --   --    EOSINOPHIL % %  --   --   --   --  0.0*  --   --   --   --   --    BASOPHIL % %  --   --   --   --  0.2  --   --   --   --   --    IMM GRAN % %  --   --   --   --  0.7*  --   --   --   --   --    NEUTROS ABS 10*3/mm3  --   --   --   --  14.00*  --   --   --   --   --    LYMPHS ABS 10*3/mm3  --   --   --   --  0.65*  --   --   --   --   --    MONOS ABS 10*3/mm3  --   --   --   --  1.05*  --   --   --   --   --    EOS ABS 10*3/mm3  --   --   --   --  0.00  --   --   --   --   --    BASOS ABS 10*3/mm3  --   --   --   --  0.03  --   --   --   --   --    IMMATURE GRANS (ABS) 10*3/mm3  --   --   --   --  0.11*  --   --   --   --   --    NRBC /100 WBC  --   --   --   --  0.3*  --   --   --   --   --      Results from last 7 days   Lab  Units 05/31/25  1207   PH, ARTERIAL pH units 7.487*   PO2 ART mm Hg 83.7   PCO2, ARTERIAL mm Hg 27.1*   HCO3 ART mmol/L 20.5*                     Results from last 7 days   Lab Units 05/26/25  0627 05/25/25  0119   INR  1.25* 1.29*     Microbiology Results (last 10 days)       ** No results found for the last 240 hours. **                Fat Emulsion Plant Based, 100 mL, Intravenous, Once per day on Tuesday Thursday Saturday  insulin regular, 2-9 Units, Subcutaneous, Q6H  pantoprazole, 40 mg, Intravenous, Q12H  sodium chloride, 10 mL, Intravenous, Q12H  sodium chloride, 10 mL, Intravenous, Q12H  sodium chloride, 10 mL, Intravenous, Q12H  sodium chloride, 10 mL, Intravenous, Q12H  sodium chloride, 10 mL, Intravenous, Q12H  sodium chloride, 10 mL, Intravenous, Q12H  sodium chloride, 10 mL, Intravenous, Q12H  sodium chloride, 10 mL, Intravenous, Q12H      Adult Central 2-in-1 TPN, , Last Rate: 75 mL/hr at 05/30/25 1736  Adult Central 2-in-1 TPN,   dexmedetomidine, 0.2-1.5 mcg/kg/hr, Last Rate: 0.8 mcg/kg/hr (05/31/25 1015)  fentanyl 10 mcg/mL,  mcg/hr, Last Rate: 50 mcg/hr (05/30/25 1854)  norepinephrine, 0.02-0.3 mcg/kg/min, Last Rate: 0.01 mcg/kg/min (05/31/25 0842)  Pharmacy to Dose TPN,   propofol, 5-50 mcg/kg/min, Last Rate: Stopped (05/30/25 1526)  sodium chloride, 100 mL/hr, Last Rate: 100 mL/hr (05/31/25 0211)        Diagnostics:  XR Chest 1 View  Result Date: 5/30/2025  SINGLE VIEW OF THE CHEST  HISTORY: Postop intubation  COMPARISON: May 22, 2025  FINDINGS: There is cardiomegaly. There is vascular congestion. Endotracheal tube terminates at the elizabeth. Retraction by approximately 2 to 3 cm would allow for more optimal positioning. There is bibasilar atelectasis. No pneumothorax is seen. No large effusion is identified.      Endotracheal tube terminates at the elizabeth. Retraction by approximately 2 to 3 cm would allow for more optimal positioning.  This report was finalized on 5/30/2025 4:23 AM by   Jennifer England M.D on Workstation: [id number]      Arterial Line  Result Date: 5/29/2025  Reilly Westrbook MD     5/29/2025 10:20 PM Arterial Line Pre-sedation assessment completed: 5/29/2025 9:15 PM Patient reassessed immediately prior to procedure Patient location during procedure: holding area Start time: 5/29/2025 9:15 PM Stop Time:5/29/2025 9:20 PM  Line placed for hemodynamic monitoring and ABGs/Labs/ISTAT. Performed By Anesthesiologist: Reilly Westbrook MD Preanesthetic Checklist Completed: patient identified, IV checked, site marked, risks and benefits discussed, surgical consent, monitors and equipment checked, pre-op evaluation and timeout performed Arterial Line Prep  Sterile Tech: cap, gloves, mask and sterile barriers Prep: ChloraPrep Patient monitoring: blood pressure monitoring, continuous pulse oximetry and EKG Arterial Line Procedure Laterality:left Location:  radial artery Catheter size: 20 G Guidance: landmark technique and palpation technique Number of attempts: 1 Successful placement: yes Images: still images obtained, printed/placed on the chart Post Assessment Dressing Type: occlusive dressing applied, secured with tape and wrist guard applied. Complications no Circ/Move/Sens Assessment: unchanged. Patient Tolerance: patient tolerated the procedure well with no apparent complications Additional Notes Ultrasound was used to identify the radial artery and surrounding structures.  The left radial artery was assessed and patent but small.  Ultrasound was used to visualize vascular needle entry into the left radial artery.  This vessel appeared anatomically normal and there were no apparent abnormal findings.  A permanent ultrasound image was saved in the patient's record.  It is  also used to visualize the wire and the catheter within the lumen of the artery.     IR Embolization  Result Date: 5/29/2025  PROCEDURE: Mesenteric angiogram  INDICATION: GI bleed localized to the small bowel on CT  angiography earlier in the day. Persistent bloody stools today. Anemia requiring transfusion. No active bleeding identified on recent upper endoscopy and colonoscopy.   Reference air kerma: 4033.10 mGy Contrast: Approximately 170 mL Isovue intra-arterial 1 mg IV glucagon administered to decrease bowel motility Procedure was performed under general anesthesia   The risks, benefits, and alternatives of the procedure were discussed with the patient, and informed consent was obtained. In the procedure room a timeout was performed confirming correct patient and procedure.  Maximum sterile barrier technique was used including sterile cap, mask, gloves, gown and large sterile sheet as well as pre-procedure hand washing and cutaneous antisepsis with 2 % chlorhexidine. Real-time sterile ultrasound guidance was utilized with sterile gel and sterile probe cover.  TECHNIQUE: Ultrasound of the right common femoral artery was performed. It was patent, and an image was saved. The right common femoral artery was accessed under ultrasound guidance with a 4 Indonesian micropuncture introducer set. This was upsized to a 5 Indonesian Alexis 1 sheath. A 5 Indonesian C2 glide catheter was then advanced into the superior mesenteric artery and digital subtraction angiography of the superior mesenteric artery was performed. Next, in a coaxial fashion, a 2.4 Indonesian Progreat microcatheter was advanced into five  separate jejunal and iliac branch arteries arising from the superior mesenteric artery. Selective angiography of each of these vessels was performed. The microcatheter was removed. Repeat digital subtraction angiography of the superior mesenteric artery through the C2 catheter was then performed in multiple obliquities. The C2 catheter was removed. Angiography of the right common femoral artery through the sheath was performed. The sheath was removed and was exchanged for an Angio-Seal vascular closure device. Hemostasis of the right common  femoral artery was achieved utilizing the Angio-Seal. Patient tolerated procedure well without immediate complication. Distal pulses were palpable following the procedure.  FINDINGS: The study is somewhat limited due to artifact from bowel peristalsis and gas (despite the usage of IV glucagon) and also somewhat limited due to attenuation from body habitus. The superior mesenteric artery and its branches are patent. There is enlargement of the gastroduodenal artery and multiple pancreaticoduodenal arteries with retrograde flow filling the celiac artery branches consistent with a stenosis of the celiac artery as demonstrated on the comparison CT angiogram. There is no active contrast extravasation identified from the superior mesenteric artery or its branches. Selective angiography of multiple jejunal and iliac branch vessels was then performed and again showed no evidence of active contrast extravasation into the small bowel. Findings were discussed with Dr. Garza following the procedure.      Angiography of the superior mesenteric artery and its branches as described above. No evidence of active contrast extravasation into the small bowel is demonstrated.    This report was finalized on 5/29/2025 8:10 AM by Dr. Jose G Pemberton M.D on Workstation: [id number]      CT Angiogram Abdomen Pelvis  Result Date: 5/28/2025  CT ANGIOGRAM ABDOMEN PELVIS-  DATE OF EXAM: 5/28/2025 1:14 PM  INDICATION: GI bleed.  COMPARISON: Nuclear medicine GI bleed study 5/27/2025. CT abdomen and pelvis 5/26/2025 and 5/22/2025.  TECHNIQUE: Multiple contiguous axial images were acquired through the abdomen and pelvis before and following the intravenous administration of 95 mL of Isovue-370. Reformatted coronal and sagittal sequences were also reviewed. Reformatted coronal, sagittal, and 3D reconstruction images were also reviewed. Radiation dose reduction techniques were utilized, including automated exposure control and exposure modulation  based on body size.  FINDINGS: Mild multifocal bibasilar subsegmental atelectasis and/or scarring. Partially imaged small pericardial effusion, measuring up to 5 mm in thickness.  The abdominal aorta is normal in size. Mild calcified atherosclerotic disease without evidence of hemodynamically significant stenosis or dissection. 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 Stable 1 cm splenic artery aneurysm. Calcified atherosclerotic disease at the origins of the SMA resulting in mild stenosis. No hemodynamically significant stenosis of either renal artery. The STACIE is widely patent. The bilateral common iliac, internal iliac, external iliac, common femoral, and proximal superficial femoral arteries demonstrate normal course and caliber without evidence of hemodynamically significant stenosis or injury.  Mild diffuse gastric wall thickening is likely accentuated by underdistention. Multiple jejunal diverticula including a fluid and lower dilated 4.5 cm diverticulum in the anterior left upper quadrant (axial series 5 image 83), and a 3 cm fluid in air dilated diverticulum in the left lower quadrant (coronal series 6 image 55). Accumulation of high density contrast within the lumen of a short segment of small bowel in the central and left upper hemipelvis, likely jejunum, and within a small bowel diverticulum in the central upper pelvis (axial series 5 images 140 through 148), likely representing active bleeding. Tiny distal ileal diverticula. Mild colorectal stool. Colonic diverticula, without CT evidence of diverticulitis. No bowel obstruction or significant bowel wall thickening. The appendix is normal.  Stable postoperative changes from cholecystectomy with likely postoperative dilatation of the extrahepatic biliary ducts. Diffuse decreased hepatic attenuation/enhancement, suggesting hepatic steatosis. The spleen, pancreas, adrenal glands, and kidneys are unremarkable. The  urinary bladder is nondistended. Diffuse urinary bladder wall thickening may be accentuated by under distention. The uterus and adnexa are unremarkable in CT appearance.  No free fluid in the abdomen or pelvis. No free intraperitoneal air. No pathologically enlarged lymph nodes in the abdomen or pelvis.  Partially imaged fluid collection in the subcutaneous fat lateral to the right hip. Similar-appearing rotary lumbar dextroscoliosis and multilevel lumbar spondylosis. Flowing multilevel lower thoracic and upper lumbar anterior osteophyte formation. Mild to moderate bilateral hip and SI joint DJD with right SI joint bridging osteophyte formation. Mild to moderate DJD at the pubic symphysis. No acute osseous abnormality or concerning osseous lesion.       1. Accumulation of high density contrast within the lumen of a short segment of small bowel in the central and left upper hemipelvis, likely jejunum, and within a small bowel diverticulum in the central upper pelvis, likely representing active GI bleed. 2. 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 3. Small bowel and colonic diverticula without evidence of diverticulitis.  This report was finalized on 5/28/2025 2:40 PM by Johnson Garcia MD on Workstation: [id number]      NM GI Blood Loss  Result Date: 5/27/2025  NM GI BLOOD LOSS-  TECHNIQUE: 35.5 mCi of technetium 99m tagged RBCs was injected intravenously with dynamic imaging of the abdomen pelvis performed for approximately 60 minutes after.  INDICATION: Nonlocalized GI bleed  COMPARISON: Multiphase CT abdomen pelvis 5/26/2025.   FINDINGS: Prompt activity seen within the circulatory system following radiotracer injection. No dynamic mobile tubular focus of activity overlying the abdomen or pelvis during the course of the study. Accumulation of urinary activity within the bladder.       No scintigraphic evidence of active  gastrointestinal bleeding during the course of the study.  This report was finalized on 5/27/2025 8:52 AM by Dr. Scott Leahy M.D on Workstation: [id number]      CT Abdomen Pelvis With Contrast  Result Date: 5/27/2025  CT OF THE ABDOMEN AND PELVIS WITH CONTRAST 05/22/2025  HISTORY: Abdominal pain.  TECHNIQUE: Spiral images were obtained from the lung bases to the symphysis pubis after intravenous contrast.  FINDINGS: There is fatty infiltration of the liver. Gallbladder has been removed. The spleen, pancreas, adrenals and kidneys appear unremarkable.  No bowel wall thickening or bowel dilatation is seen. There is colonic diverticulosis. Uterus and urinary bladder appear unremarkable. There are degenerative changes in the spine. There is some aortic calcification with no evidence of aneurysm.      1. Colonic diverticulosis. There is no evidence of acute diverticulitis. 2. Status post cholecystectomy. 3. Fatty infiltration of the liver.  Radiation dose reduction techniques were utilized, including automated exposure control and exposure modulation based on body size.   This report was finalized on 5/27/2025 7:54 AM by Dr. Tod Avina M.D on Workstation: [id number]      CT Angiogram Abdomen Pelvis  Result Date: 5/27/2025  CT ANGIOGRAM OF THE ABDOMEN AND PELVIS  HISTORY: GI bleed  COMPARISON: May 22, 2025  TECHNIQUE: Axial precontrast CT imaging was obtained through the abdomen and pelvis. Subsequently, arterial and venous delayed phase imaging was performed through the abdomen and pelvis. Three-D reformatted images were obtained.  FINDINGS: Images through the lung bases demonstrate mild scarring and atelectasis.  The patient either has an occlusion or near occlusive stenosis involving the origin of the celiac axis. The splenic artery, left gastric artery, and common hepatic artery appear to be patent. The patient also appears to have moderate stenosis at the origin of the superior mesenteric artery. The  inferior mesenteric artery is patent. There is some calcified plaque at the origin of the right renal artery, without hemodynamically significant stenosis. The iliac vessels appear widely patent. No active extravasation is seen. There is a small splenic artery aneurysm, measuring 7 mm.  There is a small hiatal hernia. The duodenum, adrenal glands, spleen, and pancreas appear normal. No suspicious hepatic lesions are seen. The gallbladder is absent. The kidneys enhance symmetrically. No hydronephrosis is seen. The uterus appears normal. No adnexal masses are seen. There is colonic diverticulosis. There is no bowel obstruction. The patient does have liquid stool within the colon, which could reflect diarrhea. The appendix is normal. No acute osseous abnormalities are seen. There is lumbar scoliosis, with convexity to the right. The patient has a fluid collection within the right gluteal region laterally. This measures up to 2.4 x 6.6 cm. Exact etiology is uncertain. It appears stable when compared to the exam from May 22. It could reflect a hematoma or seroma.       1. No contrast extravasation is identified. 2. Either occlusion or near occlusive stenosis of the origin of the celiac axis. Patient also appears to have moderate stenosis at the origin of the SMA. Inferior mesenteric artery is patent. 3. Liquid stool noted within the colon.  Radiation dose reduction techniques were utilized, including automated exposure control and exposure modulation based on body size.   This report was finalized on 5/27/2025 12:24 AM by Dr. Jennifer England M.D on Workstation: [id number]      CT Facial Bones With Contrast  Result Date: 5/25/2025  CONTRAST-ENHANCED CT SCAN OF THE FACIAL BONES ON 05/25/2025  CLINICAL HISTORY: This is a 69-year-old female patient who had a recent fall with head injury and abrasion to her forehead and the patient has some facial numbness.  TECHNIQUE: Spiral CT images were obtained through the facial  bones in the axial imaging plane following intravenous contrast. The images were reformatted and are submitted in 2 mm thick axial, sagittal and coronal CT sections with soft tissue algorithm and 1 mm thick axial sagittal and coronal CT sections with high-resolution bone algorithm.  COMPARISON: There are no prior facial CTs for comparison. This is correlated to head CT on 05/23/2025, 2 days ago.  FINDINGS: On the head CT 2 days ago there was a small scalp hematoma with bubbles of air in the scalp over the anterior right frontal bone from a scalp hematoma and scalp laceration from trauma. Unfortunately on the prior head CT of the right orbital floor was not adequately assessed as the images started above the orbital floor. This patient does have an acute mildly comminuted fracture of the right inferior orbital wall. There is a 9 x 12 mm fracture defect in the central aspect of the mid to anterior aspect of the inferior right orbital wall with inferior displacement of inferior orbital wall fracture fragments along with some inferior extraconal fat by up to 7 mm into the superior aspect of the right maxillary sinus. The fracture plane extends into the right inferior orbital canal and could compromise the right infraorbital nerve. There is some tenting of the inferior margin of the right inferior rectus muscle which minimally bulges into the superior aspect of the right maxillary sinus. No additional acute facial fractures are seen. There is moderate circumferential mucosal thickening partially opacifying the left maxillary sinus, there is a thin sliver of hyperdense fluid or hemorrhage in the posterior right maxillary sinus. The remainder of the paranasal sinuses, the mastoid air cells and middle ear cavities are clear. The nasopharynx, oropharynx, hypopharynx are normal in appearance. The parotid , parapharyngeal and submandibular spaces are symmetric and are normal in appearance.      1. On 05/23/2025, this  patient had a small scalp hematoma with bubbles of air in the scalp over the anterior right frontal bone from associated scalp laceration from head trauma. On the head CT on 5/23/2025, the right orbital floor-right inferior orbital wall was not assessed as it was excluded from the field of imaging and not assessed. On the current facial CT there is an acute comminuted fracture of the central aspect of the mid to anterior portion of the right inferior orbital wall with a 12 x 9 mm bony defect in the central to anterior aspect of the right inferior orbital wall with inferior displacement of inferior orbital wall fracture fragments along with inferior extraconal fat of the right orbit by 7 mm into the superior aspect of the right maxillary sinus, there is some inferior tenting of the inferior margin of the right inferior rectus muscle at the level of the fracture plane. The acute comminuted fracture extends into the right inferior orbital canal and could compromise the right infraorbital nerve. There is a tiny amount of hemorrhage in the posterior right maxillary sinus. Maxillofacial surgical consultation is warranted.  There is moderate circumferential mucosal thickening in the left maxillary sinus. The remainder of the facial CT is unremarkable. I communicated the results to Foreign Lora DO, by telephone on 05/25/2025 at 2:00 p.m.  Radiation dose reduction techniques were utilized, including automated exposure control and exposure modulation based on body size.   This report was finalized on 5/25/2025 8:34 PM by Dr. Nikhil Colón M.D on Workstation: [id number]      CT Head Without Contrast  Result Date: 5/24/2025  STAT CT SCAN OF THE HEAD WITHOUT CONTRAST ON 05/23/2025  CLINICAL HISTORY: This is a 69-year-old female patient who had a fall with head injury with an abrasion to the forehead.  TECHNIQUE: Spiral CTA images were obtained from the base of the skull to the vertex without intravenous contrast. The  images were reformatted and are submitted in 3 mm thick axial, sagittal and coronal CT sections with brain algorithm and 2 mm thick axial CT sections with high-resolution bone algorithm.  COMPARISON: There are no prior head CTs from [facility] for comparison.  FINDINGS: The brain parenchyma is normal in attenuation. The ventricles are normal in size. I see no focal mass effect. There is no midline shift. No extra axial fluid collections are identified. There is no evidence of acute intracranial hemorrhage. There is a scalp hematoma overlying the anterior right frontal bone from today's head trauma. No underlying acute skull fracture is identified. The calvarium and the skull base are normal in appearance. There is partial opacification of the visualized superior maxillary sinuses with fluid and mucosal thickening and mild anterior ethmoid sinus mucosal thickening. Otherwise, the paranasal sinuses, mastoid air cells and the middle ear cavities are clear. The dural venous sinuses are hypodense which can be seen if the patient is anemic.      1. No acute intracranial abnormality is identified.  2. There is partial opacification of the visualized superior aspect of the maxillary sinuses with fluid and mucosal thickening bilaterally.  3. The dural venous sinuses are hypodense suggesting that the patient has a low hematocrit and is most probably anemic and correlate clinically.  4. There is a small scalp hematoma over the anterior right frontal bone from today's head trauma. The remainder of the head CT is within normal limits with no acute skull fracture or intracranial hemorrhage identified.  Radiation dose reduction techniques were utilized, including automated exposure control and exposure modulation based on body size.   This report was finalized on 5/24/2025 8:11 AM by Dr. Nikhil Colón M.D on Workstation: [id number]      XR Ankle 3+ View Right  Result Date: 5/23/2025  XR ANKLE 3+ VW  RIGHT-5/23/2025  HISTORY: Fell, right ankle pain.  There is artifact from the patient's sock. No fractures are seen. Calcaneal spurs are seen.   This report was finalized on 5/23/2025 6:25 PM by Dr. Tod Avina M.D on Workstation: [id number]      XR Chest 1 View  Result Date: 5/22/2025  XR CHEST 1 VW-  HISTORY: Female who is 69 years-old, nausea, vomiting  TECHNIQUE: Frontal view of the chest  COMPARISON: 11/4/2024  FINDINGS: The heart size is borderline. Aorta is calcified. Pulmonary vasculature is unremarkable. No focal pulmonary consolidation, pleural effusion, or pneumothorax. No acute osseous process.      No evidence for acute pulmonary process. Follow-up as clinical indications persist.  This report was finalized on 5/22/2025 4:28 PM by Dr. Teja Mckinnon M.D on Workstation: [id number]      XR knee 3 views left [id number]  Result Date: 5/16/2025  Knee pain COMPARISON: None EXAM:  Three views FINDINGS: There is a small knee joint effusion.  Moderate to advanced tricompartmental degenerative changes are noted.  No fracture.  No dislocation.  The surrounding soft tissues demonstrate no abnormalities.   IMPRESSION: Small knee joint effusion Degenerative changes No fractures or dislocations Dictated by: Carlos Chin M.D. Signed by Carlos Chin M.D. on 5/17/2025 14:54 ##### Final ##### Dictated by:    DARCY CHIN MD-RAD Dictated DT/TM: 05/17/2025 2:54 pm Interpreted and electronically signed by:  DARCY CHIN MD-RAD Signed DT/TM:  05/17/2025 2:54 pm    XR Chest 2 View  Result Date: 5/16/2025  EXAM: CR Chest 2 Vws NUMBER OF IMAGES:  2 INDICATION: DYSPNEA ON EXERTION Technique: Frontal and lateral views of the chest obtained on 5/16/2025 8:46 Comparison:  No prior studies available for comparison. Findings: The cardiomediastinal silhouette is enlarged. There are calcifications of the thoracic aorta. The hilar and mediastinal contours are normal. The lungs are without focal consolidation or pleural  effusion. There is no pneumothorax. The visualized osseous structures demonstrate degenerative changes. Lines and Tubes: None. Impression:  No focal consolidation. Dictated by: Nikhil Noe MD Signed by Nikhil Noe MD on 5/16/2025 15:20 ##### Final ##### Dictated by:    NIKHIL NOE MD-RAD Dictated DT/TM: 05/16/2025 3:20 pm Interpreted and electronically signed by:  NIKHIL NOE MD-RAD Signed DT/TM:  05/16/2025 3:20 pm        Chest x-ray reviewed ET tube in good position lungs are pretty clear except there may be a little bit of atelectasis in the left base    Active Hospital Problems    Diagnosis  POA    **GI bleed [K92.2]  Yes    GIB (gastrointestinal bleeding) [K92.2]  Yes      Resolved Hospital Problems   No resolved problems to display.         Assessment & Plan     GI bleed status post 5/29/2025 exploratory laparotomy with jejunal small bowel resection with anastomosis ileal small bowel resection and end loop ileostomy creation.  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  She has been seen by Dr. Corona and he just recommends conservative therapy expects paresthesias to gradually resolve over months  Acute blood loss anemia hemoglobin stable actually is up slightly good sign that source was controlled  Syncope with fall  Morbid obesity with a BMI greater than 52  Obstructive sleep apnea sounds like she is not real compliant with PAP therapy will have to be extra careful with extubating her had like to use PAP after extubation but if she is not used to it we may get into more trouble with air insufflation etc. left to see how she does.  Hyperglycemia on sliding scale insulin looks like we have blood sugars reasonably  well-controlled  Fluids/electrolytes/nutrition replace electrolytes per protocol we will see how she does with extubation may be start trickling some tube feeds in tomorrow if okay with surgery  DVT prophylaxis surgery said it was okay yesterday if was stable today had not seen any signs of bleeding hemoglobin up we will start on the lower end of dosing see how she does.  5000 units every 12    Plan for disposition:    Victor Hugo Gonzalez Jr, MD  05/31/25  12:58 EDT    Time: Critical care time 39 minutes

## 2025-05-31 NOTE — SIGNIFICANT NOTE
05/31/25 1025   B = Both Spontaneous Awakening and Breathing Trials   Was patient receiving mechanical ventilation? Yes   Safety Screen Spontaneous Breathing Trial (SBT)  Proceed with SBT - No exclusion criteria met  (PS 8 for SBT)

## 2025-06-01 ENCOUNTER — APPOINTMENT (OUTPATIENT)
Dept: GENERAL RADIOLOGY | Facility: HOSPITAL | Age: 70
DRG: 329 | End: 2025-06-01
Payer: MEDICARE

## 2025-06-01 LAB
ANION GAP SERPL CALCULATED.3IONS-SCNC: 4.8 MMOL/L (ref 5–15)
BUN SERPL-MCNC: 14 MG/DL (ref 8–23)
BUN/CREAT SERPL: 34.1 (ref 7–25)
CALCIUM SPEC-SCNC: 7.6 MG/DL (ref 8.6–10.5)
CHLORIDE SERPL-SCNC: 109 MMOL/L (ref 98–107)
CO2 SERPL-SCNC: 23.2 MMOL/L (ref 22–29)
CREAT SERPL-MCNC: 0.41 MG/DL (ref 0.57–1)
DEPRECATED RDW RBC AUTO: 53.7 FL (ref 37–54)
DEPRECATED RDW RBC AUTO: 55 FL (ref 37–54)
DEPRECATED RDW RBC AUTO: 55.7 FL (ref 37–54)
DEPRECATED RDW RBC AUTO: 57.5 FL (ref 37–54)
DEPRECATED RDW RBC AUTO: NORMAL FL
EGFRCR SERPLBLD CKD-EPI 2021: 105.3 ML/MIN/1.73
ERYTHROCYTE [DISTWIDTH] IN BLOOD BY AUTOMATED COUNT: 15.8 % (ref 12.3–15.4)
ERYTHROCYTE [DISTWIDTH] IN BLOOD BY AUTOMATED COUNT: 16 % (ref 12.3–15.4)
ERYTHROCYTE [DISTWIDTH] IN BLOOD BY AUTOMATED COUNT: 16.2 % (ref 12.3–15.4)
ERYTHROCYTE [DISTWIDTH] IN BLOOD BY AUTOMATED COUNT: 16.2 % (ref 12.3–15.4)
ERYTHROCYTE [DISTWIDTH] IN BLOOD BY AUTOMATED COUNT: NORMAL %
GLUCOSE BLDC GLUCOMTR-MCNC: 110 MG/DL (ref 70–130)
GLUCOSE BLDC GLUCOMTR-MCNC: 129 MG/DL (ref 70–130)
GLUCOSE BLDC GLUCOMTR-MCNC: 133 MG/DL (ref 70–130)
GLUCOSE BLDC GLUCOMTR-MCNC: 144 MG/DL (ref 70–130)
GLUCOSE SERPL-MCNC: 144 MG/DL (ref 65–99)
HCT VFR BLD AUTO: 24.4 % (ref 34–46.6)
HCT VFR BLD AUTO: 25.4 % (ref 34–46.6)
HCT VFR BLD AUTO: 27.3 % (ref 34–46.6)
HCT VFR BLD AUTO: 27.4 % (ref 34–46.6)
HCT VFR BLD AUTO: NORMAL %
HGB BLD-MCNC: 8.1 G/DL (ref 12–15.9)
HGB BLD-MCNC: 8.3 G/DL (ref 12–15.9)
HGB BLD-MCNC: 8.5 G/DL (ref 12–15.9)
HGB BLD-MCNC: 8.6 G/DL (ref 12–15.9)
HGB BLD-MCNC: NORMAL G/DL
MAGNESIUM SERPL-MCNC: 1.9 MG/DL (ref 1.6–2.4)
MCH RBC QN AUTO: 29.9 PG (ref 26.6–33)
MCH RBC QN AUTO: 29.9 PG (ref 26.6–33)
MCH RBC QN AUTO: 30.1 PG (ref 26.6–33)
MCH RBC QN AUTO: 34.3 PG (ref 26.6–33)
MCH RBC QN AUTO: NORMAL PG
MCHC RBC AUTO-ENTMCNC: 31 G/DL (ref 31.5–35.7)
MCHC RBC AUTO-ENTMCNC: 31.5 G/DL (ref 31.5–35.7)
MCHC RBC AUTO-ENTMCNC: 31.9 G/DL (ref 31.5–35.7)
MCHC RBC AUTO-ENTMCNC: 34 G/DL (ref 31.5–35.7)
MCHC RBC AUTO-ENTMCNC: NORMAL G/DL
MCV RBC AUTO: 100.8 FL (ref 79–97)
MCV RBC AUTO: 93.7 FL (ref 79–97)
MCV RBC AUTO: 94.8 FL (ref 79–97)
MCV RBC AUTO: 97.2 FL (ref 79–97)
MCV RBC AUTO: NORMAL FL
PHOSPHATE SERPL-MCNC: 2.2 MG/DL (ref 2.5–4.5)
PLATELET # BLD AUTO: 108 10*3/MM3 (ref 140–450)
PLATELET # BLD AUTO: 113 10*3/MM3 (ref 140–450)
PLATELET # BLD AUTO: 86 10*3/MM3 (ref 140–450)
PLATELET # BLD AUTO: 89 10*3/MM3 (ref 140–450)
PLATELET # BLD AUTO: NORMAL 10*3/UL
PMV BLD AUTO: 10.3 FL (ref 6–12)
PMV BLD AUTO: 10.8 FL (ref 6–12)
PMV BLD AUTO: 10.9 FL (ref 6–12)
PMV BLD AUTO: 11.1 FL (ref 6–12)
PMV BLD AUTO: NORMAL FL
POTASSIUM SERPL-SCNC: 3.9 MMOL/L (ref 3.5–5.2)
RBC # BLD AUTO: 2.42 10*6/MM3 (ref 3.77–5.28)
RBC # BLD AUTO: 2.71 10*6/MM3 (ref 3.77–5.28)
RBC # BLD AUTO: 2.82 10*6/MM3 (ref 3.77–5.28)
RBC # BLD AUTO: 2.88 10*6/MM3 (ref 3.77–5.28)
RBC # BLD AUTO: NORMAL 10*6/UL
SODIUM SERPL-SCNC: 137 MMOL/L (ref 136–145)
WBC NRBC COR # BLD AUTO: 12.15 10*3/MM3 (ref 3.4–10.8)
WBC NRBC COR # BLD AUTO: 9.12 10*3/MM3 (ref 3.4–10.8)
WBC NRBC COR # BLD AUTO: 9.31 10*3/MM3 (ref 3.4–10.8)
WBC NRBC COR # BLD AUTO: 9.98 10*3/MM3 (ref 3.4–10.8)
WBC NRBC COR # BLD AUTO: NORMAL 10*3/UL

## 2025-06-01 PROCEDURE — 99024 POSTOP FOLLOW-UP VISIT: CPT | Performed by: STUDENT IN AN ORGANIZED HEALTH CARE EDUCATION/TRAINING PROGRAM

## 2025-06-01 PROCEDURE — 94799 UNLISTED PULMONARY SVC/PX: CPT

## 2025-06-01 PROCEDURE — 84100 ASSAY OF PHOSPHORUS: CPT | Performed by: STUDENT IN AN ORGANIZED HEALTH CARE EDUCATION/TRAINING PROGRAM

## 2025-06-01 PROCEDURE — 25010000002 HYDROMORPHONE 1 MG/ML SOLUTION: Performed by: STUDENT IN AN ORGANIZED HEALTH CARE EDUCATION/TRAINING PROGRAM

## 2025-06-01 PROCEDURE — 74018 RADEX ABDOMEN 1 VIEW: CPT

## 2025-06-01 PROCEDURE — 99231 SBSQ HOSP IP/OBS SF/LOW 25: CPT | Performed by: INTERNAL MEDICINE

## 2025-06-01 PROCEDURE — 82948 REAGENT STRIP/BLOOD GLUCOSE: CPT

## 2025-06-01 PROCEDURE — 94660 CPAP INITIATION&MGMT: CPT

## 2025-06-01 PROCEDURE — 25010000002 CALCIUM GLUCONATE PER 10 ML: Performed by: STUDENT IN AN ORGANIZED HEALTH CARE EDUCATION/TRAINING PROGRAM

## 2025-06-01 PROCEDURE — 80048 BASIC METABOLIC PNL TOTAL CA: CPT | Performed by: STUDENT IN AN ORGANIZED HEALTH CARE EDUCATION/TRAINING PROGRAM

## 2025-06-01 PROCEDURE — 25010000002 POTASSIUM CHLORIDE PER 2 MEQ OF POTASSIUM: Performed by: STUDENT IN AN ORGANIZED HEALTH CARE EDUCATION/TRAINING PROGRAM

## 2025-06-01 PROCEDURE — 25010000002 ENOXAPARIN PER 10 MG: Performed by: STUDENT IN AN ORGANIZED HEALTH CARE EDUCATION/TRAINING PROGRAM

## 2025-06-01 PROCEDURE — 85027 COMPLETE CBC AUTOMATED: CPT | Performed by: INTERNAL MEDICINE

## 2025-06-01 PROCEDURE — 25010000002 HEPARIN (PORCINE) PER 1000 UNITS: Performed by: STUDENT IN AN ORGANIZED HEALTH CARE EDUCATION/TRAINING PROGRAM

## 2025-06-01 PROCEDURE — 25010000002 HYDROMORPHONE PER 4 MG: Performed by: STUDENT IN AN ORGANIZED HEALTH CARE EDUCATION/TRAINING PROGRAM

## 2025-06-01 PROCEDURE — 25010000002 MAGNESIUM SULFATE PER 500 MG OF MAGNESIUM: Performed by: STUDENT IN AN ORGANIZED HEALTH CARE EDUCATION/TRAINING PROGRAM

## 2025-06-01 PROCEDURE — 83735 ASSAY OF MAGNESIUM: CPT | Performed by: STUDENT IN AN ORGANIZED HEALTH CARE EDUCATION/TRAINING PROGRAM

## 2025-06-01 PROCEDURE — 94761 N-INVAS EAR/PLS OXIMETRY MLT: CPT

## 2025-06-01 RX ORDER — ENOXAPARIN SODIUM 100 MG/ML
40 INJECTION SUBCUTANEOUS EVERY 12 HOURS
Status: DISCONTINUED | OUTPATIENT
Start: 2025-06-01 | End: 2025-06-07 | Stop reason: HOSPADM

## 2025-06-01 RX ADMIN — Medication 10 ML: at 22:24

## 2025-06-01 RX ADMIN — HYDROMORPHONE HYDROCHLORIDE 1 MG: 1 INJECTION, SOLUTION INTRAMUSCULAR; INTRAVENOUS; SUBCUTANEOUS at 15:19

## 2025-06-01 RX ADMIN — HYDROMORPHONE HYDROCHLORIDE 1 MG: 1 INJECTION, SOLUTION INTRAMUSCULAR; INTRAVENOUS; SUBCUTANEOUS at 21:21

## 2025-06-01 RX ADMIN — POTASSIUM PHOSPHATE, MONOBASIC POTASSIUM PHOSPHATE, DIBASIC: 224; 236 INJECTION, SOLUTION, CONCENTRATE INTRAVENOUS at 18:20

## 2025-06-01 RX ADMIN — Medication 10 ML: at 08:45

## 2025-06-01 RX ADMIN — HYDROMORPHONE HYDROCHLORIDE 0.5 MG: 1 INJECTION, SOLUTION INTRAMUSCULAR; INTRAVENOUS; SUBCUTANEOUS at 10:29

## 2025-06-01 RX ADMIN — PANTOPRAZOLE SODIUM 40 MG: 40 INJECTION, POWDER, FOR SOLUTION INTRAVENOUS at 22:24

## 2025-06-01 RX ADMIN — HYDROMORPHONE HYDROCHLORIDE 0.5 MG: 1 INJECTION, SOLUTION INTRAMUSCULAR; INTRAVENOUS; SUBCUTANEOUS at 01:39

## 2025-06-01 RX ADMIN — HYDROMORPHONE HYDROCHLORIDE 0.5 MG: 1 INJECTION, SOLUTION INTRAMUSCULAR; INTRAVENOUS; SUBCUTANEOUS at 06:15

## 2025-06-01 RX ADMIN — HEPARIN SODIUM 5000 UNITS: 5000 INJECTION INTRAVENOUS; SUBCUTANEOUS at 09:14

## 2025-06-01 RX ADMIN — PANTOPRAZOLE SODIUM 40 MG: 40 INJECTION, POWDER, FOR SOLUTION INTRAVENOUS at 09:14

## 2025-06-01 RX ADMIN — ENOXAPARIN SODIUM 40 MG: 100 INJECTION SUBCUTANEOUS at 18:17

## 2025-06-01 RX ADMIN — HYDROMORPHONE HYDROCHLORIDE 1 MG: 1 INJECTION, SOLUTION INTRAMUSCULAR; INTRAVENOUS; SUBCUTANEOUS at 18:16

## 2025-06-01 RX ADMIN — Medication 10 ML: at 08:46

## 2025-06-01 NOTE — CONSULTS
Internal Medicine Consult  INTERNAL MEDICINE   [facility]       Patient Identification:  Name: Samia Gaming  Age: 69 y.o.  Sex: female  :  1955  MRN: [id number]                   Primary Care Physician: Cristiano Logan DO                               Requesting physician: Dr. Gonzalez  Date of consultation: 2025    Reason for consultation: Patient admitted from observation unit with GI bleed had surgery and a small bowel resection and has anemia with probable ileus with multiple medical problems could use internal medicine help manage these and consider taking over care once transition to the noncritical state.    History of Present Illness:   Patient is a 69-year-old female who was admitted to the hospital observation unit on 2025 with 3-day history of abdominal pain nausea vomiting and diarrhea.  Patient had melena.  Patient was started on IV Protonix with serial H&H and transfusion of packed RBC.  Over the course of 24 hours patient became hemodynamically unstable and was transferred to ICU and extensive evaluation by general surgery gastroenterology service was initiated.  Patient eventually required exploratory laparotomy with small bowel resection and ileostomy on 2025 on an emergent basis because of significant drop in her hemoglobin over the course of 6 hours and was found to have dark blood throughout ileum and entire colon multiple jejunal diverticula.  Patient has been kept n.p.o. and is currently on TPN.  There was concern for postoperative hypoxia requiring CPAP placement last night.  Her hemoglobin has been gradually trending downwards but has been stabilized in the last 24 hours.  Her last hemoglobin earlier today was 8.6.  Patient feels tired and does not want to talk but currently on TPN with NG tube in place.  General surgery following the patient.  Patient is off pressors and from critical care team standpoint is ready to be transitioned to telemetry unit and  hospitalist service is consulted.      Past Medical History:  Past Medical History:   Diagnosis Date    Arthritis     Elevated cholesterol      Past Surgical History:  Past Surgical History:   Procedure Laterality Date    CHOLECYSTECTOMY  2005    COLONOSCOPY      COLONOSCOPY N/A 5/24/2025    Procedure: COLONOSCOPY TO CECUM AND TERMINAL ILEUM AT BEDSIDE;  Surgeon: Bi Hutson MD;  Location: [facility] ENDOSCOPY;  Service: Gastroenterology;  Laterality: N/A;  PRE- GI BLEED  POST- DIVERTICULOSIS, HEMORRHOIDS    ENDOSCOPY N/A 5/23/2025    Procedure: ESOPHAGOGASTRODUODENOSCOPY;  Surgeon: Keyur Neely MD;  Location: [facility] ENDOSCOPY;  Service: Gastroenterology;  Laterality: N/A;  pre: GI bleed  post:errosive gastritis, duodenitis    ENTEROSCOPY SMALL BOWEL N/A 5/26/2025    Procedure: ENTEROSCOPY SMALL BOWEL at bedside;  Surgeon: Justin Garza MD;  Location: [facility] ENDOSCOPY;  Service: Gastroenterology;  Laterality: N/A;  pre- melena  post- mild gastric antrum erosion    EXPLORATORY LAPAROTOMY N/A 5/29/2025    Procedure: LAPAROTOMY EXPLORATORY, SMALL BOWEL RESECTION DISTAL AND PROXIMAL, ILEOSTOMY;  Surgeon: Jose G Ny MD;  Location: [facility] MAIN OR;  Service: General;  Laterality: N/A;      Home Meds:  Medications Prior to Admission   Medication Sig Dispense Refill Last Dose/Taking    atorvastatin (LIPITOR) 40 MG tablet Take 1 tablet by mouth every night at bedtime.   5/21/2025 Bedtime    diclofenac (VOLTAREN) 75 MG EC tablet Take 1 tablet by mouth 2 (Two) Times a Day.   5/21/2025 Bedtime    traZODone (DESYREL) 150 MG tablet Take 2 tablets by mouth Every Night.   Past Week Bedtime     Current Meds:     Current Facility-Administered Medications:     acetaminophen (TYLENOL) tablet 650 mg, 650 mg, Oral, Q4H PRN, 650 mg at 05/28/25 0838 **OR** acetaminophen (TYLENOL) 160 MG/5ML oral solution 650 mg, 650 mg, Oral, Q4H PRN **OR** acetaminophen (TYLENOL) suppository 650 mg, 650 mg, Rectal, Q4H PRN, Chula  Gumaro JJ MD    Adult Central 2-in-1 TPN, , Intravenous, Continuous, Jose G Ny MD, Last Rate: 75 mL/hr at 05/31/25 1810, New Bag at 05/31/25 1810    Adult Central 2-in-1 TPN, , Intravenous, Continuous, Jose G Ny MD    Atropine Sulfate (PF) injection 0.4 mg, 0.4 mg, Intravenous, Once PRN, Mima Sam CRNA    Calcium Replacement - Follow Nurse / BPA Driven Protocol, , Not Applicable, PRN, Gumaro Quiros MD    dextrose (D50W) (25 g/50 mL) IV injection 25 g, 25 g, Intravenous, Q15 Min PRN, Gumaro Quiros MD    dextrose (GLUTOSE) oral gel 15 g, 15 g, Oral, Q15 Min PRN, Gumaro Quiros MD    diphenhydrAMINE (BENADRYL) injection 12.5 mg, 12.5 mg, Intravenous, Q15 Min PRN, Mima Sam CRNA    enoxaparin sodium (LOVENOX) syringe 40 mg, 40 mg, Subcutaneous, Q12H, Jose G Ny MD    ePHEDrine injection 5 mg, 5 mg, Intravenous, Once PRN, Mima Sam CRNA    [START ON 6/3/2025] Fat Emulsion Plant Based (INTRALIPID,LIPOSYN) 20 % infusion 50 g, 250 mL, Intravenous, Once per day on Tuesday Thursday Saturday, Jose G Ny MD    flumazenil (ROMAZICON) injection 0.2 mg, 0.2 mg, Intravenous, PRN, Mima Sam CRNA    glucagon (GLUCAGEN) injection 1 mg, 1 mg, Intramuscular, Q15 Min PRN, Gumaro Quiros MD    hydrALAZINE (APRESOLINE) injection 5 mg, 5 mg, Intravenous, Q10 Min PRN, Mima Sam CRNA    HYDROmorphone (DILAUDID) injection 0.5 mg, 0.5 mg, Intravenous, Q2H PRN, Jose G Ny MD, 0.5 mg at 06/01/25 1029    HYDROmorphone (DILAUDID) injection 1 mg, 1 mg, Intravenous, Q2H PRN, Jose G Ny MD, 1 mg at 06/01/25 1519    insulin regular (humuLIN R,novoLIN R) injection 2-9 Units, 2-9 Units, Subcutaneous, Q6H, Gumaro Quiros MD, 2 Units at 05/31/25 0604    ipratropium-albuterol (DUO-NEB) nebulizer solution 3 mL, 3 mL, Nebulization, Once PRN, Mima Sam, CRNA    labetalol (NORMODYNE,TRANDATE) injection 5 mg, 5 mg, Intravenous, Q5 Min PRN, Flaco  Mima Agudelo CRNA    Magnesium Standard Dose Replacement - Follow Nurse / BPA Driven Protocol, , Not Applicable, PRN, Gumaro Quiros MD    metoclopramide (REGLAN) injection 5 mg, 5 mg, Intravenous, Q6H PRN, Gumaro Quiros MD, 5 mg at 05/24/25 0602    naloxone (NARCAN) injection 0.2 mg, 0.2 mg, Intravenous, PRN, Mima Sam CRNA    nitroglycerin (NITROSTAT) SL tablet 0.4 mg, 0.4 mg, Sublingual, Q5 Min PRN, Gumaro Quiros MD    norepinephrine (LEVOPHED) 8 mg in 250 mL NS infusion (premix), 0.02-0.3 mcg/kg/min, Intravenous, Titrated, Chelsey Baumann, APRN, Stopped at 06/01/25 0900    [DISCONTINUED] ondansetron ODT (ZOFRAN-ODT) disintegrating tablet 4 mg, 4 mg, Oral, Q6H PRN **OR** ondansetron (ZOFRAN) injection 4 mg, 4 mg, Intravenous, Q6H PRN, Gumaro Quiros MD, 4 mg at 05/28/25 0847    ondansetron (ZOFRAN) injection 4 mg, 4 mg, Intravenous, Once PRN, Mima Sam CRNA    pantoprazole (PROTONIX) injection 40 mg, 40 mg, Intravenous, Q12H, Jose G Ny MD, 40 mg at 06/01/25 0914    Pharmacy to Dose TPN, , Not Applicable, Continuous PRN, Jose G Ny MD    phenol (CHLORASEPTIC) 1.4 % liquid 1 spray, 1 spray, Mouth/Throat, Q2H PRN, Jose G Ny MD    Phosphorus Replacement - Follow Nurse / BPA Driven Protocol, , Not Applicable, PRNChula Ervin H., MD    Potassium Replacement - Follow Nurse / BPA Driven Protocol, , Not Applicable, PRNChula Ervin H., MD    sodium chloride 0.9 % flush 10 mL, 10 mL, Intravenous, PRNChula Ervin H., MD    sodium chloride 0.9 % flush 10 mL, 10 mL, Intravenous, Q12H, Gumaro Quiros MD, 10 mL at 06/01/25 0846    sodium chloride 0.9 % flush 10 mL, 10 mL, Intravenous, PRN, Gumaro Quiros MD    sodium chloride 0.9 % flush 10 mL, 10 mL, Intravenous, Q12H, Gumaro Quiros MD, 10 mL at 06/01/25 0845    sodium chloride 0.9 % flush 10 mL, 10 mL, Intravenous, PRN, Gumaro Quiros MD    sodium chloride 0.9 % flush 10 mL, 10 mL, Intravenous, Q12H, Anant  "Jose G MENARD MD, 10 mL at 06/01/25 0845    sodium chloride 0.9 % flush 10 mL, 10 mL, Intravenous, Q12H, Jose G Ny MD, 10 mL at 06/01/25 0845    sodium chloride 0.9 % flush 10 mL, 10 mL, Intravenous, Q12H, Jose G Ny MD, 10 mL at 06/01/25 0845    sodium chloride 0.9 % flush 10 mL, 10 mL, Intravenous, PRN, Jose G Ny MD    sodium chloride 0.9 % flush 10 mL, 10 mL, Intravenous, Q12H, Jose G Ny MD, 10 mL at 06/01/25 0845    sodium chloride 0.9 % flush 10 mL, 10 mL, Intravenous, Q12H, Jose G Ny MD, 10 mL at 06/01/25 0845    sodium chloride 0.9 % flush 10 mL, 10 mL, Intravenous, Q12H, Jose G Ny MD, 10 mL at 06/01/25 0845    sodium chloride 0.9 % flush 10 mL, 10 mL, Intravenous, PRN, Jose G Ny MD    sodium chloride 0.9 % flush 20 mL, 20 mL, Intravenous, PRN, Jose G yN MD    sodium chloride 0.9 % flush 20 mL, 20 mL, Intravenous, PRN, Jose G Ny MD    sodium chloride 0.9 % infusion 40 mL, 40 mL, Intravenous, PRNChula Ervin H., MD    sodium chloride 0.9 % infusion 40 mL, 40 mL, Intravenous, PRNChula Ervin H., MD    sodium chloride 0.9 % infusion 40 mL, 40 mL, Intravenous, PRN, Jose G Ny MD    sodium chloride 0.9 % infusion 40 mL, 40 mL, Intravenous, Anant DOWELL Andrew Z, MD    traZODone (DESYREL) tablet 150 mg, 150 mg, Oral, Nightly PRN, Gumaro Quiros MD, 150 mg at 05/27/25 2125  Allergies:  No Known Allergies  Social History:   Social History     Tobacco Use    Smoking status: Never    Smokeless tobacco: Never   Substance Use Topics    Alcohol use: Never      Family History:  Family History   Family history unknown: Yes          Review of Systems  See history of present illness and past medical history.  Does not feel well at this time and feels tired.      Vitals:   /61   Pulse 83   Temp 98.3 °F (36.8 °C) (Oral)   Resp 16   Ht 157.5 cm (62.01\")   Wt 129 kg (284 lb 6.3 oz)   SpO2 98%   BMI 52.00 kg/m²   I/O:   Intake/Output Summary (Last 24 " hours) at 6/1/2025 1710  Last data filed at 6/1/2025 0800  Gross per 24 hour   Intake --   Output 1260 ml   Net -1260 ml     Exam:  Patient is examined using the personal protective equipment as per guidelines from infection control for this particular patient as enacted.  Hand washing was performed before and after patient interaction.  General Appearance:  Ill-appearing morbidly obese female with nasogastric tube in place and appears pale   Head:    Normocephalic, without obvious abnormality, atraumatic   Eyes:    PERRL, conjunctiva/corneas clear, EOM's intact, both eyes   Ears:    Normal external ear canals, both ears   Nose: Nasogastric tube in place   Throat:   Lips, tongue, gums normal; oral mucosa pink and moist   Neck:   Supple   Back:     Symmetric, no curvature, ROM normal, no CVA tenderness   Lungs:   Decreased breath sounds at the bases   Chest Wall:    No tenderness or deformity    Heart:  S1-S2 tachycardic   Abdomen:   Ileostomy and drain in place catheter in place   Extremities: Bilateral edema noted       Skin: Body wall edema noted   Neurologic: Awake interactive but does not want to engage in conversation grossly nonfocal examination       Data Review:      I reviewed the patient's new clinical results.  Results from last 7 days   Lab Units 06/01/25  1201 06/01/25  0742 06/01/25  0145 05/31/25 2005 05/31/25 0527 05/30/25  2358 05/30/25  1746   WBC 10*3/mm3 9.98 12.15* 9.31 11.31* 12.63* 12.02* 11.12*   HEMOGLOBIN g/dL 8.1* 8.6* 8.3* 8.2* 9.3* 8.6* 9.7*   PLATELETS 10*3/mm3 113* 86* 89* 98* 120* 101* 95*     Results from last 7 days   Lab Units 06/01/25  0742 05/31/25  0527 05/30/25  1238 05/27/25  0542 05/26/25  0627 05/25/25 1955   SODIUM mmol/L 137 139 138 137 139  --    POTASSIUM mmol/L 3.9 4.0 4.6 4.1 3.9 4.5   CHLORIDE mmol/L 109* 111* 107 107 107  --    CO2 mmol/L 23.2 21.2* 21.9* 24.5 26.4  --    BUN mg/dL 14.0 25.0* 25.0* 14 11  --    CREATININE mg/dL 0.41* 0.51* 0.82 0.46* 0.48*  --     CALCIUM mg/dL 7.6* 7.0* 7.6* 7.1* 7.9*  --    GLUCOSE mg/dL 144* 154* 169* 145* 119*  --    XR Abdomen KUB  Result Date: 6/1/2025   As described.   This report was finalized on 6/1/2025 9:25 AM by Dr. Teja Mckinnon M.D on Workstation: [id number]      XR Chest 1 View  Result Date: 5/31/2025  As described.  This report was finalized on 5/31/2025 6:58 AM by Dr. Teja Mckinnon M.D on Workstation: [email or web address]      XR Abdomen KUB  Result Date: 5/30/2025  Enteric tube overlying the stomach.  This report was finalized on 5/30/2025 10:59 AM by Dr. Scott Leahy M.D on Workstation: [id number]      XR Chest 1 View  Result Date: 5/30/2025  Endotracheal tube terminates at the elizabeth. Retraction by approximately 2 to 3 cm would allow for more optimal positioning.  This report was finalized on 5/30/2025 4:23 AM by Dr. Jennifer England M.D on Workstation: [id number]      IR Embolization  Result Date: 5/29/2025  Angiography of the superior mesenteric artery and its branches as described above. No evidence of active contrast extravasation into the small bowel is demonstrated.    This report was finalized on 5/29/2025 8:10 AM by Dr. Jose G Pemberton M.D on Workstation: [id number]      CT Angiogram Abdomen Pelvis  Result Date: 5/28/2025   1. Accumulation of high density contrast within the lumen of a short segment of small bowel in the central and left upper hemipelvis, likely jejunum, and within a small bowel diverticulum in the central upper pelvis, likely representing active GI bleed. 2. 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 3. Small bowel and colonic diverticula without evidence of diverticulitis.  This report was finalized on 5/28/2025 2:40 PM by Johnson Garcia MD on Workstation: [id number]      NM GI Blood Loss  Result Date: 5/27/2025  No scintigraphic evidence of active gastrointestinal bleeding during the course of the study.  This  report was finalized on 5/27/2025 8:52 AM by Dr. Scott Leahy M.D on Workstation: [id number]      CT Abdomen Pelvis With Contrast  Result Date: 5/27/2025  1. Colonic diverticulosis. There is no evidence of acute diverticulitis. 2. Status post cholecystectomy. 3. Fatty infiltration of the liver.  Radiation dose reduction techniques were utilized, including automated exposure control and exposure modulation based on body size.   This report was finalized on 5/27/2025 7:54 AM by Dr. Tod Avina M.D on Workstation: [id number]      CT Angiogram Abdomen Pelvis  Result Date: 5/27/2025   1. No contrast extravasation is identified. 2. Either occlusion or near occlusive stenosis of the origin of the celiac axis. Patient also appears to have moderate stenosis at the origin of the SMA. Inferior mesenteric artery is patent. 3. Liquid stool noted within the colon.  Radiation dose reduction techniques were utilized, including automated exposure control and exposure modulation based on body size.   This report was finalized on 5/27/2025 12:24 AM by Dr. Jennifer England M.D on Workstation: [id number]      CT Facial Bones With Contrast  Result Date: 5/25/2025  1. On 05/23/2025, this patient had a small scalp hematoma with bubbles of air in the scalp over the anterior right frontal bone from associated scalp laceration from head trauma. On the head CT on 5/23/2025, the right orbital floor-right inferior orbital wall was not assessed as it was excluded from the field of imaging and not assessed. On the current facial CT there is an acute comminuted fracture of the central aspect of the mid to anterior portion of the right inferior orbital wall with a 12 x 9 mm bony defect in the central to anterior aspect of the right inferior orbital wall with inferior displacement of inferior orbital wall fracture fragments along with inferior extraconal fat of the right orbit by 7 mm into the superior aspect of the right maxillary  sinus, there is some inferior tenting of the inferior margin of the right inferior rectus muscle at the level of the fracture plane. The acute comminuted fracture extends into the right inferior orbital canal and could compromise the right infraorbital nerve. There is a tiny amount of hemorrhage in the posterior right maxillary sinus. Maxillofacial surgical consultation is warranted.  There is moderate circumferential mucosal thickening in the left maxillary sinus. The remainder of the facial CT is unremarkable. I communicated the results to Foreign Lora DO, by telephone on 05/25/2025 at 2:00 p.m.  Radiation dose reduction techniques were utilized, including automated exposure control and exposure modulation based on body size.   This report was finalized on 5/25/2025 8:34 PM by Dr. Nikhil Colón M.D on Workstation: [id number]      CT Head Without Contrast  Result Date: 5/24/2025  1. No acute intracranial abnormality is identified.  2. There is partial opacification of the visualized superior aspect of the maxillary sinuses with fluid and mucosal thickening bilaterally.  3. The dural venous sinuses are hypodense suggesting that the patient has a low hematocrit and is most probably anemic and correlate clinically.  4. There is a small scalp hematoma over the anterior right frontal bone from today's head trauma. The remainder of the head CT is within normal limits with no acute skull fracture or intracranial hemorrhage identified.  Radiation dose reduction techniques were utilized, including automated exposure control and exposure modulation based on body size.   This report was finalized on 5/24/2025 8:11 AM by Dr. Nikhil Colón M.D on Workstation: [id number]      XR Chest 1 View  Result Date: 5/22/2025  No evidence for acute pulmonary process. Follow-up as clinical indications persist.  This report was finalized on 5/22/2025 4:28 PM by Dr. Teja Mckinnon M.D on Workstation: [id number]         Assessment:  Active Hospital Problems    Diagnosis  POA    **GI bleed [K92.2]  Yes    GIB (gastrointestinal bleeding) [K92.2]  Yes   Obesity and sleep apnea  Status post emergent exploratory laparotomy diagnostic enterotomy and repair and ileostomy on 5/29/2025  Anemia due to GI loss      Plan:  Patient seems to have improved hemodynamically and her hemoglobin seems to have stabilized.  Continue with TPN, removal of NG tube advancement of diet decisions as per general surgery service.  Continue with pulse ox monitoring and as needed CPAP for underlying sleep apnea  Continue with aspiration precautions  Continue with serial H&H and transfuse as needed  Hypoglycemia with history of hemoglobin A1c of 6.4 in September 2024-continue with Accu-Cheks and sliding scale coverage and repeat hemoglobin A1c.  Watch for hypoglycemia.  Yobany Owusu MD   6/1/2025  17:10 EDT    Parts of this note may be an electronic transcription/translation of spoken language to printed text using the Dragon dictation system.

## 2025-06-01 NOTE — PROGRESS NOTES
LOS: 9 days   Patient Care Team:  Cristiano Logan DO as PCP - General (Family Medicine)    Subjective     patient with acute GI bleed hemoglobin of 5 she went to the OR and had a partial small bowel resection and double barrel ostomy right lower quadrant, she was intubated and took a couple days to wean her off the ventilator she is awake her biggest complaint is her nasogastric tube is causing a severe sore throat hurts to swallow or talk.  It is not stopping her talking however.  Says she has not slept in 3 days which is not true.  Her stomach is sore at her incision area.  Review of Systems:          Objective     Vital Signs  Vital Sign Min/Max for last 24 hours  Temp  Min: 97.5 °F (36.4 °C)  Max: 99.8 °F (37.7 °C)   BP  Min: 70/40  Max: 159/74   Pulse  Min: 55  Max: 89   Resp  Min: 15  Max: 28   SpO2  Min: 89 %  Max: 100 %   Flow (L/min) (Oxygen Therapy)  Min: 2  Max: 3   Weight  Min: 129 kg (284 lb 6.3 oz)  Max: 129 kg (284 lb 6.3 oz)        Ventilator/Non-Invasive Ventilation Settings (From admission, onward)       Start     Ordered    05/30/25 0452  Ventilator - Vent Mode: AC/VC; Rate: 20; FiO2: 40%; PEEP: 7.5; Tidal Volume: mL; TV: 450  Continuous        Question Answer Comment   Vent Mode AC/VC    Rate 20    FiO2 40%    PEEP 7.5    Tidal Volume mL            05/30/25 0453                        Arterial Line BP: 81/68  Arterial Line MAP (mmHg): 316 mmHg     Body mass index is 52 kg/m².  I/O last 3 completed shifts:  In: 4072 [I.V.:3366]  Out: 1395 [Urine:1150; Emesis/NG output:50; Drains:40; Stool:155]  I/O this shift:  In: -   Out: 960 [Urine:525; Emesis/NG output:300; Drains:10; Stool:125]        Physical Exam:  General Appearance: Well-developed morbidly obese white female resting in bed she does not look in acute distress  Eyes: Conjunctivae are clear and anicteric, pupils are equal  ENT: Mucous membranes are moist she has a nasogastric type tube in place, no exudates no significant  erythema  Neck: No adenopathy trachea midline, no crepitance  Lungs: Pretty clear bilaterally no wheezes or rales she saturating 99 to 100% on 2 L nasal cannula O2  Cardiac: Regular rate rhythm no murmur  Abdomen: Soft lower midline incision looks good no significant erythema or drainage her right lower quadrant ostomy is pink and functioning bulb drain with minimal serosanguineous fluid  : Not examined  Musculoskeletal: She has some edema of all 4 extremities not too much ,obesity profound  Skin: Warm and dry no jaundice no petechiae  Neuro: Patient is awake and alert she is talking a lot she is following commands grossly intact  Extremities/P Vascular: Palpable radial and dorsalis pedis pulses bilaterally  MSE: She acts like she is miserable       Labs:  Results from last 7 days   Lab Units 05/31/25  0527 05/30/25  1238 05/27/25  0542 05/26/25  0627 05/25/25 1955   GLUCOSE mg/dL 154* 169* 145* 119*  --    SODIUM mmol/L 139 138 137 139  --    POTASSIUM mmol/L 4.0 4.6 4.1 3.9 4.5   MAGNESIUM mg/dL 1.9 1.7  --  2.0  --    CO2 mmol/L 21.2* 21.9* 24.5 26.4  --    CHLORIDE mmol/L 111* 107 107 107  --    ANION GAP mmol/L 6.8 9.1 5.5 5.6  --    CREATININE mg/dL 0.51* 0.82 0.46* 0.48*  --    BUN mg/dL 25.0* 25.0* 14 11  --    BUN / CREAT RATIO  49.0* 30.5* 30.4* 22.9  --    CALCIUM mg/dL 7.0* 7.6* 7.1* 7.9*  --    ALK PHOS U/L 38* 36* 36*  --   --    TOTAL PROTEIN g/dL 3.6* 3.7* 3.6*  --   --    ALT (SGPT) U/L 8 10 16  --   --    AST (SGOT) U/L 12 14 17  --   --    BILIRUBIN mg/dL 0.9 0.7 0.7  --   --    ALBUMIN g/dL 1.9* 2.1* 2.1*  --   --    GLOBULIN gm/dL 1.7 1.6 1.5  --   --      Estimated Creatinine Clearance: 130.5 mL/min (A) (by C-G formula based on SCr of 0.51 mg/dL (L)).      Results from last 7 days   Lab Units 06/01/25  0145 05/31/25 2005 05/31/25  0527 05/30/25  2358 05/30/25  1746 05/30/25  1238 05/30/25  0626   WBC 10*3/mm3 9.31 11.31* 12.63* 12.02* 11.12* 15.44* 15.84*   RBC 10*6/mm3 2.42* 2.59* 3.09*  2.95* 3.28* 3.86 4.30   HEMOGLOBIN g/dL 8.3* 8.2* 9.3* 8.6* 9.7* 11.7* 13.1   HEMATOCRIT % 24.4* 25.1* 27.9* 28.8* 29.6* 34.6 38.4   MCV fL 100.8* 96.9 90.3 97.6* 90.2 89.6 89.3   MCH pg 34.3* 31.7 30.1 29.2 29.6 30.3 30.5   MCHC g/dL 34.0 32.7 33.3 29.9* 32.8 33.8 34.1   RDW % 16.0* 15.5* 16.1* 16.2* 16.1* 15.7* 16.0*   RDW-SD fl 57.5* 53.1 50.3 53.7 48.8 48.2 49.0   MPV fL 10.8 11.0 10.6 11.4 10.5 11.1 11.1   PLATELETS 10*3/mm3 89* 98* 120* 101* 95* 128* 111*   NEUTROPHIL % %  --   --   --   --   --   --  88.4*   LYMPHOCYTE % %  --   --   --   --   --   --  4.1*   MONOCYTES % %  --   --   --   --   --   --  6.6   EOSINOPHIL % %  --   --   --   --   --   --  0.0*   BASOPHIL % %  --   --   --   --   --   --  0.2   IMM GRAN % %  --   --   --   --   --   --  0.7*   NEUTROS ABS 10*3/mm3  --   --   --   --   --   --  14.00*   LYMPHS ABS 10*3/mm3  --   --   --   --   --   --  0.65*   MONOS ABS 10*3/mm3  --   --   --   --   --   --  1.05*   EOS ABS 10*3/mm3  --   --   --   --   --   --  0.00   BASOS ABS 10*3/mm3  --   --   --   --   --   --  0.03   IMMATURE GRANS (ABS) 10*3/mm3  --   --   --   --   --   --  0.11*   NRBC /100 WBC  --   --   --   --   --   --  0.3*     Results from last 7 days   Lab Units 05/31/25  1207   PH, ARTERIAL pH units 7.487*   PO2 ART mm Hg 83.7   PCO2, ARTERIAL mm Hg 27.1*   HCO3 ART mmol/L 20.5*                     Results from last 7 days   Lab Units 05/26/25  0627   INR  1.25*     Microbiology Results (last 10 days)       ** No results found for the last 240 hours. **                Fat Emulsion Plant Based, 100 mL, Intravenous, Once per day on Tuesday Thursday Saturday  heparin (porcine), 5,000 Units, Subcutaneous, Q12H  insulin regular, 2-9 Units, Subcutaneous, Q6H  pantoprazole, 40 mg, Intravenous, Q12H  sodium chloride, 10 mL, Intravenous, Q12H  sodium chloride, 10 mL, Intravenous, Q12H  sodium chloride, 10 mL, Intravenous, Q12H  sodium chloride, 10 mL, Intravenous, Q12H  sodium chloride,  10 mL, Intravenous, Q12H  sodium chloride, 10 mL, Intravenous, Q12H  sodium chloride, 10 mL, Intravenous, Q12H  sodium chloride, 10 mL, Intravenous, Q12H      Adult Central 2-in-1 TPN, , Last Rate: 75 mL/hr at 05/31/25 1810  norepinephrine, 0.02-0.3 mcg/kg/min, Last Rate: 0.02 mcg/kg/min (06/01/25 0221)  Pharmacy to Dose TPN,         Diagnostics:  XR Chest 1 View  Result Date: 5/30/2025  SINGLE VIEW OF THE CHEST  HISTORY: Postop intubation  COMPARISON: May 22, 2025  FINDINGS: There is cardiomegaly. There is vascular congestion. Endotracheal tube terminates at the elizabeth. Retraction by approximately 2 to 3 cm would allow for more optimal positioning. There is bibasilar atelectasis. No pneumothorax is seen. No large effusion is identified.      Endotracheal tube terminates at the elizabeth. Retraction by approximately 2 to 3 cm would allow for more optimal positioning.  This report was finalized on 5/30/2025 4:23 AM by Dr. Jennifer England M.D on Workstation: [id number]      Arterial Line  Result Date: 5/29/2025  Reilly Westbrook MD     5/29/2025 10:20 PM Arterial Line Pre-sedation assessment completed: 5/29/2025 9:15 PM Patient reassessed immediately prior to procedure Patient location during procedure: holding area Start time: 5/29/2025 9:15 PM Stop Time:5/29/2025 9:20 PM  Line placed for hemodynamic monitoring and ABGs/Labs/ISTAT. Performed By Anesthesiologist: Reilly Westbrook MD Preanesthetic Checklist Completed: patient identified, IV checked, site marked, risks and benefits discussed, surgical consent, monitors and equipment checked, pre-op evaluation and timeout performed Arterial Line Prep  Sterile Tech: cap, gloves, mask and sterile barriers Prep: ChloraPrep Patient monitoring: blood pressure monitoring, continuous pulse oximetry and EKG Arterial Line Procedure Laterality:left Location:  radial artery Catheter size: 20 G Guidance: landmark technique and palpation technique Number of attempts: 1 Successful  placement: yes Images: still images obtained, printed/placed on the chart Post Assessment Dressing Type: occlusive dressing applied, secured with tape and wrist guard applied. Complications no Circ/Move/Sens Assessment: unchanged. Patient Tolerance: patient tolerated the procedure well with no apparent complications Additional Notes Ultrasound was used to identify the radial artery and surrounding structures.  The left radial artery was assessed and patent but small.  Ultrasound was used to visualize vascular needle entry into the left radial artery.  This vessel appeared anatomically normal and there were no apparent abnormal findings.  A permanent ultrasound image was saved in the patient's record.  It is  also used to visualize the wire and the catheter within the lumen of the artery.     IR Embolization  Result Date: 5/29/2025  PROCEDURE: Mesenteric angiogram  INDICATION: GI bleed localized to the small bowel on CT angiography earlier in the day. Persistent bloody stools today. Anemia requiring transfusion. No active bleeding identified on recent upper endoscopy and colonoscopy.   Reference air kerma: 4033.10 mGy Contrast: Approximately 170 mL Isovue intra-arterial 1 mg IV glucagon administered to decrease bowel motility Procedure was performed under general anesthesia   The risks, benefits, and alternatives of the procedure were discussed with the patient, and informed consent was obtained. In the procedure room a timeout was performed confirming correct patient and procedure.  Maximum sterile barrier technique was used including sterile cap, mask, gloves, gown and large sterile sheet as well as pre-procedure hand washing and cutaneous antisepsis with 2 % chlorhexidine. Real-time sterile ultrasound guidance was utilized with sterile gel and sterile probe cover.  TECHNIQUE: Ultrasound of the right common femoral artery was performed. It was patent, and an image was saved. The right common femoral artery was  accessed under ultrasound guidance with a 4 [place]i micropuncture introducer set. This was upsized to a 5 [place] Alexis 1 sheath. A 5 [place] C2 glide catheter was then advanced into the superior mesenteric artery and digital subtraction angiography of the superior mesenteric artery was performed. Next, in a coaxial fashion, a 2.4 [place] Progreat microcatheter was advanced into five  separate jejunal and iliac branch arteries arising from the superior mesenteric artery. Selective angiography of each of these vessels was performed. The microcatheter was removed. Repeat digital subtraction angiography of the superior mesenteric artery through the C2 catheter was then performed in multiple obliquities. The C2 catheter was removed. Angiography of the right common femoral artery through the sheath was performed. The sheath was removed and was exchanged for an Angio-Seal vascular closure device. Hemostasis of the right common femoral artery was achieved utilizing the Angio-Seal. Patient tolerated procedure well without immediate complication. Distal pulses were palpable following the procedure.  FINDINGS: The study is somewhat limited due to artifact from bowel peristalsis and gas (despite the usage of IV glucagon) and also somewhat limited due to attenuation from body habitus. The superior mesenteric artery and its branches are patent. There is enlargement of the gastroduodenal artery and multiple pancreaticoduodenal arteries with retrograde flow filling the celiac artery branches consistent with a stenosis of the celiac artery as demonstrated on the comparison CT angiogram. There is no active contrast extravasation identified from the superior mesenteric artery or its branches. Selective angiography of multiple jejunal and iliac branch vessels was then performed and again showed no evidence of active contrast extravasation into the small bowel. Findings were discussed with Dr. Garza following the procedure.       Angiography of the superior mesenteric artery and its branches as described above. No evidence of active contrast extravasation into the small bowel is demonstrated.    This report was finalized on 5/29/2025 8:10 AM by Dr. Jose G Pemberton M.D on Workstation: [id number]      CT Angiogram Abdomen Pelvis  Result Date: 5/28/2025  CT ANGIOGRAM ABDOMEN PELVIS-  DATE OF EXAM: 5/28/2025 1:14 PM  INDICATION: GI bleed.  COMPARISON: Nuclear medicine GI bleed study 5/27/2025. CT abdomen and pelvis 5/26/2025 and 5/22/2025.  TECHNIQUE: Multiple contiguous axial images were acquired through the abdomen and pelvis before and following the intravenous administration of 95 mL of Isovue-370. Reformatted coronal and sagittal sequences were also reviewed. Reformatted coronal, sagittal, and 3D reconstruction images were also reviewed. Radiation dose reduction techniques were utilized, including automated exposure control and exposure modulation based on body size.  FINDINGS: Mild multifocal bibasilar subsegmental atelectasis and/or scarring. Partially imaged small pericardial effusion, measuring up to 5 mm in thickness.  The abdominal aorta is normal in size. Mild calcified atherosclerotic disease without evidence of hemodynamically significant stenosis or dissection. 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 Stable 1 cm splenic artery aneurysm. Calcified atherosclerotic disease at the origins of the SMA resulting in mild stenosis. No hemodynamically significant stenosis of either renal artery. The STACIE is widely patent. The bilateral common iliac, internal iliac, external iliac, common femoral, and proximal superficial femoral arteries demonstrate normal course and caliber without evidence of hemodynamically significant stenosis or injury.  Mild diffuse gastric wall thickening is likely accentuated by underdistention. Multiple jejunal diverticula including a fluid and lower dilated 4.5 cm  diverticulum in the anterior left upper quadrant (axial series 5 image 83), and a 3 cm fluid in air dilated diverticulum in the left lower quadrant (coronal series 6 image 55). Accumulation of high density contrast within the lumen of a short segment of small bowel in the central and left upper hemipelvis, likely jejunum, and within a small bowel diverticulum in the central upper pelvis (axial series 5 images 140 through 148), likely representing active bleeding. Tiny distal ileal diverticula. Mild colorectal stool. Colonic diverticula, without CT evidence of diverticulitis. No bowel obstruction or significant bowel wall thickening. The appendix is normal.  Stable postoperative changes from cholecystectomy with likely postoperative dilatation of the extrahepatic biliary ducts. Diffuse decreased hepatic attenuation/enhancement, suggesting hepatic steatosis. The spleen, pancreas, adrenal glands, and kidneys are unremarkable. The urinary bladder is nondistended. Diffuse urinary bladder wall thickening may be accentuated by under distention. The uterus and adnexa are unremarkable in CT appearance.  No free fluid in the abdomen or pelvis. No free intraperitoneal air. No pathologically enlarged lymph nodes in the abdomen or pelvis.  Partially imaged fluid collection in the subcutaneous fat lateral to the right hip. Similar-appearing rotary lumbar dextroscoliosis and multilevel lumbar spondylosis. Flowing multilevel lower thoracic and upper lumbar anterior osteophyte formation. Mild to moderate bilateral hip and SI joint DJD with right SI joint bridging osteophyte formation. Mild to moderate DJD at the pubic symphysis. No acute osseous abnormality or concerning osseous lesion.       1. Accumulation of high density contrast within the lumen of a short segment of small bowel in the central and left upper hemipelvis, likely jejunum, and within a small bowel diverticulum in the central upper pelvis, likely representing active  GI bleed. 2. 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 3. Small bowel and colonic diverticula without evidence of diverticulitis.  This report was finalized on 5/28/2025 2:40 PM by Johnson Garcia MD on Workstation: [id number]      NM GI Blood Loss  Result Date: 5/27/2025  NM GI BLOOD LOSS-  TECHNIQUE: 35.5 mCi of technetium 99m tagged RBCs was injected intravenously with dynamic imaging of the abdomen pelvis performed for approximately 60 minutes after.  INDICATION: Nonlocalized GI bleed  COMPARISON: Multiphase CT abdomen pelvis 5/26/2025.   FINDINGS: Prompt activity seen within the circulatory system following radiotracer injection. No dynamic mobile tubular focus of activity overlying the abdomen or pelvis during the course of the study. Accumulation of urinary activity within the bladder.       No scintigraphic evidence of active gastrointestinal bleeding during the course of the study.  This report was finalized on 5/27/2025 8:52 AM by Dr. Scott Leahy M.D on Workstation: [id number]      CT Abdomen Pelvis With Contrast  Result Date: 5/27/2025  CT OF THE ABDOMEN AND PELVIS WITH CONTRAST 05/22/2025  HISTORY: Abdominal pain.  TECHNIQUE: Spiral images were obtained from the lung bases to the symphysis pubis after intravenous contrast.  FINDINGS: There is fatty infiltration of the liver. Gallbladder has been removed. The spleen, pancreas, adrenals and kidneys appear unremarkable.  No bowel wall thickening or bowel dilatation is seen. There is colonic diverticulosis. Uterus and urinary bladder appear unremarkable. There are degenerative changes in the spine. There is some aortic calcification with no evidence of aneurysm.      1. Colonic diverticulosis. There is no evidence of acute diverticulitis. 2. Status post cholecystectomy. 3. Fatty infiltration of the liver.  Radiation dose reduction techniques were utilized, including automated  exposure control and exposure modulation based on body size.   This report was finalized on 5/27/2025 7:54 AM by Dr. Tod Avina M.D on Workstation: [id number]      CT Angiogram Abdomen Pelvis  Result Date: 5/27/2025  CT ANGIOGRAM OF THE ABDOMEN AND PELVIS  HISTORY: GI bleed  COMPARISON: May 22, 2025  TECHNIQUE: Axial precontrast CT imaging was obtained through the abdomen and pelvis. Subsequently, arterial and venous delayed phase imaging was performed through the abdomen and pelvis. Three-D reformatted images were obtained.  FINDINGS: Images through the lung bases demonstrate mild scarring and atelectasis.  The patient either has an occlusion or near occlusive stenosis involving the origin of the celiac axis. The splenic artery, left gastric artery, and common hepatic artery appear to be patent. The patient also appears to have moderate stenosis at the origin of the superior mesenteric artery. The inferior mesenteric artery is patent. There is some calcified plaque at the origin of the right renal artery, without hemodynamically significant stenosis. The iliac vessels appear widely patent. No active extravasation is seen. There is a small splenic artery aneurysm, measuring 7 mm.  There is a small hiatal hernia. The duodenum, adrenal glands, spleen, and pancreas appear normal. No suspicious hepatic lesions are seen. The gallbladder is absent. The kidneys enhance symmetrically. No hydronephrosis is seen. The uterus appears normal. No adnexal masses are seen. There is colonic diverticulosis. There is no bowel obstruction. The patient does have liquid stool within the colon, which could reflect diarrhea. The appendix is normal. No acute osseous abnormalities are seen. There is lumbar scoliosis, with convexity to the right. The patient has a fluid collection within the right gluteal region laterally. This measures up to 2.4 x 6.6 cm. Exact etiology is uncertain. It appears stable when compared to the exam from  May 22. It could reflect a hematoma or seroma.       1. No contrast extravasation is identified. 2. Either occlusion or near occlusive stenosis of the origin of the celiac axis. Patient also appears to have moderate stenosis at the origin of the SMA. Inferior mesenteric artery is patent. 3. Liquid stool noted within the colon.  Radiation dose reduction techniques were utilized, including automated exposure control and exposure modulation based on body size.   This report was finalized on 5/27/2025 12:24 AM by Dr. Jennifer Engalnd M.D on Workstation: [id number]      CT Facial Bones With Contrast  Result Date: 5/25/2025  CONTRAST-ENHANCED CT SCAN OF THE FACIAL BONES ON 05/25/2025  CLINICAL HISTORY: This is a 69-year-old female patient who had a recent fall with head injury and abrasion to her forehead and the patient has some facial numbness.  TECHNIQUE: Spiral CT images were obtained through the facial bones in the axial imaging plane following intravenous contrast. The images were reformatted and are submitted in 2 mm thick axial, sagittal and coronal CT sections with soft tissue algorithm and 1 mm thick axial sagittal and coronal CT sections with high-resolution bone algorithm.  COMPARISON: There are no prior facial CTs for comparison. This is correlated to head CT on 05/23/2025, 2 days ago.  FINDINGS: On the head CT 2 days ago there was a small scalp hematoma with bubbles of air in the scalp over the anterior right frontal bone from a scalp hematoma and scalp laceration from trauma. Unfortunately on the prior head CT of the right orbital floor was not adequately assessed as the images started above the orbital floor. This patient does have an acute mildly comminuted fracture of the right inferior orbital wall. There is a 9 x 12 mm fracture defect in the central aspect of the mid to anterior aspect of the inferior right orbital wall with inferior displacement of inferior orbital wall fracture fragments along  with some inferior extraconal fat by up to 7 mm into the superior aspect of the right maxillary sinus. The fracture plane extends into the right inferior orbital canal and could compromise the right infraorbital nerve. There is some tenting of the inferior margin of the right inferior rectus muscle which minimally bulges into the superior aspect of the right maxillary sinus. No additional acute facial fractures are seen. There is moderate circumferential mucosal thickening partially opacifying the left maxillary sinus, there is a thin sliver of hyperdense fluid or hemorrhage in the posterior right maxillary sinus. The remainder of the paranasal sinuses, the mastoid air cells and middle ear cavities are clear. The nasopharynx, oropharynx, hypopharynx are normal in appearance. The parotid , parapharyngeal and submandibular spaces are symmetric and are normal in appearance.      1. On 05/23/2025, this patient had a small scalp hematoma with bubbles of air in the scalp over the anterior right frontal bone from associated scalp laceration from head trauma. On the head CT on 5/23/2025, the right orbital floor-right inferior orbital wall was not assessed as it was excluded from the field of imaging and not assessed. On the current facial CT there is an acute comminuted fracture of the central aspect of the mid to anterior portion of the right inferior orbital wall with a 12 x 9 mm bony defect in the central to anterior aspect of the right inferior orbital wall with inferior displacement of inferior orbital wall fracture fragments along with inferior extraconal fat of the right orbit by 7 mm into the superior aspect of the right maxillary sinus, there is some inferior tenting of the inferior margin of the right inferior rectus muscle at the level of the fracture plane. The acute comminuted fracture extends into the right inferior orbital canal and could compromise the right infraorbital nerve. There is a tiny  amount of hemorrhage in the posterior right maxillary sinus. Maxillofacial surgical consultation is warranted.  There is moderate circumferential mucosal thickening in the left maxillary sinus. The remainder of the facial CT is unremarkable. I communicated the results to Foreign Lora DO, by telephone on 05/25/2025 at 2:00 p.m.  Radiation dose reduction techniques were utilized, including automated exposure control and exposure modulation based on body size.   This report was finalized on 5/25/2025 8:34 PM by Dr. Nikhil Colón M.D on Workstation: [id number]      CT Head Without Contrast  Result Date: 5/24/2025  STAT CT SCAN OF THE HEAD WITHOUT CONTRAST ON 05/23/2025  CLINICAL HISTORY: This is a 69-year-old female patient who had a fall with head injury with an abrasion to the forehead.  TECHNIQUE: Spiral CTA images were obtained from the base of the skull to the vertex without intravenous contrast. The images were reformatted and are submitted in 3 mm thick axial, sagittal and coronal CT sections with brain algorithm and 2 mm thick axial CT sections with high-resolution bone algorithm.  COMPARISON: There are no prior head CTs from [facility] for comparison.  FINDINGS: The brain parenchyma is normal in attenuation. The ventricles are normal in size. I see no focal mass effect. There is no midline shift. No extra axial fluid collections are identified. There is no evidence of acute intracranial hemorrhage. There is a scalp hematoma overlying the anterior right frontal bone from today's head trauma. No underlying acute skull fracture is identified. The calvarium and the skull base are normal in appearance. There is partial opacification of the visualized superior maxillary sinuses with fluid and mucosal thickening and mild anterior ethmoid sinus mucosal thickening. Otherwise, the paranasal sinuses, mastoid air cells and the middle ear cavities are clear. The dural venous sinuses are  hypodense which can be seen if the patient is anemic.      1. No acute intracranial abnormality is identified.  2. There is partial opacification of the visualized superior aspect of the maxillary sinuses with fluid and mucosal thickening bilaterally.  3. The dural venous sinuses are hypodense suggesting that the patient has a low hematocrit and is most probably anemic and correlate clinically.  4. There is a small scalp hematoma over the anterior right frontal bone from today's head trauma. The remainder of the head CT is within normal limits with no acute skull fracture or intracranial hemorrhage identified.  Radiation dose reduction techniques were utilized, including automated exposure control and exposure modulation based on body size.   This report was finalized on 5/24/2025 8:11 AM by Dr. Nikhil Colón M.D on Workstation: [id number]      XR Ankle 3+ View Right  Result Date: 5/23/2025  XR ANKLE 3+ VW RIGHT-5/23/2025  HISTORY: Fell, right ankle pain.  There is artifact from the patient's sock. No fractures are seen. Calcaneal spurs are seen.   This report was finalized on 5/23/2025 6:25 PM by Dr. Tod Avina M.D on Workstation: [id number]      XR Chest 1 View  Result Date: 5/22/2025  XR CHEST 1 VW-  HISTORY: Female who is 69 years-old, nausea, vomiting  TECHNIQUE: Frontal view of the chest  COMPARISON: 11/4/2024  FINDINGS: The heart size is borderline. Aorta is calcified. Pulmonary vasculature is unremarkable. No focal pulmonary consolidation, pleural effusion, or pneumothorax. No acute osseous process.      No evidence for acute pulmonary process. Follow-up as clinical indications persist.  This report was finalized on 5/22/2025 4:28 PM by Dr. Teja Mckinnon M.D on Workstation: [id number]      XR knee 3 views left [id number]  Result Date: 5/16/2025  Knee pain COMPARISON: None EXAM:  Three views FINDINGS: There is a small knee joint effusion.  Moderate to advanced tricompartmental degenerative changes  are noted.  No fracture.  No dislocation.  The surrounding soft tissues demonstrate no abnormalities.   IMPRESSION: Small knee joint effusion Degenerative changes No fractures or dislocations Dictated by: Carlos Hill M.D. Signed by Carlos Hill M.D. on 5/17/2025 14:54 ##### Final ##### Dictated by:    DARCY HILL MD-RAD Dictated DT/TM: 05/17/2025 2:54 pm Interpreted and electronically signed by:  DARCY HILL MD-RAD Signed DT/TM:  05/17/2025 2:54 pm    XR Chest 2 View  Result Date: 5/16/2025  EXAM: CR Chest 2 Vws NUMBER OF IMAGES:  2 INDICATION: DYSPNEA ON EXERTION Technique: Frontal and lateral views of the chest obtained on 5/16/2025 8:46 Comparison:  No prior studies available for comparison. Findings: The cardiomediastinal silhouette is enlarged. There are calcifications of the thoracic aorta. The hilar and mediastinal contours are normal. The lungs are without focal consolidation or pleural effusion. There is no pneumothorax. The visualized osseous structures demonstrate degenerative changes. Lines and Tubes: None. Impression:  No focal consolidation. Dictated by: Nikhil Noe MD Signed by Nikhil Noe MD on 5/16/2025 15:20 ##### Final ##### Dictated by:    NIKHIL NOE MD-RAD Dictated DT/TM: 05/16/2025 3:20 pm Interpreted and electronically signed by:  NIKHIL NOE MD-RAD Signed DT/TM:  05/16/2025 3:20 pm        Chest x-ray reviewed ET tube in good position lungs are pretty clear except there may be a little bit of atelectasis in the left base    Active Hospital Problems    Diagnosis  POA    **GI bleed [K92.2]  Yes    GIB (gastrointestinal bleeding) [K92.2]  Yes      Resolved Hospital Problems   No resolved problems to display.         Assessment & Plan     GI bleed status post 5/29/2025 exploratory laparotomy with jejunal small bowel resection with anastomosis ileal small bowel resection and end loop ileostomy creation.  Bleeding was from small bowel diverticulum no evidence of ongoing  bleeding  Postop respiratory failure patient doing well off ventilator I think she can come off oxygen today  Comminuted orbital wall fracture with facial numbness due to right infraorbital nerve compromise from fracture.  She has been seen by Dr. Corona and he just recommends conservative therapy expects paresthesias to gradually resolve over months  Acute blood loss anemia hemoglobin stable.  Syncope with fall possibly related to her GI bleeding and hypotension.  Morbid obesity with a BMI greater than 52  Obstructive sleep apnea sounds like she is not real compliant with PAP therapy at home I do not think with the NG tube would be a very good idea to even attempt at this time probably just insufflate the stomach more.  Hyperglycemia on sliding scale insulin looks like we have blood sugars reasonably well-controlled  Fluids/electrolytes/nutrition replace electrolytes per protocol.  Tube feeds when okay with surgery  DVT prophylaxis  5000 units every 12 increase this in a day or 2 if no bleeding to every 8 hours    Plan for disposition: Patient can transition to telemetry unit I am going to get the hospitalist to help us with managing her diabetes monitoring her anemia ileus and nutrition to see if they would consider taking in transfer to their service.    Victor Hugo Gonzalez Jr, MD  06/01/25  07:00 EDT    Time:care time 37 minutes

## 2025-06-01 NOTE — PROGRESS NOTES
General Surgery Note    Summary:  POD #2.5 s/p SBR x 2, repair of diagnostic enterotomies, and end-loop ileostomy    Interval Status:  400cc NGT output. 150cc bilious ileostomy output. Only able to pull 100mL on incentive spirometer. CPAP last night.    Physical Exam:    Not sick  Afebrile, HR 77, /58  Nonlabored breathing, 3L NC, poor volumes on IS < 100cc  Abdomen soft, appropriately tender, obese, incision clean and dry with staples, drain serosanguineous      Labs:  Results from last 7 days   Lab Units 06/01/25  0742 06/01/25  0145 05/31/25  2005 05/31/25  0527 05/30/25  2358 05/30/25  1746 05/30/25  1238   WBC 10*3/mm3 12.15* 9.31 11.31* 12.63* 12.02* 11.12* 15.44*   HEMOGLOBIN g/dL 8.6* 8.3* 8.2* 9.3* 8.6* 9.7* 11.7*   PLATELETS 10*3/mm3 86* 89* 98* 120* 101* 95* 128*     Results from last 7 days   Lab Units 06/01/25  0742 05/31/25  0527 05/30/25  1238 05/27/25  0542   SODIUM mmol/L 137 139 138 137   POTASSIUM mmol/L 3.9 4.0 4.6 4.1   CHLORIDE mmol/L 109* 111* 107 107   CO2 mmol/L 23.2 21.2* 21.9* 24.5   BUN mg/dL 14.0 25.0* 25.0* 14   CREATININE mg/dL 0.41* 0.51* 0.82 0.46*   CALCIUM mg/dL 7.6* 7.0* 7.6* 7.1*   BILIRUBIN mg/dL  --  0.9 0.7 0.7   ALK PHOS U/L  --  38* 36* 36*   ALT (SGPT) U/L  --  8 10 16   AST (SGOT) U/L  --  12 14 17   GLUCOSE mg/dL 144* 154* 169* 145*       Plan:  - KUB reviewed  - Keep NGT one more day given low ileostomy output, diagnostic enterotomies and proximal SBR  - Continue TPN  - Aggressive pulmonary toilet, incentive spirometer, flutter valve  - Try to get off levophed  - Start mobilizing, PT consult  - Okay to space out H/H from my standpoint  - Lovenox DVT ppx        Jose G Ny MD  [facility]  [address], [address]  [place], [place], [address]  P: [phone]  F: [phone]

## 2025-06-01 NOTE — PROGRESS NOTES
[facility]  Inpatient Progress Note    Reason for Follow Up: GI bleed    Subjective     Interval History:   Hemoglobin 8.6 hematocrit 27.3.  Patient extubated, using incentive spirometry.  Discussed with patient and family at bedside.  She wishes to have NG out but advised to maintain this for decompression purposes until gut function returns.  Will defer to the surgical service in the postoperative setting.    Current Facility-Administered Medications:     acetaminophen (TYLENOL) tablet 650 mg, 650 mg, Oral, Q4H PRN, 650 mg at 05/28/25 0838 **OR** acetaminophen (TYLENOL) 160 MG/5ML oral solution 650 mg, 650 mg, Oral, Q4H PRN **OR** acetaminophen (TYLENOL) suppository 650 mg, 650 mg, Rectal, Q4H PRN, Gumaro Quiros MD    Adult Central 2-in-1 TPN, , Intravenous, Continuous, Jose G Ny MD, Last Rate: 75 mL/hr at 05/31/25 1810, New Bag at 05/31/25 1810    Atropine Sulfate (PF) injection 0.4 mg, 0.4 mg, Intravenous, Once PRN, Mima Sam CRNA    Calcium Replacement - Follow Nurse / BPA Driven Protocol, , Not Applicable, PRN, Gumaro Quiros MD    dextrose (D50W) (25 g/50 mL) IV injection 25 g, 25 g, Intravenous, Q15 Min PRN, Gumaro Quiros MD    dextrose (GLUTOSE) oral gel 15 g, 15 g, Oral, Q15 Min PRN, Gumaro Quiros MD    diphenhydrAMINE (BENADRYL) injection 12.5 mg, 12.5 mg, Intravenous, Q15 Min PRN, Mima Sam CRNA    enoxaparin sodium (LOVENOX) syringe 40 mg, 40 mg, Subcutaneous, Q12H, Jose G Ny MD    ePHEDrine injection 5 mg, 5 mg, Intravenous, Once PRN, Mima Sam CRNA    Fat Emulsion Plant Based (INTRALIPID,LIPOSYN) 20 % infusion 20 g, 100 mL, Intravenous, Once per day on Tuesday Thursday Saturday, Jose G Ny MD, Last Rate: 8.33 mL/hr at 05/31/25 1811, 20 g at 05/31/25 1811    flumazenil (ROMAZICON) injection 0.2 mg, 0.2 mg, Intravenous, PRN, Mima Sam CRNA    glucagon (GLUCAGEN) injection 1 mg, 1 mg, Intramuscular, Q15  Min PRN, Gumaro Quiros MD    hydrALAZINE (APRESOLINE) injection 5 mg, 5 mg, Intravenous, Q10 Min PRN, Mima Sam CRNA    HYDROmorphone (DILAUDID) injection 0.5 mg, 0.5 mg, Intravenous, Q2H PRN, Jose G Ny MD, 0.5 mg at 06/01/25 0615    HYDROmorphone (DILAUDID) injection 1 mg, 1 mg, Intravenous, Q2H PRN, Jose G Ny MD    insulin regular (humuLIN R,novoLIN R) injection 2-9 Units, 2-9 Units, Subcutaneous, Q6H, Gumaro Quiros MD, 2 Units at 05/31/25 0604    ipratropium-albuterol (DUO-NEB) nebulizer solution 3 mL, 3 mL, Nebulization, Once PRN, Mima Sam CRNA    labetalol (NORMODYNE,TRANDATE) injection 5 mg, 5 mg, Intravenous, Q5 Min PRN, Mima Sam CRNA    Magnesium Standard Dose Replacement - Follow Nurse / BPA Driven Protocol, , Not Applicable, PRN, Gumaro Quiros MD    metoclopramide (REGLAN) injection 5 mg, 5 mg, Intravenous, Q6H PRN, Gumaro Quiros MD, 5 mg at 05/24/25 0602    naloxone (NARCAN) injection 0.2 mg, 0.2 mg, Intravenous, PRN, Mima Sam CRNA    nitroglycerin (NITROSTAT) SL tablet 0.4 mg, 0.4 mg, Sublingual, Q5 Min PRN, Gumaro Quiros MD    norepinephrine (LEVOPHED) 8 mg in 250 mL NS infusion (premix), 0.02-0.3 mcg/kg/min, Intravenous, Titrated, Chelsey Baumann, KYLAH, Last Rate: 4.91 mL/hr at 06/01/25 0221, 0.02 mcg/kg/min at 06/01/25 0221    [DISCONTINUED] ondansetron ODT (ZOFRAN-ODT) disintegrating tablet 4 mg, 4 mg, Oral, Q6H PRN **OR** ondansetron (ZOFRAN) injection 4 mg, 4 mg, Intravenous, Q6H PRN, Gumaro Quiros MD, 4 mg at 05/28/25 0847    ondansetron (ZOFRAN) injection 4 mg, 4 mg, Intravenous, Once PRN, Mima Sam CRNA    pantoprazole (PROTONIX) injection 40 mg, 40 mg, Intravenous, Q12H, Jose G Ny MD, 40 mg at 06/01/25 0914    Pharmacy to Dose TPN, , Not Applicable, Continuous PRN, Jose G Ny MD    phenol (CHLORASEPTIC) 1.4 % liquid 1 spray, 1 spray, Mouth/Throat, Q2H PRN, Jose G Ny MD    Phosphorus  Replacement - Follow Nurse / BPA Driven Protocol, , Not Applicable, Chula DOWELL Ervin H., MD    Potassium Replacement - Follow Nurse / BPA Driven Protocol, , Not Applicable, Chula DOWELL Ervin H., MD    sodium chloride 0.9 % flush 10 mL, 10 mL, Intravenous, PRNChula Ervin H., MD    sodium chloride 0.9 % flush 10 mL, 10 mL, Intravenous, Q12H, Gumaro Quiros MD, 10 mL at 06/01/25 0846    sodium chloride 0.9 % flush 10 mL, 10 mL, Intravenous, PRNChula Ervin H., MD    sodium chloride 0.9 % flush 10 mL, 10 mL, Intravenous, Q12H, Gumaro Quiros MD, 10 mL at 06/01/25 0845    sodium chloride 0.9 % flush 10 mL, 10 mL, Intravenous, Chula DOWELL Ervin H., MD    sodium chloride 0.9 % flush 10 mL, 10 mL, Intravenous, Q12H, TumJose G souza MD, 10 mL at 06/01/25 0845    sodium chloride 0.9 % flush 10 mL, 10 mL, Intravenous, Q12H, TumJose G souza MD, 10 mL at 06/01/25 0845    sodium chloride 0.9 % flush 10 mL, 10 mL, Intravenous, Q12H, TumJose G souza MD, 10 mL at 06/01/25 0845    sodium chloride 0.9 % flush 10 mL, 10 mL, Intravenous, PRNAnant Andrew Z, MD    sodium chloride 0.9 % flush 10 mL, 10 mL, Intravenous, Q12H, Jose G Ny MD, 10 mL at 06/01/25 0845    sodium chloride 0.9 % flush 10 mL, 10 mL, Intravenous, Q12H, Jose G Ny MD, 10 mL at 06/01/25 0845    sodium chloride 0.9 % flush 10 mL, 10 mL, Intravenous, Q12H, TumJose G souza MD, 10 mL at 06/01/25 0845    sodium chloride 0.9 % flush 10 mL, 10 mL, Intravenous, PRNAnant Andrew Z, MD    sodium chloride 0.9 % flush 20 mL, 20 mL, Intravenous, PRN, Jose G Ny MD    sodium chloride 0.9 % flush 20 mL, 20 mL, Intravenous, PRN, Jose G Ny MD    sodium chloride 0.9 % infusion 40 mL, 40 mL, Intravenous, PRNChula Ervin H., MD    sodium chloride 0.9 % infusion 40 mL, 40 mL, Intravenous, PRNChula Ervin H., MD    sodium chloride 0.9 % infusion 40 mL, 40 mL, Intravenous, PRN, Jose G Ny MD    sodium chloride 0.9 % infusion 40 mL, 40 mL,  Intravenous, PRN, Jose G Ny MD    traZODone (DESYREL) tablet 150 mg, 150 mg, Oral, Nightly PRN, Gumaro Quiros MD, 150 mg at 05/27/25 2125  Review of Systems:    All systems were reviewed and negative except for:  Gastrointestinal: positive for  See HPI    Objective     Vital Signs  Temp:  [97.5 °F (36.4 °C)-99.7 °F (37.6 °C)] 97.9 °F (36.6 °C)  Heart Rate:  [55-89] 77  Resp:  [15-20] 15  BP: ()/(38-87) 116/58  FiO2 (%):  [21 %] 21 %  Body mass index is 52 kg/m².    Intake/Output Summary (Last 24 hours) at 6/1/2025 0931  Last data filed at 6/1/2025 0600  Gross per 24 hour   Intake --   Output 1500 ml   Net -1500 ml     No intake/output data recorded.     Physical Exam:   General: patient awake, alert and cooperative   Eyes: Normal lids and lashes, no scleral icterus   Neck: supple, normal ROM   Skin: warm and dry, not jaundiced   Cardiovascular: regular rhythm and rate, no murmurs auscultated   Pulm: clear to auscultation bilaterally, regular and unlabored   Abdomen: soft, nontender, nondistended; normal bowel sounds   Extremities: no rash or edema   Psychiatric: Normal mood and behavior; memory intact     Results Review:     I reviewed the patient's new clinical results.    Results from last 7 days   Lab Units 06/01/25  0742 06/01/25  0145 05/31/25 2005   WBC 10*3/mm3 12.15* 9.31 11.31*   HEMOGLOBIN g/dL 8.6* 8.3* 8.2*   HEMATOCRIT % 27.3* 24.4* 25.1*   PLATELETS 10*3/mm3 86* 89* 98*     Results from last 7 days   Lab Units 06/01/25  0742 05/31/25  0527 05/30/25  1238 05/27/25  0542   SODIUM mmol/L 137 139 138 137   POTASSIUM mmol/L 3.9 4.0 4.6 4.1   CHLORIDE mmol/L 109* 111* 107 107   CO2 mmol/L 23.2 21.2* 21.9* 24.5   BUN mg/dL 14.0 25.0* 25.0* 14   CREATININE mg/dL 0.41* 0.51* 0.82 0.46*   CALCIUM mg/dL 7.6* 7.0* 7.6* 7.1*   BILIRUBIN mg/dL  --  0.9 0.7 0.7   ALK PHOS U/L  --  38* 36* 36*   ALT (SGPT) U/L  --  8 10 16   AST (SGOT) U/L  --  12 14 17   GLUCOSE mg/dL 144* 154* 169* 145*     Results  from last 7 days   Lab Units 05/26/25  0627   INR  1.25*     Lab Results   Lab Value Date/Time    LIPASE 17 05/22/2025 1611       Radiology:  XR Abdomen KUB   Final Result       As described.           This report was finalized on 6/1/2025 9:25 AM by Dr. Teja Mckinnon M.D on Workstation: [id number]          XR Chest 1 View   Final Result   As described.       This report was finalized on 5/31/2025 6:58 AM by Dr. Teja Mckinnon M.D on Workstation: [id number]          XR Abdomen KUB   Final Result   Enteric tube overlying the stomach.       This report was finalized on 5/30/2025 10:59 AM by Dr. Scott Leahy M.D on Workstation: [id number]          XR Chest 1 View   Final Result   Endotracheal tube terminates at the elizabeth. Retraction by approximately   2 to 3 cm would allow for more optimal positioning.       This report was finalized on 5/30/2025 4:23 AM by Dr. Jennifer England M.D on Workstation: [id number]          IR Embolization   Final Result   Angiography of the superior mesenteric artery and its branches as   described above. No evidence of active contrast extravasation into the   small bowel is demonstrated.               This report was finalized on 5/29/2025 8:10 AM by Dr. Jose G Pemberton M.D on Workstation: [id number]          CT Angiogram Abdomen Pelvis   Final Result       1. Accumulation of high density contrast within the lumen of a short   segment of small bowel in the central and left upper hemipelvis, likely   jejunum, and within a small bowel diverticulum in the central upper   pelvis, likely representing active GI bleed.   2. 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   3. Small bowel and colonic diverticula without evidence of   diverticulitis.       This report was finalized on 5/28/2025 2:40 PM by Johnson Garcia MD on   Workstation: [id number]          NM GI Blood Loss   Final Result   No  scintigraphic evidence of active gastrointestinal   bleeding during the course of the study.       This report was finalized on 5/27/2025 8:52 AM by Dr. Scott Leahy M.D on Workstation: [id number]          CT Angiogram Abdomen Pelvis   Final Result       1. No contrast extravasation is identified.   2. Either occlusion or near occlusive stenosis of the origin of the   celiac axis. Patient also appears to have moderate stenosis at the   origin of the SMA. Inferior mesenteric artery is patent.   3. Liquid stool noted within the colon.       Radiation dose reduction techniques were utilized, including automated   exposure control and exposure modulation based on body size.           This report was finalized on 5/27/2025 12:24 AM by Dr. Jennifer England M.D on Workstation: [id number]          CT Facial Bones With Contrast   Final Result   1. On 05/23/2025, this patient had a small scalp hematoma with bubbles   of air in the scalp over the anterior right frontal bone from associated   scalp laceration from head trauma. On the head CT on 5/23/2025, the   right orbital floor-right inferior orbital wall was not assessed as it   was excluded from the field of imaging and not assessed. On the current   facial CT there is an acute comminuted fracture of the central aspect of   the mid to anterior portion of the right inferior orbital wall with a 12   x 9 mm bony defect in the central to anterior aspect of the right   inferior orbital wall with inferior displacement of inferior orbital   wall fracture fragments along with inferior extraconal fat of the right   orbit by 7 mm into the superior aspect of the right maxillary sinus,   there is some inferior tenting of the inferior margin of the right   inferior rectus muscle at the level of the fracture plane. The acute   comminuted fracture extends into the right inferior orbital canal and   could compromise the right infraorbital nerve. There is a tiny amount of    hemorrhage in the posterior right maxillary sinus. Maxillofacial   surgical consultation is warranted.       There is moderate circumferential mucosal thickening in the left   maxillary sinus. The remainder of the facial CT is unremarkable. I   communicated the results to Foreign Lora DO, by telephone on   05/25/2025 at 2:00 p.m.       Radiation dose reduction techniques were utilized, including automated   exposure control and exposure modulation based on body size.           This report was finalized on 5/25/2025 8:34 PM by Dr. Nikhil Colón M.D   on Workstation: [id number]          XR Ankle 3+ View Right   Final Result      CT Head Without Contrast   Final Result   1. No acute intracranial abnormality is identified.       2. There is partial opacification of the visualized superior aspect of   the maxillary sinuses with fluid and mucosal thickening bilaterally.       3. The dural venous sinuses are hypodense suggesting that the patient   has a low hematocrit and is most probably anemic and correlate   clinically.       4. There is a small scalp hematoma over the anterior right frontal bone   from today's head trauma. The remainder of the head CT is within normal   limits with no acute skull fracture or intracranial hemorrhage   identified.       Radiation dose reduction techniques were utilized, including automated   exposure control and exposure modulation based on body size.           This report was finalized on 5/24/2025 8:11 AM by Dr. Nikhil Colón M.D   on Workstation: [id number]          CT Abdomen Pelvis With Contrast   Final Result   1. Colonic diverticulosis. There is no evidence of acute diverticulitis.   2. Status post cholecystectomy.   3. Fatty infiltration of the liver.       Radiation dose reduction techniques were utilized, including automated   exposure control and exposure modulation based on body size.           This report was finalized on 5/27/2025 7:54 AM by Dr. Baron  JEANNE Avina on Workstation: [id number]          XR Chest 1 View   Final Result   No evidence for acute pulmonary process. Follow-up as   clinical indications persist.       This report was finalized on 5/22/2025 4:28 PM by Dr. Teja Mckinnon M.D on Workstation: [id number]              Assessment & Plan     Active Hospital Problems    Diagnosis     **GI bleed     GIB (gastrointestinal bleeding)        Assessment:  Obscure GI bleeding status post surgical intervention with small bowel resection and double barrel ostomy  Nutrition currently addressed with TPN      Plan:  Will continue to monitor from the periphery in the postoperative setting  I discussed the patients findings and my recommendations with patient and family.    Jaxon Berger MD

## 2025-06-01 NOTE — PROGRESS NOTES
General Surgery Note    Summary:  Postoperative day 1 status post small bowel resection x 2, repair of diagnostic enterotomies, and end loop ileostomy    Interval Status:  On low-dose Levophed this morning.  Hemoglobin remained stable.  No obvious blood in ileostomy appliance.  Following commands, breathing spontaneously.    Physical Exam:    Afebrile, heart rate 76, /66  Following commands  Breathing spontaneously, pulling good volumes  Left upper extremity PICC  Abdomen soft, appropriately tender, incision in good order with staples and no signs of infection, drain serosanguineous, bilious nonbloody ileostomy output      Labs:  Results from last 7 days   Lab Units 05/31/25  0527 05/30/25  2358 05/30/25  1746 05/30/25  1238 05/30/25  0626 05/29/25  2246 05/29/25  1753 05/29/25  1149   WBC 10*3/mm3 12.63* 12.02* 11.12* 15.44* 15.84*  --  10.30 11.45*   HEMOGLOBIN g/dL 9.3* 8.6* 9.7* 11.7* 13.1  --  5.7* 9.2*   HEMOGLOBIN, POC   --   --   --   --   --    < >  --   --    PLATELETS 10*3/mm3 120* 101* 95* 128* 111*  --  123* 232    < > = values in this interval not displayed.     Results from last 7 days   Lab Units 05/31/25  0527 05/30/25  1238 05/27/25  0542   SODIUM mmol/L 139 138 137   POTASSIUM mmol/L 4.0 4.6 4.1   CHLORIDE mmol/L 111* 107 107   CO2 mmol/L 21.2* 21.9* 24.5   BUN mg/dL 25.0* 25.0* 14   CREATININE mg/dL 0.51* 0.82 0.46*   CALCIUM mg/dL 7.0* 7.6* 7.1*   BILIRUBIN mg/dL 0.9 0.7 0.7   ALK PHOS U/L 38* 36* 36*   ALT (SGPT) U/L 8 10 16   AST (SGOT) U/L 12 14 17   GLUCOSE mg/dL 154* 169* 145*       Plan:  - Work towards extubation  - Wean vasopressors as able  - Continue TPN and NG tube to suction  - Prefer to avoid BiPAP if possible  - Transition to Lovenox tomorrow for DVT prophylaxis        Jose G Ny MD  [facility]  [address], [address]  [place], [place], [address]  P: [phone]  F: [phone]

## 2025-06-02 LAB
ALBUMIN SERPL-MCNC: 1.9 G/DL (ref 3.5–5.2)
ALBUMIN SERPL-MCNC: 2 G/DL (ref 3.5–5.2)
ALBUMIN/GLOB SERPL: 0.9 G/DL
ALBUMIN/GLOB SERPL: 1.3 G/DL
ALP SERPL-CCNC: 62 U/L (ref 39–117)
ALP SERPL-CCNC: 72 U/L (ref 39–117)
ALT SERPL W P-5'-P-CCNC: 18 U/L (ref 1–33)
ALT SERPL W P-5'-P-CCNC: 22 U/L (ref 1–33)
ANION GAP SERPL CALCULATED.3IONS-SCNC: 5.8 MMOL/L (ref 5–15)
ANION GAP SERPL CALCULATED.3IONS-SCNC: 6.9 MMOL/L (ref 5–15)
ANION GAP SERPL CALCULATED.3IONS-SCNC: 8.3 MMOL/L (ref 5–15)
AST SERPL-CCNC: 23 U/L (ref 1–32)
AST SERPL-CCNC: 26 U/L (ref 1–32)
BH BB BLOOD EXPIRATION DATE: NORMAL
BH BB BLOOD TYPE BARCODE: 5100
BH BB DISPENSE STATUS: NORMAL
BH BB PRODUCT CODE: NORMAL
BH BB UNIT NUMBER: NORMAL
BILIRUB SERPL-MCNC: 0.6 MG/DL (ref 0–1.2)
BILIRUB SERPL-MCNC: 0.7 MG/DL (ref 0–1.2)
BUN SERPL-MCNC: 11 MG/DL (ref 8–23)
BUN/CREAT SERPL: 31.4 (ref 7–25)
BUN/CREAT SERPL: 33.3 (ref 7–25)
BUN/CREAT SERPL: 45.8 (ref 7–25)
CA-I SERPL ISE-MCNC: 1.18 MMOL/L (ref 1.15–1.35)
CALCIUM SPEC-SCNC: 7.9 MG/DL (ref 8.6–10.5)
CALCIUM SPEC-SCNC: 7.9 MG/DL (ref 8.6–10.5)
CALCIUM SPEC-SCNC: 8 MG/DL (ref 8.6–10.5)
CHLORIDE SERPL-SCNC: 103 MMOL/L (ref 98–107)
CHLORIDE SERPL-SCNC: 105 MMOL/L (ref 98–107)
CHLORIDE SERPL-SCNC: 105 MMOL/L (ref 98–107)
CO2 SERPL-SCNC: 22.7 MMOL/L (ref 22–29)
CO2 SERPL-SCNC: 25.1 MMOL/L (ref 22–29)
CO2 SERPL-SCNC: 26.2 MMOL/L (ref 22–29)
CREAT SERPL-MCNC: 0.24 MG/DL (ref 0.57–1)
CREAT SERPL-MCNC: 0.33 MG/DL (ref 0.57–1)
CREAT SERPL-MCNC: 0.35 MG/DL (ref 0.57–1)
CROSSMATCH INTERPRETATION: NORMAL
DEPRECATED RDW RBC AUTO: 49.7 FL (ref 37–54)
DEPRECATED RDW RBC AUTO: 55.7 FL (ref 37–54)
DEPRECATED RDW RBC AUTO: 56 FL (ref 37–54)
EGFRCR SERPLBLD CKD-EPI 2021: 109.4 ML/MIN/1.73
EGFRCR SERPLBLD CKD-EPI 2021: 111 ML/MIN/1.73
EGFRCR SERPLBLD CKD-EPI 2021: 119.8 ML/MIN/1.73
ERYTHROCYTE [DISTWIDTH] IN BLOOD BY AUTOMATED COUNT: 15.6 % (ref 12.3–15.4)
ERYTHROCYTE [DISTWIDTH] IN BLOOD BY AUTOMATED COUNT: 15.7 % (ref 12.3–15.4)
ERYTHROCYTE [DISTWIDTH] IN BLOOD BY AUTOMATED COUNT: 16 % (ref 12.3–15.4)
GLOBULIN UR ELPH-MCNC: 1.5 GM/DL
GLOBULIN UR ELPH-MCNC: 2.3 GM/DL
GLUCOSE BLDC GLUCOMTR-MCNC: 122 MG/DL (ref 70–130)
GLUCOSE BLDC GLUCOMTR-MCNC: 124 MG/DL (ref 70–130)
GLUCOSE BLDC GLUCOMTR-MCNC: 125 MG/DL (ref 70–130)
GLUCOSE BLDC GLUCOMTR-MCNC: 135 MG/DL (ref 70–130)
GLUCOSE BLDC GLUCOMTR-MCNC: 135 MG/DL (ref 70–130)
GLUCOSE SERPL-MCNC: 134 MG/DL (ref 65–99)
GLUCOSE SERPL-MCNC: 138 MG/DL (ref 65–99)
GLUCOSE SERPL-MCNC: 567 MG/DL (ref 65–99)
HCT VFR BLD AUTO: 24.7 % (ref 34–46.6)
HCT VFR BLD AUTO: 25.2 % (ref 34–46.6)
HCT VFR BLD AUTO: 27 % (ref 34–46.6)
HGB BLD-MCNC: 7.7 G/DL (ref 12–15.9)
HGB BLD-MCNC: 8.1 G/DL (ref 12–15.9)
HGB BLD-MCNC: 8.1 G/DL (ref 12–15.9)
HGB BLD-MCNC: 8.4 G/DL (ref 12–15.9)
INR PPP: 1.15 (ref 0.9–1.1)
MAGNESIUM SERPL-MCNC: 2.2 MG/DL (ref 1.6–2.4)
MCH RBC QN AUTO: 30.1 PG (ref 26.6–33)
MCH RBC QN AUTO: 30.4 PG (ref 26.6–33)
MCH RBC QN AUTO: 30.5 PG (ref 26.6–33)
MCHC RBC AUTO-ENTMCNC: 30.6 G/DL (ref 31.5–35.7)
MCHC RBC AUTO-ENTMCNC: 31.1 G/DL (ref 31.5–35.7)
MCHC RBC AUTO-ENTMCNC: 32.8 G/DL (ref 31.5–35.7)
MCV RBC AUTO: 92.9 FL (ref 79–97)
MCV RBC AUTO: 96.8 FL (ref 79–97)
MCV RBC AUTO: 99.6 FL (ref 79–97)
PHOSPHATE SERPL-MCNC: 4.5 MG/DL (ref 2.5–4.5)
PLATELET # BLD AUTO: 112 10*3/MM3 (ref 140–450)
PLATELET # BLD AUTO: 118 10*3/MM3 (ref 140–450)
PLATELET # BLD AUTO: 138 10*3/MM3 (ref 140–450)
PMV BLD AUTO: 10.3 FL (ref 6–12)
PMV BLD AUTO: 10.5 FL (ref 6–12)
PMV BLD AUTO: 10.9 FL (ref 6–12)
POTASSIUM SERPL-SCNC: 4 MMOL/L (ref 3.5–5.2)
POTASSIUM SERPL-SCNC: 4 MMOL/L (ref 3.5–5.2)
POTASSIUM SERPL-SCNC: 6.3 MMOL/L (ref 3.5–5.2)
PREALB SERPL-MCNC: 7.9 MG/DL (ref 20–40)
PROT SERPL-MCNC: 3.4 G/DL (ref 6–8.5)
PROT SERPL-MCNC: 4.3 G/DL (ref 6–8.5)
PROTHROMBIN TIME: 14.7 SECONDS (ref 11.7–14.2)
RBC # BLD AUTO: 2.53 10*6/MM3 (ref 3.77–5.28)
RBC # BLD AUTO: 2.66 10*6/MM3 (ref 3.77–5.28)
RBC # BLD AUTO: 2.79 10*6/MM3 (ref 3.77–5.28)
SODIUM SERPL-SCNC: 134 MMOL/L (ref 136–145)
SODIUM SERPL-SCNC: 137 MMOL/L (ref 136–145)
SODIUM SERPL-SCNC: 137 MMOL/L (ref 136–145)
TRIGL SERPL-MCNC: 127 MG/DL (ref 0–150)
UNIT  ABO: NORMAL
UNIT  RH: NORMAL
WBC NRBC COR # BLD AUTO: 6.7 10*3/MM3 (ref 3.4–10.8)
WBC NRBC COR # BLD AUTO: 7.27 10*3/MM3 (ref 3.4–10.8)
WBC NRBC COR # BLD AUTO: 9.08 10*3/MM3 (ref 3.4–10.8)

## 2025-06-02 PROCEDURE — 82948 REAGENT STRIP/BLOOD GLUCOSE: CPT

## 2025-06-02 PROCEDURE — 84478 ASSAY OF TRIGLYCERIDES: CPT | Performed by: INTERNAL MEDICINE

## 2025-06-02 PROCEDURE — 25010000002 MAGNESIUM SULFATE PER 500 MG OF MAGNESIUM: Performed by: INTERNAL MEDICINE

## 2025-06-02 PROCEDURE — 85610 PROTHROMBIN TIME: CPT | Performed by: INTERNAL MEDICINE

## 2025-06-02 PROCEDURE — 25010000002 HYDROMORPHONE 1 MG/ML SOLUTION: Performed by: INTERNAL MEDICINE

## 2025-06-02 PROCEDURE — 25010000002 ENOXAPARIN PER 10 MG: Performed by: INTERNAL MEDICINE

## 2025-06-02 PROCEDURE — 82330 ASSAY OF CALCIUM: CPT | Performed by: INTERNAL MEDICINE

## 2025-06-02 PROCEDURE — 85018 HEMOGLOBIN: CPT | Performed by: STUDENT IN AN ORGANIZED HEALTH CARE EDUCATION/TRAINING PROGRAM

## 2025-06-02 PROCEDURE — 99024 POSTOP FOLLOW-UP VISIT: CPT | Performed by: STUDENT IN AN ORGANIZED HEALTH CARE EDUCATION/TRAINING PROGRAM

## 2025-06-02 PROCEDURE — 25010000002 CALCIUM GLUCONATE PER 10 ML: Performed by: INTERNAL MEDICINE

## 2025-06-02 PROCEDURE — 94799 UNLISTED PULMONARY SVC/PX: CPT

## 2025-06-02 PROCEDURE — 85027 COMPLETE CBC AUTOMATED: CPT | Performed by: INTERNAL MEDICINE

## 2025-06-02 PROCEDURE — 25010000002 POTASSIUM CHLORIDE PER 2 MEQ OF POTASSIUM: Performed by: INTERNAL MEDICINE

## 2025-06-02 PROCEDURE — 97162 PT EVAL MOD COMPLEX 30 MIN: CPT

## 2025-06-02 PROCEDURE — 84134 ASSAY OF PREALBUMIN: CPT | Performed by: INTERNAL MEDICINE

## 2025-06-02 PROCEDURE — 84100 ASSAY OF PHOSPHORUS: CPT | Performed by: INTERNAL MEDICINE

## 2025-06-02 PROCEDURE — 99232 SBSQ HOSP IP/OBS MODERATE 35: CPT | Performed by: INTERNAL MEDICINE

## 2025-06-02 PROCEDURE — 97530 THERAPEUTIC ACTIVITIES: CPT

## 2025-06-02 PROCEDURE — 83735 ASSAY OF MAGNESIUM: CPT | Performed by: INTERNAL MEDICINE

## 2025-06-02 PROCEDURE — 80053 COMPREHEN METABOLIC PANEL: CPT | Performed by: INTERNAL MEDICINE

## 2025-06-02 RX ADMIN — PANTOPRAZOLE SODIUM 40 MG: 40 INJECTION, POWDER, FOR SOLUTION INTRAVENOUS at 22:06

## 2025-06-02 RX ADMIN — Medication 10 ML: at 08:28

## 2025-06-02 RX ADMIN — HYDROMORPHONE HYDROCHLORIDE 1 MG: 1 INJECTION, SOLUTION INTRAMUSCULAR; INTRAVENOUS; SUBCUTANEOUS at 00:35

## 2025-06-02 RX ADMIN — Medication 10 ML: at 22:07

## 2025-06-02 RX ADMIN — ENOXAPARIN SODIUM 40 MG: 100 INJECTION SUBCUTANEOUS at 06:21

## 2025-06-02 RX ADMIN — HYDROMORPHONE HYDROCHLORIDE 1 MG: 1 INJECTION, SOLUTION INTRAMUSCULAR; INTRAVENOUS; SUBCUTANEOUS at 06:24

## 2025-06-02 RX ADMIN — PANTOPRAZOLE SODIUM 40 MG: 40 INJECTION, POWDER, FOR SOLUTION INTRAVENOUS at 09:05

## 2025-06-02 RX ADMIN — HYDROMORPHONE HYDROCHLORIDE 1 MG: 1 INJECTION, SOLUTION INTRAMUSCULAR; INTRAVENOUS; SUBCUTANEOUS at 09:05

## 2025-06-02 RX ADMIN — POTASSIUM PHOSPHATE, MONOBASIC POTASSIUM PHOSPHATE, DIBASIC: 224; 236 INJECTION, SOLUTION, CONCENTRATE INTRAVENOUS at 18:08

## 2025-06-02 RX ADMIN — HYDROMORPHONE HYDROCHLORIDE 1 MG: 1 INJECTION, SOLUTION INTRAMUSCULAR; INTRAVENOUS; SUBCUTANEOUS at 18:05

## 2025-06-02 RX ADMIN — HYDROMORPHONE HYDROCHLORIDE 1 MG: 1 INJECTION, SOLUTION INTRAMUSCULAR; INTRAVENOUS; SUBCUTANEOUS at 22:06

## 2025-06-02 RX ADMIN — Medication 10 ML: at 22:06

## 2025-06-02 RX ADMIN — HYDROMORPHONE HYDROCHLORIDE 1 MG: 1 INJECTION, SOLUTION INTRAMUSCULAR; INTRAVENOUS; SUBCUTANEOUS at 11:56

## 2025-06-02 NOTE — PROGRESS NOTES
Gastroenterology   Inpatient Progress Note    Reason for Follow Up: GI bleed    Subjective     Interval History:   No bleeding reported per patient or family.  NG tube has been removed.    Current Facility-Administered Medications:     acetaminophen (TYLENOL) tablet 650 mg, 650 mg, Oral, Q4H PRN, 650 mg at 05/28/25 0838 **OR** acetaminophen (TYLENOL) 160 MG/5ML oral solution 650 mg, 650 mg, Oral, Q4H PRN **OR** acetaminophen (TYLENOL) suppository 650 mg, 650 mg, Rectal, Q4H PRN, Victor Hugo Gonzalez Jr., MD    Adult Central 2-in-1 TPN, , Intravenous, Continuous, Victor Hugo Gonzalez Jr., MD, Last Rate: 75 mL/hr at 06/01/25 1820, New Bag at 06/01/25 1820    Calcium Replacement - Follow Nurse / BPA Driven Protocol, , Not Applicable, PRN, Victor Hugo Gonzalez Jr., MD    dextrose (D50W) (25 g/50 mL) IV injection 25 g, 25 g, Intravenous, Q15 Min PRN, Victor Hugo Gonzalez Jr., MD    dextrose (GLUTOSE) oral gel 15 g, 15 g, Oral, Q15 Min PRN, Victor Hugo Gonzalez Jr., MD    [Held by provider] enoxaparin sodium (LOVENOX) syringe 40 mg, 40 mg, Subcutaneous, Q12H, Victor Hugo Gonzalez Jr., MD, 40 mg at 06/02/25 0621    [START ON 6/3/2025] Fat Emulsion Plant Based (INTRALIPID,LIPOSYN) 20 % infusion 50 g, 250 mL, Intravenous, Once per day on Tuesday Thursday Saturday, Victor Hugo Gonzalez Jr., MD    glucagon (GLUCAGEN) injection 1 mg, 1 mg, Intramuscular, Q15 Min PRN, Victor Hugo Gonzalez Jr., MD    HYDROmorphone (DILAUDID) injection 0.5 mg, 0.5 mg, Intravenous, Q2H PRN, Victor Hugo Gonzalez Jr., MD, 0.5 mg at 06/01/25 1029    HYDROmorphone (DILAUDID) injection 1 mg, 1 mg, Intravenous, Q2H PRN, Victor Hugo Gonzalez Jr., MD, 1 mg at 06/02/25 0905    insulin regular (humuLIN R,novoLIN R) injection 2-9 Units, 2-9 Units, Subcutaneous, Q6H, Victor Hugo Gonzalez Jr., MD, 2 Units at 05/31/25 0604    Magnesium Standard Dose Replacement - Follow Nurse / BPA Driven Protocol, , Not Applicable, PRNLisa Harold Dale Jr., MD    metoclopramide  (REGLAN) injection 5 mg, 5 mg, Intravenous, Q6H PRN, Victor Hugo Gonzalez Jr., MD, 5 mg at 05/24/25 0602    nitroglycerin (NITROSTAT) SL tablet 0.4 mg, 0.4 mg, Sublingual, Q5 Min PRN, Victor Hugo Gonzalez Jr., MD    [DISCONTINUED] ondansetron ODT (ZOFRAN-ODT) disintegrating tablet 4 mg, 4 mg, Oral, Q6H PRN **OR** ondansetron (ZOFRAN) injection 4 mg, 4 mg, Intravenous, Q6H PRN, Victor Hugo Gonzalez Jr., MD, 4 mg at 05/28/25 0847    pantoprazole (PROTONIX) injection 40 mg, 40 mg, Intravenous, Q12H, Victor Hugo Gonzalez Jr., MD, 40 mg at 06/02/25 0905    Pharmacy to Dose TPN, , Not Applicable, Continuous PRN, Victor Hugo Gonzalez Jr., MD    phenol (CHLORASEPTIC) 1.4 % liquid 1 spray, 1 spray, Mouth/Throat, Q2H PRN, Victor Hugo Gonzalez Jr., MD    Phosphorus Replacement - Follow Nurse / BPA Driven Protocol, , Not Applicable, PRNLisa Harold Dale Jr., MD    Potassium Replacement - Follow Nurse / BPA Driven Protocol, , Not Applicable, Lisa DOWELL Harold Dale Jr., MD    sodium chloride 0.9 % flush 10 mL, 10 mL, Intravenous, PRNLisa Harold Dale Jr., MD    sodium chloride 0.9 % flush 10 mL, 10 mL, Intravenous, PRNLisa Harold Dale Jr., MD    sodium chloride 0.9 % flush 10 mL, 10 mL, Intravenous, PRNLisa Harold Dale Jr., MD    sodium chloride 0.9 % flush 10 mL, 10 mL, Intravenous, PRNLisa Harold Dale Jr., MD    sodium chloride 0.9 % flush 10 mL, 10 mL, Intravenous, Q12H, Victor Hugo Gonzalez Jr., MD, 10 mL at 06/02/25 0828    sodium chloride 0.9 % flush 10 mL, 10 mL, Intravenous, Q12H, Victor Hugo Gonzalez Jr., MD, 10 mL at 06/02/25 0828    sodium chloride 0.9 % flush 10 mL, 10 mL, Intravenous, Q12H, Victor Hugo Gonzalez Jr., MD, 10 mL at 06/02/25 0828    sodium chloride 0.9 % flush 10 mL, 10 mL, Intravenous, PRN, Victor Hugo Gonzalez Jr., MD    sodium chloride 0.9 % flush 20 mL, 20 mL, Intravenous, PRN, Victor Hugo Gonzalez Jr., MD    sodium chloride 0.9 % flush 20 mL, 20 mL, Intravenous, PRN, Victor Hugo Gonzalez  Genaro King MD    sodium chloride 0.9 % infusion 40 mL, 40 mL, Intravenous, PRN, Victor Hugo Gonzalez Jr., MD    sodium chloride 0.9 % infusion 40 mL, 40 mL, Intravenous, PRN, Victor Hugo Gonzalez Jr., MD    sodium chloride 0.9 % infusion 40 mL, 40 mL, Intravenous, PRN, Victor Hugo Gonzalez Jr., MD    sodium chloride 0.9 % infusion 40 mL, 40 mL, Intravenous, PRN, Victor Hugo Gonzalez Jr., MD    traZODone (DESYREL) tablet 150 mg, 150 mg, Oral, Nightly PRN, Victor Hugo Gonzalez Jr., MD, 150 mg at 05/27/25 2125  Review of Systems:    The following systems were reviewed and negative;  gastrointestinal    Objective     Vital Signs  Temp:  [97.5 °F (36.4 °C)-98.3 °F (36.8 °C)] 98.2 °F (36.8 °C)  Heart Rate:  [79-89] 79  Resp:  [16-20] 18  BP: (123-154)/(54-76) 145/58  Body mass index is 52 kg/m².    Intake/Output Summary (Last 24 hours) at 6/2/2025 0951  Last data filed at 6/2/2025 0942  Gross per 24 hour   Intake 2612.5 ml   Output 2880 ml   Net -267.5 ml     I/O this shift:  In: 0   Out: 250 [Stool:250]     Physical Exam:   General: patient awake, alert and cooperative   Eyes: no scleral icterus   Skin: warm and dry, not jaundiced   Abdomen: soft, nontender, nondistended; normal bowel sounds, no masses palpated, no periumbical lymphadenopathy   Psychiatric: Appropriate affect and behavior     Results Review:     I reviewed the patient's new clinical results.    Results from last 7 days   Lab Units 06/02/25  0532 06/01/25  2358 06/01/25  1811   WBC 10*3/mm3 7.27 9.08 9.12   HEMOGLOBIN g/dL 7.7* 8.4* 8.5*   HEMATOCRIT % 25.2* 27.0* 27.4*   PLATELETS 10*3/mm3 118* 112* 108*     Results from last 7 days   Lab Units 06/02/25  0532 06/01/25  0742 05/31/25  0527 05/30/25  1238   SODIUM mmol/L 134* 137 139 138   POTASSIUM mmol/L 6.3* 3.9 4.0 4.6   CHLORIDE mmol/L 103 109* 111* 107   CO2 mmol/L 22.7 23.2 21.2* 21.9*   BUN mg/dL 11.0 14.0 25.0* 25.0*   CREATININE mg/dL 0.33* 0.41* 0.51* 0.82   CALCIUM mg/dL 7.9* 7.6* 7.0* 7.6*    BILIRUBIN mg/dL 0.6  --  0.9 0.7   ALK PHOS U/L 62  --  38* 36*   ALT (SGPT) U/L 18  --  8 10   AST (SGOT) U/L 23  --  12 14   GLUCOSE mg/dL 567* 144* 154* 169*     Results from last 7 days   Lab Units 06/02/25  0532   INR  1.15*     Lab Results   Lab Value Date/Time    LIPASE 17 05/22/2025 1611       Radiology:  XR Abdomen KUB   Final Result       As described.           This report was finalized on 6/1/2025 9:25 AM by Dr. Teja Mckinnon M.D on Workstation: [email or web address]          XR Chest 1 View   Final Result   As described.       This report was finalized on 5/31/2025 6:58 AM by Dr. Teja Mckinnon M.D on Workstation: [email or web address]          XR Abdomen KUB   Final Result   Enteric tube overlying the stomach.       This report was finalized on 5/30/2025 10:59 AM by Dr. Scott Leahy M.D on Workstation: [id number]          XR Chest 1 View   Final Result   Endotracheal tube terminates at the elizabeth. Retraction by approximately   2 to 3 cm would allow for more optimal positioning.       This report was finalized on 5/30/2025 4:23 AM by Dr. Jennifer England M.D on Workstation: [id number]          IR Embolization   Final Result   Angiography of the superior mesenteric artery and its branches as   described above. No evidence of active contrast extravasation into the   small bowel is demonstrated.               This report was finalized on 5/29/2025 8:10 AM by Dr. Jose G Pemberton M.D on Workstation: [id number]          CT Angiogram Abdomen Pelvis   Final Result       1. Accumulation of high density contrast within the lumen of a short   segment of small bowel in the central and left upper hemipelvis, likely   jejunum, and within a small bowel diverticulum in the central upper   pelvis, likely representing active GI bleed.   2. 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   3. Small bowel and colonic diverticula without  evidence of   diverticulitis.       This report was finalized on 5/28/2025 2:40 PM by Johnson Garcia MD on   Workstation: [id number]          NM GI Blood Loss   Final Result   No scintigraphic evidence of active gastrointestinal   bleeding during the course of the study.       This report was finalized on 5/27/2025 8:52 AM by Dr. Scott Leahy M.D on Workstation: [email or web address]          CT Angiogram Abdomen Pelvis   Final Result       1. No contrast extravasation is identified.   2. Either occlusion or near occlusive stenosis of the origin of the   celiac axis. Patient also appears to have moderate stenosis at the   origin of the SMA. Inferior mesenteric artery is patent.   3. Liquid stool noted within the colon.       Radiation dose reduction techniques were utilized, including automated   exposure control and exposure modulation based on body size.           This report was finalized on 5/27/2025 12:24 AM by Dr. Jennifer England M.D on Workstation: [email or web address]          CT Facial Bones With Contrast   Final Result   1. On 05/23/2025, this patient had a small scalp hematoma with bubbles   of air in the scalp over the anterior right frontal bone from associated   scalp laceration from head trauma. On the head CT on 5/23/2025, the   right orbital floor-right inferior orbital wall was not assessed as it   was excluded from the field of imaging and not assessed. On the current   facial CT there is an acute comminuted fracture of the central aspect of   the mid to anterior portion of the right inferior orbital wall with a 12   x 9 mm bony defect in the central to anterior aspect of the right   inferior orbital wall with inferior displacement of inferior orbital   wall fracture fragments along with inferior extraconal fat of the right   orbit by 7 mm into the superior aspect of the right maxillary sinus,   there is some inferior tenting of the inferior margin of the right   inferior rectus muscle at the level of the  fracture plane. The acute   comminuted fracture extends into the right inferior orbital canal and   could compromise the right infraorbital nerve. There is a tiny amount of   hemorrhage in the posterior right maxillary sinus. Maxillofacial   surgical consultation is warranted.       There is moderate circumferential mucosal thickening in the left   maxillary sinus. The remainder of the facial CT is unremarkable. I   communicated the results to Foreign Lora DO, by telephone on   05/25/2025 at 2:00 p.m.       Radiation dose reduction techniques were utilized, including automated   exposure control and exposure modulation based on body size.           This report was finalized on 5/25/2025 8:34 PM by Dr. Nikhil Colón M.D   on Workstation: [id number]          XR Ankle 3+ View Right   Final Result      CT Head Without Contrast   Final Result   1. No acute intracranial abnormality is identified.       2. There is partial opacification of the visualized superior aspect of   the maxillary sinuses with fluid and mucosal thickening bilaterally.       3. The dural venous sinuses are hypodense suggesting that the patient   has a low hematocrit and is most probably anemic and correlate   clinically.       4. There is a small scalp hematoma over the anterior right frontal bone   from today's head trauma. The remainder of the head CT is within normal   limits with no acute skull fracture or intracranial hemorrhage   identified.       Radiation dose reduction techniques were utilized, including automated   exposure control and exposure modulation based on body size.           This report was finalized on 5/24/2025 8:11 AM by Dr. Nikhil Colón M.D   on Workstation: [id number]          CT Abdomen Pelvis With Contrast   Final Result   1. Colonic diverticulosis. There is no evidence of acute diverticulitis.   2. Status post cholecystectomy.   3. Fatty infiltration of the liver.       Radiation dose reduction techniques  were utilized, including automated   exposure control and exposure modulation based on body size.           This report was finalized on 5/27/2025 7:54 AM by Dr. Tod Avina M.D on Workstation: [id number]          XR Chest 1 View   Final Result   No evidence for acute pulmonary process. Follow-up as   clinical indications persist.       This report was finalized on 5/22/2025 4:28 PM by Dr. Teja Mckinnon M.D on Workstation: [id number]              Assessment & Plan     Active Hospital Problems    Diagnosis     **GI bleed     GIB (gastrointestinal bleeding)      These problems are new to me  Assessment:  Overt but obscure GI bleed status post surgical resection of 2 portions of the small bowel with loop ileostomy.  Anemia.  Hemoglobin stable.      Plan:  Continue plans per surgery service.  Monitor H&H but no evidence of any recurrent bleeding.  We will be available as needed.  I discussed the patients findings and my recommendations with patient and family.    MD Teja Espinosa M.D.  [facility] [address] [address]  Office: [phone]

## 2025-06-02 NOTE — PROGRESS NOTES
[facility]: TPN Daily Progress Note    TPN Day # 4  Indication: Inaccessible GI Tract, prolonged paralytic ileus  Route: central  Type: standard    Subjective/Objective  Results from last 7 days   Lab Units 25  0913 25  0820 25  0532   SODIUM mmol/L 137 137 134*   POTASSIUM mmol/L 4.0 4.0 6.3*   CHLORIDE mmol/L 105 105 103   CO2 mmol/L 26.2 25.1 22.7   BUN mg/dL 11.0 11.0 11.0   CREATININE mg/dL 0.24* 0.35* 0.33*   CALCIUM mg/dL 7.9* 8.0* 7.9*   ALBUMIN g/dL  --  2.0* 1.9*   BILIRUBIN mg/dL  --  0.7 0.6   ALK PHOS U/L  --  72 62   ALT (SGPT) U/L  --  22 18   AST (SGOT) U/L  --  26 23   GLUCOSE mg/dL 134* 138* 567*   MAGNESIUM mg/dL  --   --  2.2   PHOSPHORUS mg/dL  --   --  4.5   TRIGLYCERIDES mg/dL  --   --  127   PREALBUMIN mg/dL  --   --  7.9*        Diet Orders (active) (From admission, onward)       Start     Ordered    25 0630  NPO Diet NPO Type: Strict NPO  Diet Effective Now         25 0630                  Additional insulin administration while previous TPN infusin units SSI  Additional electrolyte administration while previous TPN infusing: none  Acid suppression: IV protonix    Goal TPN Formula Recommendations: 100g/700kcal/250mL 3x weekly AA/Dex/Lipids; 1314 kcal/day  Current TPN Formula: 50g/400kcal/0mL AA/Dex/Lipids  Rate 75ml/hr    Assessment/Plan      -AM labs initially showed K 6.3, but 2 subsequent lab draws showed K of 4.0.  -Phos increased 2.2->4.5 from , but I think this was also bad lab draw. (Level was not repeated today however). Will adjust phos tonight in response however to be safe.  -AM labs also showed , repeat labs showed 134 and ranged 122-138.    Macronutrients: at goal continue same. AM .  Electrolytes/Additives:   Sodium Chloride: 70 mEq   Sodium Acetate: 0 mEq  Sodium Phosphate: 0 mEq  Potassium Chloride: 65 mEq  (was 50)  Potassium Acetate: 0 mEq   Potassium Phosphate: 20 mEq (was 35)  Calcium  Gluconate: 9 mEq  Magnesium Sulfate: 10 mEq  Trace Elements  MVI  Other Additives: none  Labs: CMP, Mg, phos in AM    Bjorn Moreno [facility]  Clinical Pharmacist

## 2025-06-02 NOTE — PLAN OF CARE
Problem: Adult Inpatient Plan of Care  Goal: Plan of Care Review  Outcome: Progressing     Problem: Adult Inpatient Plan of Care  Goal: Patient-Specific Goal (Individualized)  Outcome: Progressing     Problem: Adult Inpatient Plan of Care  Goal: Optimal Comfort and Wellbeing  Outcome: Progressing  Intervention: Monitor Pain and Promote Comfort  Recent Flowsheet Documentation  Taken 6/2/2025 0105 by Tod Wyman RN  Pain Management Interventions: pain medication given   Goal Outcome Evaluation:

## 2025-06-02 NOTE — THERAPY EVALUATION
Patient Name: Samia Gaming  : 1955    MRN: [id number]                              Today's Date: 2025       Admit Date: 2025    Visit Dx:     ICD-10-CM ICD-9-CM   1. Gastrointestinal hemorrhage, unspecified gastrointestinal hemorrhage type  K92.2 578.9   2. Acute blood loss anemia  D62 285.1   3. Elevated BUN  R79.9 790.6   4. Elevated lactic acid level  R79.89 276.2   5. Generalized abdominal pain  R10.84 789.07     Patient Active Problem List   Diagnosis    GI bleed    GIB (gastrointestinal bleeding)     Past Medical History:   Diagnosis Date    Arthritis     Elevated cholesterol      Past Surgical History:   Procedure Laterality Date    CHOLECYSTECTOMY      COLONOSCOPY      COLONOSCOPY N/A 2025    Procedure: COLONOSCOPY TO CECUM AND TERMINAL ILEUM AT BEDSIDE;  Surgeon: Bi Hutson MD;  Location: [facility];  Service: Gastroenterology;  Laterality: N/A;  PRE- GI BLEED  POST- DIVERTICULOSIS, HEMORRHOIDS    ENDOSCOPY N/A 2025    Procedure: ESOPHAGOGASTRODUODENOSCOPY;  Surgeon: Keyur Neely MD;  Location: [facility] ENDOSCOPY;  Service: Gastroenterology;  Laterality: N/A;  pre: GI bleed  post:errosive gastritis, duodenitis    ENTEROSCOPY SMALL BOWEL N/A 2025    Procedure: ENTEROSCOPY SMALL BOWEL at bedside;  Surgeon: Justin Garza MD;  Location: [facility] ENDOSCOPY;  Service: Gastroenterology;  Laterality: N/A;  pre- melena  post- mild gastric antrum erosion    EXPLORATORY LAPAROTOMY N/A 2025    Procedure: LAPAROTOMY EXPLORATORY, SMALL BOWEL RESECTION DISTAL AND PROXIMAL, ILEOSTOMY;  Surgeon: Jose G Ny MD;  Location: [facility] MAIN OR;  Service: General;  Laterality: N/A;      General Information       Row Name 25 1541          Physical Therapy Time and Intention    Document Type evaluation  -CS     Mode of Treatment individual therapy;physical therapy  -CS       Row Name 25 1541          General Information    Patient Profile Reviewed yes   -CS     Prior Level of Function independent:;community mobility;ADL's  -CS     Existing Precautions/Restrictions fall  -CS     Barriers to Rehab medically complex  -CS       Row Name 06/02/25 1541          Living Environment    Current Living Arrangements home  -CS     People in Home child(jolly), adult  -CS       Row Name 06/02/25 1541          Home Main Entrance    Number of Stairs, Main Entrance one  -CS       Row Name 06/02/25 1541          Cognition    Orientation Status (Cognition) oriented x 4  -CS       Row Name 06/02/25 1541          Safety Issues/Impairments Affecting Functional Mobility    Impairments Affecting Function (Mobility) balance;pain;endurance/activity tolerance;strength  -CS     Comment, Safety Issues/Impairments (Mobility) Good awareness of symptoms, slightly anxious for mobility due to fear of falling. Difficulty following cues during t/f due to this fear. Gait belt and non-skid socks donned.  -CS               User Key  (r) = Recorded By, (t) = Taken By, (c) = Cosigned By      Initials Name Provider Type    CS Dylon Pemberton PT Physical Therapist                   Mobility       Row Name 06/02/25 1544          Bed Mobility    Bed Mobility supine-sit  -CS     Supine-Sit [place] (Bed Mobility) moderate assist (50% patient effort);1 person assist  -CS     Assistive Device (Bed Mobility) head of bed elevated;bed rails  -CS       Row Name 06/02/25 1544          Sit-Stand Transfer    Sit-Stand [place] (Transfers) moderate assist (50% patient effort);2 person assist  -CS     Comment, (Sit-Stand Transfer) HHA x 2  -CS       Row Name 06/02/25 1544          Gait/Stairs (Locomotion)    [place] Level (Gait) moderate assist (50% patient effort);2 person assist  -CS     Assistive Device (Gait) other (see comments)  HHA x2, shuffle steps  -CS     Patient was able to Ambulate yes  -CS     Distance in Feet (Gait) 1  -CS     Deviations/Abnormal Patterns (Gait) other (see comments)  Unable to  assess deviations due to small shuffle step for t/f  -CS     Comment, (Gait/Stairs) Anxious during our t/f; unable to assess gait due to instability and increased assistance required. Only able to perform 2 small shuffle steps for improved alignment of pelvis over chair.  -               User Key  (r) = Recorded By, (t) = Taken By, (c) = Cosigned By      Initials Name Provider Type    CS Dylon Pemberton PT Physical Therapist                   Obj/Interventions       Row Name 06/02/25 1546          Range of Motion Comprehensive    Comment, General Range of Motion Decreased BLE ROM deficits due to proloned immobility and body habitus  -       Row Name 06/02/25 1546          Strength Comprehensive (MMT)    Comment, General Manual Muscle Testing (MMT) Assessment BLE WFL for ambulation and mobility, but overall decreased  -       Row Name 06/02/25 1546          Motor Skills    Motor Skills functional endurance  -     Functional Endurance Fair -  -       Row Name 06/02/25 1546          Balance    Balance Assessment sitting static balance;standing static balance;sitting dynamic balance;standing dynamic balance  -     Static Sitting Balance standby assist  -     Dynamic Sitting Balance standby assist  -CS     Position, Sitting Balance unsupported;sitting edge of bed;sitting in chair  -     Static Standing Balance moderate assist;2-person assist  -CS     Dynamic Standing Balance moderate assist;2-person assist  -CS     Position/Device Used, Standing Balance other (see comments)  HHA x2  -     Balance Interventions sitting;standing;sit to stand;supported;dynamic;static;narrowed base of support  -     Comment, Balance Fair balance w/ standing activities, appeared to be fear limited and increased anxiety led to quick t/f against cueing.  -       Row Name 06/02/25 1546          Sensory Assessment (Somatosensory)    Sensory Assessment (Somatosensory) not tested  -               User Key  (r) = Recorded By,  (t) = Taken By, (c) = Cosigned By      Initials Name Provider Type    Dylon Piedra, PT Physical Therapist                   Goals/Plan       Row Name 06/02/25 1548          Bed Mobility Goal 1 (PT)    Activity/Assistive Device (Bed Mobility Goal 1, PT) bed mobility activities, all  -CS     [place] Level/Cues Needed (Bed Mobility Goal 1, PT) modified independence  -CS     Time Frame (Bed Mobility Goal 1, PT) short term goal (STG);10 days  -CS       Row Name 06/02/25 1548          Transfer Goal 1 (PT)    Activity/Assistive Device (Transfer Goal 1, PT) sit-to-stand/stand-to-sit;bed-to-chair/chair-to-bed  -CS     [place] Level/Cues Needed (Transfer Goal 1, PT) contact guard required  -CS     Time Frame (Transfer Goal 1, PT) short term goal (STG);10 days  -CS       Row Name 06/02/25 1548          Gait Training Goal 1 (PT)    Activity/Assistive Device (Gait Training Goal 1, PT) gait (walking locomotion)  -CS     [place] Level (Gait Training Goal 1, PT) contact guard required  -CS     Distance (Gait Training Goal 1, PT) 50ft  -CS     Time Frame (Gait Training Goal 1, PT) short term goal (STG);10 days  -CS               User Key  (r) = Recorded By, (t) = Taken By, (c) = Cosigned By      Initials Name Provider Type    Dylon Piedra, PT Physical Therapist                   Clinical Impression       Row Name 06/02/25 1548          Pain    Pretreatment Pain Rating 0/10 - no pain  -CS     Posttreatment Pain Rating 0/10 - no pain  -CS       Row Name 06/02/25 1548          Plan of Care Review    Plan of Care Reviewed With patient;sibling  -CS     Progress improving  -CS     Outcome Evaluation Pt is a 69 y.o. female admitted to [facility] due to weakness and vomiting on 5/22/2025. Noted w/ internal bleeding, underwent small rowel resection x2 on 5/30.Prior to hospital admission, pt reports residing in home with her daughter and no NEFTALI. Prior to hospital stay, pt was IADLs and utilized no AD at baseline. Pt required  ModAx1 for bed mobility, ModAx2 for STS transfers, and performed stand-pivot t/f w/ ModAx2 and HHAx2 w/ 2 shuffle steps for improved alignment. Pt reports elevated lightheaded symptoms due to her pain medications. Pt presents today with below baseline strength, dec endurance, and decreased functional mobility. Pt will benefit from skilled PT to address the previous deficits and improve overall safety with functional mobility. Anticipate pt will D/C to IRF vs. Home w/ home health pending progress.  -CS       Row Name 06/02/25 1548          Therapy Assessment/Plan (PT)    Rehab Potential (PT) fair  -CS     Criteria for Skilled Interventions Met (PT) yes  -CS     Therapy Frequency (PT) 6 times/wk  -CS       Row Name 06/02/25 1548          Vital Signs    Pre Systolic BP Rehab 117  -CS     Pre Treatment Diastolic BP 45  -CS     O2 Delivery Pre Treatment supplemental O2  -CS     O2 Delivery Intra Treatment supplemental O2  -CS     O2 Delivery Post Treatment supplemental O2  -CS     Pre Patient Position Supine  -CS     Intra Patient Position Standing  -CS     Post Patient Position Sitting  -CS       Row Name 06/02/25 1548          Positioning and Restraints    Pre-Treatment Position in bed  -CS     Post Treatment Position chair  -CS     In Chair notified nsg;reclined;call light within reach;with family/caregiver;exit alarm on;encouraged to call for assist  -CS               User Key  (r) = Recorded By, (t) = Taken By, (c) = Cosigned By      Initials Name Provider Type    CS Dylon Pemberton, PT Physical Therapist                   Outcome Measures       Row Name 06/02/25 1549          How much help from another person do you currently need...    Turning from your back to your side while in flat bed without using bedrails? 2  -CS     Moving from lying on back to sitting on the side of a flat bed without bedrails? 2  -CS     Moving to and from a bed to a chair (including a wheelchair)? 2  -CS     Standing up from a chair using  your arms (e.g., wheelchair, bedside chair)? 2  -CS     Climbing 3-5 steps with a railing? 1  -CS     To walk in hospital room? 2  -CS     AM-PAC 6 Clicks Score (PT) 11  -CS               User Key  (r) = Recorded By, (t) = Taken By, (c) = Cosigned By      Initials Name Provider Type    CS Dylon Pemberton PT Physical Therapist                                 Physical Therapy Education       Title: PT OT SLP Therapies (Done)       Topic: Physical Therapy (Done)       Point: Mobility training (Done)       Learning Progress Summary            Patient Acceptance, E, VU by CS at 6/2/2025 1549   Family Acceptance, E, VU by CS at 6/2/2025 1549                      Point: Home exercise program (Done)       Learning Progress Summary            Patient Acceptance, E, VU by CS at 6/2/2025 1549   Family Acceptance, E, VU by CS at 6/2/2025 1549                      Point: Body mechanics (Done)       Learning Progress Summary            Patient Acceptance, E, VU by CS at 6/2/2025 1549   Family Acceptance, E, VU by CS at 6/2/2025 1549                      Point: Precautions (Done)       Learning Progress Summary            Patient Acceptance, E, VU by CS at 6/2/2025 1549   Family Acceptance, E, VU by CS at 6/2/2025 1549                                      User Key       Initials Effective Dates Name Provider Type Discipline     09/06/24 -  Dylon Pemberton, PT Physical Therapist PT                  PT Recommendation and Plan     Progress: improving  Outcome Evaluation: Pt is a 69 y.o. female admitted to [facility] due to weakness and vomiting on 5/22/2025. Noted w/ internal bleeding, underwent small rowel resection x2 on 5/30.Prior to hospital admission, pt reports residing in home with her daughter and no NEFTALI. Prior to hospital stay, pt was IADLs and utilized no AD at baseline. Pt required ModAx1 for bed mobility, ModAx2 for STS transfers, and performed stand-pivot t/f w/ ModAx2 and HHAx2 w/ 2 shuffle steps for improved alignment. Pt  reports elevated lightheaded symptoms due to her pain medications. Pt presents today with below baseline strength, dec endurance, and decreased functional mobility. Pt will benefit from skilled PT to address the previous deficits and improve overall safety with functional mobility. Anticipate pt will D/C to IRF vs. Home w/ home health pending progress.     Time Calculation:         PT Charges       Row Name 06/02/25 1550             Time Calculation    Start Time 1337  -CS      Stop Time 1357  -CS      Time Calculation (min) 20 min  -CS      PT Received On 06/02/25  -CS      PT - Next Appointment 06/03/25  -CS      PT Goal Re-Cert Due Date 06/12/25  -CS         Time Calculation- PT    Total Timed Code Minutes- PT 20 minute(s)  -CS         Timed Charges    50020 - PT Therapeutic Activity Minutes 12  -CS         Untimed Charges    PT Eval/Re-eval Minutes 8  -CS         Total Minutes    Timed Charges Total Minutes 12  -CS      Untimed Charges Total Minutes 8  -CS       Total Minutes 20  -CS                User Key  (r) = Recorded By, (t) = Taken By, (c) = Cosigned By      Initials Name Provider Type    CS Dylon Pemberton, PT Physical Therapist                  Therapy Charges for Today       Code Description Service Date Service Provider Modifiers Qty    95595222503 HC PT THERAPEUTIC ACT EA 15 MIN 6/2/2025 Dylon ePmberton, PT GP 1    10014485419 HC PT EVAL MOD COMPLEXITY 2 6/2/2025 Dylon Pemberton, PT GP 1            PT G-Codes  AM-PAC 6 Clicks Score (PT): 11  PT Discharge Summary  Anticipated Discharge Disposition (PT): home with home health, inpatient rehabilitation facility    Dylon Pemberton PT  6/2/2025

## 2025-06-02 NOTE — NURSING NOTE
"   06/02/25 1501   Ileostomy Loop RUQ   Placement date: If unknown, DO NOT use \"Add Comment\" note: 05/29/25   Inserted by: Dr Ny  Ileostomy Type: Loop  Location: RUQ   Stomal Appliance 2 piece;Clean;Dry;Intact   Stoma Appearance rosebud appearance;moist;pink;protruding above skin level   Stoma Function flatus;stool   Stool Color green   Stool Consistency liquid   Treatment Placement checked   Output (mL) 100 mL     Ostomy education provided to the patient/family: Pt is sitting up in the chair today, she is alert but drowsy, NG tube has been removed. Her daughter is at bedside and is attentive to teaching. We discussed differences between an ileostomy and colostomy, since pt is familiar with a colostomy. We discussed expected stool consistency and need to drink adequate po fluids to avoid dehydration.     []Pouch changed   []Pt observed   []Pt participated    []CG participated            [x]Instructed in frequency of pouch change  [x]Pouch emptied and cleaned, demonstrated use of pouch closure    [x]Pt observed   []Pt participated    [x]CG observed   [x]Instructed to empty pouch when 1/3 to 1/2 full  []Teaching packet/handouts provided/reviewed  [x]Return to ADL's, showering, clothing  [x]Peristomal skin care, avoiding use of soaps with moisturizers  [x]Diet   [x]Adequate po fluid intake   []S/S of dehydration   []Foods to thicken stool   []Foods to avoid to prevent blockage  [x]Supplies at bedside  []Samples ordered    Current Supplies: 2 3/4\" soft convex Xuan wafer and pouch with barrier ring, 1 extra set of supplies in the room.     WOC Team follow up plan: 5x week ostomy teaching.     "

## 2025-06-02 NOTE — PROGRESS NOTES
Thank you Dr. Hernandez and [facility] service for taking over primary care critical care available if needed

## 2025-06-02 NOTE — PROGRESS NOTES
Name: Samia Gaming ADMIT: 2025   : 1955  PCP: Cristiano Logan DO    MRN: [id number] LOS: 10 days   AGE/SEX: 69 y.o. female  ROOM: [facility]     Subjective   Subjective   Patient uncomfortable in bed.  No further bleeding.  Tolerating ice chips without any nausea or vomiting.  Abdominal pain well-controlled.    Review of Systems  As above     Objective   Objective   Vital Signs  Temp:  [97.5 °F (36.4 °C)-99.1 °F (37.3 °C)] 99.1 °F (37.3 °C)  Heart Rate:  [77-86] 77  Resp:  [18-20] 18  BP: (124-154)/(58-76) 124/60  SpO2:  [91 %-97 %] 97 %  on  Flow (L/min) (Oxygen Therapy):  [2] 2;   Device (Oxygen Therapy): nasal cannula  Body mass index is 52 kg/m².  Physical Exam  Constitutional:       General: She is not in acute distress.     Appearance: She is not ill-appearing.   Cardiovascular:      Rate and Rhythm: Normal rate and regular rhythm.   Pulmonary:      Effort: Pulmonary effort is normal. No respiratory distress.   Abdominal:      General: Abdomen is flat. There is no distension.      Tenderness: There is no abdominal tenderness.      Comments: Abdominal incision with staples   Musculoskeletal:         General: No swelling or deformity. Normal range of motion.   Skin:     General: Skin is warm and dry.   Neurological:      General: No focal deficit present.      Mental Status: She is alert. Mental status is at baseline.         Results Review     I reviewed the patient's new clinical results.  Results from last 7 days   Lab Units 25  0532 25  2358 25  1811 25  1201   WBC 10*3/mm3 7.27 9.08 9.12 9.98   HEMOGLOBIN g/dL 7.7* 8.4* 8.5* 8.1*   PLATELETS 10*3/mm3 118* 112* 108* 113*     Results from last 7 days   Lab Units 25  0913 25  0820 25  0532 25  0742   SODIUM mmol/L 137 137 134* 137   POTASSIUM mmol/L 4.0 4.0 6.3* 3.9   CHLORIDE mmol/L 105 105 103 109*   CO2 mmol/L 26.2 25.1 22.7 23.2   BUN mg/dL 11.0 11.0 11.0 14.0   CREATININE mg/dL 0.24* 0.35* 0.33*  0.41*   GLUCOSE mg/dL 134* 138* 567* 144*   Estimated Creatinine Clearance: 277.3 mL/min (A) (by C-G formula based on SCr of 0.24 mg/dL (L)).  Results from last 7 days   Lab Units 06/02/25  0820 06/02/25  0532 05/31/25  0527 05/30/25  1238   ALBUMIN g/dL 2.0* 1.9* 1.9* 2.1*   BILIRUBIN mg/dL 0.7 0.6 0.9 0.7   ALK PHOS U/L 72 62 38* 36*   AST (SGOT) U/L 26 23 12 14   ALT (SGPT) U/L 22 18 8 10     Results from last 7 days   Lab Units 06/02/25  0913 06/02/25  0820 06/02/25  0532 06/01/25  0742 05/31/25  0527 05/30/25  1238   CALCIUM mg/dL 7.9* 8.0* 7.9* 7.6* 7.0* 7.6*   ALBUMIN g/dL  --  2.0* 1.9*  --  1.9* 2.1*   MAGNESIUM mg/dL  --   --  2.2 1.9 1.9 1.7   PHOSPHORUS mg/dL  --   --  4.5 2.2* 2.3* 5.0*       External COVID19   Date Value Ref Range Status   12/18/2020 Detected (A) Not Detected Final     Coronavirus (COVID-19)   Date Value Ref Range Status   12/18/2020 Acceptable Acceptable Final     Glucose   Date/Time Value Ref Range Status   06/02/2025 1119 135 (H) 70 - 130 mg/dL Final   06/02/2025 0909 135 (H) 70 - 130 mg/dL Final   06/02/2025 0609 125 70 - 130 mg/dL Final   06/02/2025 0029 122 70 - 130 mg/dL Final   06/01/2025 2209 133 (H) 70 - 130 mg/dL Final   06/01/2025 1202 144 (H) 70 - 130 mg/dL Final   06/01/2025 0620 129 70 - 130 mg/dL Final       XR Abdomen KUB  Narrative: XR ABDOMEN KUB-     INDICATIONS: Possible ileus     TECHNIQUE: Supine views of the abdomen     COMPARISON: 5/30/2025     FINDINGS:     NG tube extends to the epigastric region. A surgical drain is apparent  at the level of the pelvis, with overlying skin staples. The bowel gas  pattern is nonspecific, with mild abnormal gaseous distention of the  colon. Mild colonic fecal retention is apparent. No supine evidence for  free intraperitoneal gas. Follow-up as indications persist. If there is  further clinical concern, CT can be obtained for further evaluation.     Impression:    As described.        This report was finalized on 6/1/2025 9:25  AM by Dr. Teja Mckinnon M.D on Workstation: [id number]       I reviewed the patient's daily medications.  Scheduled Medications  [Held by provider] enoxaparin sodium, 40 mg, Subcutaneous, Q12H  [START ON 6/3/2025] Fat Emulsion Plant Based, 250 mL, Intravenous, Once per day on Tuesday Thursday Saturday  insulin regular, 2-9 Units, Subcutaneous, Q6H  pantoprazole, 40 mg, Intravenous, Q12H  sodium chloride, 10 mL, Intravenous, Q12H  sodium chloride, 10 mL, Intravenous, Q12H  sodium chloride, 10 mL, Intravenous, Q12H    Infusions  Adult Central 2-in-1 TPN, , Last Rate: 75 mL/hr at 06/01/25 1820  Adult Central 2-in-1 TPN,   Pharmacy to Dose TPN,     Diet  Adult Central 2-in-1 TPN  NPO Diet NPO Type: Ice Chips  Adult Central 2-in-1 TPN         I have personally reviewed:  [x]  Laboratory   []  Microbiology   [x]  Radiology   []  EKG/Telemetry   [x]  Cardiology/Vascular   []  Pathology   [x]  Records     Assessment/Plan     Active Hospital Problems    Diagnosis  POA    **GI bleed [K92.2]  Yes    GIB (gastrointestinal bleeding) [K92.2]  Yes      Resolved Hospital Problems   No resolved problems to display.       69 y.o. female admitted with GI bleed.    GI bleeding from small bowel diverticulum  Acute anemia  -EGD on 5/23, erosive gastropathy with no bleeding.  Nonbleeding duodenal ulcer with no stigmata of bleeding  -Colonoscopy on 5/24 with diverticulosis and blood in the sigmoid colon  -Small bowel enteroscopy on 5/26 with no acute findings  - Surgery consulted status post small bowel resection x 2 end ileostomy on 5/29  -GI following  - Patient on TPN.  NG removed today.  Allowed to have ice chips.  Diet per surgery    Right orbital wall fracture-seen by OMFS and recommends conservative therapy.  Suspect paresthesias will improve over months    Morbid obesity, BMI 52    Postop respiratory failure-now on room air    SCDs for DVT prophylaxis.  Chemo full axis when okay with surgery  Full code.  Discussed with  patient, family, and nursing staff.  Anticipate discharge home with HH vs SNU facility later this week.    Expected Discharge Date: 6/4/2025; Expected Discharge Time:  3:00 PM      Lowell Hernandez MD  [facility]  06/02/25  13:36 EDT

## 2025-06-02 NOTE — PLAN OF CARE
Goal Outcome Evaluation:  Plan of Care Reviewed With: patient, sibling        Progress: improving  Outcome Evaluation: Pt is a 69 y.o. female admitted to [facility] due to weakness and vomiting on 5/22/2025. Noted w/ internal bleeding, underwent small rowel resection x2 on 5/30.Prior to hospital admission, pt reports residing in home with her daughter and no NEFTALI. Prior to hospital stay, pt was IADLs and utilized no AD at baseline. Pt required ModAx1 for bed mobility, ModAx2 for STS transfers, and performed stand-pivot t/f w/ ModAx2 and HHAx2 w/ 2 shuffle steps for improved alignment. Pt reports elevated lightheaded symptoms due to her pain medications. Pt presents today with below baseline strength, dec endurance, and decreased functional mobility. Pt will benefit from skilled PT to address the previous deficits and improve overall safety with functional mobility. Anticipate pt will D/C to IRF vs. Home w/ home health pending progress.    Anticipated Discharge Disposition (PT): home with home health, inpatient rehabilitation facility

## 2025-06-02 NOTE — PROGRESS NOTES
Nutrition Services    Patient Name: Samia Gaming  YOB: 1955  MRN: [id number]  Admission date: 5/22/2025    PROGRESS NOTE      Encounter Information: Today is day #4 of TPN. Pt extubated 5/31. NG tube removed today. .        PO Diet: Adult Central 2-in-1 TPN  NPO Diet NPO Type: Ice Chips   PO Supplements: n/a   PO Intake:  NPO       Current nutrition support: 100g/700kcal/250mL 3x weekly AA/Dex/Lipids    Nutrition support review: TPN infusing at goal as ordered       Weight: Weight: 129 kg (284 lb 6.3 oz) (06/01/25 0424)       Medications: reviewed protonix   Labs: reviewed        GI Function:  ileostomy 30 mL output in last 24 hours  450 mL NG output in last 24 hours       Nutrition Intervention Updates: Continue TPN at goal as ordered.        Results from last 7 days   Lab Units 06/01/25  0742 05/31/25  0527 05/30/25  1238 05/27/25  0542   SODIUM mmol/L 137 139 138 137   POTASSIUM mmol/L 3.9 4.0 4.6 4.1   CHLORIDE mmol/L 109* 111* 107 107   CO2 mmol/L 23.2 21.2* 21.9* 24.5   BUN mg/dL 14.0 25.0* 25.0* 14   CREATININE mg/dL 0.41* 0.51* 0.82 0.46*   CALCIUM mg/dL 7.6* 7.0* 7.6* 7.1*   BILIRUBIN mg/dL  --  0.9 0.7 0.7   ALK PHOS U/L  --  38* 36* 36*   ALT (SGPT) U/L  --  8 10 16   AST (SGOT) U/L  --  12 14 17   GLUCOSE mg/dL 144* 154* 169* 145*     Results from last 7 days   Lab Units 06/02/25  0532 06/01/25  1201 06/01/25  0742 05/31/25 2005 05/31/25  0527   MAGNESIUM mg/dL 2.2  --  1.9  --  1.9   PHOSPHORUS mg/dL 4.5  --  2.2*  --  2.3*   HEMOGLOBIN g/dL 7.7*   < > 8.6*   < > 9.3*   HEMATOCRIT % 25.2*   < > 27.3*   < > 27.9*   TRIGLYCERIDES mg/dL 127  --   --   --   --     < > = values in this interval not displayed.     External COVID19   Date Value Ref Range Status   12/18/2020 Detected (A) Not Detected Final     Coronavirus (COVID-19)   Date Value Ref Range Status   12/18/2020 Acceptable Acceptable Final     Lab Results   Component Value Date    HGBA1C 6.4 (H) 09/17/2024       RD to follow up per  protocol.    Electronically signed by:  Azucena Santiago RD  06/02/25 08:24 EDT

## 2025-06-02 NOTE — CASE MANAGEMENT/SOCIAL WORK
Continued Stay Note  [facility]     Patient Name: Samia Gaming  MRN: [id number]  Today's Date: 6/2/2025    Admit Date: 5/22/2025    Plan: Pending clinical course   Discharge Plan       Row Name 06/02/25 1519       Plan    Plan Pending clinical course    Patient/Family in Agreement with Plan yes    Plan Comments Patient discussed in huddle and clinical chart reviewed. Physical therapy evaluation and recommendations pending. CCP to follow for discharge needs. CD, CSW.                   Discharge Codes    No documentation.                 Expected Discharge Date and Time       Expected Discharge Date Expected Discharge Time    Jun 4, 2025

## 2025-06-02 NOTE — PROGRESS NOTES
Postoperative day #3.5 status post small bowel resection x 2, repair of diagnostic enterotomies, and end loop ileostomy    500 cc thin bilious NG tube output.  Greater than 500 cc bilious ileostomy tube output.  Abdominal pain better controlled today.  Is off vasopressors.    Afebrile, heart rate 79, /58  Abdomen soft, appropriately tender, obese, incision clean and dry with staples, drain serosanguineous, NG thin bilious, ileostomy output bilious with no obvious blood    Hemoglobin 7.7 from 8.4, WBC 7.27, platelets 118  Prealbumin 7.9  INR 1.15    - NG tube removed, may have limited ice chips  - Continue TPN  - Hold Lovenox today and repeat hemoglobin this afternoon  - No clinically evident bleeding, however small drop in hemoglobin  - Pulmonary toilet  - Out of bed to chair, physical therapy

## 2025-06-03 LAB
ALBUMIN SERPL-MCNC: 2 G/DL (ref 3.5–5.2)
ALBUMIN/GLOB SERPL: 0.8 G/DL
ALP SERPL-CCNC: 89 U/L (ref 39–117)
ALT SERPL W P-5'-P-CCNC: 53 U/L (ref 1–33)
ANION GAP SERPL CALCULATED.3IONS-SCNC: 6 MMOL/L (ref 5–15)
AST SERPL-CCNC: 52 U/L (ref 1–32)
BILIRUB SERPL-MCNC: 0.8 MG/DL (ref 0–1.2)
BUN SERPL-MCNC: 10 MG/DL (ref 8–23)
BUN/CREAT SERPL: 27.8 (ref 7–25)
CALCIUM SPEC-SCNC: 8.1 MG/DL (ref 8.6–10.5)
CHLORIDE SERPL-SCNC: 104 MMOL/L (ref 98–107)
CO2 SERPL-SCNC: 27 MMOL/L (ref 22–29)
CREAT SERPL-MCNC: 0.36 MG/DL (ref 0.57–1)
CYTO UR: NORMAL
DEPRECATED RDW RBC AUTO: 50.4 FL (ref 37–54)
DEPRECATED RDW RBC AUTO: 50.5 FL (ref 37–54)
DEPRECATED RDW RBC AUTO: 50.7 FL (ref 37–54)
DEPRECATED RDW RBC AUTO: 52.4 FL (ref 37–54)
EGFRCR SERPLBLD CKD-EPI 2021: 108.7 ML/MIN/1.73
ERYTHROCYTE [DISTWIDTH] IN BLOOD BY AUTOMATED COUNT: 15.3 % (ref 12.3–15.4)
ERYTHROCYTE [DISTWIDTH] IN BLOOD BY AUTOMATED COUNT: 15.4 % (ref 12.3–15.4)
ERYTHROCYTE [DISTWIDTH] IN BLOOD BY AUTOMATED COUNT: 15.7 % (ref 12.3–15.4)
ERYTHROCYTE [DISTWIDTH] IN BLOOD BY AUTOMATED COUNT: 15.8 % (ref 12.3–15.4)
GLOBULIN UR ELPH-MCNC: 2.4 GM/DL
GLUCOSE BLDC GLUCOMTR-MCNC: 114 MG/DL (ref 70–130)
GLUCOSE BLDC GLUCOMTR-MCNC: 126 MG/DL (ref 70–130)
GLUCOSE BLDC GLUCOMTR-MCNC: 129 MG/DL (ref 70–130)
GLUCOSE SERPL-MCNC: 114 MG/DL (ref 65–99)
HCT VFR BLD AUTO: 24.3 % (ref 34–46.6)
HCT VFR BLD AUTO: 26.2 % (ref 34–46.6)
HCT VFR BLD AUTO: 26.8 % (ref 34–46.6)
HCT VFR BLD AUTO: 26.9 % (ref 34–46.6)
HGB BLD-MCNC: 8 G/DL (ref 12–15.9)
HGB BLD-MCNC: 8.5 G/DL (ref 12–15.9)
HGB BLD-MCNC: 8.7 G/DL (ref 12–15.9)
HGB BLD-MCNC: 8.9 G/DL (ref 12–15.9)
LAB AP CASE REPORT: NORMAL
MAGNESIUM SERPL-MCNC: 1.9 MG/DL (ref 1.6–2.4)
MCH RBC QN AUTO: 30.2 PG (ref 26.6–33)
MCH RBC QN AUTO: 30.3 PG (ref 26.6–33)
MCH RBC QN AUTO: 30.4 PG (ref 26.6–33)
MCH RBC QN AUTO: 31 PG (ref 26.6–33)
MCHC RBC AUTO-ENTMCNC: 32.3 G/DL (ref 31.5–35.7)
MCHC RBC AUTO-ENTMCNC: 32.4 G/DL (ref 31.5–35.7)
MCHC RBC AUTO-ENTMCNC: 32.9 G/DL (ref 31.5–35.7)
MCHC RBC AUTO-ENTMCNC: 33.2 G/DL (ref 31.5–35.7)
MCV RBC AUTO: 91.2 FL (ref 79–97)
MCV RBC AUTO: 93.2 FL (ref 79–97)
MCV RBC AUTO: 94.1 FL (ref 79–97)
MCV RBC AUTO: 94.2 FL (ref 79–97)
PATH REPORT.FINAL DX SPEC: NORMAL
PATH REPORT.GROSS SPEC: NORMAL
PHOSPHATE SERPL-MCNC: 3.2 MG/DL (ref 2.5–4.5)
PLATELET # BLD AUTO: 134 10*3/MM3 (ref 140–450)
PLATELET # BLD AUTO: 141 10*3/MM3 (ref 140–450)
PLATELET # BLD AUTO: 142 10*3/MM3 (ref 140–450)
PLATELET # BLD AUTO: 157 10*3/MM3 (ref 140–450)
PMV BLD AUTO: 10.1 FL (ref 6–12)
PMV BLD AUTO: 10.3 FL (ref 6–12)
POTASSIUM SERPL-SCNC: 4.3 MMOL/L (ref 3.5–5.2)
PROT SERPL-MCNC: 4.4 G/DL (ref 6–8.5)
RBC # BLD AUTO: 2.58 10*6/MM3 (ref 3.77–5.28)
RBC # BLD AUTO: 2.81 10*6/MM3 (ref 3.77–5.28)
RBC # BLD AUTO: 2.86 10*6/MM3 (ref 3.77–5.28)
RBC # BLD AUTO: 2.94 10*6/MM3 (ref 3.77–5.28)
SODIUM SERPL-SCNC: 137 MMOL/L (ref 136–145)
WBC NRBC COR # BLD AUTO: 4.16 10*3/MM3 (ref 3.4–10.8)
WBC NRBC COR # BLD AUTO: 4.56 10*3/MM3 (ref 3.4–10.8)
WBC NRBC COR # BLD AUTO: 4.88 10*3/MM3 (ref 3.4–10.8)
WBC NRBC COR # BLD AUTO: 5.11 10*3/MM3 (ref 3.4–10.8)

## 2025-06-03 PROCEDURE — 25010000002 MAGNESIUM SULFATE PER 500 MG OF MAGNESIUM: Performed by: INTERNAL MEDICINE

## 2025-06-03 PROCEDURE — 82948 REAGENT STRIP/BLOOD GLUCOSE: CPT

## 2025-06-03 PROCEDURE — 97530 THERAPEUTIC ACTIVITIES: CPT

## 2025-06-03 PROCEDURE — 25010000002 HYDROMORPHONE 1 MG/ML SOLUTION: Performed by: INTERNAL MEDICINE

## 2025-06-03 PROCEDURE — 85027 COMPLETE CBC AUTOMATED: CPT | Performed by: INTERNAL MEDICINE

## 2025-06-03 PROCEDURE — 84100 ASSAY OF PHOSPHORUS: CPT | Performed by: INTERNAL MEDICINE

## 2025-06-03 PROCEDURE — 94799 UNLISTED PULMONARY SVC/PX: CPT

## 2025-06-03 PROCEDURE — 25010000002 POTASSIUM CHLORIDE PER 2 MEQ OF POTASSIUM: Performed by: INTERNAL MEDICINE

## 2025-06-03 PROCEDURE — 80053 COMPREHEN METABOLIC PANEL: CPT | Performed by: INTERNAL MEDICINE

## 2025-06-03 PROCEDURE — 94660 CPAP INITIATION&MGMT: CPT

## 2025-06-03 PROCEDURE — 25010000002 HYDROMORPHONE PER 4 MG: Performed by: STUDENT IN AN ORGANIZED HEALTH CARE EDUCATION/TRAINING PROGRAM

## 2025-06-03 PROCEDURE — 83735 ASSAY OF MAGNESIUM: CPT | Performed by: INTERNAL MEDICINE

## 2025-06-03 PROCEDURE — 25010000002 CALCIUM GLUCONATE PER 10 ML: Performed by: INTERNAL MEDICINE

## 2025-06-03 PROCEDURE — 25010000002 ENOXAPARIN PER 10 MG: Performed by: STUDENT IN AN ORGANIZED HEALTH CARE EDUCATION/TRAINING PROGRAM

## 2025-06-03 PROCEDURE — 97110 THERAPEUTIC EXERCISES: CPT

## 2025-06-03 PROCEDURE — 99024 POSTOP FOLLOW-UP VISIT: CPT | Performed by: STUDENT IN AN ORGANIZED HEALTH CARE EDUCATION/TRAINING PROGRAM

## 2025-06-03 RX ORDER — HYDROMORPHONE HYDROCHLORIDE 1 MG/ML
0.5 INJECTION, SOLUTION INTRAMUSCULAR; INTRAVENOUS; SUBCUTANEOUS
Status: DISCONTINUED | OUTPATIENT
Start: 2025-06-03 | End: 2025-06-04

## 2025-06-03 RX ORDER — OXYCODONE HYDROCHLORIDE 5 MG/1
5 TABLET ORAL EVERY 4 HOURS PRN
Refills: 0 | Status: DISCONTINUED | OUTPATIENT
Start: 2025-06-03 | End: 2025-06-07 | Stop reason: HOSPADM

## 2025-06-03 RX ADMIN — Medication 10 ML: at 20:06

## 2025-06-03 RX ADMIN — Medication 10 ML: at 08:33

## 2025-06-03 RX ADMIN — PANTOPRAZOLE SODIUM 40 MG: 40 INJECTION, POWDER, FOR SOLUTION INTRAVENOUS at 20:04

## 2025-06-03 RX ADMIN — Medication 10 ML: at 09:00

## 2025-06-03 RX ADMIN — ENOXAPARIN SODIUM 40 MG: 100 INJECTION SUBCUTANEOUS at 20:04

## 2025-06-03 RX ADMIN — HYDROMORPHONE HYDROCHLORIDE 1 MG: 1 INJECTION, SOLUTION INTRAMUSCULAR; INTRAVENOUS; SUBCUTANEOUS at 11:16

## 2025-06-03 RX ADMIN — HYDROMORPHONE HYDROCHLORIDE 1 MG: 1 INJECTION, SOLUTION INTRAMUSCULAR; INTRAVENOUS; SUBCUTANEOUS at 01:30

## 2025-06-03 RX ADMIN — TRAZODONE HYDROCHLORIDE 150 MG: 100 TABLET ORAL at 22:10

## 2025-06-03 RX ADMIN — HYDROMORPHONE HYDROCHLORIDE 1 MG: 1 INJECTION, SOLUTION INTRAMUSCULAR; INTRAVENOUS; SUBCUTANEOUS at 08:32

## 2025-06-03 RX ADMIN — PANTOPRAZOLE SODIUM 40 MG: 40 INJECTION, POWDER, FOR SOLUTION INTRAVENOUS at 08:32

## 2025-06-03 RX ADMIN — HYDROMORPHONE HYDROCHLORIDE 0.5 MG: 1 INJECTION, SOLUTION INTRAMUSCULAR; INTRAVENOUS; SUBCUTANEOUS at 18:35

## 2025-06-03 RX ADMIN — HYDROMORPHONE HYDROCHLORIDE 1 MG: 1 INJECTION, SOLUTION INTRAMUSCULAR; INTRAVENOUS; SUBCUTANEOUS at 13:36

## 2025-06-03 RX ADMIN — HYDROMORPHONE HYDROCHLORIDE 1 MG: 1 INJECTION, SOLUTION INTRAMUSCULAR; INTRAVENOUS; SUBCUTANEOUS at 16:16

## 2025-06-03 RX ADMIN — HYDROMORPHONE HYDROCHLORIDE 1 MG: 1 INJECTION, SOLUTION INTRAMUSCULAR; INTRAVENOUS; SUBCUTANEOUS at 05:53

## 2025-06-03 RX ADMIN — POTASSIUM PHOSPHATE, MONOBASIC POTASSIUM PHOSPHATE, DIBASIC: 224; 236 INJECTION, SOLUTION, CONCENTRATE INTRAVENOUS at 19:42

## 2025-06-03 RX ADMIN — OXYCODONE HYDROCHLORIDE 5 MG: 5 TABLET ORAL at 22:10

## 2025-06-03 NOTE — PLAN OF CARE
Problem: Adult Inpatient Plan of Care  Goal: Plan of Care Review  Outcome: Progressing  Goal: Patient-Specific Goal (Individualized)  Outcome: Progressing  Goal: Absence of Hospital-Acquired Illness or Injury  Outcome: Progressing  Intervention: Identify and Manage Fall Risk  Description: Perform standard risk assessment on admission using a validated tool or comprehensive approach appropriate to the patient; reassess fall risk frequently, with change in status or transfer to another level of care.Communicate risk to interprofessional healthcare team; ensure fall risk visible cue.Determine need for increased observation, equipment and environmental modification, as well as use of supportive, nonskid footwear.Adjust safety measures to individual needs and identified risk factors.Reinforce the importance of active participation with fall risk prevention, safety, and physical activity with the patient and family.Perform regular intentional rounding to assess need for position change, pain assessment and personal needs, including assistance with toileting.  Recent Flowsheet Documentation  Taken 6/3/2025 0400 by Sandy Singh RN  Safety Promotion/Fall Prevention: safety round/check completed  Taken 6/3/2025 0200 by Sandy Singh RN  Safety Promotion/Fall Prevention: safety round/check completed  Taken 6/3/2025 0000 by Sandy Singh RN  Safety Promotion/Fall Prevention: safety round/check completed  Taken 6/2/2025 2200 by Sandy Singh RN  Safety Promotion/Fall Prevention: safety round/check completed  Taken 6/2/2025 2000 by Sandy Singh RN  Safety Promotion/Fall Prevention: safety round/check completed  Intervention: Prevent Skin Injury  Description: Perform a screening for skin injury risk, such as pressure or moisture-associated skin damage on admission and at regular intervals throughout hospital stay.Keep all areas of skin (especially folds) clean and dry.Maintain adequate skin  hydration.Relieve and redistribute pressure and protect bony prominences and skin at risk for injury; implement measures based on patient-specific risk factors.Match turning and repositioning schedule to clinical condition.Encourage weight shift frequently; assist with reposition if unable to complete independently.Float heels off bed; avoid pressure on the Achilles tendon.Keep skin free from extended contact with medical devices.Optimize nutrition and hydration.Encourage functional activity and mobility, as early as tolerated.Use aids (e.g., slide boards, mechanical lift) during transfer.  Recent Flowsheet Documentation  Taken 6/2/2025 2200 by Sandy Singh RN  Body Position:   turned   tilted   left  Intervention: Prevent Infection  Description: Maintain skin and mucous membrane integrity; promote hand, oral and pulmonary hygiene.Optimize fluid balance, nutrition, sleep and glycemic control to maximize infection resistance.Identify potential sources of infection early to prevent or mitigate progression of infection (e.g., wound, lines, devices).Evaluate ongoing need for invasive devices; remove promptly when no longer indicated.Review vaccination status.  Recent Flowsheet Documentation  Taken 6/3/2025 0400 by Sandy Singh RN  Infection Prevention: environmental surveillance performed  Taken 6/3/2025 0200 by Sandy Singh RN  Infection Prevention: environmental surveillance performed  Taken 6/3/2025 0000 by Sandy Singh RN  Infection Prevention: environmental surveillance performed  Taken 6/2/2025 2200 by Sandy Singh RN  Infection Prevention: environmental surveillance performed  Taken 6/2/2025 2000 by Sandy Singh RN  Infection Prevention: environmental surveillance performed  Goal: Optimal Comfort and Wellbeing  Outcome: Progressing  Intervention: Monitor Pain and Promote Comfort  Description: Assess pain level, treatment efficacy and patient response at regular intervals using  a consistent pain scale.Consider the presence and impact of preexisting chronic pain.Encourage patient and caregiver involvement in pain assessment, interventions and safety measures.Promote activity; balance with sleep and rest to enhance healing.  Recent Flowsheet Documentation  Taken 6/3/2025 0400 by Sandy Singh RN  Pain Management Interventions: care clustered  Taken 6/3/2025 0200 by Sandy Singh RN  Pain Management Interventions: care clustered  Taken 6/3/2025 0000 by Sandy Singh RN  Pain Management Interventions: care clustered  Taken 6/2/2025 2200 by Sandy Singh RN  Pain Management Interventions: care clustered  Taken 6/2/2025 2000 by Sandy Singh RN  Pain Management Interventions: care clustered  Goal: Readiness for Transition of Care  Outcome: Progressing     Problem: Sepsis/Septic Shock  Goal: Optimal Coping  Outcome: Progressing  Goal: Absence of Bleeding  Outcome: Progressing  Goal: Blood Glucose Level Within Target Range  Outcome: Progressing  Goal: Absence of Infection Signs and Symptoms  Outcome: Progressing  Intervention: Initiate Sepsis Management  Description: Provide fluid therapy, such as crystalloid or albumin, to increase intravascular volume, organ perfusion and oxygen delivery.Provide respiratory support, such as oxygen therapy, noninvasive or invasive positive pressure ventilation, to achieve oxygenation and ventilation goal; avoid hyperoxemia.Obtain cultures prior to initiating antimicrobial therapy when possible. Do not delay treatment for laboratory results in the presence of high suspicion or clinical indicators.Administer intravenous broad-spectrum antimicrobial therapy promptly.Implement hemodynamic monitoring to guide intravascular support based on individual targeted parameters.Determine and address underlying source of infection aggressively; implement transmission-based precautions and isolation, as indicated.  Recent Flowsheet Documentation  Taken  6/3/2025 0400 by Sandy Singh RN  Infection Prevention: environmental surveillance performed  Taken 6/3/2025 0200 by Sandy Singh RN  Infection Prevention: environmental surveillance performed  Taken 6/3/2025 0000 by Sandy Singh RN  Infection Prevention: environmental surveillance performed  Taken 6/2/2025 2200 by Sandy Singh RN  Infection Prevention: environmental surveillance performed  Taken 6/2/2025 2000 by Sandy Singh RN  Infection Prevention: environmental surveillance performed  Intervention: Promote Recovery  Description: Encourage pulmonary hygiene, such as cough-enhancement and airway-clearance techniques, that may include use of incentive spirometry, deep breathing and cough.Encourage early rehabilitation and physical activity to optimize functional ability and activity tolerance, as well as minimize delirium.Promote energy conservation; minimize oxygen demand and consumption by adjusting environment, decreasing stimulation, maintaining normothermia and treating pain.Optimize fluid balance, nutrition intake, sleep and glycemic control to maintain tissue perfusion and enhance immune response.  Recent Flowsheet Documentation  Taken 6/2/2025 2158 by Sandy Singh RN  Activity Management: activity encouraged  Taken 6/2/2025 2000 by Sandy Singh RN  Activity Management: activity encouraged  Goal: Optimal Nutrition Delivery  Outcome: Progressing     Problem: Fall Injury Risk  Goal: Absence of Fall and Fall-Related Injury  Outcome: Progressing  Intervention: Identify and Manage Contributors  Description: Develop a fall prevention plan, considering patient-centered interventions and family/caregiver involvement; identify and address patient's facilitators and barriers.Provide reorientation, appropriate sensory stimulation and routines with changes in mental status to decrease risk of fall.Promote use of personal vision and auditory aids.Assess assistance level required  for safe and effective self-care; provide support as needed, such as toileting and mobilization. For age 65 and older, implement timed toileting with assistance.Encourage physical activity, such as performance of mobility and self-care at highest level of patient ability, multicomponent exercise program and provision of appropriate assistive devices.If fall occurs, assess the severity of injury; implement fall injury protocol. Determine the cause and revise fall injury prevention plan.Regularly review and advocate for medication adjustment to decrease fall risk; consider administration times, polypharmacy and age.Balance adequate pain management with potential for oversedation.  Recent Flowsheet Documentation  Taken 6/2/2025 2000 by Sandy Singh RN  Self-Care Promotion: independence encouraged  Intervention: Promote Injury-Free Environment  Description: Provide a safe, barrier-free environment that encourages independent activity.Keep care area uncluttered and well-lighted.Determine need for increased observation or monitoring.Avoid use of devices that minimize mobility, such as restraints or indwelling urinary catheter.  Recent Flowsheet Documentation  Taken 6/3/2025 0400 by Sandy Singh RN  Safety Promotion/Fall Prevention: safety round/check completed  Taken 6/3/2025 0200 by Sandy Singh RN  Safety Promotion/Fall Prevention: safety round/check completed  Taken 6/3/2025 0000 by Sandy Singh RN  Safety Promotion/Fall Prevention: safety round/check completed  Taken 6/2/2025 2200 by Sandy Singh RN  Safety Promotion/Fall Prevention: safety round/check completed  Taken 6/2/2025 2000 by Sandy Singh RN  Safety Promotion/Fall Prevention: safety round/check completed     Problem: Skin Injury Risk Increased  Goal: Skin Health and Integrity  Outcome: Progressing  Intervention: Optimize Skin Protection  Description: Perform a full pressure injury risk assessment, as indicated by  screening, upon admission to care unit.Reassess skin (full inspection and injury risk, including skin temperature, consistency and color) frequently (e.g., scheduled interval, with change in condition) to provide optimal early detection and prevention.Maintain adequate tissue perfusion (e.g., encourage fluid balance; avoid crossing legs, constrictive clothing or devices) to promote tissue oxygenation.Maintain head of bed at lowest degree of elevation tolerated, considering medical condition and other restrictions. Use positioning supports to prevent sliding and friction. Consider low friction textiles.Avoid positioning onto an area that remains reddened or on bony prominences.Minimize incontinence and moisture (e.g., toileting schedule; moisture-wicking pad, diaper or incontinence collection device; skin moisture barrier).Cleanse skin promptly and gently, when soiled, utilizing a pH-balanced cleanser.Relieve and redistribute pressure (e.g., scheduled position changes, weight shifts, use of support surface, medical device repositioning, protective dressing application, use of positioning device, microclimate control, use of pressure-injury-monitorEncourage increased activity, such as sitting in a chair at the bedside or early mobilization, when able to tolerate. Avoid prolonged sitting.  Recent Flowsheet Documentation  Taken 6/2/2025 2200 by Sandy Singh, RN  Head of Bed (HOB) Positioning: HOB at 30 degrees  Taken 6/2/2025 2158 by Sandy Singh, RN  Activity Management: activity encouraged  Taken 6/2/2025 2000 by Sandy Singh, RN  Activity Management: activity encouraged     Problem: Parenteral Nutrition  Goal: Effective Intravenous Nutrition Therapy Delivery  Outcome: Progressing     Problem: Restraint, Nonviolent  Goal: Absence of Harm or Injury  Outcome: Progressing  Intervention: Protect Skin and Joint Integrity  Description: Frequently assess restraint application site for skin integrity, edema and  perfusion distal to the site; document findings.Consider protective skin barrier with risk of skin injury.Release and replace restraint at regular intervals.Assist with frequent joint range of motion activity.  Recent Flowsheet Documentation  Taken 6/2/2025 2200 by Sandy Singh RN  Body Position:   turned   tilted   left     Problem: Noninvasive Ventilation Acute  Goal: Effective Unassisted Ventilation and Oxygenation  Outcome: Progressing

## 2025-06-03 NOTE — THERAPY TREATMENT NOTE
Patient Name: Samia Gaming  : 1955    MRN: [id number]                              Today's Date: 6/3/2025       Admit Date: 2025    Visit Dx:     ICD-10-CM ICD-9-CM   1. Gastrointestinal hemorrhage, unspecified gastrointestinal hemorrhage type  K92.2 578.9   2. Acute blood loss anemia  D62 285.1   3. Elevated BUN  R79.9 790.6   4. Elevated lactic acid level  R79.89 276.2   5. Generalized abdominal pain  R10.84 789.07     Patient Active Problem List   Diagnosis    GI bleed    GIB (gastrointestinal bleeding)     Past Medical History:   Diagnosis Date    Arthritis     Elevated cholesterol      Past Surgical History:   Procedure Laterality Date    CHOLECYSTECTOMY      COLONOSCOPY      COLONOSCOPY N/A 2025    Procedure: COLONOSCOPY TO CECUM AND TERMINAL ILEUM AT BEDSIDE;  Surgeon: Bi Hutson MD;  Location: [facility] ENDOSCOPY;  Service: Gastroenterology;  Laterality: N/A;  PRE- GI BLEED  POST- DIVERTICULOSIS, HEMORRHOIDS    ENDOSCOPY N/A 2025    Procedure: ESOPHAGOGASTRODUODENOSCOPY;  Surgeon: Keyur Neely MD;  Location: [facility] ENDOSCOPY;  Service: Gastroenterology;  Laterality: N/A;  pre: GI bleed  post:errosive gastritis, duodenitis    ENTEROSCOPY SMALL BOWEL N/A 2025    Procedure: ENTEROSCOPY SMALL BOWEL at bedside;  Surgeon: Justin Garza MD;  Location: [facility] ENDOSCOPY;  Service: Gastroenterology;  Laterality: N/A;  pre- melena  post- mild gastric antrum erosion    EXPLORATORY LAPAROTOMY N/A 2025    Procedure: LAPAROTOMY EXPLORATORY, SMALL BOWEL RESECTION DISTAL AND PROXIMAL, ILEOSTOMY;  Surgeon: Jose G Ny MD;  Location: [facility] MAIN OR;  Service: General;  Laterality: N/A;      General Information       Row Name 25 1200          Physical Therapy Time and Intention    Document Type therapy note (daily note)  -CS     Mode of Treatment individual therapy;physical therapy  -CS       Row Name 25 1200          General Information    Patient Profile  Reviewed yes  -CS     Existing Precautions/Restrictions fall  -CS       Row Name 06/03/25 1200          Cognition    Orientation Status (Cognition) oriented x 4  -CS       Row Name 06/03/25 1200          Safety Issues/Impairments Affecting Functional Mobility    Impairments Affecting Function (Mobility) balance;pain;endurance/activity tolerance;strength  -CS     Comment, Safety Issues/Impairments (Mobility) Gait belt and non-skid socks donned  -CS               User Key  (r) = Recorded By, (t) = Taken By, (c) = Cosigned By      Initials Name Provider Type    Dylon Piedra PT Physical Therapist                   Mobility       Row Name 06/03/25 1200          Bed Mobility    Bed Mobility supine-sit  -CS     Supine-Sit Stanton (Bed Mobility) minimum assist (75% patient effort);1 person assist  -CS     Assistive Device (Bed Mobility) head of bed elevated  -CS     Comment, (Bed Mobility) No bed rails used this date but required use of HHA  -CS       Row Name 06/03/25 1200          Sit-Stand Transfer    Sit-Stand Stanton (Transfers) moderate assist (50% patient effort);1 person assist  -CS     Comment, (Sit-Stand Transfer) No HHA or AD  -CS       Row Name 06/03/25 1200          Gait/Stairs (Locomotion)    Stanton Level (Gait) moderate assist (50% patient effort);1 person assist  -CS     Assistive Device (Gait) other (see comments)  -CS     Patient was able to Ambulate yes  -CS     Distance in Feet (Gait) 3  -CS     Bilateral Gait Deviations heel strike decreased;forward flexed posture;lateral trunk flexion  -CS     Comment, (Gait/Stairs) Continuing to be limited due anxiety of falling, progressing well. Pt likely to begin further ambulation distances next session, could benefit from use of RWx to assist w/ her fear of falling limitations.  -CS               User Key  (r) = Recorded By, (t) = Taken By, (c) = Cosigned By      Initials Name Provider Type    Dylon Piedra PT Physical Therapist                    Obj/Interventions       Row Name 06/03/25 1204          Motor Skills    Therapeutic Exercise --  LAQs, GS, QS, APs, marches; 3 sets x 10 reps  -CS       Row Name 06/03/25 1204          Balance    Balance Assessment sitting static balance;sitting dynamic balance;standing static balance;standing dynamic balance  -CS     Static Sitting Balance standby assist  -CS     Dynamic Sitting Balance standby assist  -CS     Position, Sitting Balance sitting edge of bed;sitting in chair;unsupported  -CS     Static Standing Balance moderate assist;minimal assist;standby assist;1-person assist  -CS     Dynamic Standing Balance moderate assist;1-person assist  -CS     Balance Interventions sitting;standing;sit to stand;supported;static  -CS     Comment, Balance Unsteady at times but due to prolonged time spent in bed, progressing well now she is out of bed more. More fear limited  -CS               User Key  (r) = Recorded By, (t) = Taken By, (c) = Cosigned By      Initials Name Provider Type    CS Dylon Pemberton, PT Physical Therapist                   Goals/Plan    No documentation.                  Clinical Impression       Row Name 06/03/25 1205          Pain    Pretreatment Pain Rating 7/10  -CS     Posttreatment Pain Rating 7/10  -CS     Pain Side/Orientation generalized  -CS     Pain Management Interventions activity modification encouraged  -CS     Response to Pain Interventions activity participation with increased pain;activity participation with tolerable pain  -CS       Row Name 06/03/25 1205          Plan of Care Review    Plan of Care Reviewed With patient  -CS     Progress improving  -CS     Outcome Evaluation Pt agreeable and anticipating PT tx session this date. Pt required Malathi for bed mobility this date to assist w/ moving trunk into seated position. Required ModAx1 for STS, able to tolerate standing by her bed w/ SBA. Much improved t/f to her bedside chair this date and ability to take 3 steps to her  chair requiring ModAx1. Pt limited by fear of falling. Overall she remains unsteady but appears to be progressing well. Spent time performing and educating re: LE exercises to perform throughout the day, pt agreeable and all needs met at end of session. Continuing w/ current recommendation for IRF vs. home health pending on her progressions while admitted, at this time pt unsafe to return home.  -CS       Row Name 06/03/25 1205          Therapy Assessment/Plan (PT)    Rehab Potential (PT) fair  -CS     Criteria for Skilled Interventions Met (PT) yes  -CS     Therapy Frequency (PT) 6 times/wk  -CS       Row Name 06/03/25 1205          Vital Signs    O2 Delivery Pre Treatment room air  -CS     O2 Delivery Intra Treatment room air  -CS     O2 Delivery Post Treatment room air  -CS       Row Name 06/03/25 1205          Positioning and Restraints    Pre-Treatment Position in bed  -CS     Post Treatment Position chair  -CS     In Chair notified nsg;reclined;call light within reach;exit alarm on;encouraged to call for assist  -CS               User Key  (r) = Recorded By, (t) = Taken By, (c) = Cosigned By      Initials Name Provider Type    Dylon Piedra, PT Physical Therapist                   Outcome Measures       Row Name 06/03/25 1207 06/03/25 0832       How much help from another person do you currently need...    Turning from your back to your side while in flat bed without using bedrails? 3  -CS 3  -CM    Moving from lying on back to sitting on the side of a flat bed without bedrails? 3  -CS 2  -CM    Moving to and from a bed to a chair (including a wheelchair)? 2  -CS 2  -CM    Standing up from a chair using your arms (e.g., wheelchair, bedside chair)? 2  -CS 2  -CM    Climbing 3-5 steps with a railing? 2  -CS 1  -CM    To walk in hospital room? 2  -CS 2  -CM    AM-PAC 6 Clicks Score (PT) 14  -CS 12  -CM              User Key  (r) = Recorded By, (t) = Taken By, (c) = Cosigned By      Initials Name Provider Type     Ksenia Paul, RN Registered Nurse    Dylon Piedra, PT Physical Therapist                                 Physical Therapy Education       Title: PT OT SLP Therapies (Done)       Topic: Physical Therapy (Done)       Point: Mobility training (Done)       Learning Progress Summary            Patient Acceptance, E, VU by CS at 6/3/2025 1207    Acceptance, E, VU by CS at 6/2/2025 1549   Family Acceptance, E, VU by CS at 6/2/2025 1549                      Point: Home exercise program (Done)       Learning Progress Summary            Patient Acceptance, E, VU by CS at 6/3/2025 1207    Acceptance, E, VU by CS at 6/2/2025 1549   Family Acceptance, E, VU by CS at 6/2/2025 1549                      Point: Body mechanics (Done)       Learning Progress Summary            Patient Acceptance, E, VU by CS at 6/3/2025 1207    Acceptance, E, VU by CS at 6/2/2025 1549   Family Acceptance, E, VU by CS at 6/2/2025 1549                      Point: Precautions (Done)       Learning Progress Summary            Patient Acceptance, E, VU by CS at 6/3/2025 1207    Acceptance, E, VU by CS at 6/2/2025 1549   Family Acceptance, E, VU by CS at 6/2/2025 1549                                      User Key       Initials Effective Dates Name Provider Type Discipline     09/06/24 -  Dylon Pemberton, PT Physical Therapist PT                  PT Recommendation and Plan     Progress: improving  Outcome Evaluation: Pt agreeable and anticipating PT tx session this date. Pt required Malathi for bed mobility this date to assist w/ moving trunk into seated position. Required ModAx1 for STS, able to tolerate standing by her bed w/ SBA. Much improved t/f to her bedside chair this date and ability to take 3 steps to her chair requiring ModAx1. Pt limited by fear of falling. Overall she remains unsteady but appears to be progressing well. Spent time performing and educating re: LE exercises to perform throughout the day, pt agreeable and all needs met  at end of session. Continuing w/ current recommendation for IRF vs. home health pending on her progressions while admitted, at this time pt unsafe to return home.     Time Calculation:         PT Charges       Row Name 06/03/25 1207             Time Calculation    Start Time 1057  -CS      Stop Time 1125  -CS      Time Calculation (min) 28 min  -CS      PT Non-Billable Time (min) 4 min  -CS      PT Received On 06/03/25  -CS      PT - Next Appointment 06/04/25  -CS      PT Goal Re-Cert Due Date 06/12/25  -CS         Time Calculation- PT    Total Timed Code Minutes- PT 24 minute(s)  -CS         Timed Charges    99836 - PT Therapeutic Exercise Minutes 10  -CS      15701 - PT Therapeutic Activity Minutes 14  -CS         Total Minutes    Timed Charges Total Minutes 24  -CS       Total Minutes 24  -CS                User Key  (r) = Recorded By, (t) = Taken By, (c) = Cosigned By      Initials Name Provider Type    CS Dylon Pemberton, PT Physical Therapist                  Therapy Charges for Today       Code Description Service Date Service Provider Modifiers Qty    77127535784 HC PT THERAPEUTIC ACT EA 15 MIN 6/2/2025 Dylon Pemberton, PT GP 1    81316962375 HC PT EVAL MOD COMPLEXITY 2 6/2/2025 Dylon Pemberton, PT GP 1    42634193075 HC PT THER PROC EA 15 MIN 6/3/2025 Dylon Pemberton, PT GP 1    42292732620 HC PT THERAPEUTIC ACT EA 15 MIN 6/3/2025 Dylon Pemberton, PT GP 1            PT G-Codes  AM-PAC 6 Clicks Score (PT): 14  PT Discharge Summary  Anticipated Discharge Disposition (PT): home with home health, inpatient rehabilitation facility    Dylon Pemberton PT  6/3/2025

## 2025-06-03 NOTE — DISCHARGE SUMMARY
Name: Samia Gaming ADMIT: 2025   : 1955  PCP: Cristiano Logan DO    MRN: [id number] LOS: 11 days   AGE/SEX: 69 y.o. female  ROOM: [facility]     Subjective   Subjective   Patient sitting up in chair doing leg exercises.  Ostomy bag just changed.  She is feeling better now that she is out of bed.  No nausea or vomiting.  Having expected postop pain.  Hemoglobin up to 8.7    Review of Systems  As above     Objective   Objective   Vital Signs  Temp:  [97.7 °F (36.5 °C)-99 °F (37.2 °C)] 98.5 °F (36.9 °C)  Heart Rate:  [72-88] 78  Resp:  [14-18] 14  BP: (109-136)/(45-69) 128/69  SpO2:  [91 %-100 %] 91 %  on  Flow (L/min) (Oxygen Therapy):  [2] 2;   Device (Oxygen Therapy): room air  Body mass index is 52 kg/m².  Physical Exam  Constitutional:       General: She is not in acute distress.     Appearance: She is not ill-appearing.   Cardiovascular:      Rate and Rhythm: Normal rate and regular rhythm.   Pulmonary:      Effort: Pulmonary effort is normal. No respiratory distress.   Abdominal:      General: Abdomen is flat. There is no distension.      Tenderness: There is no abdominal tenderness.      Comments: Abdominal incision with staples   Musculoskeletal:         General: No swelling or deformity. Normal range of motion.   Skin:     General: Skin is warm and dry.   Neurological:      General: No focal deficit present.      Mental Status: She is alert. Mental status is at baseline.         Results Review     I reviewed the patient's new clinical results.  Results from last 7 days   Lab Units 25  0610 25  2354 25  1941 25  0532   WBC 10*3/mm3 4.56 5.11 6.70 7.27   HEMOGLOBIN g/dL 8.7* 8.0* 8.1*  8.1* 7.7*   PLATELETS 10*3/mm3 134* 142 138* 118*     Results from last 7 days   Lab Units 25  0611 25  0913 25  0820 25  0532   SODIUM mmol/L 137 137 137 134*   POTASSIUM mmol/L 4.3 4.0 4.0 6.3*   CHLORIDE mmol/L 104 105 105 103   CO2 mmol/L 27.0 26.2 25.1 22.7   BUN  mg/dL 10.0 11.0 11.0 11.0   CREATININE mg/dL 0.36* 0.24* 0.35* 0.33*   GLUCOSE mg/dL 114* 134* 138* 567*   Estimated Creatinine Clearance: 184.9 mL/min (A) (by C-G formula based on SCr of 0.36 mg/dL (L)).  Results from last 7 days   Lab Units 06/03/25  0611 06/02/25  0820 06/02/25  0532 05/31/25  0527   ALBUMIN g/dL 2.0* 2.0* 1.9* 1.9*   BILIRUBIN mg/dL 0.8 0.7 0.6 0.9   ALK PHOS U/L 89 72 62 38*   AST (SGOT) U/L 52* 26 23 12   ALT (SGPT) U/L 53* 22 18 8     Results from last 7 days   Lab Units 06/03/25  0611 06/02/25  0913 06/02/25  0820 06/02/25  0532 06/01/25  0742 05/31/25  0527   CALCIUM mg/dL 8.1* 7.9* 8.0* 7.9* 7.6* 7.0*   ALBUMIN g/dL 2.0*  --  2.0* 1.9*  --  1.9*   MAGNESIUM mg/dL 1.9  --   --  2.2 1.9 1.9   PHOSPHORUS mg/dL 3.2  --   --  4.5 2.2* 2.3*       External COVID19   Date Value Ref Range Status   12/18/2020 Detected (A) Not Detected Final     Coronavirus (COVID-19)   Date Value Ref Range Status   12/18/2020 Acceptable Acceptable Final     Glucose   Date/Time Value Ref Range Status   06/03/2025 0012 114 70 - 130 mg/dL Final   06/02/2025 1545 124 70 - 130 mg/dL Final   06/02/2025 1119 135 (H) 70 - 130 mg/dL Final   06/02/2025 0909 135 (H) 70 - 130 mg/dL Final   06/02/2025 0609 125 70 - 130 mg/dL Final   06/02/2025 0029 122 70 - 130 mg/dL Final   06/01/2025 2209 133 (H) 70 - 130 mg/dL Final       XR Abdomen KUB  Narrative: XR ABDOMEN KUB-     INDICATIONS: Possible ileus     TECHNIQUE: Supine views of the abdomen     COMPARISON: 5/30/2025     FINDINGS:     NG tube extends to the epigastric region. A surgical drain is apparent  at the level of the pelvis, with overlying skin staples. The bowel gas  pattern is nonspecific, with mild abnormal gaseous distention of the  colon. Mild colonic fecal retention is apparent. No supine evidence for  free intraperitoneal gas. Follow-up as indications persist. If there is  further clinical concern, CT can be obtained for further evaluation.     Impression:    As  described.        This report was finalized on 6/1/2025 9:25 AM by Dr. Teja Mckinnon M.D on Workstation: [id number]       I reviewed the patient's daily medications.  Scheduled Medications  enoxaparin sodium, 40 mg, Subcutaneous, Q12H  Fat Emulsion Plant Based, 250 mL, Intravenous, Once per day on Tuesday Thursday Saturday  insulin regular, 2-9 Units, Subcutaneous, Q6H  pantoprazole, 40 mg, Intravenous, Q12H  sodium chloride, 10 mL, Intravenous, Q12H  sodium chloride, 10 mL, Intravenous, Q12H  sodium chloride, 10 mL, Intravenous, Q12H    Infusions  Adult Central 2-in-1 TPN, , Last Rate: 75 mL/hr at 06/02/25 1808  Pharmacy to Dose TPN,     Diet  NPO Diet NPO Type: Ice Chips  Adult Central 2-in-1 TPN         I have personally reviewed:  [x]  Laboratory   []  Microbiology   [x]  Radiology   []  EKG/Telemetry   [x]  Cardiology/Vascular   []  Pathology   [x]  Records     Assessment/Plan     Active Hospital Problems    Diagnosis  POA    **GI bleed [K92.2]  Yes    GIB (gastrointestinal bleeding) [K92.2]  Yes      Resolved Hospital Problems   No resolved problems to display.       69 y.o. female admitted with GI bleed.    GI bleeding from small bowel diverticulum  Acute anemia  -EGD on 5/23, erosive gastropathy with no bleeding.  Nonbleeding duodenal ulcer with no stigmata of bleeding  -Colonoscopy on 5/24 with diverticulosis and blood in the sigmoid colon  -Small bowel enteroscopy on 5/26 with no acute findings  - Surgery consulted status post small bowel resection x 2 end ileostomy on 5/29  -GI following  - Discussed with kevin Bush for Lovenox.  Plan to discontinue Barone catheter.  Plan to start diet today.    Right orbital wall fracture-seen by OMFS and recommends conservative therapy.  Suspect paresthesias will improve over months    Morbid obesity, BMI 52    Postop respiratory failure-now on room air    SCDs for DVT prophylaxis.  Chemo full axis when okay with surgery  Full code.  Discussed with patient,  family, and nursing staff.  Anticipate discharge home with  vs SNU facility later this week.  Patient is very eager to go home with home health, but discussed we will see how she does with physical therapy over the next few days now that she is finally gotten out of bed    Expected Discharge Date: 6/4/2025; Expected Discharge Time:       Lowell Hernandez MD  [facility]  06/03/25  11:43 EDT

## 2025-06-03 NOTE — PROGRESS NOTES
General Surgery Note    Summary:  Postoperative day #4.5 status post small bowel resection x 2, repair of diagnostic enterotomies, and end-loop ileostomy    Interval Status:  Has been out of bed and working with physical therapy.  Sitting up in the chair today.  No nausea.  Pain well-controlled.  Ileostomy output increasing and nonbloody.    Physical Exam:    Afebrile, heart rate 83, /66  Nonlabored breathing on 2 L nasal cannula  Abdomen soft, moderately tender, obese, incision clean and dry with staples, drain serosanguineous, double barrel / end loop ileostomy with bilious nonbloody output      Labs:  Results from last 7 days   Lab Units 06/03/25  1154 06/03/25  0610 06/02/25  2354 06/02/25  1941 06/02/25  0532 06/01/25  2358 06/01/25  1811   WBC 10*3/mm3 4.16 4.56 5.11 6.70 7.27 9.08 9.12   HEMOGLOBIN g/dL 8.5* 8.7* 8.0* 8.1*  8.1* 7.7* 8.4* 8.5*   PLATELETS 10*3/mm3 157 134* 142 138* 118* 112* 108*     Results from last 7 days   Lab Units 06/03/25  0611 06/02/25  0913 06/02/25  0820 06/02/25  0532   SODIUM mmol/L 137 137 137 134*   POTASSIUM mmol/L 4.3 4.0 4.0 6.3*   CHLORIDE mmol/L 104 105 105 103   CO2 mmol/L 27.0 26.2 25.1 22.7   BUN mg/dL 10.0 11.0 11.0 11.0   CREATININE mg/dL 0.36* 0.24* 0.35* 0.33*   CALCIUM mg/dL 8.1* 7.9* 8.0* 7.9*   BILIRUBIN mg/dL 0.8  --  0.7 0.6   ALK PHOS U/L 89  --  72 62   ALT (SGPT) U/L 53*  --  22 18   AST (SGOT) U/L 52*  --  26 23   GLUCOSE mg/dL 114* 134* 138* 567*   Albumin 2.0  Prealbumin 7.9    Plan:  - Continue TPN for now  - Start clear liquid diet  - Lovenox DVT prophylaxis  - Out of bed and ambulate  - Pulmonary toilet        Jose G Ny MD  [facility] ([facility]  [address], [address]  [place], [place], [address]  P: [phone]  F: [phone]

## 2025-06-03 NOTE — PLAN OF CARE
Goal Outcome Evaluation:  Plan of Care Reviewed With: patient        Progress: improving  Outcome Evaluation: Pt agreeable and anticipating PT tx session this date. Pt required Malathi for bed mobility this date to assist w/ moving trunk into seated position. Required ModAx1 for STS, able to tolerate standing by her bed w/ SBA. Much improved t/f to her bedside chair this date and ability to take 3 steps to her chair requiring ModAx1. Pt limited by fear of falling. Overall she remains unsteady but appears to be progressing well. Spent time performing and educating re: LE exercises to perform throughout the day, pt agreeable and all needs met at end of session. Continuing w/ current recommendation for IRF vs. home health pending on her progressions while admitted, at this time pt unsafe to return home.    Anticipated Discharge Disposition (PT): home with home health, inpatient rehabilitation facility

## 2025-06-03 NOTE — PROGRESS NOTES
[facility] Clinical Pharmacy Services: TPN Daily Progress Note    TPN Day # 5  Indication: Inaccessible GI Tract, prolonged paralytic ileus  Route: central  Type: standard    Subjective/Objective  Results from last 7 days   Lab Units 25  0611 25  0820 25  0532   SODIUM mmol/L 137   < > 134*   POTASSIUM mmol/L 4.3   < > 6.3*   CHLORIDE mmol/L 104   < > 103   CO2 mmol/L 27.0   < > 22.7   BUN mg/dL 10.0   < > 11.0   CREATININE mg/dL 0.36*   < > 0.33*   CALCIUM mg/dL 8.1*   < > 7.9*   ALBUMIN g/dL 2.0*   < > 1.9*   BILIRUBIN mg/dL 0.8   < > 0.6   ALK PHOS U/L 89   < > 62   ALT (SGPT) U/L 53*   < > 18   AST (SGOT) U/L 52*   < > 23   GLUCOSE mg/dL 114*   < > 567*   MAGNESIUM mg/dL 1.9  --  2.2   PHOSPHORUS mg/dL 3.2  --  4.5   TRIGLYCERIDES mg/dL  --   --  127   PREALBUMIN mg/dL  --   --  7.9*    < > = values in this interval not displayed.        Diet Orders (active) (From admission, onward)       Start     Ordered    25 06  NPO Diet NPO Type: Strict NPO  Diet Effective Now         25 0630                  Additional insulin administration while previous TPN infusin units SSI  Additional electrolyte administration while previous TPN infusing: none  Acid suppression: IV protonix  Corrected Calcium:  9.7 mg/dL    Goal TPN Formula Recommendations: 100g/700kcal/250mL 3x weekly AA/Dex/Lipids; 1314 kcal/day  Current TPN Formula: 100g/700kcal/250mL 3x/week AA/Dex/Lipids  Rate 75ml/hr    Assessment/Plan        Macronutrients: at goal continue same.   Electrolytes/Additives:   Sodium Chloride: 70 mEq   Sodium Acetate: 0 mEq  Sodium Phosphate: 0 mEq  Potassium Chloride: 55 mEq  (was 65)  Potassium Acetate: 0 mEq   Potassium Phosphate: 20 mEq    Calcium Gluconate: 9 mEq  Magnesium Sulfate: 14 mEq  (was 10)  Trace Elements  MVI  Other Additives: none  Labs: CMP, Mg, phos in AM  Change lipids to smof 20 %/250 ml 3x/week     Bjorn A. House, [facility]  Clinical Pharmacist

## 2025-06-03 NOTE — NURSING NOTE
"   06/03/25 1039   Ileostomy Loop RUQ   Placement date: If unknown, DO NOT use \"Add Comment\" note: 05/29/25   Inserted by: Dr Ny  Ileostomy Type: Loop  Location: RUQ   Stomal Appliance 2 piece;Changed   Stoma Appearance irregular;rosebud appearance;moist;red;protruding above skin level   Peristomal Assessment Clean;Intact   Accessories/Skin Care cleansed with water;skin barrier ring;convex wafer  ([place] 2 3/4\" soft convex wafer and pouch with barrier ring.)   Stoma Function flatus;stool   Stool Color brown;green   Stool Consistency liquid   Treatment Bag change;Site care   Output (mL) 75 mL     Ostomy education provided to the patient: Pt remains in CCU, she is alert and oriented, able to observe pouch change today and ask appropriate questions.     [x]Pouch changed   [x]Pt observed   []Pt participated    []CG participated            [x]Instructed in frequency of pouch change  [x]Pouch emptied and cleaned, demonstrated use of pouch closure    [x]Pt observed   []Pt participated    []CG participated    [x]Instructed to empty pouch when 1/3 to 1/2 full  [x]Teaching packet/handouts provided, she did not have her glasses, so we will review the packet once her daughter brings her glasses.   [x]Return to ADL's, showering, clothing  [x]Peristomal skin care, avoiding use of soaps with moisturizers  []Diet   []Adequate po fluid intake   []S/S of dehydration   []Foods to thicken stool   []Foods to avoid to prevent blockage  [x]Supplies at bedside  [x]Samples ordered with verbal consent    Current Supplies: [place] 2 3/4\" soft convex wafer and pouch with barrier ring     WOC Team follow up plan: 5x week for ostomy teaching, will need  follow up upon D/C    "

## 2025-06-03 NOTE — PROGRESS NOTES
Name: Samia Gaming ADMIT: 2025   : 1955  PCP: Cristiano Logan DO    MRN: [id number] LOS: 11 days   AGE/SEX: 69 y.o. female  ROOM: [facility]     Subjective   Subjective   Patient sitting up in chair doing leg exercises. Ostomy bag just changed. She is feeling better now that she is out of bed. No nausea or vomiting. Having expected postop pain. Hemoglobin up to 8.7     Review of Systems  As above     Objective   Objective   Vital Signs  Temp:  [97.7 °F (36.5 °C)-99 °F (37.2 °C)] 98.5 °F (36.9 °C)  Heart Rate:  [72-88] 78  Resp:  [14-18] 14  BP: (109-136)/(45-69) 128/69  SpO2:  [91 %-100 %] 91 %  on  Flow (L/min) (Oxygen Therapy):  [2] 2;   Device (Oxygen Therapy): room air  Body mass index is 52 kg/m².  Physical Exam  Constitutional:       General: She is not in acute distress.     Appearance: She is not ill-appearing.   Cardiovascular:      Rate and Rhythm: Normal rate and regular rhythm.   Pulmonary:      Effort: Pulmonary effort is normal. No respiratory distress.   Abdominal:      General: Abdomen is flat. There is no distension.      Tenderness: There is no abdominal tenderness.      Comments: Abdominal incision with staples   Musculoskeletal:         General: No swelling or deformity. Normal range of motion.   Skin:     General: Skin is warm and dry.   Neurological:      General: No focal deficit present.      Mental Status: She is alert. Mental status is at baseline.         Results Review     I reviewed the patient's new clinical results.  Results from last 7 days   Lab Units 25  0610 25  2354 25  1941 25  0532   WBC 10*3/mm3 4.56 5.11 6.70 7.27   HEMOGLOBIN g/dL 8.7* 8.0* 8.1*  8.1* 7.7*   PLATELETS 10*3/mm3 134* 142 138* 118*     Results from last 7 days   Lab Units 25  0611 25  0913 25  0820 25  0532   SODIUM mmol/L 137 137 137 134*   POTASSIUM mmol/L 4.3 4.0 4.0 6.3*   CHLORIDE mmol/L 104 105 105 103   CO2 mmol/L 27.0 26.2 25.1 22.7   BUN  Chief Complaint   Patient presents with     RECHECK     UMP RETURN MYASTHENIA GRAVIS      Viji Mirza, EMT     mg/dL 10.0 11.0 11.0 11.0   CREATININE mg/dL 0.36* 0.24* 0.35* 0.33*   GLUCOSE mg/dL 114* 134* 138* 567*   Estimated Creatinine Clearance: 184.9 mL/min (A) (by C-G formula based on SCr of 0.36 mg/dL (L)).  Results from last 7 days   Lab Units 06/03/25  0611 06/02/25  0820 06/02/25  0532 05/31/25  0527   ALBUMIN g/dL 2.0* 2.0* 1.9* 1.9*   BILIRUBIN mg/dL 0.8 0.7 0.6 0.9   ALK PHOS U/L 89 72 62 38*   AST (SGOT) U/L 52* 26 23 12   ALT (SGPT) U/L 53* 22 18 8     Results from last 7 days   Lab Units 06/03/25  0611 06/02/25  0913 06/02/25  0820 06/02/25  0532 06/01/25  0742 05/31/25  0527   CALCIUM mg/dL 8.1* 7.9* 8.0* 7.9* 7.6* 7.0*   ALBUMIN g/dL 2.0*  --  2.0* 1.9*  --  1.9*   MAGNESIUM mg/dL 1.9  --   --  2.2 1.9 1.9   PHOSPHORUS mg/dL 3.2  --   --  4.5 2.2* 2.3*       External COVID19   Date Value Ref Range Status   12/18/2020 Detected (A) Not Detected Final     Coronavirus (COVID-19)   Date Value Ref Range Status   12/18/2020 Acceptable Acceptable Final     Glucose   Date/Time Value Ref Range Status   06/03/2025 0012 114 70 - 130 mg/dL Final   06/02/2025 1545 124 70 - 130 mg/dL Final   06/02/2025 1119 135 (H) 70 - 130 mg/dL Final   06/02/2025 0909 135 (H) 70 - 130 mg/dL Final   06/02/2025 0609 125 70 - 130 mg/dL Final   06/02/2025 0029 122 70 - 130 mg/dL Final   06/01/2025 2209 133 (H) 70 - 130 mg/dL Final       XR Abdomen KUB  Narrative: XR ABDOMEN KUB-     INDICATIONS: Possible ileus     TECHNIQUE: Supine views of the abdomen     COMPARISON: 5/30/2025     FINDINGS:     NG tube extends to the epigastric region. A surgical drain is apparent  at the level of the pelvis, with overlying skin staples. The bowel gas  pattern is nonspecific, with mild abnormal gaseous distention of the  colon. Mild colonic fecal retention is apparent. No supine evidence for  free intraperitoneal gas. Follow-up as indications persist. If there is  further clinical concern, CT can be obtained for further evaluation.     Impression:    As  described.        This report was finalized on 6/1/2025 9:25 AM by Dr. Teja Mckinnon M.D on Workstation: [email or web address]       I reviewed the patient's daily medications.  Scheduled Medications  enoxaparin sodium, 40 mg, Subcutaneous, Q12H  Fat Emulsion Plant Based, 250 mL, Intravenous, Once per day on Tuesday Thursday Saturday  insulin regular, 2-9 Units, Subcutaneous, Q6H  pantoprazole, 40 mg, Intravenous, Q12H  sodium chloride, 10 mL, Intravenous, Q12H  sodium chloride, 10 mL, Intravenous, Q12H  sodium chloride, 10 mL, Intravenous, Q12H    Infusions  Adult Central 2-in-1 TPN, , Last Rate: 75 mL/hr at 06/02/25 1808  Pharmacy to Dose TPN,     Diet  NPO Diet NPO Type: Ice Chips  Adult Central 2-in-1 TPN         I have personally reviewed:  [x]  Laboratory   []  Microbiology   [x]  Radiology   []  EKG/Telemetry   [x]  Cardiology/Vascular   []  Pathology   [x]  Records     Assessment/Plan     Active Hospital Problems    Diagnosis  POA    **GI bleed [K92.2]  Yes    GIB (gastrointestinal bleeding) [K92.2]  Yes      Resolved Hospital Problems   No resolved problems to display.       69 y.o. female admitted with GI bleed.    GI bleeding from small bowel diverticulum  Acute anemia  -EGD on 5/23, erosive gastropathy with no bleeding.  Nonbleeding duodenal ulcer with no stigmata of bleeding  -Colonoscopy on 5/24 with diverticulosis and blood in the sigmoid colon  -Small bowel enteroscopy on 5/26 with no acute findings  - Surgery consulted status post small bowel resection x 2 end ileostomy on 5/29  -GI following  -Discussed with kevin Bush for Lovenox.  Plan to discontinue Barone catheter.  Plan to start diet today.   -on TPN    Right orbital wall fracture-seen by OMFS and recommends conservative therapy.  Suspect paresthesias will improve over months    Morbid obesity, BMI 52    Postop respiratory failure-now on room air    SCDs for DVT prophylaxis.  Chemo full axis when okay with surgery  Full code.  Discussed with  patient, family, and nursing staff.  Anticipate discharge home with  vs SNU facility later this week.Patient is very eager to go home with home health, but discussed we will see how she does with physical therapy over the next few days now that she is finally gotten out of bed     Expected Discharge Date: 6/4/2025; Expected Discharge Time:       Lowell Hernandez MD  [facility]  06/03/25  11:45 EDT

## 2025-06-04 LAB
ALBUMIN SERPL-MCNC: 2 G/DL (ref 3.5–5.2)
ALBUMIN/GLOB SERPL: 0.7 G/DL
ALP SERPL-CCNC: 104 U/L (ref 39–117)
ALT SERPL W P-5'-P-CCNC: 62 U/L (ref 1–33)
ANION GAP SERPL CALCULATED.3IONS-SCNC: 11 MMOL/L (ref 5–15)
AST SERPL-CCNC: 54 U/L (ref 1–32)
BILIRUB SERPL-MCNC: 0.6 MG/DL (ref 0–1.2)
BUN SERPL-MCNC: 11 MG/DL (ref 8–23)
BUN/CREAT SERPL: 30.6 (ref 7–25)
CALCIUM SPEC-SCNC: 8.2 MG/DL (ref 8.6–10.5)
CHLORIDE SERPL-SCNC: 101 MMOL/L (ref 98–107)
CO2 SERPL-SCNC: 22 MMOL/L (ref 22–29)
CREAT SERPL-MCNC: 0.36 MG/DL (ref 0.57–1)
DEPRECATED RDW RBC AUTO: 51.3 FL (ref 37–54)
DEPRECATED RDW RBC AUTO: 52.6 FL (ref 37–54)
EGFRCR SERPLBLD CKD-EPI 2021: 108.7 ML/MIN/1.73
ERYTHROCYTE [DISTWIDTH] IN BLOOD BY AUTOMATED COUNT: 15.7 % (ref 12.3–15.4)
ERYTHROCYTE [DISTWIDTH] IN BLOOD BY AUTOMATED COUNT: 15.9 % (ref 12.3–15.4)
GLOBULIN UR ELPH-MCNC: 2.8 GM/DL
GLUCOSE BLDC GLUCOMTR-MCNC: 113 MG/DL (ref 70–130)
GLUCOSE BLDC GLUCOMTR-MCNC: 119 MG/DL (ref 70–130)
GLUCOSE BLDC GLUCOMTR-MCNC: 122 MG/DL (ref 70–130)
GLUCOSE BLDC GLUCOMTR-MCNC: 123 MG/DL (ref 70–130)
GLUCOSE BLDC GLUCOMTR-MCNC: 125 MG/DL (ref 70–130)
GLUCOSE BLDC GLUCOMTR-MCNC: 98 MG/DL (ref 70–130)
GLUCOSE SERPL-MCNC: 108 MG/DL (ref 65–99)
HCT VFR BLD AUTO: 25.8 % (ref 34–46.6)
HCT VFR BLD AUTO: 29.5 % (ref 34–46.6)
HGB BLD-MCNC: 8.7 G/DL (ref 12–15.9)
HGB BLD-MCNC: 9.3 G/DL (ref 12–15.9)
MAGNESIUM SERPL-MCNC: 1.9 MG/DL (ref 1.6–2.4)
MCH RBC QN AUTO: 29.8 PG (ref 26.6–33)
MCH RBC QN AUTO: 30.9 PG (ref 26.6–33)
MCHC RBC AUTO-ENTMCNC: 31.5 G/DL (ref 31.5–35.7)
MCHC RBC AUTO-ENTMCNC: 33.7 G/DL (ref 31.5–35.7)
MCV RBC AUTO: 91.5 FL (ref 79–97)
MCV RBC AUTO: 94.6 FL (ref 79–97)
PHOSPHATE SERPL-MCNC: 3.5 MG/DL (ref 2.5–4.5)
PLATELET # BLD AUTO: 144 10*3/MM3 (ref 140–450)
PLATELET # BLD AUTO: 147 10*3/MM3 (ref 140–450)
PMV BLD AUTO: 10.4 FL (ref 6–12)
PMV BLD AUTO: 10.5 FL (ref 6–12)
POTASSIUM SERPL-SCNC: 4.2 MMOL/L (ref 3.5–5.2)
PROT SERPL-MCNC: 4.8 G/DL (ref 6–8.5)
RBC # BLD AUTO: 2.82 10*6/MM3 (ref 3.77–5.28)
RBC # BLD AUTO: 3.12 10*6/MM3 (ref 3.77–5.28)
SODIUM SERPL-SCNC: 134 MMOL/L (ref 136–145)
TRIGL SERPL-MCNC: 125 MG/DL (ref 0–150)
WBC NRBC COR # BLD AUTO: 4.32 10*3/MM3 (ref 3.4–10.8)
WBC NRBC COR # BLD AUTO: 5.08 10*3/MM3 (ref 3.4–10.8)

## 2025-06-04 PROCEDURE — 83735 ASSAY OF MAGNESIUM: CPT | Performed by: INTERNAL MEDICINE

## 2025-06-04 PROCEDURE — 99024 POSTOP FOLLOW-UP VISIT: CPT | Performed by: STUDENT IN AN ORGANIZED HEALTH CARE EDUCATION/TRAINING PROGRAM

## 2025-06-04 PROCEDURE — 84100 ASSAY OF PHOSPHORUS: CPT | Performed by: INTERNAL MEDICINE

## 2025-06-04 PROCEDURE — 82948 REAGENT STRIP/BLOOD GLUCOSE: CPT

## 2025-06-04 PROCEDURE — 25010000002 ENOXAPARIN PER 10 MG: Performed by: STUDENT IN AN ORGANIZED HEALTH CARE EDUCATION/TRAINING PROGRAM

## 2025-06-04 PROCEDURE — 84478 ASSAY OF TRIGLYCERIDES: CPT | Performed by: INTERNAL MEDICINE

## 2025-06-04 PROCEDURE — 25010000002 HYDROMORPHONE PER 4 MG: Performed by: STUDENT IN AN ORGANIZED HEALTH CARE EDUCATION/TRAINING PROGRAM

## 2025-06-04 PROCEDURE — 85027 COMPLETE CBC AUTOMATED: CPT | Performed by: INTERNAL MEDICINE

## 2025-06-04 PROCEDURE — 80053 COMPREHEN METABOLIC PANEL: CPT | Performed by: INTERNAL MEDICINE

## 2025-06-04 PROCEDURE — 97110 THERAPEUTIC EXERCISES: CPT

## 2025-06-04 PROCEDURE — 97530 THERAPEUTIC ACTIVITIES: CPT

## 2025-06-04 RX ORDER — PANTOPRAZOLE SODIUM 40 MG/1
40 TABLET, DELAYED RELEASE ORAL
Status: DISCONTINUED | OUTPATIENT
Start: 2025-06-04 | End: 2025-06-07 | Stop reason: HOSPADM

## 2025-06-04 RX ORDER — ACETAMINOPHEN 500 MG
1000 TABLET ORAL EVERY 8 HOURS
Status: DISCONTINUED | OUTPATIENT
Start: 2025-06-04 | End: 2025-06-07 | Stop reason: HOSPADM

## 2025-06-04 RX ADMIN — TRAZODONE HYDROCHLORIDE 150 MG: 100 TABLET ORAL at 21:38

## 2025-06-04 RX ADMIN — Medication 10 ML: at 09:38

## 2025-06-04 RX ADMIN — PANTOPRAZOLE SODIUM 40 MG: 40 INJECTION, POWDER, FOR SOLUTION INTRAVENOUS at 09:36

## 2025-06-04 RX ADMIN — ENOXAPARIN SODIUM 40 MG: 100 INJECTION SUBCUTANEOUS at 05:58

## 2025-06-04 RX ADMIN — HYDROMORPHONE HYDROCHLORIDE 0.5 MG: 1 INJECTION, SOLUTION INTRAMUSCULAR; INTRAVENOUS; SUBCUTANEOUS at 00:38

## 2025-06-04 RX ADMIN — OXYCODONE HYDROCHLORIDE 5 MG: 5 TABLET ORAL at 21:38

## 2025-06-04 RX ADMIN — OXYCODONE HYDROCHLORIDE 5 MG: 5 TABLET ORAL at 12:37

## 2025-06-04 RX ADMIN — OXYCODONE HYDROCHLORIDE 5 MG: 5 TABLET ORAL at 16:45

## 2025-06-04 RX ADMIN — HYDROMORPHONE HYDROCHLORIDE 0.5 MG: 1 INJECTION, SOLUTION INTRAMUSCULAR; INTRAVENOUS; SUBCUTANEOUS at 09:36

## 2025-06-04 RX ADMIN — OXYCODONE HYDROCHLORIDE 5 MG: 5 TABLET ORAL at 04:08

## 2025-06-04 RX ADMIN — HYDROMORPHONE HYDROCHLORIDE 0.5 MG: 1 INJECTION, SOLUTION INTRAMUSCULAR; INTRAVENOUS; SUBCUTANEOUS at 05:58

## 2025-06-04 RX ADMIN — Medication 10 ML: at 09:40

## 2025-06-04 RX ADMIN — ENOXAPARIN SODIUM 40 MG: 100 INJECTION SUBCUTANEOUS at 16:46

## 2025-06-04 RX ADMIN — PANTOPRAZOLE SODIUM 40 MG: 40 TABLET, DELAYED RELEASE ORAL at 16:45

## 2025-06-04 NOTE — PROGRESS NOTES
Nutrition Services    Patient Name: Samia Gaming  YOB: 1955  MRN: [id number]  Admission date: 5/22/2025    PROGRESS NOTE      Encounter Information: Follow Up    Day #6 of TPN. Patient has been advanced to diet this afternoon.  Had been eating 75% at meals of liquid diet.  Plans for TPN discontinuation after today's bag runs out.       PO Diet: Adult Central 2-in-1 TPN  Diet: Gastrointestinal, Diabetic; Consistent Carbohydrate; Fiber-Restricted; No Carbonated Beverages; Texture: Soft to Chew (NDD 3); Soft to Chew: Whole Meat; Fluid Consistency: Thin (IDDSI 0)   PO Supplements: n/a   PO Intake:  75% x 2 meals       Current nutrition support: 100g/700kcal/250mL 3x weekly AA/Dex/Lipids    Nutrition support review: TPN infusing at goal as ordered       Weight: Weight: 122 kg (269 lb 10 oz) (06/04/25 0403)       Medications: reviewed    Labs: reviewed        GI Function:  abdominal pain, hypoactive bowel sounds, ileostomy, last bowel movement: 6/3        Nutrition Intervention Updates: TPN to be discontinued after today.  Monitor/encourage PO intake.    RD to continue to follow.       Results from last 7 days   Lab Units 06/04/25  0218 06/03/25  0611 06/02/25  0913 06/02/25  0820   SODIUM mmol/L 134* 137 137 137   POTASSIUM mmol/L 4.2 4.3 4.0 4.0   CHLORIDE mmol/L 101 104 105 105   CO2 mmol/L 22.0 27.0 26.2 25.1   BUN mg/dL 11.0 10.0 11.0 11.0   CREATININE mg/dL 0.36* 0.36* 0.24* 0.35*   CALCIUM mg/dL 8.2* 8.1* 7.9* 8.0*   BILIRUBIN mg/dL 0.6 0.8  --  0.7   ALK PHOS U/L 104 89  --  72   ALT (SGPT) U/L 62* 53*  --  22   AST (SGOT) U/L 54* 52*  --  26   GLUCOSE mg/dL 108* 114* 134* 138*     Results from last 7 days   Lab Units 06/04/25  0218 06/03/25  1154 06/03/25  0611 06/02/25  1941 06/02/25  0532   MAGNESIUM mg/dL 1.9  --  1.9  --  2.2   PHOSPHORUS mg/dL 3.5  --  3.2  --  4.5   HEMOGLOBIN g/dL 9.3*   < >  --    < > 7.7*   HEMATOCRIT % 29.5*   < >  --    < > 25.2*   TRIGLYCERIDES mg/dL 125  --   --   --   127    < > = values in this interval not displayed.     External COVID19   Date Value Ref Range Status   12/18/2020 Detected (A) Not Detected Final     Coronavirus (COVID-19)   Date Value Ref Range Status   12/18/2020 Acceptable Acceptable Final     Lab Results   Component Value Date    HGBA1C 6.4 (H) 09/17/2024       RD to follow up per protocol.    Electronically signed by:  Alie Jerome RD  06/04/25 17:04 EDT

## 2025-06-04 NOTE — PROGRESS NOTES
Continued Stay Note  Psychiatric     Patient Name: Samia Gaming  MRN: [id number]  Today's Date: 6/4/2025    Admit Date: 5/22/2025    Plan: Return home with daughter and [facility]  following (referral pending)   Discharge Plan       Row Name 06/04/25 1444       Plan    Plan Return home with daughter and [facility] HH following (referral pending)    Patient/Family in Agreement with Plan yes    Plan Comments Spoke with patient at bedside.  Plan remains to return home at [place].  Given patient choice list for HH agency - she would like referral to [facility] -not in network with insurance.  She is agreeabel to referral to any accepting HH agency.  Spoke with Peace/Saira and she will evaluate.  Per PT - will need a walker.  Patient states they have a walker and BSC available and she will not need one.  CCP following.  lAysa ARMSTRONG                    Expected Discharge Date and Time       Expected Discharge Date Expected Discharge Time    Jun 6, 2025               Becky S. [place], RN

## 2025-06-04 NOTE — PLAN OF CARE
Problem: Adult Inpatient Plan of Care  Goal: Plan of Care Review  Outcome: Progressing  Goal: Patient-Specific Goal (Individualized)  Outcome: Progressing  Goal: Absence of Hospital-Acquired Illness or Injury  Outcome: Progressing  Intervention: Identify and Manage Fall Risk  Recent Flowsheet Documentation  Taken 6/3/2025 2015 by Christa Hubbard RN  Safety Promotion/Fall Prevention:   activity supervised   fall prevention program maintained   nonskid shoes/slippers when out of bed   room organization consistent   safety round/check completed   toileting scheduled  Intervention: Prevent Skin Injury  Recent Flowsheet Documentation  Taken 6/3/2025 2015 by Christa Hubbard RN  Body Position: position changed independently  Skin Protection: drying agents applied  Intervention: Prevent and Manage VTE (Venous Thromboembolism) Risk  Recent Flowsheet Documentation  Taken 6/3/2025 2015 by Christa Hubbard RN  VTE Prevention/Management: (lovenox) SCDs (sequential compression devices) off  Intervention: Prevent Infection  Recent Flowsheet Documentation  Taken 6/3/2025 2015 by Christa Hubbard RN  Infection Prevention:   single patient room provided   personal protective equipment utilized   rest/sleep promoted   hand hygiene promoted   equipment surfaces disinfected  Goal: Optimal Comfort and Wellbeing  Outcome: Progressing  Intervention: Provide Person-Centered Care  Recent Flowsheet Documentation  Taken 6/3/2025 2015 by Christa Hubbard RN  Trust Relationship/Rapport:   choices provided   care explained  Goal: Readiness for Transition of Care  Outcome: Progressing     Problem: Sepsis/Septic Shock  Goal: Optimal Coping  Outcome: Progressing  Goal: Absence of Bleeding  Outcome: Progressing  Goal: Blood Glucose Level Within Target Range  Outcome: Progressing  Goal: Absence of Infection Signs and Symptoms  Outcome: Progressing  Intervention: Initiate Sepsis Management  Recent Flowsheet Documentation  Taken 6/3/2025 2015 by Haydee  PATTI Goodwin  Infection Prevention:   single patient room provided   personal protective equipment utilized   rest/sleep promoted   hand hygiene promoted   equipment surfaces disinfected  Intervention: Promote Recovery  Recent Flowsheet Documentation  Taken 6/3/2025 2015 by Christa Hubbard RN  Activity Management: activity encouraged  Goal: Optimal Nutrition Delivery  Outcome: Progressing     Problem: Fall Injury Risk  Goal: Absence of Fall and Fall-Related Injury  Outcome: Progressing  Intervention: Identify and Manage Contributors  Recent Flowsheet Documentation  Taken 6/3/2025 2015 by Christa Hubbard RN  Medication Review/Management: medications reviewed  Intervention: Promote Injury-Free Environment  Recent Flowsheet Documentation  Taken 6/3/2025 2015 by Christa Hubbard RN  Safety Promotion/Fall Prevention:   activity supervised   fall prevention program maintained   nonskid shoes/slippers when out of bed   room organization consistent   safety round/check completed   toileting scheduled     Problem: Skin Injury Risk Increased  Goal: Skin Health and Integrity  Outcome: Progressing  Intervention: Optimize Skin Protection  Recent Flowsheet Documentation  Taken 6/3/2025 2015 by Christa Hubbard RN  Activity Management: activity encouraged  Pressure Reduction Techniques: frequent weight shift encouraged  Head of Bed (HOB) Positioning: HOB at 30 degrees  Pressure Reduction Devices: alternating pressure pump (NOHEMI)  Skin Protection: drying agents applied     Problem: Parenteral Nutrition  Goal: Effective Intravenous Nutrition Therapy Delivery  Outcome: Progressing  Intervention: Optimize Intravenous Nutrition Delivery  Recent Flowsheet Documentation  Taken 6/3/2025 2015 by Christa Hubbard RN  Medication Review/Management: medications reviewed     Problem: Restraint, Nonviolent  Goal: Absence of Harm or Injury  Outcome: Progressing  Intervention: Protect Dignity, Rights and Personal Wellbeing  Recent Flowsheet  Documentation  Taken 6/3/2025 2015 by Christa Hubbard, RN  Trust Relationship/Rapport:   choices provided   care explained  Intervention: Protect Skin and Joint Integrity  Recent Flowsheet Documentation  Taken 6/3/2025 2015 by Christa Hubbard RN  Body Position: position changed independently  Skin Protection: drying agents applied  Range of Motion: active ROM (range of motion) encouraged     Problem: Noninvasive Ventilation Acute  Goal: Effective Unassisted Ventilation and Oxygenation  Outcome: Progressing   Goal Outcome Evaluation:

## 2025-06-04 NOTE — PROGRESS NOTES
Name: Samia Gaming ADMIT: 2025   : 1955  PCP: Cristiano Logan DO    MRN: [id number] LOS: 12 days   AGE/SEX: 69 y.o. female  ROOM: [facility]     Subjective   Subjective   Patient resting in bed.  Requesting pain medication.  No nausea or vomiting.  Tolerated full liquid diet for lunch.  TPN infusion.  No fevers or chills, no chest pain, shortness of breath    Review of Systems  As above     Objective   Objective   Vital Signs  Temp:  [97.7 °F (36.5 °C)-98.3 °F (36.8 °C)] 98.3 °F (36.8 °C)  Heart Rate:  [75-83] 75  Resp:  [14-18] 16  BP: (129-153)/(61-71) 132/65  SpO2:  [87 %-97 %] 97 %  on   ;      Body mass index is 49.3 kg/m².  Physical Exam  Constitutional:       General: She is not in acute distress.     Appearance: She is not ill-appearing.   Cardiovascular:      Rate and Rhythm: Normal rate and regular rhythm.   Pulmonary:      Effort: Pulmonary effort is normal. No respiratory distress.   Abdominal:      General: Abdomen is flat. There is no distension.      Tenderness: There is no abdominal tenderness.      Comments: Abdominal incision with staples   Musculoskeletal:         General: No swelling or deformity. Normal range of motion.   Skin:     General: Skin is warm and dry.   Neurological:      General: No focal deficit present.      Mental Status: She is alert. Mental status is at baseline.         Results Review     I reviewed the patient's new clinical results.  Results from last 7 days   Lab Units 25  0218 25  2352 25  1749 25  1154   WBC 10*3/mm3 4.32 5.08 4.88 4.16   HEMOGLOBIN g/dL 9.3* 8.7* 8.9* 8.5*   PLATELETS 10*3/mm3 147 144 141 157     Results from last 7 days   Lab Units 25  0218 25  0611 25  0913 25  0820   SODIUM mmol/L 134* 137 137 137   POTASSIUM mmol/L 4.2 4.3 4.0 4.0   CHLORIDE mmol/L 101 104 105 105   CO2 mmol/L 22.0 27.0 26.2 25.1   BUN mg/dL 11.0 10.0 11.0 11.0   CREATININE mg/dL 0.36* 0.36* 0.24* 0.35*   GLUCOSE mg/dL 108*  114* 134* 138*   Estimated Creatinine Clearance: 178.5 mL/min (A) (by C-G formula based on SCr of 0.36 mg/dL (L)).  Results from last 7 days   Lab Units 06/04/25 0218 06/03/25  0611 06/02/25  0820 06/02/25  0532   ALBUMIN g/dL 2.0* 2.0* 2.0* 1.9*   BILIRUBIN mg/dL 0.6 0.8 0.7 0.6   ALK PHOS U/L 104 89 72 62   AST (SGOT) U/L 54* 52* 26 23   ALT (SGPT) U/L 62* 53* 22 18     Results from last 7 days   Lab Units 06/04/25 0218 06/03/25  0611 06/02/25  0913 06/02/25  0820 06/02/25  0532 06/01/25  0742   CALCIUM mg/dL 8.2* 8.1* 7.9* 8.0* 7.9* 7.6*   ALBUMIN g/dL 2.0* 2.0*  --  2.0* 1.9*  --    MAGNESIUM mg/dL 1.9 1.9  --   --  2.2 1.9   PHOSPHORUS mg/dL 3.5 3.2  --   --  4.5 2.2*       External COVID19   Date Value Ref Range Status   12/18/2020 Detected (A) Not Detected Final     Coronavirus (COVID-19)   Date Value Ref Range Status   12/18/2020 Acceptable Acceptable Final     Glucose   Date/Time Value Ref Range Status   06/04/2025 1026 125 70 - 130 mg/dL Final   06/04/2025 0557 122 70 - 130 mg/dL Final   06/04/2025 0027 113 70 - 130 mg/dL Final   06/03/2025 1521 129 70 - 130 mg/dL Final   06/03/2025 1154 126 70 - 130 mg/dL Final   06/03/2025 0012 114 70 - 130 mg/dL Final   06/02/2025 1545 124 70 - 130 mg/dL Final       XR Abdomen KUB  Narrative: XR ABDOMEN KUB-     INDICATIONS: Possible ileus     TECHNIQUE: Supine views of the abdomen     COMPARISON: 5/30/2025     FINDINGS:     NG tube extends to the epigastric region. A surgical drain is apparent  at the level of the pelvis, with overlying skin staples. The bowel gas  pattern is nonspecific, with mild abnormal gaseous distention of the  colon. Mild colonic fecal retention is apparent. No supine evidence for  free intraperitoneal gas. Follow-up as indications persist. If there is  further clinical concern, CT can be obtained for further evaluation.     Impression:    As described.        This report was finalized on 6/1/2025 9:25 AM by Dr. Teja Mckinnon M.D on  Workstation: [email or web address]       I reviewed the patient's daily medications.  Scheduled Medications  enoxaparin sodium, 40 mg, Subcutaneous, Q12H  Fat Emul Fish Oil/Plant Based, 250 mL, Intravenous, Once per day on Monday Wednesday Friday  insulin regular, 2-9 Units, Subcutaneous, Q6H  pantoprazole, 40 mg, Intravenous, Q12H  sodium chloride, 10 mL, Intravenous, Q12H  sodium chloride, 10 mL, Intravenous, Q12H  sodium chloride, 10 mL, Intravenous, Q12H    Infusions  Adult Central 2-in-1 TPN, , Last Rate: 75 mL/hr at 06/03/25 1942  Adult Central 2-in-1 TPN,   Pharmacy to Dose TPN,     Diet  Adult Central 2-in-1 TPN  Diet: Liquid, Diabetic; Full Liquid; Consistent Carbohydrate; No Carbonated Beverages; Fluid Consistency: Thin (IDDSI 0)  Adult Central 2-in-1 TPN         I have personally reviewed:  [x]  Laboratory   []  Microbiology   [x]  Radiology   []  EKG/Telemetry   [x]  Cardiology/Vascular   []  Pathology   [x]  Records     Assessment/Plan     Active Hospital Problems    Diagnosis  POA    **GI bleed [K92.2]  Yes    GIB (gastrointestinal bleeding) [K92.2]  Yes      Resolved Hospital Problems   No resolved problems to display.       69 y.o. female admitted with GI bleed.    GI bleeding from small bowel diverticulum  Acute anemia  -EGD on 5/23, erosive gastropathy with no bleeding.  Nonbleeding duodenal ulcer with no stigmata of bleeding  -Colonoscopy on 5/24 with diverticulosis and blood in the sigmoid colon  -Small bowel enteroscopy on 5/26 with no acute findings  - Surgery consulted status post small bowel resection x 2 end ileostomy on 5/29  -GI has signed off  - Surgery following, diet advanced to full liquid.  Plan not to renew TPN today as long as tolerating diet.  -on TPN    Right orbital wall fracture-seen by OMFS and recommends conservative therapy.  Suspect paresthesias will improve over months    Morbid obesity, BMI 52    Postop respiratory failure-now on room air    Pharmacy to dose Lovenox for DVT  prophylaxis.    Full code.  Discussed with patient, family, and nursing staff.  Anticipate discharge home with  vs SNU facility later this week.Patient is very eager to go home with home health, but discussed we will see how she does with physical therapy over the next few days now that she is finally gotten out of bed, but pretty adamant that she is can go home with home health    Expected Discharge Date: 6/6/2025; Expected Discharge Time:       Lowell Hernandez MD  [facility]  06/04/25  12:39 EDT

## 2025-06-04 NOTE — PLAN OF CARE
Goal Outcome Evaluation:  Plan of Care Reviewed With: patient, caregiver           Outcome Evaluation: Improved activity tolerance and independence w/ mobility during PT today.  Able to sit up w/ SBA.  Stood w/ CGA from bed and BSC w/ RW.  Able to ambulate 40' w/ CGA using RW, slow shuffling steps but no loss of balance or safety concerns.  REcommend sitting in chair for meals, using bathroom instead of purewick.  Anticipate DC to home w/ family with use of RW - CCP notified, and family working on getting bariatric BSC.    Anticipated Discharge Disposition (PT): home with assist, home with home health

## 2025-06-04 NOTE — THERAPY TREATMENT NOTE
Patient Name: Samia Gaming  : 1955    MRN: [id number]                              Today's Date: 2025       Admit Date: 2025    Visit Dx:     ICD-10-CM ICD-9-CM   1. Gastrointestinal hemorrhage, unspecified gastrointestinal hemorrhage type  K92.2 578.9   2. Acute blood loss anemia  D62 285.1   3. Elevated BUN  R79.9 790.6   4. Elevated lactic acid level  R79.89 276.2   5. Generalized abdominal pain  R10.84 789.07     Patient Active Problem List   Diagnosis    GI bleed    GIB (gastrointestinal bleeding)     Past Medical History:   Diagnosis Date    Arthritis     Elevated cholesterol      Past Surgical History:   Procedure Laterality Date    CHOLECYSTECTOMY      COLONOSCOPY      COLONOSCOPY N/A 2025    Procedure: COLONOSCOPY TO CECUM AND TERMINAL ILEUM AT BEDSIDE;  Surgeon: Bi Hutson MD;  Location: [facility] ENDOSCOPY;  Service: Gastroenterology;  Laterality: N/A;  PRE- GI BLEED  POST- DIVERTICULOSIS, HEMORRHOIDS    ENDOSCOPY N/A 2025    Procedure: ESOPHAGOGASTRODUODENOSCOPY;  Surgeon: Keyur Neely MD;  Location: [facility] ENDOSCOPY;  Service: Gastroenterology;  Laterality: N/A;  pre: GI bleed  post:errosive gastritis, duodenitis    ENTEROSCOPY SMALL BOWEL N/A 2025    Procedure: ENTEROSCOPY SMALL BOWEL at bedside;  Surgeon: Justin Garza MD;  Location: [facility] ENDOSCOPY;  Service: Gastroenterology;  Laterality: N/A;  pre- melena  post- mild gastric antrum erosion    EXPLORATORY LAPAROTOMY N/A 2025    Procedure: LAPAROTOMY EXPLORATORY, SMALL BOWEL RESECTION DISTAL AND PROXIMAL, ILEOSTOMY;  Surgeon: Jose G Ny MD;  Location: [facility] MAIN OR;  Service: General;  Laterality: N/A;      General Information       Row Name 25 1456          Physical Therapy Time and Intention    Document Type therapy note (daily note)  -AR     Mode of Treatment physical therapy  -AR       Row Name 25 1456          General Information    Patient Profile Reviewed yes  -AR      Existing Precautions/Restrictions fall  -AR       Row Name 06/04/25 1456          Home Main Entrance    Number of Stairs, Main Entrance one  -AR       Row Name 06/04/25 1456          Cognition    Orientation Status (Cognition) oriented x 4  -AR               User Key  (r) = Recorded By, (t) = Taken By, (c) = Cosigned By      Initials Name Provider Type    Mima Lazaro, PT Physical Therapist                   Mobility       Row Name 06/04/25 1456          Bed Mobility    Supine-Sit [place] (Bed Mobility) standby assist  -AR     Assistive Device (Bed Mobility) head of bed elevated;bed rails  -AR       Row Name 06/04/25 1456          Sit-Stand Transfer    Sit-Stand [place] (Transfers) contact guard  -AR     Assistive Device (Sit-Stand Transfers) walker, front-wheeled  -AR       Row Name 06/04/25 1456          Gait/Stairs (Locomotion)    [place] Level (Gait) contact guard  -AR     Assistive Device (Gait) walker, front-wheeled  -AR     Patient was able to Ambulate yes  -AR     Distance in Feet (Gait) 40  -AR     Deviations/Abnormal Patterns (Gait) gait speed decreased;festinating/shuffling  -AR     Bilateral Gait Deviations heel strike decreased;forward flexed posture  -AR               User Key  (r) = Recorded By, (t) = Taken By, (c) = Cosigned By      Initials Name Provider Type    Mima Lazaro PT Physical Therapist                   Obj/Interventions       Row Name 06/04/25 1457          Motor Skills    Therapeutic Exercise --  B LAQ 5x  -AR       Row Name 06/04/25 1457          Balance    Static Standing Balance standby assist  -AR     Dynamic Standing Balance contact guard  -AR     Position/Device Used, Standing Balance walker, rolling  -AR               User Key  (r) = Recorded By, (t) = Taken By, (c) = Cosigned By      Initials Name Provider Type    Mima Lazaro, PT Physical Therapist                   Goals/Plan    No documentation.                  Clinical Impression        Row Name 06/04/25 1457          Pain    Pretreatment Pain Rating 5/10  -AR     Posttreatment Pain Rating 5/10  -AR     Pain Location abdomen  -AR     Pain Side/Orientation generalized  -AR     Response to Pain Interventions activity participation with tolerable pain  -AR       Row Name 06/04/25 1457          Plan of Care Review    Plan of Care Reviewed With patient;caregiver  -AR     Outcome Evaluation Improved activity tolerance and independence w/ mobility during PT today.  Able to sit up w/ SBA.  Stood w/ CGA from bed and BSC w/ RW.  Able to ambulate 40' w/ CGA using RW, slow shuffling steps but no loss of balance or safety concerns.  REcommend sitting in chair for meals, using bathroom instead of purewick.  Anticipate DC to home w/ family with use of RW - CCP notified, and family working on getting bariatric BSC.  -AR       Row Name 06/04/25 1457          Therapy Assessment/Plan (PT)    Rehab Potential (PT) good  -AR     Criteria for Skilled Interventions Met (PT) yes  -AR     Therapy Frequency (PT) 5 times/wk  -AR       Row Name 06/04/25 1457          Vital Signs    O2 Delivery Pre Treatment room air  -AR       Row Name 06/04/25 1457          Positioning and Restraints    Pre-Treatment Position in bed  -AR     Post Treatment Position chair  -AR     In Chair notified nsg;reclined;sitting;call light within reach;encouraged to call for assist;exit alarm on;with family/caregiver;with nsg  -AR               User Key  (r) = Recorded By, (t) = Taken By, (c) = Cosigned By      Initials Name Provider Type    AR Mima Bowles, PT Physical Therapist                   Outcome Measures       Row Name 06/04/25 1459 06/04/25 0819       How much help from another person do you currently need...    Turning from your back to your side while in flat bed without using bedrails? 4  -AR 3  -DH    Moving from lying on back to sitting on the side of a flat bed without bedrails? 3  -AR 3  -DH    Moving to and from a bed to a  chair (including a wheelchair)? 3  -AR 2  -DH    Standing up from a chair using your arms (e.g., wheelchair, bedside chair)? 3  -AR 2  -DH    Climbing 3-5 steps with a railing? 2  -AR 2  -DH    To walk in hospital room? 3  -AR 2  -DH    AM-PAC 6 Clicks Score (PT) 18  -AR 14  -DH    Highest Level of Mobility Goal Walk 10 Steps or More-6  -AR Move to Chair/Commode-4  -DH      Row Name 06/04/25 1459          Functional Assessment    Outcome Measure Options AM-PAC 6 Clicks Basic Mobility (PT)  -AR               User Key  (r) = Recorded By, (t) = Taken By, (c) = Cosigned By      Initials Name Provider Type    Mima Lazaro, PT Physical Therapist    Chanell Lira, RN Registered Nurse                                 Physical Therapy Education       Title: PT OT SLP Therapies (In Progress)       Topic: Physical Therapy (In Progress)       Point: Mobility training (In Progress)       Learning Progress Summary            Patient Acceptance, E, NR by AR at 6/4/2025 1459    Acceptance, E, VU by CS at 6/3/2025 1207    Acceptance, E, VU by CS at 6/2/2025 1549   Family Acceptance, E, VU by CS at 6/2/2025 1549                      Point: Home exercise program (In Progress)       Learning Progress Summary            Patient Acceptance, E, NR by AR at 6/4/2025 1459    Acceptance, E, VU by CS at 6/3/2025 1207    Acceptance, E, VU by CS at 6/2/2025 1549   Family Acceptance, E, VU by CS at 6/2/2025 1549                      Point: Body mechanics (In Progress)       Learning Progress Summary            Patient Acceptance, E, NR by AR at 6/4/2025 1459    Acceptance, E, VU by CS at 6/3/2025 1207    Acceptance, E, VU by CS at 6/2/2025 1549   Family Acceptance, E, VU by CS at 6/2/2025 1549                      Point: Precautions (In Progress)       Learning Progress Summary            Patient Acceptance, E, NR by AR at 6/4/2025 1459    Acceptance, E, VU by CS at 6/3/2025 1207    Acceptance, E, VU by CS at 6/2/2025 1549   Family  Acceptance, E, VU by  at 6/2/2025 1549                                      User Key       Initials Effective Dates Name Provider Type Discipline    AR 06/16/21 -  Mima Bowles, PURVI Physical Therapist PT    CS 09/06/24 -  Dylon Pemberton PT Physical Therapist PT                  PT Recommendation and Plan     Outcome Evaluation: Improved activity tolerance and independence w/ mobility during PT today.  Able to sit up w/ SBA.  Stood w/ CGA from bed and BSC w/ RW.  Able to ambulate 40' w/ CGA using RW, slow shuffling steps but no loss of balance or safety concerns.  REcommend sitting in chair for meals, using bathroom instead of purewick.  Anticipate DC to home w/ family with use of RW - CCP notified, and family working on getting bariatric BSC.     Time Calculation:         PT Charges       Row Name 06/04/25 1456             Time Calculation    Start Time 1345  -AR      Stop Time 1412  -AR      Time Calculation (min) 27 min  -AR      PT Received On 06/04/25  -AR      PT - Next Appointment 06/05/25  -AR                User Key  (r) = Recorded By, (t) = Taken By, (c) = Cosigned By      Initials Name Provider Type    AR Mima Bowles, PT Physical Therapist                  Therapy Charges for Today       Code Description Service Date Service Provider Modifiers Qty    54037741535 HC PT THER PROC EA 15 MIN 6/4/2025 Mima Bowles, PT GP 1    45902624381 HC PT THERAPEUTIC ACT EA 15 MIN 6/4/2025 Mima Bowles, PT GP 1            PT G-Codes  Outcome Measure Options: AM-PAC 6 Clicks Basic Mobility (PT)  AM-PAC 6 Clicks Score (PT): 18  PT Discharge Summary  Anticipated Discharge Disposition (PT): home with assist, home with home health    Mima Bowles PT  6/4/2025

## 2025-06-04 NOTE — PROGRESS NOTES
Postoperative day #5.5 status post small bowel resection x 2, repair of diagnostic enterotomies, and end-loop ileostomy    Doing very well.  Sitting in the chair this morning, has been out of bed and using bedside commode.  Tolerating clear liquids.  Her pain is well-controlled.  Her ileostomy output remains thin and nonbloody    Afebrile, heart rate 75, /65  Abdomen is soft, minimally tender, nondistended, incision clean and dry with no erythema or drainage and staples in place, drain serosanguineous, ileostomy with 2 visible lumens above skin level with dark liquid bilious output    WBC 4.32, hemoglobin 9.3, platelets 147  Creatinine 0.36, BUN 11, HCO3 22, sodium 134, albumin 2.0  Ileostomy output 275 recorded    - Advance to full liquid diet  - Will plan to let TPN run out and not renew today as long as tolerating diet  - Out of bed and ambulate, physical therapy  - Lovenox DVT prophylaxis

## 2025-06-04 NOTE — NURSING NOTE
"   06/04/25 1045   Ileostomy Loop RUQ   Placement date: If unknown, DO NOT use \"Add Comment\" note: 05/29/25   Inserted by: Dr Ny  Ileostomy Type: Loop  Location: RUQ   Stomal Appliance 2 piece;Clean;Dry;Intact   Stoma Appearance irregular;moist;pink   Stoma Function flatus;stool   Stool Color brown   Stool Consistency liquid   Treatment Placement checked   Output (mL) 100 mL     Ostomy education provided to the patient/family: Pt reports \"I am so sleepy\", but she was able to be attentive to teaching today. Her daughter brought her glasses, so she could see the teaching materials. 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 She was able to repeat back to me the steps of emptying, cleaning, and closing the pouch. I asked her to work with staff today on emptying, so she is comfortable prior to discharge and she was agreeable to this.     []Pouch changed   []Pt observed   []Pt participated    []CG participated            [x]Instructed in frequency of pouch change  [x]Pouch emptied and cleaned, demonstrated use of pouch closure    [x]Pt observed   []Pt participated    [x]CG observed   [x]Instructed to empty pouch when 1/3 to 1/2 full  [x]Teaching packet/handouts provided/reviewed all the handouts in the folder  [x]Return to ADL's, showering, clothing  [x]Peristomal skin care, avoiding use of soaps with moisturizers  [x]Diet   [x]Adequate po fluid intake   [x]S/S of dehydration   [x]Foods to thicken stool   [x]Foods to avoid to prevent blockage  [x]Supplies at bedside  []Samples ordered    Current Supplies: Xuan 2 3/4\" soft convex wafer and pouch with barrier ring.     WOC Team follow up plan: 5x week, HH upon D/C. She will need an [employer] catalog with supplies marked in the book prior to D/C.     "

## 2025-06-04 NOTE — DISCHARGE PLACEMENT REQUEST
"Samia Gaming (69 y.o. Female)       Date of Birth   1955    Social Security Number       Address   [address] [place] [address]    Home Phone   [phone]    MRN   [id number]       [employer]   [employer]    Marital Status   Single                            Admission Date   5/22/2025    Admission Type   Emergency    Admitting Provider   Victor Hugo Gonzalez Jr., MD    Attending Provider   Lowell Hernandez MD    [facility]       Discharge Date       Discharge Disposition       Discharge Destination                                 Attending Provider: Lowell Hernandez MD    Allergies: No Known Allergies    Isolation: None   Infection: None   Code Status: CPR    Ht: 157.5 cm (62.01\")   Wt: 122 kg (269 lb 10 oz)    Admission Cmt: None   Principal Problem: GI bleed [K92.2]                   Active Insurance as of 5/22/2025       Primary Coverage       Payor Plan Insurance Group Employer/Plan Group    MEDICARE MEDICARE A & B        Payor Plan Address Payor Plan Phone Number Payor Plan Fax Number Effective Dates    [address] [phone]  9/1/2018 - None Entered    [facility] [id number]         Subscriber Name Subscriber Birth Date Member ID       SAMIA GAMING 1955 [id number]               Secondary Coverage       Payor Plan Insurance Group Employer/Plan Group    AARP MC SUP AARP HEALTH CARE OPTIONS        Payor Plan Address Payor Plan Phone Number Payor Plan Fax Number Effective Dates    [facility] [phone]  1/1/2024 - None Entered    [address]       [facility] [address]         Subscriber Name Subscriber Birth Date Member ID       SAMIA GAMING 1955 [id number]                     Emergency Contacts        (Rel.) Home Phone Work Phone Mobile Phone    ANATOLY COONEY (Grandchild) [phone] -- [phone]    Brittny Gaming (Daughter) -- -- [phone]                "

## 2025-06-04 NOTE — PROGRESS NOTES
[facility]: TPN Daily Progress Note    TPN Day # 6  Indication: Inaccessible GI Tract, prolonged paralytic ileus  Route: central  Type: standard    Subjective/Objective  Results from last 7 days   Lab Units 25  0218 25  0820 25  0532   SODIUM mmol/L 134*   < > 134*   POTASSIUM mmol/L 4.2   < > 6.3*   CHLORIDE mmol/L 101   < > 103   CO2 mmol/L 22.0   < > 22.7   BUN mg/dL 11.0   < > 11.0   CREATININE mg/dL 0.36*   < > 0.33*   CALCIUM mg/dL 8.2*   < > 7.9*   ALBUMIN g/dL 2.0*   < > 1.9*   BILIRUBIN mg/dL 0.6   < > 0.6   ALK PHOS U/L 104   < > 62   ALT (SGPT) U/L 62*   < > 18   AST (SGOT) U/L 54*   < > 23   GLUCOSE mg/dL 108*   < > 567*   MAGNESIUM mg/dL 1.9   < > 2.2   PHOSPHORUS mg/dL 3.5   < > 4.5   TRIGLYCERIDES mg/dL 125  --  127   PREALBUMIN mg/dL  --   --  7.9*    < > = values in this interval not displayed.        Diet Orders (active) (From admission, onward)       Start     Ordered    25 06  NPO Diet NPO Type: Strict NPO  Diet Effective Now         25 0630                  Additional insulin administration while previous TPN infusin units SSI  Additional electrolyte administration while previous TPN infusing: none  Acid suppression: IV protonix  Corrected Calcium:  9.2 mg/dL    Goal TPN Formula Recommendations: 100g/700kcal/250mL 3x weekly AA/Dex/Lipids; 1314 kcal/day  Current TPN Formula: 100g/700kcal/250mL 3x/week AA/Dex/Lipids  Rate 75ml/hr    Assessment/Plan: electrolytes are stable, will continue TPN formula from 6/3.  Patient will receive lipids today as well.  Triglycerides are 125 today, will reorder for .    Macronutrients: at goal continue same.   Electrolytes/Additives:   Sodium Chloride: 70 mEq   Sodium Acetate: 0 mEq  Sodium Phosphate: 0 mEq  Potassium Chloride: 55 mEq  Potassium Acetate: 0 mEq   Potassium Phosphate: 20 mEq    Calcium Gluconate: 9 mEq  Magnesium Sulfate: 14 mEq  Trace Elements  MVI  Other Additives: none  Labs:  CMP, Mg, phos in AM, TG for 6/11    Jose G Wright, PharmD  PGY1 Pharmacy Resident  6/4/2025  11:57 EDT

## 2025-06-05 LAB
ANION GAP SERPL CALCULATED.3IONS-SCNC: 7.9 MMOL/L (ref 5–15)
BUN SERPL-MCNC: 9 MG/DL (ref 8–23)
BUN/CREAT SERPL: 20.5 (ref 7–25)
CALCIUM SPEC-SCNC: 8.4 MG/DL (ref 8.6–10.5)
CHLORIDE SERPL-SCNC: 102 MMOL/L (ref 98–107)
CO2 SERPL-SCNC: 27.1 MMOL/L (ref 22–29)
CREAT SERPL-MCNC: 0.44 MG/DL (ref 0.57–1)
DEPRECATED RDW RBC AUTO: 51.2 FL (ref 37–54)
EGFRCR SERPLBLD CKD-EPI 2021: 103.6 ML/MIN/1.73
ERYTHROCYTE [DISTWIDTH] IN BLOOD BY AUTOMATED COUNT: 15.5 % (ref 12.3–15.4)
GLUCOSE BLDC GLUCOMTR-MCNC: 110 MG/DL (ref 70–130)
GLUCOSE SERPL-MCNC: 97 MG/DL (ref 65–99)
HCT VFR BLD AUTO: 28.7 % (ref 34–46.6)
HGB BLD-MCNC: 9.5 G/DL (ref 12–15.9)
MCH RBC QN AUTO: 30.8 PG (ref 26.6–33)
MCHC RBC AUTO-ENTMCNC: 33.1 G/DL (ref 31.5–35.7)
MCV RBC AUTO: 93.2 FL (ref 79–97)
PLATELET # BLD AUTO: 180 10*3/MM3 (ref 140–450)
PMV BLD AUTO: 10.4 FL (ref 6–12)
POTASSIUM SERPL-SCNC: 3.8 MMOL/L (ref 3.5–5.2)
RBC # BLD AUTO: 3.08 10*6/MM3 (ref 3.77–5.28)
SODIUM SERPL-SCNC: 137 MMOL/L (ref 136–145)
WBC NRBC COR # BLD AUTO: 5.71 10*3/MM3 (ref 3.4–10.8)

## 2025-06-05 PROCEDURE — 99024 POSTOP FOLLOW-UP VISIT: CPT | Performed by: STUDENT IN AN ORGANIZED HEALTH CARE EDUCATION/TRAINING PROGRAM

## 2025-06-05 PROCEDURE — 85027 COMPLETE CBC AUTOMATED: CPT | Performed by: STUDENT IN AN ORGANIZED HEALTH CARE EDUCATION/TRAINING PROGRAM

## 2025-06-05 PROCEDURE — 82948 REAGENT STRIP/BLOOD GLUCOSE: CPT

## 2025-06-05 PROCEDURE — 80048 BASIC METABOLIC PNL TOTAL CA: CPT | Performed by: STUDENT IN AN ORGANIZED HEALTH CARE EDUCATION/TRAINING PROGRAM

## 2025-06-05 PROCEDURE — 25010000002 ENOXAPARIN PER 10 MG: Performed by: STUDENT IN AN ORGANIZED HEALTH CARE EDUCATION/TRAINING PROGRAM

## 2025-06-05 RX ORDER — OXYCODONE AND ACETAMINOPHEN 5; 325 MG/1; MG/1
1 TABLET ORAL EVERY 8 HOURS PRN
Qty: 15 TABLET | Refills: 0 | Status: SHIPPED | OUTPATIENT
Start: 2025-06-05

## 2025-06-05 RX ADMIN — OXYCODONE HYDROCHLORIDE 5 MG: 5 TABLET ORAL at 04:53

## 2025-06-05 RX ADMIN — PANTOPRAZOLE SODIUM 40 MG: 40 TABLET, DELAYED RELEASE ORAL at 18:03

## 2025-06-05 RX ADMIN — ACETAMINOPHEN 1000 MG: 500 TABLET, FILM COATED ORAL at 15:06

## 2025-06-05 RX ADMIN — AVOBENZONE, HOMOSALATE, OCTISALATE, OCTOCRYLENE, AND OXYBENZONE 1 PACKET: 29.4; 147; 49; 25.4; 58.8 LOTION TOPICAL at 10:35

## 2025-06-05 RX ADMIN — ENOXAPARIN SODIUM 40 MG: 100 INJECTION SUBCUTANEOUS at 06:33

## 2025-06-05 RX ADMIN — PANTOPRAZOLE SODIUM 40 MG: 40 TABLET, DELAYED RELEASE ORAL at 06:34

## 2025-06-05 RX ADMIN — OXYCODONE HYDROCHLORIDE 5 MG: 5 TABLET ORAL at 14:58

## 2025-06-05 RX ADMIN — ENOXAPARIN SODIUM 40 MG: 100 INJECTION SUBCUTANEOUS at 18:03

## 2025-06-05 RX ADMIN — TRAZODONE HYDROCHLORIDE 150 MG: 100 TABLET ORAL at 20:06

## 2025-06-05 RX ADMIN — OXYCODONE HYDROCHLORIDE 5 MG: 5 TABLET ORAL at 20:06

## 2025-06-05 RX ADMIN — ACETAMINOPHEN 1000 MG: 500 TABLET, FILM COATED ORAL at 06:48

## 2025-06-05 NOTE — PROGRESS NOTES
General Surgery Note    Summary:  Postoperative day #6.5 status post mall bowel resection x 2, repair of diagnostic enterotomies, and end-loop ileostomy for difficult to localize gastrointestinal bleed    Interval Status:  Pain well-controlled.  Up in the chair and ambulating.  Really wants to go home.  States she has interacted with people with ostomies before.  Tolerated first regular food last night.    Physical Exam:    Not sick  Afebrile, heart rate 84, /63  Nonlabored breathing on room air  Abdomen soft, mildly tender, nondistended, incision clean and dry with staples, drain serosanguineous, ileostomy pink with thin liquid output      Labs:  Results from last 7 days   Lab Units 06/05/25  0314 06/04/25  0218 06/03/25  2352 06/03/25  1749 06/03/25  1154 06/03/25  0610 06/02/25  2354   WBC 10*3/mm3 5.71 4.32 5.08 4.88 4.16 4.56 5.11   HEMOGLOBIN g/dL 9.5* 9.3* 8.7* 8.9* 8.5* 8.7* 8.0*   PLATELETS 10*3/mm3 180 147 144 141 157 134* 142     Results from last 7 days   Lab Units 06/05/25  0314 06/04/25  0218 06/03/25  0611 06/02/25  0913 06/02/25  0820   SODIUM mmol/L 137 134* 137   < > 137   POTASSIUM mmol/L 3.8 4.2 4.3   < > 4.0   CHLORIDE mmol/L 102 101 104   < > 105   CO2 mmol/L 27.1 22.0 27.0   < > 25.1   BUN mg/dL 9.0 11.0 10.0   < > 11.0   CREATININE mg/dL 0.44* 0.36* 0.36*   < > 0.35*   CALCIUM mg/dL 8.4* 8.2* 8.1*   < > 8.0*   BILIRUBIN mg/dL  --  0.6 0.8  --  0.7   ALK PHOS U/L  --  104 89  --  72   ALT (SGPT) U/L  --  62* 53*  --  22   AST (SGOT) U/L  --  54* 52*  --  26   GLUCOSE mg/dL 97 108* 114*   < > 138*    < > = values in this interval not displayed.       Plan:  - Continue regular diet  - Start fiber  - Ostomy teaching, needs to be able to at least empty her bag, daughter is able to help her change the appliance.  - May tentatively be able to go home tomorrow, depending on ileostomy output and diet tolerance        Jose G Ny MD  [facility]  [address] [place] [address], [facility]  [address]  [place], [place], [address]  P: [phone]  F: [phone]

## 2025-06-05 NOTE — NURSING NOTE
"   06/05/25 0858   Ileostomy Loop RUQ   Placement date: If unknown, DO NOT use \"Add Comment\" note: 05/29/25   Inserted by: Dr Ny  Ileostomy Type: Loop  Location: RUQ   Stomal Appliance 2 piece;Clean;Dry;Intact;Drainable   Stoma Appearance moist;red;rosebud appearance   Peristomal Assessment SONG   Stoma Function flatus;stool   Stool Color brown   Stool Consistency liquid   Treatment Placement checked   Output (mL) 150 mL     OMS: Follow up Ostomy care, existing Ostomy appliance remains intact, good seal noted, no leaks, ostomy care performed, [place] 2-piece 2 3/4 appliance used.  Ostomy education provided to the patient/family:    []Pouch changed   []Pt observed   []Pt participated    []CG participated            [x]Instructed in frequency of pouch change  [x]Pouch emptied and cleaned, demonstrated use of pouch closure    []Pt observed   [x]Pt participated    []CG participated    [x]Instructed to empty pouch when 1/3 to 1/2 full  [x]Teaching packet/handouts provided/  []Return to ADL's, showering, clothing  [x]Peristomal skin care, avoiding use of soaps with moisturizers  [x]Diet   [x]Adequate po fluid intake   [x]S/S of dehydration   [x]Foods to thicken stool   []Foods to avoid to prevent blockage  [x]Supplies at bedside  []Samples ordered  WOC Team follow up plan: [facility] team will follow up daily  and according to her need    "

## 2025-06-05 NOTE — PROGRESS NOTES
Name: Samia Gaming ADMIT: 2025   : 1955  PCP: Cristiano Logan DO    MRN: [id number] LOS: 13 days   AGE/SEX: 69 y.o. female  ROOM: [facility]/     Subjective   Subjective   Patient sitting up in chair.  Very eager to leave the hospital.  Tolerating diet without any nausea or vomiting.  Abdominal pain well-controlled.  No fevers or chills.  Ileostomy output.  Hemoglobin uptrending    Review of Systems  As above     Objective   Objective   Vital Signs  Temp:  [97.8 °F (36.6 °C)-98.4 °F (36.9 °C)] 98 °F (36.7 °C)  Heart Rate:  [84-88] 84  Resp:  [16-18] 18  BP: (110-145)/(61-80) 110/61  SpO2:  [98 %] 98 %  on  Flow (L/min) (Oxygen Therapy):  [2] 2;   Device (Oxygen Therapy): nasal cannula  Body mass index is 47.91 kg/m².  Physical Exam  Constitutional:       General: She is not in acute distress.     Appearance: She is not ill-appearing.   Cardiovascular:      Rate and Rhythm: Normal rate and regular rhythm.   Pulmonary:      Effort: Pulmonary effort is normal. No respiratory distress.   Abdominal:      General: Abdomen is flat. There is no distension.      Tenderness: There is no abdominal tenderness.      Comments: Abdominal incision with staples   Musculoskeletal:         General: No swelling or deformity. Normal range of motion.   Skin:     General: Skin is warm and dry.   Neurological:      General: No focal deficit present.      Mental Status: She is alert. Mental status is at baseline.         Results Review     I reviewed the patient's new clinical results.  Results from last 7 days   Lab Units 25  0314 25  0218 25  2352 25  1749   WBC 10*3/mm3 5.71 4.32 5.08 4.88   HEMOGLOBIN g/dL 9.5* 9.3* 8.7* 8.9*   PLATELETS 10*3/mm3 180 147 144 141     Results from last 7 days   Lab Units 25  0314 25  0218 25  0611 25  0913   SODIUM mmol/L 137 134* 137 137   POTASSIUM mmol/L 3.8 4.2 4.3 4.0   CHLORIDE mmol/L 102 101 104 105   CO2 mmol/L 27.1 22.0 27.0 26.2   BUN  mg/dL 9.0 11.0 10.0 11.0   CREATININE mg/dL 0.44* 0.36* 0.36* 0.24*   GLUCOSE mg/dL 97 108* 114* 134*   Estimated Creatinine Clearance: 143.8 mL/min (A) (by C-G formula based on SCr of 0.44 mg/dL (L)).  Results from last 7 days   Lab Units 06/04/25  0218 06/03/25  0611 06/02/25  0820 06/02/25  0532   ALBUMIN g/dL 2.0* 2.0* 2.0* 1.9*   BILIRUBIN mg/dL 0.6 0.8 0.7 0.6   ALK PHOS U/L 104 89 72 62   AST (SGOT) U/L 54* 52* 26 23   ALT (SGPT) U/L 62* 53* 22 18     Results from last 7 days   Lab Units 06/05/25  0314 06/04/25 0218 06/03/25  0611 06/02/25  0913 06/02/25  0820 06/02/25  0532 06/01/25  0742   CALCIUM mg/dL 8.4* 8.2* 8.1* 7.9* 8.0* 7.9* 7.6*   ALBUMIN g/dL  --  2.0* 2.0*  --  2.0* 1.9*  --    MAGNESIUM mg/dL  --  1.9 1.9  --   --  2.2 1.9   PHOSPHORUS mg/dL  --  3.5 3.2  --   --  4.5 2.2*       External COVID19   Date Value Ref Range Status   12/18/2020 Detected (A) Not Detected Final     Coronavirus (COVID-19)   Date Value Ref Range Status   12/18/2020 Acceptable Acceptable Final     Glucose   Date/Time Value Ref Range Status   06/05/2025 0621 110 70 - 130 mg/dL Final   06/04/2025 2012 98 70 - 130 mg/dL Final   06/04/2025 1826 119 70 - 130 mg/dL Final   06/04/2025 1525 123 70 - 130 mg/dL Final   06/04/2025 1026 125 70 - 130 mg/dL Final   06/04/2025 0557 122 70 - 130 mg/dL Final   06/04/2025 0027 113 70 - 130 mg/dL Final       XR Abdomen KUB  Narrative: XR ABDOMEN KUB-     INDICATIONS: Possible ileus     TECHNIQUE: Supine views of the abdomen     COMPARISON: 5/30/2025     FINDINGS:     NG tube extends to the epigastric region. A surgical drain is apparent  at the level of the pelvis, with overlying skin staples. The bowel gas  pattern is nonspecific, with mild abnormal gaseous distention of the  colon. Mild colonic fecal retention is apparent. No supine evidence for  free intraperitoneal gas. Follow-up as indications persist. If there is  further clinical concern, CT can be obtained for further evaluation.      Impression:    As described.        This report was finalized on 6/1/2025 9:25 AM by Dr. Teja Mckinnon M.D on Workstation: [email or web address]       I reviewed the patient's daily medications.  Scheduled Medications  acetaminophen, 1,000 mg, Oral, Q8H  enoxaparin sodium, 40 mg, Subcutaneous, Q12H  pantoprazole, 40 mg, Oral, BID AC  Psyllium, 1 packet, Oral, Daily    Infusions     Diet  Diet: Gastrointestinal, Diabetic; Consistent Carbohydrate; Fiber-Restricted; No Carbonated Beverages; Texture: Soft to Chew (NDD 3); Soft to Chew: Whole Meat; Fluid Consistency: Thin (IDDSI 0)         I have personally reviewed:  [x]  Laboratory   []  Microbiology   [x]  Radiology   []  EKG/Telemetry   [x]  Cardiology/Vascular   []  Pathology   [x]  Records     Assessment/Plan     Active Hospital Problems    Diagnosis  POA    **GI bleed [K92.2]  Yes    GIB (gastrointestinal bleeding) [K92.2]  Yes      Resolved Hospital Problems   No resolved problems to display.       69 y.o. female admitted with GI bleed.    GI bleeding from small bowel diverticulum  Acute anemia  -EGD on 5/23, erosive gastropathy with no bleeding.  Nonbleeding duodenal ulcer with no stigmata of bleeding  -Colonoscopy on 5/24 with diverticulosis and blood in the sigmoid colon  -Small bowel enteroscopy on 5/26 with no acute findings  - Surgery consulted status post small bowel resection x 2 end ileostomy on 5/29  -GI has signed off  - Surgery following, diet advanced GI soft.    - Now off TPN, PICC line removed    Right orbital wall fracture-seen by OMFS and recommends conservative therapy.  Suspect paresthesias will improve over months    Morbid obesity, BMI 52    Postop respiratory failure-now on room air    Pharmacy to dose Lovenox for DVT prophylaxis.    Full code.  Discussed with patient, family, and nursing staff.  Anticipate discharge home with home health tomorrow.  If okay with surgery    Expected Discharge Date: 6/6/2025; Expected Discharge Time:       Lowell W  MD David  [facility]  06/05/25  09:18 EDT

## 2025-06-05 NOTE — PLAN OF CARE
Vss. Oxycodone administered for c/o pain. No change in assessment.   Problem: Adult Inpatient Plan of Care  Goal: Plan of Care Review  Outcome: Progressing  Goal: Patient-Specific Goal (Individualized)  Outcome: Progressing  Goal: Absence of Hospital-Acquired Illness or Injury  Outcome: Progressing  Intervention: Identify and Manage Fall Risk  Description: Perform standard risk assessment on admission using a validated tool or comprehensive approach appropriate to the patient; reassess fall risk frequently, with change in status or transfer to another level of care.Communicate risk to interprofessional healthcare team; ensure fall risk visible cue.Determine need for increased observation, equipment and environmental modification, as well as use of supportive, nonskid footwear.Adjust safety measures to individual needs and identified risk factors.Reinforce the importance of active participation with fall risk prevention, safety, and physical activity with the patient and family.Perform regular intentional rounding to assess need for position change, pain assessment and personal needs, including assistance with toileting.  Recent Flowsheet Documentation  Taken 6/5/2025 3039 by Angelica Oleary, RN  Safety Promotion/Fall Prevention:   activity supervised   assistive device/personal items within reach   clutter free environment maintained   fall prevention program maintained   nonskid shoes/slippers when out of bed   room organization consistent   safety round/check completed  Taken 6/5/2025 0339 by Angelica Oleary, RN  Safety Promotion/Fall Prevention:   activity supervised   assistive device/personal items within reach   clutter free environment maintained   fall prevention program maintained   nonskid shoes/slippers when out of bed   room organization consistent   safety round/check completed  Taken 6/5/2025 0159 by Angelica Oleary, RN  Safety Promotion/Fall Prevention:   activity supervised    assistive device/personal items within reach   clutter free environment maintained   fall prevention program maintained   nonskid shoes/slippers when out of bed   room organization consistent   safety round/check completed  Taken 6/4/2025 2349 by Angelica Oleary, RN  Safety Promotion/Fall Prevention:   activity supervised   assistive device/personal items within reach   clutter free environment maintained   fall prevention program maintained   nonskid shoes/slippers when out of bed   room organization consistent   safety round/check completed  Taken 6/4/2025 2138 by Angelica Oleary, RN  Safety Promotion/Fall Prevention:   activity supervised   assistive device/personal items within reach   clutter free environment maintained   fall prevention program maintained   nonskid shoes/slippers when out of bed   room organization consistent   safety round/check completed  Taken 6/4/2025 1947 by Angelica Oleary, RN  Safety Promotion/Fall Prevention:   activity supervised   assistive device/personal items within reach   clutter free environment maintained   fall prevention program maintained   nonskid shoes/slippers when out of bed   room organization consistent   safety round/check completed  Intervention: Prevent Skin Injury  Description: Perform a screening for skin injury risk, such as pressure or moisture-associated skin damage on admission and at regular intervals throughout hospital stay.Keep all areas of skin (especially folds) clean and dry.Maintain adequate skin hydration.Relieve and redistribute pressure and protect bony prominences and skin at risk for injury; implement measures based on patient-specific risk factors.Match turning and repositioning schedule to clinical condition.Encourage weight shift frequently; assist with reposition if unable to complete independently.Float heels off bed; avoid pressure on the Achilles tendon.Keep skin free from extended contact with medical devices.Optimize  nutrition and hydration.Encourage functional activity and mobility, as early as tolerated.Use aids (e.g., slide boards, mechanical lift) during transfer.  Recent Flowsheet Documentation  Taken 6/5/2025 0453 by Angelica Oleary RN  Body Position: position changed independently  Taken 6/5/2025 0339 by Angelica Oleary RN  Body Position: position changed independently  Taken 6/5/2025 0159 by Angelica Oleary RN  Body Position: position changed independently  Taken 6/4/2025 2349 by Angelica Oleary RN  Body Position: position changed independently  Taken 6/4/2025 2138 by Angelica Oleary RN  Body Position: position changed independently  Taken 6/4/2025 1947 by Angelica Oleary RN  Body Position: position changed independently  Goal: Optimal Comfort and Wellbeing  Outcome: Progressing  Intervention: Provide Person-Centered Care  Description: Use a family-focused approach to care; encourage support system presence and participation.Develop trust and rapport by proactively providing information, encouraging questions, addressing concerns and offering reassurance.Acknowledge emotional response to hospitalization.Recognize and utilize personal coping strategies and strengths; develop goals via shared decision-making.Honor spiritual and cultural preferences.  Recent Flowsheet Documentation  Taken 6/4/2025 2138 by Angelica Oleary RN  Trust Relationship/Rapport: care explained  Goal: Readiness for Transition of Care  Outcome: Progressing   Goal Outcome Evaluation:

## 2025-06-05 NOTE — DISCHARGE INSTRUCTIONS
Dr. Jose G Ny  [address]  [place] [address]  [phone]    Discharge Instructions    Walking is encouraged. Do not do any heavy lifting for 4 weeks.     It is important you take 1 packet of fiber supplement (metamucil or citrucel) every morning with a full glass of water. This is available over the counter. Drink plenty of water.     Your ileostomy output should be toothpaste consistency and less than 1.2 liters per day, please call the office if it is more than that or you feel dehydrated.     The purple skin glue will dissolve on its own as your incisions heal. Let the glue fall off on its own.    You may shower allowing water to run over the incision; however, do not scrub your incision or take a tub bath for 1 week.    No heavy lifting greater than 15 pounds or strenuous exercise for 6 weeks. Walking is encouraged.     You have received a prescription for a narcotic pain medicine, as you will have some pain following surgery.   You will not be totally pain free, but your pain medicine should make the pain tolerable.  Please take your pain medicine as prescribed and always take your pills with food to prevent nausea.     No driving for as long as you are taking your prescription pain medicine.      You will need to call the office at [phone] to schedule a follow-up appointment in 2 weeks    Remember to contact me for any of the following:    Fever > 101 degrees  Severe pain that cannot be controlled by taking your pain pills  Severe nausea or vomiting that cannot be controlled   Significant bleeding of your incisions  Drainage that has a bad smell or is yellow or green in appearance  Dehydration and high volume thin ileostomy output.   Any other questions or concerns

## 2025-06-06 LAB
ANION GAP SERPL CALCULATED.3IONS-SCNC: 8 MMOL/L (ref 5–15)
BUN SERPL-MCNC: 9 MG/DL (ref 8–23)
BUN/CREAT SERPL: 17.6 (ref 7–25)
CALCIUM SPEC-SCNC: 8.4 MG/DL (ref 8.6–10.5)
CHLORIDE SERPL-SCNC: 103 MMOL/L (ref 98–107)
CO2 SERPL-SCNC: 27 MMOL/L (ref 22–29)
CREAT SERPL-MCNC: 0.51 MG/DL (ref 0.57–1)
DEPRECATED RDW RBC AUTO: 53.2 FL (ref 37–54)
EGFRCR SERPLBLD CKD-EPI 2021: 99.9 ML/MIN/1.73
ERYTHROCYTE [DISTWIDTH] IN BLOOD BY AUTOMATED COUNT: 15.9 % (ref 12.3–15.4)
GLUCOSE SERPL-MCNC: 116 MG/DL (ref 65–99)
HCT VFR BLD AUTO: 29.4 % (ref 34–46.6)
HGB BLD-MCNC: 9.5 G/DL (ref 12–15.9)
MCH RBC QN AUTO: 30.9 PG (ref 26.6–33)
MCHC RBC AUTO-ENTMCNC: 32.3 G/DL (ref 31.5–35.7)
MCV RBC AUTO: 95.8 FL (ref 79–97)
PLATELET # BLD AUTO: 233 10*3/MM3 (ref 140–450)
PMV BLD AUTO: 10.1 FL (ref 6–12)
POTASSIUM SERPL-SCNC: 3.4 MMOL/L (ref 3.5–5.2)
POTASSIUM SERPL-SCNC: 4.4 MMOL/L (ref 3.5–5.2)
RBC # BLD AUTO: 3.07 10*6/MM3 (ref 3.77–5.28)
SODIUM SERPL-SCNC: 138 MMOL/L (ref 136–145)
WBC NRBC COR # BLD AUTO: 5.27 10*3/MM3 (ref 3.4–10.8)

## 2025-06-06 PROCEDURE — 85027 COMPLETE CBC AUTOMATED: CPT | Performed by: STUDENT IN AN ORGANIZED HEALTH CARE EDUCATION/TRAINING PROGRAM

## 2025-06-06 PROCEDURE — 25010000002 ONDANSETRON PER 1 MG: Performed by: INTERNAL MEDICINE

## 2025-06-06 PROCEDURE — 97116 GAIT TRAINING THERAPY: CPT

## 2025-06-06 PROCEDURE — 94799 UNLISTED PULMONARY SVC/PX: CPT

## 2025-06-06 PROCEDURE — 25010000002 ENOXAPARIN PER 10 MG: Performed by: STUDENT IN AN ORGANIZED HEALTH CARE EDUCATION/TRAINING PROGRAM

## 2025-06-06 PROCEDURE — 84132 ASSAY OF SERUM POTASSIUM: CPT | Performed by: STUDENT IN AN ORGANIZED HEALTH CARE EDUCATION/TRAINING PROGRAM

## 2025-06-06 PROCEDURE — 99024 POSTOP FOLLOW-UP VISIT: CPT | Performed by: STUDENT IN AN ORGANIZED HEALTH CARE EDUCATION/TRAINING PROGRAM

## 2025-06-06 PROCEDURE — 80048 BASIC METABOLIC PNL TOTAL CA: CPT | Performed by: STUDENT IN AN ORGANIZED HEALTH CARE EDUCATION/TRAINING PROGRAM

## 2025-06-06 RX ORDER — POTASSIUM CHLORIDE 1500 MG/1
40 TABLET, EXTENDED RELEASE ORAL EVERY 4 HOURS
Status: COMPLETED | OUTPATIENT
Start: 2025-06-06 | End: 2025-06-06

## 2025-06-06 RX ORDER — LOPERAMIDE HYDROCHLORIDE 2 MG/1
2 CAPSULE ORAL
Status: DISCONTINUED | OUTPATIENT
Start: 2025-06-06 | End: 2025-06-07 | Stop reason: HOSPADM

## 2025-06-06 RX ADMIN — AVOBENZONE, HOMOSALATE, OCTISALATE, OCTOCRYLENE, AND OXYBENZONE 1 PACKET: 29.4; 147; 49; 25.4; 58.8 LOTION TOPICAL at 09:22

## 2025-06-06 RX ADMIN — ONDANSETRON 4 MG: 2 INJECTION, SOLUTION INTRAMUSCULAR; INTRAVENOUS at 12:45

## 2025-06-06 RX ADMIN — ACETAMINOPHEN 1000 MG: 500 TABLET, FILM COATED ORAL at 16:55

## 2025-06-06 RX ADMIN — LOPERAMIDE HYDROCHLORIDE 2 MG: 2 CAPSULE ORAL at 12:41

## 2025-06-06 RX ADMIN — ENOXAPARIN SODIUM 40 MG: 100 INJECTION SUBCUTANEOUS at 05:51

## 2025-06-06 RX ADMIN — ENOXAPARIN SODIUM 40 MG: 100 INJECTION SUBCUTANEOUS at 16:58

## 2025-06-06 RX ADMIN — OXYCODONE HYDROCHLORIDE 5 MG: 5 TABLET ORAL at 12:45

## 2025-06-06 RX ADMIN — LOPERAMIDE HYDROCHLORIDE 2 MG: 2 CAPSULE ORAL at 16:55

## 2025-06-06 RX ADMIN — POTASSIUM CHLORIDE 40 MEQ: 1500 TABLET, EXTENDED RELEASE ORAL at 12:41

## 2025-06-06 RX ADMIN — POTASSIUM CHLORIDE 40 MEQ: 1500 TABLET, EXTENDED RELEASE ORAL at 16:55

## 2025-06-06 RX ADMIN — PANTOPRAZOLE SODIUM 40 MG: 40 TABLET, DELAYED RELEASE ORAL at 05:50

## 2025-06-06 RX ADMIN — ACETAMINOPHEN 1000 MG: 500 TABLET, FILM COATED ORAL at 05:51

## 2025-06-06 RX ADMIN — PANTOPRAZOLE SODIUM 40 MG: 40 TABLET, DELAYED RELEASE ORAL at 16:55

## 2025-06-06 RX ADMIN — OXYCODONE HYDROCHLORIDE 5 MG: 5 TABLET ORAL at 05:51

## 2025-06-06 RX ADMIN — LOPERAMIDE HYDROCHLORIDE 2 MG: 2 CAPSULE ORAL at 20:22

## 2025-06-06 RX ADMIN — LOPERAMIDE HYDROCHLORIDE 2 MG: 2 CAPSULE ORAL at 09:22

## 2025-06-06 NOTE — NURSING NOTE
"   06/06/25 1048   Ileostomy Loop RUQ   Placement date: If unknown, DO NOT use \"Add Comment\" note: 05/29/25   Inserted by: Dr Ny  Ileostomy Type: Loop  Location: RUQ   Stomal Appliance 2 piece;Changed   Stoma Appearance irregular;moist;pink;protruding above skin level   Peristomal Assessment Clean;Intact   Accessories/Skin Care cleansed with water;skin barrier ring;convex wafer  ([place] 2 3/4\" soft convex wafer and pouch with barrier ring.)   Stoma Function flatus;stool   Stool Color brown;green   Stool Consistency liquid;loose   Treatment Bag change;Site care   Output (mL) 50 mL     Ostomy education provided to the patient: pt participated in pouch change today. She reports that she has been emptying her pouch without difficulty. She is able to teach back crusting technique. She has one area of MARSI at 4 o'clock that we crusted with stoma powder and barrier spray today with pouch change. Her stool remains fairly loose, but is thickening some with the fiber powder. Reinforced the need to thickens stool to applesauce consistency, will need to continue antidiarrheal medication and fiber supplement at home, along with foods to thicken stool.     [x]Pouch changed   []Pt observed   [x]Pt participated    []CG participated            [x]Instructed in frequency of pouch change  [x]Pouch emptied and cleaned, demonstrated use of pouch closure    []Pt observed   [x]Pt participated    []CG participated    [x]Instructed to empty pouch when 1/3 to 1/2 full  []Teaching packet/handouts provided/reviewed  [x]Return to ADL's, showering, clothing  [x]Peristomal skin care, avoiding use of soaps with moisturizers, crusting technique  [x]Diet   [x]Adequate po fluid intake   [x]S/S of dehydration   [x]Foods to thicken stool   []Foods to avoid to prevent blockage  [x]Supplies at bedside, [employer] catalog provided and marked with supplies.   []Samples ordered    Current Supplies: [place] 2 3/4\" soft convex wafer and pouch with " barrier ring.     WOC Team follow up plan: 5x week while in-patient, HH to follow after D/C

## 2025-06-06 NOTE — PLAN OF CARE
Goal Outcome Evaluation:  Plan of Care Reviewed With: patient        Progress: improving  Outcome Evaluation: Pt seen for PT this AM. Pt in bed on arrival. Pt c/o fatigue and was relectant but agreeable to therapy. Pt moved from supine to sitting EOB using bed rails and SBA. Pt completed STS to rwx with SBA. Pt moved to bedside commode and completed toilet hygiene tasks independently. Pt amb 50 ft in room with rwx and SBA. No marked unsteadiness, just fatigue and small, shuffling steps. Pt returned to sitting reclined in chair with needs met. Pt plans to DC home wiht daughter. PT will continue to follow.    Anticipated Discharge Disposition (PT): home with assist, home with home health

## 2025-06-06 NOTE — THERAPY TREATMENT NOTE
Patient Name: Samia Gaming  : 1955    MRN: [id number]                              Today's Date: 2025       Admit Date: 2025    Visit Dx:     ICD-10-CM ICD-9-CM   1. Gastrointestinal hemorrhage, unspecified gastrointestinal hemorrhage type  K92.2 578.9   2. Acute blood loss anemia  D62 285.1   3. Elevated BUN  R79.9 790.6   4. Elevated lactic acid level  R79.89 276.2   5. Generalized abdominal pain  R10.84 789.07     Patient Active Problem List   Diagnosis    GI bleed    GIB (gastrointestinal bleeding)     Past Medical History:   Diagnosis Date    Arthritis     Elevated cholesterol      Past Surgical History:   Procedure Laterality Date    CHOLECYSTECTOMY      COLONOSCOPY      COLONOSCOPY N/A 2025    Procedure: COLONOSCOPY TO CECUM AND TERMINAL ILEUM AT BEDSIDE;  Surgeon: Bi Hutson MD;  Location: [facility] ENDOSCOPY;  Service: Gastroenterology;  Laterality: N/A;  PRE- GI BLEED  POST- DIVERTICULOSIS, HEMORRHOIDS    ENDOSCOPY N/A 2025    Procedure: ESOPHAGOGASTRODUODENOSCOPY;  Surgeon: Keyur Neely MD;  Location: [facility] ENDOSCOPY;  Service: Gastroenterology;  Laterality: N/A;  pre: GI bleed  post:errosive gastritis, duodenitis    ENTEROSCOPY SMALL BOWEL N/A 2025    Procedure: ENTEROSCOPY SMALL BOWEL at bedside;  Surgeon: Justin Garza MD;  Location: [facility] ENDOSCOPY;  Service: Gastroenterology;  Laterality: N/A;  pre- melena  post- mild gastric antrum erosion    EXPLORATORY LAPAROTOMY N/A 2025    Procedure: LAPAROTOMY EXPLORATORY, SMALL BOWEL RESECTION DISTAL AND PROXIMAL, ILEOSTOMY;  Surgeon: Jose G Ny MD;  Location: [facility] MAIN OR;  Service: General;  Laterality: N/A;      General Information       Row Name 25 1397          Physical Therapy Time and Intention    Document Type therapy note (daily note)  -BH (r) AA (t) BH (c)     Mode of Treatment physical therapy;individual therapy  -BH (r) AA (t) BH (c)       Row Name 25 0299           General Information    Patient Profile Reviewed yes  -BH (r) AA (t) BH (c)     Existing Precautions/Restrictions fall  -BH (r) AA (t) BH (c)       Row Name 06/06/25 1150          Safety Issues/Impairments Affecting Functional Mobility    Impairments Affecting Function (Mobility) balance;pain;endurance/activity tolerance;strength  -BH (r) AA (t) BH (c)               User Key  (r) = Recorded By, (t) = Taken By, (c) = Cosigned By      Initials Name Provider Type     Kelley Lloyd, PT Physical Therapist    Jn Clark, PT Student PT Student                   Mobility       Row Name 06/06/25 1151          Bed Mobility    Bed Mobility supine-sit  -BH (r) AA (t) BH (c)     Supine-Sit [place] (Bed Mobility) standby assist  -BH (r) AA (t) BH (c)     Assistive Device (Bed Mobility) head of bed elevated;bed rails  -BH (r) AA (t) BH (c)       Row Name 06/06/25 1151          Sit-Stand Transfer    Sit-Stand [place] (Transfers) standby assist  -BH (r) AA (t) BH (c)     Assistive Device (Sit-Stand Transfers) walker, front-wheeled  -BH (r) AA (t) BH (c)       Row Name 06/06/25 1151          Gait/Stairs (Locomotion)    [place] Level (Gait) standby assist  -BH (r) AA (t) BH (c)     Assistive Device (Gait) walker, front-wheeled  -BH (r) AA (t) BH (c)     Patient was able to Ambulate yes  -BH (r) AA (t) BH (c)     Distance in Feet (Gait) 50  -BH (r) AA (t) BH (c)     Deviations/Abnormal Patterns (Gait) gait speed decreased;festinating/shuffling  -BH (r) AA (t) BH (c)     Bilateral Gait Deviations heel strike decreased;forward flexed posture  -BH (r) AA (t) BH (c)               User Key  (r) = Recorded By, (t) = Taken By, (c) = Cosigned By      Initials Name Provider Type     Kelley Lloyd PT Physical Therapist    Jn Clark, PT Student PT Student                   Obj/Interventions    No documentation.                  Goals/Plan    No documentation.                  Clinical Impression       Row Name  06/06/25 1151          Pain    Pre/Posttreatment Pain Comment no c/o pain durign session  -BH (r) AA (t) BH (c)       Row Name 06/06/25 1151          Plan of Care Review    Plan of Care Reviewed With patient  -BH (r) AA (t) BH (c)     Progress improving  -BH (r) AA (t) BH (c)     Outcome Evaluation Pt seen for PT this AM. Pt in bed on arrival. Pt c/o fatigue and was relectant but agreeable to therapy. Pt moved from supine to sitting EOB using bed rails and SBA. Pt completed STS to rwx with SBA. Pt moved to bedside commode and completed toilet hygiene tasks independently. Pt amb 50 ft in room with rwx and SBA. No marked unsteadiness, just fatigue and small, shuffling steps. Pt returned to sitting reclined in chair with needs met. Pt plans to DC home wiht daughter. PT will continue to follow.  -BH (r) AA (t) BH (c)       Row Name 06/06/25 1151          Therapy Assessment/Plan (PT)    Rehab Potential (PT) good  -BH (r) AA (t) BH (c)     Criteria for Skilled Interventions Met (PT) yes  -BH (r) AA (t) BH (c)     Therapy Frequency (PT) 5 times/wk  -BH (r) AA (t) BH (c)       Row Name 06/06/25 1151          Vital Signs    O2 Delivery Pre Treatment room air  -BH (r) AA (t) BH (c)     O2 Delivery Intra Treatment room air  -BH (r) AA (t) BH (c)     O2 Delivery Post Treatment room air  -BH (r) AA (t) BH (c)       Row Name 06/06/25 1151          Positioning and Restraints    Pre-Treatment Position in bed  -BH (r) AA (t) BH (c)     Post Treatment Position chair  -BH (r) AA (t) BH (c)     In Chair reclined;call light within reach;encouraged to call for assist;exit alarm on  -BH (r) AA (t) BH (c)               User Key  (r) = Recorded By, (t) = Taken By, (c) = Cosigned By      Initials Name Provider Type    Kelley Leyva, PT Physical Therapist    Jn Clark, PT Student PT Student                   Outcome Measures       Row Name 06/06/25 1154 06/06/25 1000       How much help from another person do you currently need...     Turning from your back to your side while in flat bed without using bedrails? 3  -BH (r) AA (t) BH (c) 3  -KEENA (r) KL (t) KEENA (c)    Moving from lying on back to sitting on the side of a flat bed without bedrails? 3  -BH (r) AA (t) BH (c) 3  -KEENA (r) KL (t) KEENA (c)    Moving to and from a bed to a chair (including a wheelchair)? 3  -BH (r) AA (t) BH (c) 3  -KEENA (r) KL (t) KEENA (c)    Standing up from a chair using your arms (e.g., wheelchair, bedside chair)? 4  -BH (r) AA (t) BH (c) 3  -KEENA (r) KL (t) KEENA (c)    Climbing 3-5 steps with a railing? 2  -BH (r) AA (t) BH (c) 2  -KEENA (r) KL (t) KEENA (c)    To walk in hospital room? 4  -BH (r) AA (t) BH (c) 3  -KEENA (r) KL (t) KEENA (c)    AM-PAC 6 Clicks Score (PT) 19  -BH (r) AA (t) 17  -KEENA (r) KL (t)    Highest Level of Mobility Goal Walk 10 Steps or More-6  -BH (r) AA (t) Stand (1 or More Minutes)-5  -KEENA (r) KL (t)      Row Name 06/06/25 4203          How much help from another person do you currently need...    Turning from your back to your side while in flat bed without using bedrails? --  -KEENA (r) KL (t) KEENA (c)     Moving from lying on back to sitting on the side of a flat bed without bedrails? --  -KEENA (r) KL (t) KEENA (c)     Moving to and from a bed to a chair (including a wheelchair)? --  -KEENA (r) KL (t) KEENA (c)     Standing up from a chair using your arms (e.g., wheelchair, bedside chair)? --  -KEENA (r) KL (t) KEENA (c)     Climbing 3-5 steps with a railing? --  -KEENA (r) KL (t) KEENA (c)     To walk in hospital room? --  -KEENA (r) ANEL (t) KEENA (c)     AM-PAC 6 Clicks Score (PT) -- (P)   -ANEL     Highest Level of Mobility Goal -- (P)   -ANEL       Row Name 06/06/25 1154          Functional Assessment    Outcome Measure Options AM-PAC 6 Clicks Basic Mobility (PT)  -SUSANNA (r) LORRAINE (t) SUSANNA (c)               User Key  (r) = Recorded By, (t) = Taken By, (c) = Cosigned By      Initials Name Provider Type    Reilly Silva, RN Registered Nurse    Kelley Leyva, PT Physical Therapist    ANEL Hill,  Rupal, RN Extern Registered Nurse    Jn Clark, PT Student PT Student                                 Physical Therapy Education       Title: PT OT SLP Therapies (Done)       Topic: Physical Therapy (Done)       Point: Mobility training (Done)       Learning Progress Summary            Patient Acceptance, E,TB, VU,NR by AA at 6/6/2025 1155    Acceptance, E, NR by AR at 6/4/2025 1459    Acceptance, E, VU by CS at 6/3/2025 1207    Acceptance, E, VU by CS at 6/2/2025 1549   Family Acceptance, E, VU by CS at 6/2/2025 1549                      Point: Home exercise program (Done)       Learning Progress Summary            Patient Acceptance, E,TB, VU,NR by AA at 6/6/2025 1155    Acceptance, E, NR by AR at 6/4/2025 1459    Acceptance, E, VU by CS at 6/3/2025 1207    Acceptance, E, VU by CS at 6/2/2025 1549   Family Acceptance, E, VU by CS at 6/2/2025 1549                      Point: Body mechanics (Done)       Learning Progress Summary            Patient Acceptance, E,TB, VU,NR by AA at 6/6/2025 1155    Acceptance, E, NR by AR at 6/4/2025 1459    Acceptance, E, VU by CS at 6/3/2025 1207    Acceptance, E, VU by CS at 6/2/2025 1549   Family Acceptance, E, VU by CS at 6/2/2025 1549                      Point: Precautions (Done)       Learning Progress Summary            Patient Acceptance, E,TB, VU,NR by AA at 6/6/2025 1155    Acceptance, E, NR by AR at 6/4/2025 1459    Acceptance, E, VU by CS at 6/3/2025 1207    Acceptance, E, VU by CS at 6/2/2025 1549   Family Acceptance, E, VU by CS at 6/2/2025 1549                                      User Key       Initials Effective Dates Name Provider Type Discipline    AR 06/16/21 -  Mima Bowles, PT Physical Therapist PT     09/06/24 -  Dylon Pemberton, PT Physical Therapist PT     01/22/25 -  Jn Crump, PT Student PT Student PT                  PT Recommendation and Plan     Progress: improving  Outcome Evaluation: Pt seen for PT this AM. Pt in bed on arrival. Pt c/o  fatigue and was relectant but agreeable to therapy. Pt moved from supine to sitting EOB using bed rails and SBA. Pt completed STS to rwx with SBA. Pt moved to bedside commode and completed toilet hygiene tasks independently. Pt amb 50 ft in room with rwx and SBA. No marked unsteadiness, just fatigue and small, shuffling steps. Pt returned to sitting reclined in chair with needs met. Pt plans to DC home wiht daughter. PT will continue to follow.     Time Calculation:         PT Charges       Row Name 06/06/25 1155             Time Calculation    Start Time 1132  -BH (r) AA (t) BH (c)      Stop Time 1148  -BH (r) AA (t) BH (c)      Time Calculation (min) 16 min  -BH (r) AA (t)      PT Received On 06/06/25  -BH (r) AA (t) BH (c)      PT - Next Appointment 06/09/25  -BH (r) AA (t) BH (c)      PT Goal Re-Cert Due Date 06/12/25  -BH (r) AA (t) BH (c)         Time Calculation- PT    Total Timed Code Minutes- PT 16 minute(s)  -BH (r) AA (t) BH (c)         Timed Charges    90465 - Gait Training Minutes  10  -BH (r) AA (t) BH (c)      23314 - PT Therapeutic Activity Minutes 6  -BH (r) AA (t) BH (c)         Total Minutes    Timed Charges Total Minutes 16  -BH (r) AA (t)       Total Minutes 16  -BH (r) AA (t)                User Key  (r) = Recorded By, (t) = Taken By, (c) = Cosigned By      Initials Name Provider Type    Kelley Leyva, PT Physical Therapist    Jn Clark, PT Student PT Student                      PT G-Codes  Outcome Measure Options: AM-PAC 6 Clicks Basic Mobility (PT)  AM-PAC 6 Clicks Score (PT): 19  PT Discharge Summary  Anticipated Discharge Disposition (PT): home with assist, home with home health    Jn Crump, PT Student  6/6/2025

## 2025-06-06 NOTE — PROGRESS NOTES
Continued Stay Note  [facility]     Patient Name: Samia Gaming  MRN: [id number]  Today's Date: 6/6/2025    Admit Date: 5/22/2025    Plan: Return home with daughter and [facility]  following   Discharge Plan       Row Name 06/06/25 1414       Plan    Plan Return home with daughter and [facility] following    Patient/Family in Agreement with Plan yes    Plan Comments Spoke with patient at bedside.  Plan remains to return home at [place].  [facility] will follow.  Patient is agreeable.  CCP will follow as needed.  Alysa ARMSTRONG                    Expected Discharge Date and Time       Expected Discharge Date Expected Discharge Time    Jun 7, 2025               Becky S. [place], RN

## 2025-06-06 NOTE — PLAN OF CARE
Problem: Adult Inpatient Plan of Care  Goal: Plan of Care Review  Outcome: Progressing  Flowsheets (Taken 6/6/2025 0511)  Progress: improving  Outcome Evaluation: Patient is alert and oriented x 4, saturating well on room air. No signs of active bleeding, illeostomy is draining with good amount of watery stool. Fall risk prevention protocol maintained.  Plan of Care Reviewed With: patient   Goal Outcome Evaluation:  Plan of Care Reviewed With: patient        Progress: improving  Outcome Evaluation: Patient is alert and oriented x 4, saturating well on room air. No signs of active bleeding, illeostomy is draining with good amount of watery stool. Fall risk prevention protocol maintained.

## 2025-06-06 NOTE — PROGRESS NOTES
General Surgery Note    Summary:  69-year-old lady with difficult to localize gastrointestinal bleed postoperative day #7.5 status post small bowel resection x 2, repair of diagnostic enterotomies, and end-loop ileostomy    Interval Status:  Not having much pain.  Tolerating regular diet.  Still having thin high ileostomy output.  Up in the chair this morning    Physical Exam:    Not sick  Afebrile, heart rate 63, /59  Nonlabored breathing on room air  Abdomen soft, nontender, nondistended, incision clean and dry with staples, drain serosanguineous, ileostomy pink with thin liquid output  Ileostomy output 1.3 L      Labs:  Results from last 7 days   Lab Units 06/05/25  0314 06/04/25  0218 06/03/25  2352 06/03/25  1749 06/03/25  1154 06/03/25  0610 06/02/25  2354   WBC 10*3/mm3 5.71 4.32 5.08 4.88 4.16 4.56 5.11   HEMOGLOBIN g/dL 9.5* 9.3* 8.7* 8.9* 8.5* 8.7* 8.0*   PLATELETS 10*3/mm3 180 147 144 141 157 134* 142     Results from last 7 days   Lab Units 06/05/25  0314 06/04/25  0218 06/03/25  0611 06/02/25  0913 06/02/25  0820   SODIUM mmol/L 137 134* 137   < > 137   POTASSIUM mmol/L 3.8 4.2 4.3   < > 4.0   CHLORIDE mmol/L 102 101 104   < > 105   CO2 mmol/L 27.1 22.0 27.0   < > 25.1   BUN mg/dL 9.0 11.0 10.0   < > 11.0   CREATININE mg/dL 0.44* 0.36* 0.36*   < > 0.35*   CALCIUM mg/dL 8.4* 8.2* 8.1*   < > 8.0*   BILIRUBIN mg/dL  --  0.6 0.8  --  0.7   ALK PHOS U/L  --  104 89  --  72   ALT (SGPT) U/L  --  62* 53*  --  22   AST (SGOT) U/L  --  54* 52*  --  26   GLUCOSE mg/dL 97 108* 114*   < > 138*    < > = values in this interval not displayed.       Plan:  - She needs to stay another day given thin high ileostomy output  - Regular diet  - Continue fiber  - Start loperamide 4 times daily before meals and nightly  - Remove drain  - Anticipate she may be able to go home tomorrow if ileostomy output reasonable and thickening        Jose G yN MD  [facility], [facility] [address]  [place], [place], [address]  P: [phone]  F: [phone]

## 2025-06-06 NOTE — PROGRESS NOTES
Name: Samia Gaming ADMIT: 2025   : 1955  PCP: Cristiano Logan DO    MRN: [id number] LOS: 14 days   AGE/SEX: 69 y.o. female  ROOM: [facility]/     Subjective   Subjective   Patient sitting up in chair.  Very eager to leave the hospital.  Tolerating regular diet.  Having a lot of liquid ileostomy output still.  Family at bedside.    Review of Systems  As above     Objective   Objective   Vital Signs  Temp:  [97.7 °F (36.5 °C)-98.4 °F (36.9 °C)] 98.4 °F (36.9 °C)  Heart Rate:  [63-82] 73  Resp:  [18] 18  BP: (108-138)/(59-78) 138/59  SpO2:  [95 %-100 %] 95 %  on  Flow (L/min) (Oxygen Therapy):  [2] 2;   Device (Oxygen Therapy): room air  Body mass index is 47.91 kg/m².  Physical Exam  Constitutional:       General: She is not in acute distress.     Appearance: She is not ill-appearing.   Cardiovascular:      Rate and Rhythm: Normal rate and regular rhythm.   Pulmonary:      Effort: Pulmonary effort is normal. No respiratory distress.   Abdominal:      General: Abdomen is flat. There is no distension.      Tenderness: There is no abdominal tenderness.      Comments: Abdominal incision with staples   Musculoskeletal:         General: No swelling or deformity. Normal range of motion.   Skin:     General: Skin is warm and dry.   Neurological:      General: No focal deficit present.      Mental Status: She is alert. Mental status is at baseline.         Results Review     I reviewed the patient's new clinical results.  Results from last 7 days   Lab Units 25  0914 25  0314 25  0218 25  2352   WBC 10*3/mm3 5.27 5.71 4.32 5.08   HEMOGLOBIN g/dL 9.5* 9.5* 9.3* 8.7*   PLATELETS 10*3/mm3 233 180 147 144     Results from last 7 days   Lab Units 25  0314 25  0218 25  0611 25  0913   SODIUM mmol/L 137 134* 137 137   POTASSIUM mmol/L 3.8 4.2 4.3 4.0   CHLORIDE mmol/L 102 101 104 105   CO2 mmol/L 27.1 22.0 27.0 26.2   BUN mg/dL 9.0 11.0 10.0 11.0   CREATININE mg/dL 0.44*  0.36* 0.36* 0.24*   GLUCOSE mg/dL 97 108* 114* 134*   Estimated Creatinine Clearance: 143.8 mL/min (A) (by C-G formula based on SCr of 0.44 mg/dL (L)).  Results from last 7 days   Lab Units 06/04/25 0218 06/03/25  0611 06/02/25  0820 06/02/25  0532   ALBUMIN g/dL 2.0* 2.0* 2.0* 1.9*   BILIRUBIN mg/dL 0.6 0.8 0.7 0.6   ALK PHOS U/L 104 89 72 62   AST (SGOT) U/L 54* 52* 26 23   ALT (SGPT) U/L 62* 53* 22 18     Results from last 7 days   Lab Units 06/05/25 0314 06/04/25 0218 06/03/25  0611 06/02/25  0913 06/02/25  0820 06/02/25  0532 06/01/25  0742   CALCIUM mg/dL 8.4* 8.2* 8.1* 7.9* 8.0* 7.9* 7.6*   ALBUMIN g/dL  --  2.0* 2.0*  --  2.0* 1.9*  --    MAGNESIUM mg/dL  --  1.9 1.9  --   --  2.2 1.9   PHOSPHORUS mg/dL  --  3.5 3.2  --   --  4.5 2.2*       External COVID19   Date Value Ref Range Status   12/18/2020 Detected (A) Not Detected Final     Coronavirus (COVID-19)   Date Value Ref Range Status   12/18/2020 Acceptable Acceptable Final     Glucose   Date/Time Value Ref Range Status   06/05/2025 0621 110 70 - 130 mg/dL Final   06/04/2025 2012 98 70 - 130 mg/dL Final   06/04/2025 1826 119 70 - 130 mg/dL Final   06/04/2025 1525 123 70 - 130 mg/dL Final   06/04/2025 1026 125 70 - 130 mg/dL Final   06/04/2025 0557 122 70 - 130 mg/dL Final   06/04/2025 0027 113 70 - 130 mg/dL Final       XR Abdomen KUB  Narrative: XR ABDOMEN KUB-     INDICATIONS: Possible ileus     TECHNIQUE: Supine views of the abdomen     COMPARISON: 5/30/2025     FINDINGS:     NG tube extends to the epigastric region. A surgical drain is apparent  at the level of the pelvis, with overlying skin staples. The bowel gas  pattern is nonspecific, with mild abnormal gaseous distention of the  colon. Mild colonic fecal retention is apparent. No supine evidence for  free intraperitoneal gas. Follow-up as indications persist. If there is  further clinical concern, CT can be obtained for further evaluation.     Impression:    As described.        This report  was finalized on 6/1/2025 9:25 AM by Dr. Teja Mckinnon M.D on Workstation: [id number]       I reviewed the patient's daily medications.  Scheduled Medications  acetaminophen, 1,000 mg, Oral, Q8H  enoxaparin sodium, 40 mg, Subcutaneous, Q12H  loperamide, 2 mg, Oral, 4x Daily AC & at Bedtime  pantoprazole, 40 mg, Oral, BID AC  Psyllium, 1 packet, Oral, Daily    Infusions     Diet  Diet: Diabetic; Consistent Carbohydrate; No Carbonated Beverages; Texture: Soft to Chew (NDD 3); Soft to Chew: Whole Meat; Fluid Consistency: Thin (IDDSI 0)         I have personally reviewed:  [x]  Laboratory   []  Microbiology   [x]  Radiology   []  EKG/Telemetry   [x]  Cardiology/Vascular   []  Pathology   [x]  Records     Assessment/Plan     Active Hospital Problems    Diagnosis  POA    **GI bleed [K92.2]  Yes    GIB (gastrointestinal bleeding) [K92.2]  Yes      Resolved Hospital Problems   No resolved problems to display.       69 y.o. female admitted with GI bleed.    GI bleeding from small bowel diverticulum  Acute anemia  -EGD on 5/23, erosive gastropathy with no bleeding.  Nonbleeding duodenal ulcer with no stigmata of bleeding  -Colonoscopy on 5/24 with diverticulosis and blood in the sigmoid colon  -Small bowel enteroscopy on 5/26 with no acute findings  - Surgery consulted status post small bowel resection x 2 end ileostomy on 5/29  -GI has signed off  - Surgery following, awaiting improvement of ileostomy output.  Started loperamide 4 times a day, continue fiber.  - Now off TPN, PICC line removed    Right orbital wall fracture-seen by OMFS and recommends conservative therapy.  Suspect paresthesias will improve over months    Morbid obesity, BMI 52    Postop respiratory failure-now on room air    Pharmacy to dose Lovenox for DVT prophylaxis.    Full code.  Discussed with patient, family, and nursing staff.  Anticipate discharge home with home health tomorrow.  Ileostomy output is more reasonable    Expected Discharge Date:  6/6/2025; Expected Discharge Time:       Lowell Hernandez MD  [facility]  06/06/25  10:45 EDT

## 2025-06-06 NOTE — PROGRESS NOTES
Nutrition Services    Patient Name: Samia Gaming  YOB: 1955  MRN: [id number]  Admission date: 5/22/2025    PROGRESS NOTE      Encounter Information: Checking on po intakes. Diet advanced to soft to chew; whole meats with thins. Tolerating diet. Noted, having a lot of liquid ileostomy output still. Possible dc tomorrow, pending improvement of ileostomy output.        PO Diet: Diet: Diabetic; Consistent Carbohydrate; No Carbonated Beverages; Texture: Soft to Chew (NDD 3); Soft to Chew: Whole Meat; Fluid Consistency: Thin (IDDSI 0)   PO Supplements: N/a   PO Intake:  25-75% - tolerating diet       Current nutrition support: -   Nutrition support review: -       Labs (reviewed below): Reviewed. Management per MD.        GI Function:  High ileostomy output - surgery following       Nutrition Intervention Updates: Continue to encourage good po intakes.     Results from last 7 days   Lab Units 06/06/25 0914 06/05/25 0314 06/04/25 0218 06/03/25  0611 06/02/25  0913 06/02/25  0820   SODIUM mmol/L 138 137 134* 137   < > 137   POTASSIUM mmol/L 3.4* 3.8 4.2 4.3   < > 4.0   CHLORIDE mmol/L 103 102 101 104   < > 105   CO2 mmol/L 27.0 27.1 22.0 27.0   < > 25.1   BUN mg/dL 9.0 9.0 11.0 10.0   < > 11.0   CREATININE mg/dL 0.51* 0.44* 0.36* 0.36*   < > 0.35*   CALCIUM mg/dL 8.4* 8.4* 8.2* 8.1*   < > 8.0*   BILIRUBIN mg/dL  --   --  0.6 0.8  --  0.7   ALK PHOS U/L  --   --  104 89  --  72   ALT (SGPT) U/L  --   --  62* 53*  --  22   AST (SGOT) U/L  --   --  54* 52*  --  26   GLUCOSE mg/dL 116* 97 108* 114*   < > 138*    < > = values in this interval not displayed.     Results from last 7 days   Lab Units 06/06/25  0914 06/05/25 0314 06/04/25 0218 06/03/25  1154 06/03/25  0611 06/02/25  1941 06/02/25  0532   MAGNESIUM mg/dL  --   --  1.9  --  1.9  --  2.2   PHOSPHORUS mg/dL  --   --  3.5  --  3.2  --  4.5   HEMOGLOBIN g/dL 9.5*   < > 9.3*   < >  --    < > 7.7*   HEMATOCRIT % 29.4*   < > 29.5*   < >  --    < > 25.2*    TRIGLYCERIDES mg/dL  --   --  125  --   --   --  127    < > = values in this interval not displayed.     External COVID19   Date Value Ref Range Status   12/18/2020 Detected (A) Not Detected Final     Coronavirus (COVID-19)   Date Value Ref Range Status   12/18/2020 Acceptable Acceptable Final     Lab Results   Component Value Date    HGBA1C 6.4 (H) 09/17/2024       Wt Readings from Last 10 Encounters:   06/05/25 0600 119 kg (262 lb)   06/04/25 0403 122 kg (269 lb 10 oz)   06/01/25 0424 129 kg (284 lb 6.3 oz)   05/31/25 0604 129 kg (284 lb 6.3 oz)   05/31/25 0421 132 kg (292 lb 1.8 oz)   05/30/25 0455 131 kg (288 lb 5.8 oz)   05/27/25 0400 128 kg (282 lb 13.6 oz)   05/26/25 0400 121 kg (266 lb 3.2 oz)   05/25/25 0600 125 kg (275 lb 9.2 oz)   05/24/25 0600 123 kg (270 lb 11.6 oz)   05/22/25 2108 124 kg (274 lb 6.4 oz)       RD to follow up per protocol.    Electronically signed by:  Rajni Go RD  06/06/25 12:27 EDT

## 2025-06-07 ENCOUNTER — READMISSION MANAGEMENT (OUTPATIENT)
Dept: CALL CENTER | Facility: HOSPITAL | Age: 70
End: 2025-06-07
Payer: MEDICARE

## 2025-06-07 VITALS
HEART RATE: 82 BPM | DIASTOLIC BLOOD PRESSURE: 69 MMHG | RESPIRATION RATE: 16 BRPM | WEIGHT: 255.3 LBS | SYSTOLIC BLOOD PRESSURE: 140 MMHG | TEMPERATURE: 99.3 F | HEIGHT: 62 IN | OXYGEN SATURATION: 100 % | BODY MASS INDEX: 46.98 KG/M2

## 2025-06-07 LAB
ANION GAP SERPL CALCULATED.3IONS-SCNC: 6.4 MMOL/L (ref 5–15)
BUN SERPL-MCNC: 12 MG/DL (ref 8–23)
BUN/CREAT SERPL: 22.2 (ref 7–25)
CALCIUM SPEC-SCNC: 8.1 MG/DL (ref 8.6–10.5)
CHLORIDE SERPL-SCNC: 104 MMOL/L (ref 98–107)
CO2 SERPL-SCNC: 26.6 MMOL/L (ref 22–29)
CREAT SERPL-MCNC: 0.54 MG/DL (ref 0.57–1)
DEPRECATED RDW RBC AUTO: 52.9 FL (ref 37–54)
EGFRCR SERPLBLD CKD-EPI 2021: 98.6 ML/MIN/1.73
ERYTHROCYTE [DISTWIDTH] IN BLOOD BY AUTOMATED COUNT: 15.4 % (ref 12.3–15.4)
GLUCOSE SERPL-MCNC: 91 MG/DL (ref 65–99)
HCT VFR BLD AUTO: 27 % (ref 34–46.6)
HGB BLD-MCNC: 8.5 G/DL (ref 12–15.9)
MCH RBC QN AUTO: 29.9 PG (ref 26.6–33)
MCHC RBC AUTO-ENTMCNC: 31.5 G/DL (ref 31.5–35.7)
MCV RBC AUTO: 95.1 FL (ref 79–97)
PLATELET # BLD AUTO: 242 10*3/MM3 (ref 140–450)
PMV BLD AUTO: 10.1 FL (ref 6–12)
POTASSIUM SERPL-SCNC: 4.4 MMOL/L (ref 3.5–5.2)
RBC # BLD AUTO: 2.84 10*6/MM3 (ref 3.77–5.28)
SODIUM SERPL-SCNC: 137 MMOL/L (ref 136–145)
WBC NRBC COR # BLD AUTO: 4.13 10*3/MM3 (ref 3.4–10.8)

## 2025-06-07 PROCEDURE — 85027 COMPLETE CBC AUTOMATED: CPT | Performed by: STUDENT IN AN ORGANIZED HEALTH CARE EDUCATION/TRAINING PROGRAM

## 2025-06-07 PROCEDURE — 80048 BASIC METABOLIC PNL TOTAL CA: CPT | Performed by: STUDENT IN AN ORGANIZED HEALTH CARE EDUCATION/TRAINING PROGRAM

## 2025-06-07 PROCEDURE — 99024 POSTOP FOLLOW-UP VISIT: CPT | Performed by: STUDENT IN AN ORGANIZED HEALTH CARE EDUCATION/TRAINING PROGRAM

## 2025-06-07 PROCEDURE — 25010000002 ENOXAPARIN PER 10 MG: Performed by: STUDENT IN AN ORGANIZED HEALTH CARE EDUCATION/TRAINING PROGRAM

## 2025-06-07 RX ORDER — PANTOPRAZOLE SODIUM 40 MG/1
40 TABLET, DELAYED RELEASE ORAL
Qty: 60 TABLET | Refills: 0 | Status: SHIPPED | OUTPATIENT
Start: 2025-06-07 | End: 2025-07-07

## 2025-06-07 RX ORDER — LOPERAMIDE HYDROCHLORIDE 2 MG/1
2 CAPSULE ORAL
Qty: 120 CAPSULE | Refills: 6 | Status: SHIPPED | OUTPATIENT
Start: 2025-06-07

## 2025-06-07 RX ADMIN — ACETAMINOPHEN 1000 MG: 500 TABLET, FILM COATED ORAL at 08:48

## 2025-06-07 RX ADMIN — LOPERAMIDE HYDROCHLORIDE 2 MG: 2 CAPSULE ORAL at 06:39

## 2025-06-07 RX ADMIN — AVOBENZONE, HOMOSALATE, OCTISALATE, OCTOCRYLENE, AND OXYBENZONE 1 PACKET: 29.4; 147; 49; 25.4; 58.8 LOTION TOPICAL at 08:48

## 2025-06-07 RX ADMIN — ENOXAPARIN SODIUM 40 MG: 100 INJECTION SUBCUTANEOUS at 06:39

## 2025-06-07 RX ADMIN — PANTOPRAZOLE SODIUM 40 MG: 40 TABLET, DELAYED RELEASE ORAL at 06:39

## 2025-06-07 NOTE — PLAN OF CARE
Goal Outcome Evaluation:  Plan of Care Reviewed With: patient           Outcome Evaluation: Pt. is AOX4, vital sign, lab and medication reviewed. no complain of pain this shift. On 2L O2 overnight. family remain at bedside.medication administered per ordered. Plan of care ongoing.

## 2025-06-07 NOTE — DISCHARGE SUMMARY
Date of Admission: 5/22/2025  Date of Discharge:  6/7/2025  Primary Care Physician: Cristiano Logan, DO     Discharge Diagnosis:  Active Hospital Problems    Diagnosis  POA    **GI bleed [K92.2]  Yes    GIB (gastrointestinal bleeding) [K92.2]  Yes      Resolved Hospital Problems   No resolved problems to display.       DETAILS OF HOSPITAL STAY     Hospital Course  This is a 69-year-old female who presented to the emergency room with acute GI bleed.  Please see H&P for full details.  She underwent colonoscopy/EGD/small bowel enteroscopy as well as multiple CT angiograms and tagged RBC scan that did not reveal a source of her bleeding.  General surgery eventually had to take her to the OR where they had to perform small bowel resection x 2 and end loop ileostomy.  Her bleeding has since resolved.  She initially had very runny ileostomy output but this is now more thickened and better controlled.  She is tolerating diet.  Her hemoglobin is stable.  General surgery has cleared her for discharge and have discussed with her the importance of daily fiber and use of loperamide to manage her ostomy output.  She will follow-up in the office with general surgery in 1 week.  She will be released home today.    Physical Exam at Discharge:  General: No acute distress, AAOx3  HEENT: EOMI, PERRL  Cardiovascular: +s1 and s2, RRR  Lungs: No rhonchi or wheezing  Abdomen: soft, nontender    Consults:   Consults       Date and Time Order Name Status Description    6/1/2025  1:57 PM Inpatient Hospitalist Consult Completed     5/26/2025 11:16 PM Inpatient General Surgery Consult Completed     5/25/2025 11:11 PM Inpatient Oral & Maxillofacial Surgery Consult Completed     5/22/2025  6:20 PM Inpatient Gastroenterology Consult Completed     5/22/2025  6:20 PM Gastroenterology (on-call MD unless specified)                Condition on Discharge: Stable, improved    Discharge Disposition  Home or Self Care    Discharge Medications      Discharge Medications        New Medications        Instructions Start Date   loperamide 2 MG capsule  Commonly known as: IMODIUM   2 mg, Oral, 4 Times Daily Before Meals & Nightly      oxyCODONE-acetaminophen 5-325 MG per tablet  Commonly known as: Percocet   1 tablet, Oral, Every 8 Hours PRN      pantoprazole 40 MG EC tablet  Commonly known as: PROTONIX   40 mg, Oral, 2 Times Daily Before Meals      psyllium 58.6 % powder  Commonly known as: METAMUCIL   1 packet, Oral, Daily             Continue These Medications        Instructions Start Date   atorvastatin 40 MG tablet  Commonly known as: LIPITOR   40 mg, Every Night at Bedtime      traZODone 150 MG tablet  Commonly known as: DESYREL   300 mg, Nightly             Stop These Medications      diclofenac 75 MG EC tablet  Commonly known as: VOLTAREN              Discharge Diet:   Diet Instructions       Diet: Regular/House Diet, Diabetic Diets; Consistent Carbohydrate; Soft to Chew (NDD 3); Whole Meat; Thin (IDDSI 0)      Discharge Diet:  Regular/House Diet  Diabetic Diets       Diabetic Diet: Consistent Carbohydrate    Texture: Soft to Chew (NDD 3)    Soft to Chew: Whole Meat    Fluid Consistency: Thin (IDDSI 0)            Activity at Discharge:   Activity Instructions       Activity as Tolerated              Follow-up Appointments  No future appointments.  Additional Instructions for the Follow-ups that You Need to Schedule       Ambulatory Referral to Home Health   As directed      Face to Face Visit Date: 6/7/2025   Follow-up provider for Plan of Care?: I treated the patient in an acute care facility and will not continue treatment after discharge.   Follow-up provider: BRANDON MAIER [[id number]]   Reason/Clinical Findings: weakness and deconditioning   Describe mobility limitations that make leaving home difficult: weakness and deconditioning   Nursing/Therapeutic Services Requested: Physical Therapy Occupational Therapy Skilled Nursing   Skilled nursing orders:  Ostomy instruction   Frequency: 1 Week 1        Discharge Follow-up with PCP   As directed       Currently Documented PCP:    Cristiano Logan DO    PCP Phone Number:    [phone]     Follow Up Details: 1 week        Discharge Follow-up with Specified Provider: Dr. Uribe of general surgery in 1 week   As directed      To: Dr. Uribe of general surgery in 1 week                I have examined and discussed discharge planning with the patient today.    Nikhil Nevarez MD  06/07/25  10:54 EDT    Time: Discharge greater than 30 min

## 2025-06-07 NOTE — PROGRESS NOTES
General Surgery Note    Summary:  Postoperative day #7.5 status post mall bowel resection x 2, repair of diagnostic enterotomies, and end-loop ileostomy for difficult to localize gastrointestinal bleed     Interval Status:  Ileostomy output thickened and better controlled.  Up in the chair this morning.  Pain well-controlled.  Tolerating diet without any nausea.  No evidence of bleeding.  Participated in appliance change.    Physical Exam:    Not sick  Afebrile, heart rate 82, /69  Nonlabored breathing on room air  Abdomen soft, nontender, nondistended, incision clean and dry with staples, ileostomy with thin liquid output    Ileostomy about 700 cc    Labs:  Results from last 7 days   Lab Units 06/07/25  0315 06/06/25  0914 06/05/25  0314 06/04/25  0218 06/03/25  2352 06/03/25  1749 06/03/25  1154   WBC 10*3/mm3 4.13 5.27 5.71 4.32 5.08 4.88 4.16   HEMOGLOBIN g/dL 8.5* 9.5* 9.5* 9.3* 8.7* 8.9* 8.5*   PLATELETS 10*3/mm3 242 233 180 147 144 141 157     Results from last 7 days   Lab Units 06/07/25  0315 06/06/25  2013 06/06/25  0914 06/05/25  0314 06/04/25  0218 06/03/25  0611 06/02/25  0913 06/02/25  0820   SODIUM mmol/L 137  --  138 137 134* 137   < > 137   POTASSIUM mmol/L 4.4 4.4 3.4* 3.8 4.2 4.3   < > 4.0   CHLORIDE mmol/L 104  --  103 102 101 104   < > 105   CO2 mmol/L 26.6  --  27.0 27.1 22.0 27.0   < > 25.1   BUN mg/dL 12.0  --  9.0 9.0 11.0 10.0   < > 11.0   CREATININE mg/dL 0.54*  --  0.51* 0.44* 0.36* 0.36*   < > 0.35*   CALCIUM mg/dL 8.1*  --  8.4* 8.4* 8.2* 8.1*   < > 8.0*   BILIRUBIN mg/dL  --   --   --   --  0.6 0.8  --  0.7   ALK PHOS U/L  --   --   --   --  104 89  --  72   ALT (SGPT) U/L  --   --   --   --  62* 53*  --  22   AST (SGOT) U/L  --   --   --   --  54* 52*  --  26   GLUCOSE mg/dL 91  --  116* 97 108* 114*   < > 138*    < > = values in this interval not displayed.       Plan:  - Okay for discharge from my perspective  - We discussed discharge instructions and postoperative care  including daily fiber and 2 mg loperamide 4 times daily before meals and at bedtime  - Able to participate in changing ileostomy appliance, and independent in emptying bag.  - Follow-up in my office in 1 week        Jose G Ny MD  [facility]  [address], [address]  [place], [place], [address]  P: [phone]  F: [phone]

## 2025-06-07 NOTE — OUTREACH NOTE
Prep Survey      Flowsheet Row Responses   [facility] patient discharged from? [place]   Is LACE score < 7 ? No   Eligibility Readm Mgmt   Discharge diagnosis GI bleed   Does the patient have one of the following disease processes/diagnoses(primary or secondary)? General Surgery   Does the patient have Home health ordered? Yes   What is the Home health agency?  [facility]    Is there a DME ordered? No   Medication alerts for this patient see AVS   Prep survey completed? Yes            Arabella SIMPSON - Registered Nurse              Arabella SIMPSON - Registered Nurse

## 2025-06-09 NOTE — PROGRESS NOTES
Case Management Discharge Note      Final Note: DC'd home with daughter and [facility] following 6/7         Selected Continued Care - Discharged on 6/7/2025 Admission date: 5/22/2025 - Discharge disposition: Home or Self Care          Home Medical Care Coordination complete.      Service Provider Services Address Phone Fax Patient Preferred    [facility] - [facility] [address] [place] [address] [place], [place] [address] [phone] --                      Transportation Services  Private: Car    Final Discharge Disposition Code: 06 - home with home health care

## 2025-06-13 ENCOUNTER — OFFICE VISIT (OUTPATIENT)
Dept: SURGERY | Facility: CLINIC | Age: 70
End: 2025-06-13
Payer: MEDICARE

## 2025-06-13 ENCOUNTER — READMISSION MANAGEMENT (OUTPATIENT)
Dept: CALL CENTER | Facility: HOSPITAL | Age: 70
End: 2025-06-13
Payer: MEDICARE

## 2025-06-13 VITALS
DIASTOLIC BLOOD PRESSURE: 68 MMHG | HEIGHT: 62 IN | BODY MASS INDEX: 47.06 KG/M2 | SYSTOLIC BLOOD PRESSURE: 138 MMHG | HEART RATE: 83 BPM | OXYGEN SATURATION: 100 % | WEIGHT: 255.73 LBS

## 2025-06-13 DIAGNOSIS — K57.11 DIVERTICULOSIS OF SMALL INTESTINE WITH HEMORRHAGE: ICD-10-CM

## 2025-06-13 DIAGNOSIS — Z93.2 ILEOSTOMY IN PLACE: Primary | ICD-10-CM

## 2025-06-13 PROBLEM — Z98.890 STATUS POST REVERSAL OF ILEOSTOMY: Status: ACTIVE | Noted: 2025-06-13

## 2025-06-13 RX ORDER — NEOMYCIN SULFATE 500 MG/1
1000 TABLET ORAL 3 TIMES DAILY
Qty: 6 TABLET | Refills: 0 | Status: SHIPPED | OUTPATIENT
Start: 2025-06-13 | End: 2025-06-14

## 2025-06-13 RX ORDER — METRONIDAZOLE 500 MG/1
500 TABLET ORAL SEE ADMIN INSTRUCTIONS
Qty: 4 TABLET | Refills: 0 | Status: SHIPPED | OUTPATIENT
Start: 2025-06-13 | End: 2025-06-14

## 2025-06-13 NOTE — OUTREACH NOTE
General Surgery Week 1 Survey      Flowsheet Row Responses   [facility] patient discharged from? [place]   Does the patient have one of the following disease processes/diagnoses(primary or secondary)? General Surgery   Week 1 attempt successful? No   Unsuccessful attempts Attempt 1            Laurel Davenport Registered Nurse

## 2025-06-13 NOTE — PROGRESS NOTES
POSTOPERATIVE OFFICE NOTE      HISTORY OF PRESENT ILLNESS:  This is a 69 y.o. female who presents for a post-operative visit after undergoing laparotomy with small bowel resection x 2, repair of multiple diagnostic enterotomies, and double barrel ileostomy for difficult to localize GI bleed related to small bowel diverticulosis on 5/29.  She is doing well postoperatively.  Her daughter is here with her today, and has been helping her with her ileostomy.  She has been ambulating and working with physical therapy.  Ileostomy output has been appropriate.  Not having any pain.  No recurrent bleeding.  There is some skin irritation around her ileostomy which is otherwise in good order.      DATA REVIEWED:  Final Diagnosis   1.  Small bowel, distal, segmental resection:         A.  Segment of small bowel with mucosal erosions, diverticulosis, and serosal adhesions.         B.  Resection margins are viable.     2.  Small bowel, proximal, segmental resection:         A.  Segment of benign small bowel with mucosal ischemic necrosis, diverticulosis, and serosal adhesions.         B.  Resection margins are viable.       PHYSICAL EXAM:  General: not sick  Respiratory: nonlabored breathing on room air  Abdomen: soft, nontender, nondistended, incision in good order with no erythema or drainage and staples in place, double-barreled ileostomy pink and viable with semisolid stool output, peristomal skin irritation and maceration noted.      Vitals:    06/13/25 0845   BP: 138/68   Pulse: 83   SpO2: 100%       Body mass index is 46.76 kg/m².    ASSESSMENT / PLAN:  I changed her ileostomy appliance and instructed them on the use of ostomy powder and barrier wipes for skin protection given peristomal skin maceration.  Also discussed my recommendation for [place] soaks.  She should continue her current fiber and loperamide dose, 2 mg 4 times daily.  I removed staples from her midline incision and asked her to give me a call if she  experiences redness and drainage from the incision.  She had an adequate prepped colonoscopy with Dr. Hutson while inpatient, no polyps seen at that time.  We discussed ileostomy reversal and September, the associated risks including reopening of her laparotomy, anastomotic leak, and damage to surrounding structures, and postoperative expectations.  She agrees to proceed and we will get her scheduled.      Jose G Ny MD  General Surgery  [facility]    [address], [address]  [place] [address]  P: [phone]  F: [phone]

## 2025-06-16 ENCOUNTER — TELEPHONE (OUTPATIENT)
Dept: SURGERY | Facility: CLINIC | Age: 70
End: 2025-06-16
Payer: MEDICARE

## 2025-06-16 DIAGNOSIS — R10.84 GENERALIZED ABDOMINAL PAIN: Primary | ICD-10-CM

## 2025-06-16 RX ORDER — HYDROCODONE BITARTRATE AND ACETAMINOPHEN 5; 325 MG/1; MG/1
1 TABLET ORAL EVERY 8 HOURS PRN
Qty: 10 TABLET | Refills: 0 | Status: SHIPPED | OUTPATIENT
Start: 2025-06-16

## 2025-06-16 NOTE — TELEPHONE ENCOUNTER
Patient called in requesting a refill of her pain med. S/P Exploratory laparotomy with jejunal small bowel resection with anastomosis,  ileal small bowel resection, and end loop ileostomy creation on 05/29/25.The [facility] in her chart is correct.

## 2025-06-18 ENCOUNTER — READMISSION MANAGEMENT (OUTPATIENT)
Dept: CALL CENTER | Facility: HOSPITAL | Age: 70
End: 2025-06-18
Payer: MEDICARE

## 2025-06-18 NOTE — OUTREACH NOTE
General Surgery Week 1 Survey      Flowsheet Row Responses   [facility] patient discharged from? [place]   Does the patient have one of the following disease processes/diagnoses(primary or secondary)? General Surgery   Week 1 attempt successful? Yes   Call start time 1549   Unsuccessful attempts Attempt 2   Call end time 1610   Discharge diagnosis GI bleed   Meds reviewed with patient/caregiver? Yes   Is the patient having any side effects they believe may be caused by any medication additions or changes? No   Does the patient have all medications related to this admission filled (includes all antibiotics, pain medications, etc.) Yes   Is the patient taking all medications as directed (includes completed medication regime)? Yes   Does the patient have a follow up appointment scheduled with their surgeon? Yes   Has the patient kept scheduled appointments due by today? Yes   Comments Pt has seen PCP since discharge   What is the Home health agency?  [facility]   Has home health visited the patient within 72 hours of discharge? Yes   Home health comments Pt has HH to assist as well P.T.   DME comments Pt using walker for ambulation   Psychosocial issues? No   Did the patient receive a copy of their discharge instructions? Yes   Nursing interventions Reviewed instructions with patient   What is the patient's perception of their health status since discharge? Same  [Patient communicates some ongoing concerns r/t fall and orbital fx and will be following up with specialists]   Nursing interventions Nurse provided patient education  [Reports her PCP will be placing a neuro referral as well as ophthalmology referral post d/c.  Reports she called Dr. Corona on AVS for diagnosis of orbital fx and was told he was dentist,  updated her PCP who will send in correct referral needed.]   Is the patient /caregiver able to teach back basic post-op care? Lifting as instructed by MD in discharge instructions, Keep incision  areas clean,dry and protected   Is the patient/caregiver able to teach back signs and symptoms of incisional infection? Increased redness, swelling or pain at the incisonal site, Increased drainage or bleeding, Incisional warmth, Pus or odor from incision, Fever  [Reports that surgical incisions without issues,reports issues with ostomy site as difficulty with adherence.  She has blisters to site and has to change sometimes 8 times in one day, reports while inpt this was not the case.]   Is the patient/caregiver able to teach back the hierarchy of who to call/visit for symptoms/problems? PCP, Specialist, Home health nurse, Urgent Care, ED, 911 Yes   Additional teach back comments REports she discussed her concerns with ostomy challenges with her HH teams and was told that hospital has improved supplies, encouraged to have her HH team reach out to ostomy nurse to see if perhaps branding could be shared as pt did not have an issue with ostomy while inpt.   Week 1 call completed? Yes   Wrap up additional comments Pt expressed concerns related to hospitalization,  will escalate her concerns for follow up.   Call end time 1610            CAROLYN LONDONO - Registered Nurse

## 2025-06-26 ENCOUNTER — READMISSION MANAGEMENT (OUTPATIENT)
Dept: CALL CENTER | Facility: HOSPITAL | Age: 70
End: 2025-06-26
Payer: MEDICARE

## 2025-06-26 NOTE — OUTREACH NOTE
General Surgery Week 2 Survey      Flowsheet Row Responses   [facility] patient discharged from? [place]   Does the patient have one of the following disease processes/diagnoses(primary or secondary)? General Surgery   Week 2 attempt successful? Yes   Call start time 1441   Call end time 1444   Discharge diagnosis GI bleed   Person spoke with today (if not patient) and relationship Amisha (grandchild)   Does the patient have all medications related to this admission filled (includes all antibiotics, pain medications, etc.) Yes   Is the patient taking all medications as directed (includes completed medication regime)? Yes   Does the patient have a follow up appointment scheduled with their surgeon? Yes   Comments Pt has seen PCP since discharge   What is the Home health agency?  [facility]   Has home health visited the patient within 72 hours of discharge? Yes   Home health comments Pt has HH to assist as well P.T.   DME comments Pt using walker for ambulation   Psychosocial issues? No   What is the patient's perception of their health status since discharge? Improving   Is the patient/caregiver able to teach back the hierarchy of who to call/visit for symptoms/problems? PCP, Specialist, Home health nurse, Urgent Care, ED, 911 Yes   Week 2 call completed? Yes   Graduated Yes   Is the patient interested in additional calls from an ambulatory ? No   Would this patient benefit from a Referral to Amb Social Work? No   Wrap up additional comments Domingo reports patient is improving. No questions or concerns at this time. HH is following.   Call end time 1444            YOANA LONDONO - Registered Nurse

## 2025-07-08 ENCOUNTER — TELEPHONE (OUTPATIENT)
Dept: SURGERY | Facility: CLINIC | Age: 70
End: 2025-07-08
Payer: MEDICARE

## 2025-07-08 DIAGNOSIS — R10.84 GENERALIZED ABDOMINAL PAIN: Primary | ICD-10-CM

## 2025-07-08 NOTE — TELEPHONE ENCOUNTER
I spoke to the patient on the phone as she has been having abdominal pain.  She reports having an ileostomy output despite the loperamide.  She denies any fever, nausea, or vomiting.  States she is urinating and does not feel dehydrated.  Also feels like she has to have a bowel movement which I explained is okay.  I we will get her set up with labs and a CT scan on outpatient basis and an office appointment with me this Friday.  I told her she needs to go to the emergency department if this is getting worse, she has any vomiting, or has a fever.

## 2025-07-09 ENCOUNTER — HOSPITAL ENCOUNTER (EMERGENCY)
Facility: HOSPITAL | Age: 70
Discharge: HOME | End: 2025-07-10
Attending: EMERGENCY MEDICINE
Payer: MEDICARE

## 2025-07-09 ENCOUNTER — LAB (OUTPATIENT)
Dept: LAB | Facility: HOSPITAL | Age: 70
End: 2025-07-09
Payer: MEDICARE

## 2025-07-09 ENCOUNTER — TELEPHONE (OUTPATIENT)
Dept: SURGERY | Facility: CLINIC | Age: 70
End: 2025-07-09
Payer: MEDICARE

## 2025-07-09 ENCOUNTER — HOSPITAL ENCOUNTER (OUTPATIENT)
Dept: CT IMAGING | Facility: HOSPITAL | Age: 70
Discharge: HOME | End: 2025-07-09
Payer: MEDICARE

## 2025-07-09 DIAGNOSIS — Z93.2 STATUS POST ILEOSTOMY: ICD-10-CM

## 2025-07-09 DIAGNOSIS — Z93.2 ILEOSTOMY IN PLACE: Primary | ICD-10-CM

## 2025-07-09 DIAGNOSIS — K56.41 FECAL IMPACTION: Primary | ICD-10-CM

## 2025-07-09 DIAGNOSIS — K57.11 DIVERTICULOSIS OF SMALL INTESTINE WITH HEMORRHAGE: ICD-10-CM

## 2025-07-09 DIAGNOSIS — R10.84 GENERALIZED ABDOMINAL PAIN: ICD-10-CM

## 2025-07-09 DIAGNOSIS — R10.84 GENERALIZED ABDOMINAL PAIN: Primary | ICD-10-CM

## 2025-07-09 LAB
ALBUMIN SERPL-MCNC: 3.8 G/DL (ref 3.5–5.2)
ALBUMIN/GLOB SERPL: 1.2 G/DL
ALP SERPL-CCNC: 101 U/L (ref 39–117)
ALT SERPL W P-5'-P-CCNC: 31 U/L (ref 1–33)
ANION GAP SERPL CALCULATED.3IONS-SCNC: 16.8 MMOL/L (ref 5–15)
ANION GAP SERPL CALCULATED.3IONS-SCNC: 21.8 MMOL/L (ref 5–15)
AST SERPL-CCNC: 33 U/L (ref 1–32)
BASOPHILS # BLD AUTO: 0.03 10*3/MM3 (ref 0–0.2)
BASOPHILS NFR BLD AUTO: 0.2 % (ref 0–1.5)
BILIRUB SERPL-MCNC: 2.1 MG/DL (ref 0–1.2)
BUN SERPL-MCNC: 11 MG/DL (ref 8–23)
BUN SERPL-MCNC: 11.2 MG/DL (ref 8–23)
BUN/CREAT SERPL: 11.1 (ref 7–25)
BUN/CREAT SERPL: 9.6 (ref 7–25)
CALCIUM SPEC-SCNC: 8.5 MG/DL (ref 8.6–10.5)
CALCIUM SPEC-SCNC: 9.1 MG/DL (ref 8.6–10.5)
CHLORIDE SERPL-SCNC: 100 MMOL/L (ref 98–107)
CHLORIDE SERPL-SCNC: 101 MMOL/L (ref 98–107)
CO2 SERPL-SCNC: 15.2 MMOL/L (ref 22–29)
CO2 SERPL-SCNC: 19.2 MMOL/L (ref 22–29)
CREAT SERPL-MCNC: 1.01 MG/DL (ref 0.57–1)
CREAT SERPL-MCNC: 1.14 MG/DL (ref 0.57–1)
DEPRECATED RDW RBC AUTO: 52.9 FL (ref 37–54)
DEPRECATED RDW RBC AUTO: 55.5 FL (ref 37–54)
EGFRCR SERPLBLD CKD-EPI 2021: 51.6 ML/MIN/1.73
EGFRCR SERPLBLD CKD-EPI 2021: 59.6 ML/MIN/1.73
EOSINOPHIL # BLD AUTO: 0.08 10*3/MM3 (ref 0–0.4)
EOSINOPHIL NFR BLD AUTO: 0.7 % (ref 0.3–6.2)
ERYTHROCYTE [DISTWIDTH] IN BLOOD BY AUTOMATED COUNT: 15.5 % (ref 12.3–15.4)
ERYTHROCYTE [DISTWIDTH] IN BLOOD BY AUTOMATED COUNT: 16.3 % (ref 12.3–15.4)
GLOBULIN UR ELPH-MCNC: 3.3 GM/DL
GLUCOSE SERPL-MCNC: 127 MG/DL (ref 65–99)
GLUCOSE SERPL-MCNC: 144 MG/DL (ref 65–99)
HCT VFR BLD AUTO: 37.6 % (ref 34–46.6)
HCT VFR BLD AUTO: 41 % (ref 34–46.6)
HGB BLD-MCNC: 12.5 G/DL (ref 12–15.9)
HGB BLD-MCNC: 13.5 G/DL (ref 12–15.9)
IMM GRANULOCYTES # BLD AUTO: 0.05 10*3/MM3 (ref 0–0.05)
IMM GRANULOCYTES NFR BLD AUTO: 0.4 % (ref 0–0.5)
LYMPHOCYTES # BLD AUTO: 0.82 10*3/MM3 (ref 0.7–3.1)
LYMPHOCYTES NFR BLD AUTO: 6.8 % (ref 19.6–45.3)
MCH RBC QN AUTO: 30.5 PG (ref 26.6–33)
MCH RBC QN AUTO: 31 PG (ref 26.6–33)
MCHC RBC AUTO-ENTMCNC: 32.9 G/DL (ref 31.5–35.7)
MCHC RBC AUTO-ENTMCNC: 33.2 G/DL (ref 31.5–35.7)
MCV RBC AUTO: 91.7 FL (ref 79–97)
MCV RBC AUTO: 94 FL (ref 79–97)
MONOCYTES # BLD AUTO: 0.99 10*3/MM3 (ref 0.1–0.9)
MONOCYTES NFR BLD AUTO: 8.2 % (ref 5–12)
NEUTROPHILS NFR BLD AUTO: 10.07 10*3/MM3 (ref 1.7–7)
NEUTROPHILS NFR BLD AUTO: 83.7 % (ref 42.7–76)
PLATELET # BLD AUTO: 219 10*3/MM3 (ref 140–450)
PLATELET # BLD AUTO: 231 10*3/MM3 (ref 140–450)
PMV BLD AUTO: 9.5 FL (ref 6–12)
PMV BLD AUTO: 9.7 FL (ref 6–12)
POTASSIUM SERPL-SCNC: 4 MMOL/L (ref 3.5–5.2)
POTASSIUM SERPL-SCNC: 4.3 MMOL/L (ref 3.5–5.2)
PROT SERPL-MCNC: 7.1 G/DL (ref 6–8.5)
RBC # BLD AUTO: 4.1 10*6/MM3 (ref 3.77–5.28)
RBC # BLD AUTO: 4.36 10*6/MM3 (ref 3.77–5.28)
SODIUM SERPL-SCNC: 136 MMOL/L (ref 136–145)
SODIUM SERPL-SCNC: 138 MMOL/L (ref 136–145)
WBC NRBC COR # BLD AUTO: 12.04 10*3/MM3 (ref 3.4–10.8)
WBC NRBC COR # BLD AUTO: 4.88 10*3/MM3 (ref 3.4–10.8)

## 2025-07-09 PROCEDURE — 25510000001 IOPAMIDOL 61 % SOLUTION: Performed by: STUDENT IN AN ORGANIZED HEALTH CARE EDUCATION/TRAINING PROGRAM

## 2025-07-09 PROCEDURE — 36415 COLL VENOUS BLD VENIPUNCTURE: CPT

## 2025-07-09 PROCEDURE — 85027 COMPLETE CBC AUTOMATED: CPT

## 2025-07-09 PROCEDURE — 25810000003 LACTATED RINGERS SOLUTION: Performed by: PHYSICIAN ASSISTANT

## 2025-07-09 PROCEDURE — 96374 THER/PROPH/DIAG INJ IV PUSH: CPT

## 2025-07-09 PROCEDURE — 85025 COMPLETE CBC W/AUTO DIFF WBC: CPT | Performed by: PHYSICIAN ASSISTANT

## 2025-07-09 PROCEDURE — 80053 COMPREHEN METABOLIC PANEL: CPT | Performed by: STUDENT IN AN ORGANIZED HEALTH CARE EDUCATION/TRAINING PROGRAM

## 2025-07-09 PROCEDURE — 25010000002 ONDANSETRON PER 1 MG: Performed by: PHYSICIAN ASSISTANT

## 2025-07-09 PROCEDURE — 99284 EMERGENCY DEPT VISIT MOD MDM: CPT

## 2025-07-09 PROCEDURE — 74177 CT ABD & PELVIS W/CONTRAST: CPT

## 2025-07-09 RX ORDER — LOPERAMIDE HYDROCHLORIDE 2 MG/1
4 CAPSULE ORAL
Qty: 240 CAPSULE | Refills: 2 | Status: ON HOLD | OUTPATIENT
Start: 2025-07-09

## 2025-07-09 RX ORDER — ONDANSETRON 2 MG/ML
4 INJECTION INTRAMUSCULAR; INTRAVENOUS ONCE
Status: COMPLETED | OUTPATIENT
Start: 2025-07-09 | End: 2025-07-09

## 2025-07-09 RX ORDER — PANTOPRAZOLE SODIUM 40 MG/1
40 TABLET, DELAYED RELEASE ORAL DAILY
Qty: 90 TABLET | Refills: 1 | Status: ON HOLD | OUTPATIENT
Start: 2025-07-09

## 2025-07-09 RX ORDER — ONDANSETRON 4 MG/1
4 TABLET, FILM COATED ORAL DAILY PRN
Qty: 15 TABLET | Refills: 0 | Status: ON HOLD | OUTPATIENT
Start: 2025-07-09

## 2025-07-09 RX ORDER — SODIUM CHLORIDE 0.9 % (FLUSH) 0.9 %
10 SYRINGE (ML) INJECTION AS NEEDED
Status: DISCONTINUED | OUTPATIENT
Start: 2025-07-09 | End: 2025-07-10 | Stop reason: HOSPADM

## 2025-07-09 RX ORDER — IOPAMIDOL 612 MG/ML
100 INJECTION, SOLUTION INTRAVASCULAR
Status: COMPLETED | OUTPATIENT
Start: 2025-07-09 | End: 2025-07-09

## 2025-07-09 RX ADMIN — IOPAMIDOL 85 ML: 612 INJECTION, SOLUTION INTRAVENOUS at 08:49

## 2025-07-09 RX ADMIN — ONDANSETRON 4 MG: 2 INJECTION, SOLUTION INTRAMUSCULAR; INTRAVENOUS at 22:33

## 2025-07-09 RX ADMIN — SODIUM CHLORIDE, POTASSIUM CHLORIDE, SODIUM LACTATE AND CALCIUM CHLORIDE 1000 ML: 600; 310; 30; 20 INJECTION, SOLUTION INTRAVENOUS at 22:43

## 2025-07-09 NOTE — TELEPHONE ENCOUNTER
I spoke to the patient on the phone regarding her labs and CT scan.  It appears as though she has a mild acute kidney injury and is likely dehydrated.  She has been having thin liquid ileostomy output and I instructed her to drink plenty of water and increase the amount of loperamide that she is taking and to add a dose at bedtime as she is having to get up to empty her appliance.  Her CT scan demonstrates a fecal impaction and I have instructed her to take 2 Fleet enemas and a bisacodyl suppository.  I have called these prescriptions as well as a refill for Protonix and to her pharmacy and I will see her back in my office on Friday.

## 2025-07-09 NOTE — TELEPHONE ENCOUNTER
Voice mail from patient's Granddaughter stating patient is requesting a prescription of zofran, and a refill of protonix.   She would like both sent to [facility].

## 2025-07-10 VITALS
DIASTOLIC BLOOD PRESSURE: 79 MMHG | RESPIRATION RATE: 20 BRPM | HEIGHT: 62 IN | HEART RATE: 90 BPM | OXYGEN SATURATION: 96 % | WEIGHT: 240 LBS | SYSTOLIC BLOOD PRESSURE: 123 MMHG | TEMPERATURE: 97.7 F | BODY MASS INDEX: 44.16 KG/M2

## 2025-07-10 NOTE — DISCHARGE INSTRUCTIONS
Follow-up with primary care provider and with Dr. Ny.   Take all of your prescribed medications.  Return to emergency department for any worsening symptoms.

## 2025-07-10 NOTE — ED PROVIDER NOTES
"SHARED VISIT: This visit was performed by BOTH a physician and an APC. The substantive portion of the medical decision making was performed by this attesting physician who made or approved the management plan and takes responsibility for patient management. All studies in the APC note (if performed) were independently interpreted by me.     The NOHEMI and I have discussed this patient's history, physical exam, and treatment plan.  I have reviewed the documentation and personally had a face to face interaction with the patient. I affirm the documentation and agree with the treatment and plan.  The attached note describes my personal findings.      I provided a substantive portion of the care of the patient.  I personally performed the physical exam in its entirety, and below are my findings.     Brief history of present illness: 69-year-old female sent for evaluation of rectal pressure and concern for fecal impaction.  Patient feels much better at this time.    Physical exam:    /69   Pulse 91   Temp 97.7 °F (36.5 °C)   Resp 20   Ht 157.5 cm (62\")   Wt 109 kg (240 lb)   SpO2 100%   BMI 43.90 kg/m²       Physical Exam   Constitutional: No distress.  Nontoxic  HENT:  Head: Normocephalic and atraumatic.   Oropharynx: Mucous membranes are moist.   Eyes: . No scleral icterus.   Neck: Normal range of motion. Neck supple.   Cardiovascular: Pink warm and well perfused throughout.    Pulmonary/Chest: No respiratory distress.    Abdominal: Soft. There is no rebound or rigidity.  Benign exam.  Ostomy present right abdomen  Musculoskeletal: Moves all extremities equally.    Neurological: Alert and oriented.  Speech fluent and easily intelligible  Skin: Skin is pink, warm, and dry.   Psychiatric: Mood and affect normal.   Nursing note and vitals reviewed.        MDM:  My differential diagnosis for abdominal pain includes but is not limited to:  Gastritis, gastroenteritis, peptic ulcer disease, GERD, esophageal " perforation, acute appendicitis, mesenteric adenitis, Meckel’s diverticulum, epiploic appendagitis, diverticulitis, colon cancer, ulcerative colitis, Crohn’s disease, intussusception, small bowel obstruction, adhesions, ischemic bowel, perforated viscus, ileus, obstipation, biliary colic, cholecystitis, cholelithiasis, Maxim-Nilesh Dequan, hepatitis, pancreatitis, common bile duct obstruction, cholangitis, bile leak, splenic trauma, splenic rupture, splenic infarction, splenic abscess, abdominal abscess, ascites, spontaneous bacterial peritonitis, hernia, UTI, cystitis,ureterolithiasis, urinary obstruction, ovarian cyst, torsion, pregnancy, ectopic pregnancy, PID, pelvic abscess, mittelschmerz, endometriosis, AAA, myocardial infarction, pneumonia, cancer, porphyria, DKA, medications, sickle cell, viral syndrome, zoster      Please note that portions of this were completed with a voice recognition program.       Note Disclaimer: At [facility], we believe that sharing information builds trust and better relationships. You are receiving this note because you are receiving care at [facility] or recently visited. It is possible you will see health information before a provider has talked with you about it. This kind of information can be easy to misunderstand. To help you fully understand what it means for your health, we urge you to discuss this note with your provider.     Jered Pemberton MD  07/10/25 0001

## 2025-07-10 NOTE — ED PROVIDER NOTES
EMERGENCY DEPARTMENT ENCOUNTER  Room Number:  01/01  PCP: Cristiano Logan DO  Independent Historians: Historian: Patient and EMS      HPI:  Chief Complaint: had concerns including Abdominal Pain.     A complete HPI/ROS/PMH/PSH/SH/FH are unobtainable due to: EM_Unobtainable History : None    Chronic or social conditions impacting patient care (Social Determinants of Health): None      Context: Samia Gaming is a 69 y.o. female with a medical history of GI bleed, arthritis, hyperlipidemia who presents to the ED c/o acute abdominal pain.  Patient underwent exploratory laparotomy with ileostomy creation on 5/29/2025.  This was done by Dr. Ny.  Reports she began to have rectal pain and pressure.  Dr. Ny ordered CT abdomen and pelvis that was performed this morning which showed fecal impaction.  He prescribed Fleet enema which was done at home.  Reports she did not get much relief.  Reports this evening she developed nausea and vomiting.  No reported fever.  No chest pain or dyspnea.      Review of prior external notes (non-ED) -and- Review of prior external test results outside of this encounter: Patient seen in office by general surgery on 6/13/2025 for ileostomy.  Reviewed assessment and plan.  Will follow-up to discuss ileostomy reversal. Reviewed labs collected on 6/7/2025.  CBC with hemoglobin 8.5, BMP with creatinine 0.54.    Prescription drug monitoring program review:     EM_[place] : N/A    PAST MEDICAL HISTORY  Active Ambulatory Problems     Diagnosis Date Noted    GI bleed 05/22/2025    GIB (gastrointestinal bleeding) 05/23/2025    Ileostomy in place 06/13/2025    Status post reversal of ileostomy 06/13/2025     Resolved Ambulatory Problems     Diagnosis Date Noted    No Resolved Ambulatory Problems     Past Medical History:   Diagnosis Date    Arthritis     Elevated cholesterol          PAST SURGICAL HISTORY  Past Surgical History:   Procedure Laterality Date    CHOLECYSTECTOMY  2005    COLONOSCOPY       COLONOSCOPY N/A 5/24/2025    Procedure: COLONOSCOPY TO CECUM AND TERMINAL ILEUM AT BEDSIDE;  Surgeon: Bi Hutson MD;  Location: [facility] ENDOSCOPY;  Service: Gastroenterology;  Laterality: N/A;  PRE- GI BLEED  POST- DIVERTICULOSIS, HEMORRHOIDS    ENDOSCOPY N/A 5/23/2025    Procedure: ESOPHAGOGASTRODUODENOSCOPY;  Surgeon: Keyur Neely MD;  Location: [facility] ENDOSCOPY;  Service: Gastroenterology;  Laterality: N/A;  pre: GI bleed  post:errosive gastritis, duodenitis    ENTEROSCOPY SMALL BOWEL N/A 5/26/2025    Procedure: ENTEROSCOPY SMALL BOWEL at bedside;  Surgeon: Justin Garza MD;  Location: [facility] ENDOSCOPY;  Service: Gastroenterology;  Laterality: N/A;  pre- melena  post- mild gastric antrum erosion    EXPLORATORY LAPAROTOMY N/A 5/29/2025    Procedure: LAPAROTOMY EXPLORATORY, SMALL BOWEL RESECTION DISTAL AND PROXIMAL, ILEOSTOMY;  Surgeon: Jose G Ny MD;  Location: [facility] MAIN OR;  Service: General;  Laterality: N/A;         FAMILY HISTORY  Family History   Family history unknown: Yes         SOCIAL HISTORY  Social History     Socioeconomic History    Marital status: Single   Tobacco Use    Smoking status: Never    Smokeless tobacco: Never   Vaping Use    Vaping status: Never Used   Substance and Sexual Activity    Alcohol use: Never    Drug use: Never    Sexual activity: Defer     Partners: Male         ALLERGIES  Patient has no known allergies.      REVIEW OF SYSTEMS  Included in HPI  All systems reviewed and negative except for those discussed in HPI.      PHYSICAL EXAM    I have reviewed the triage vital signs and nursing notes.    ED Triage Vitals [07/09/25 2152]   Temp Heart Rate Resp BP SpO2   97.7 °F (36.5 °C) 100 20 128/77 99 %      Temp src Heart Rate Source Patient Position BP Location FiO2 (%)   -- -- -- -- --       Physical Exam  Constitutional:       General: She is not in acute distress.     Appearance: She is well-developed.   HENT:      Head: Normocephalic and atraumatic.    Eyes:      Extraocular Movements: Extraocular movements intact.   Cardiovascular:      Rate and Rhythm: Normal rate and regular rhythm.      Heart sounds: Normal heart sounds.   Pulmonary:      Effort: Pulmonary effort is normal.      Breath sounds: Normal breath sounds.   Abdominal:      General: There is no distension.   Genitourinary:     Comments: Exam chaperoned by MORIAH Mart.  No impacted stool palpated in the rectal vault  Skin:     General: Skin is warm.   Neurological:      General: No focal deficit present.      Mental Status: She is alert and oriented to person, place, and time.   Psychiatric:         Mood and Affect: Mood normal.             LAB RESULTS  Recent Results (from the past 24 hours)   CBC (No Diff)    Collection Time: 07/09/25  8:40 AM    Specimen: Blood   Result Value Ref Range    WBC 4.88 3.40 - 10.80 10*3/mm3    RBC 4.10 3.77 - 5.28 10*6/mm3    Hemoglobin 12.5 12.0 - 15.9 g/dL    Hematocrit 37.6 34.0 - 46.6 %    MCV 91.7 79.0 - 97.0 fL    MCH 30.5 26.6 - 33.0 pg    MCHC 33.2 31.5 - 35.7 g/dL    RDW 16.3 (H) 12.3 - 15.4 %    RDW-SD 55.5 (H) 37.0 - 54.0 fl    MPV 9.5 6.0 - 12.0 fL    Platelets 219 140 - 450 10*3/mm3   Basic Metabolic Panel    Collection Time: 07/09/25  8:40 AM    Specimen: Blood   Result Value Ref Range    Glucose 127 (H) 65 - 99 mg/dL    BUN 11.2 8.0 - 23.0 mg/dL    Creatinine 1.01 (H) 0.57 - 1.00 mg/dL    Sodium 136 136 - 145 mmol/L    Potassium 4.0 3.5 - 5.2 mmol/L    Chloride 100 98 - 107 mmol/L    CO2 19.2 (L) 22.0 - 29.0 mmol/L    Calcium 9.1 8.6 - 10.5 mg/dL    BUN/Creatinine Ratio 11.1 7.0 - 25.0    Anion Gap 16.8 (H) 5.0 - 15.0 mmol/L    eGFR 59.6 (L) >60.0 mL/min/1.73   Comprehensive Metabolic Panel    Collection Time: 07/09/25 10:42 PM    Specimen: Blood   Result Value Ref Range    Glucose 144 (H) 65 - 99 mg/dL    BUN 11.0 8.0 - 23.0 mg/dL    Creatinine 1.14 (H) 0.57 - 1.00 mg/dL    Sodium 138 136 - 145 mmol/L    Potassium 4.3 3.5 - 5.2 mmol/L    Chloride 101 98 -  107 mmol/L    CO2 15.2 (L) 22.0 - 29.0 mmol/L    Calcium 8.5 (L) 8.6 - 10.5 mg/dL    Total Protein 7.1 6.0 - 8.5 g/dL    Albumin 3.8 3.5 - 5.2 g/dL    ALT (SGPT) 31 1 - 33 U/L    AST (SGOT) 33 (H) 1 - 32 U/L    Alkaline Phosphatase 101 39 - 117 U/L    Total Bilirubin 2.1 (H) 0.0 - 1.2 mg/dL    Globulin 3.3 gm/dL    A/G Ratio 1.2 g/dL    BUN/Creatinine Ratio 9.6 7.0 - 25.0    Anion Gap 21.8 (H) 5.0 - 15.0 mmol/L    eGFR 51.6 (L) >60.0 mL/min/1.73   CBC Auto Differential    Collection Time: 07/09/25 11:10 PM    Specimen: Arm, Right; Blood   Result Value Ref Range    WBC 12.04 (H) 3.40 - 10.80 10*3/mm3    RBC 4.36 3.77 - 5.28 10*6/mm3    Hemoglobin 13.5 12.0 - 15.9 g/dL    Hematocrit 41.0 34.0 - 46.6 %    MCV 94.0 79.0 - 97.0 fL    MCH 31.0 26.6 - 33.0 pg    MCHC 32.9 31.5 - 35.7 g/dL    RDW 15.5 (H) 12.3 - 15.4 %    RDW-SD 52.9 37.0 - 54.0 fl    MPV 9.7 6.0 - 12.0 fL    Platelets 231 140 - 450 10*3/mm3    Neutrophil % 83.7 (H) 42.7 - 76.0 %    Lymphocyte % 6.8 (L) 19.6 - 45.3 %    Monocyte % 8.2 5.0 - 12.0 %    Eosinophil % 0.7 0.3 - 6.2 %    Basophil % 0.2 0.0 - 1.5 %    Immature Grans % 0.4 0.0 - 0.5 %    Neutrophils, Absolute 10.07 (H) 1.70 - 7.00 10*3/mm3    Lymphocytes, Absolute 0.82 0.70 - 3.10 10*3/mm3    Monocytes, Absolute 0.99 (H) 0.10 - 0.90 10*3/mm3    Eosinophils, Absolute 0.08 0.00 - 0.40 10*3/mm3    Basophils, Absolute 0.03 0.00 - 0.20 10*3/mm3    Immature Grans, Absolute 0.05 0.00 - 0.05 10*3/mm3         RADIOLOGY  CT Abdomen Pelvis With Contrast  Result Date: 7/9/2025  CT ABDOMEN AND PELVIS WITH IV CONTRAST  HISTORY: 69-year-old female with generalized abdominal pain. Exploratory laparotomy, small bowel resection and ileostomy on 05/29/2025. Cholecystectomy in the past.  TECHNIQUE: Radiation dose reduction techniques were utilized, including automated exposure control and exposure modulation based on body size. 3 mm images were obtained through the abdomen and pelvis after the administration of IV  contrast. Compared with CT abdomen/pelvis 05/28/2025.  FINDINGS: 1. Expected appearance of the distal ileostomy in the right mid abdomen. The efferent limb is collapsed and most of the colon is devoid of formed stool, but the rectosigmoid colon is filled with formed stool, and there may be some fecal impaction. - There is small bowel and colonic diverticulosis without evidence for diverticulitis. - There is no bowel ileus or obstruction. - No free fluid or fluid collection. Expected postsurgical changes at the abdominal wall.  2. Liver, spleen, pancreas, adrenals, and kidneys appear stable. Uterus and adnexa appear stable as well. Bladder is collapsed.  3. No evidence for pneumonia or pleural effusions at the visualized lung bases.            MEDICATIONS GIVEN IN ER  Medications   sodium chloride 0.9 % flush 10 mL (has no administration in time range)   lactated ringers bolus 1,000 mL (0 mL Intravenous Stopped 7/10/25 0142)   ondansetron (ZOFRAN) injection 4 mg (4 mg Intravenous Given 7/9/25 7411)         ORDERS PLACED DURING THIS VISIT:  Orders Placed This Encounter   Procedures    Comprehensive Metabolic Panel    CBC Auto Differential    Soap suds enema    Telemetry Scan    Insert Peripheral IV    CBC & Differential         OUTPATIENT MEDICATION MANAGEMENT:  Current Facility-Administered Medications Ordered in Epic   Medication Dose Route Frequency Provider Last Rate Last Admin    sodium chloride 0.9 % flush 10 mL  10 mL Intravenous PRN Patito Orosco PA-C         Current Outpatient Medications Ordered in Epic   Medication Sig Dispense Refill    atorvastatin (LIPITOR) 40 MG tablet Take 1 tablet by mouth every night at bedtime.      HYDROcodone-acetaminophen (NORCO) 5-325 MG per tablet Take 1 tablet by mouth Every 8 (Eight) Hours As Needed for Severe Pain. 10 tablet 0    loperamide (IMODIUM) 2 MG capsule Take 1 capsule by mouth 4 (Four) Times a Day Before Meals & at Bedtime. 120 capsule 6    loperamide  (IMODIUM) 2 MG capsule Take 2 capsules by mouth 4 (Four) Times a Day Before Meals & at Bedtime. 240 capsule 2    mineral oil enema Insert 1 each into the rectum Daily. 4 each 0    ondansetron (Zofran) 4 MG tablet Take 1 tablet by mouth Daily As Needed for Nausea or Vomiting. 15 tablet 0    oxyCODONE-acetaminophen (Percocet) 5-325 MG per tablet Take 1 tablet by mouth Every 8 (Eight) Hours As Needed for Moderate Pain. 15 tablet 0    pantoprazole (Protonix) 40 MG EC tablet Take 1 tablet by mouth Daily. 90 tablet 1    psyllium (METAMUCIL) 58.6 % powder Take 1 packet by mouth Daily. 1040 g 3    traZODone (DESYREL) 150 MG tablet Take 2 tablets by mouth Every Night.             PROGRESS, DATA ANALYSIS, CONSULTS, AND MEDICAL DECISION MAKING  All labs have been independently interpreted by me.  All radiology studies have been reviewed by me. All EKG's have been independently viewed and interpreted by me.  Discussion below represents my analysis of pertinent findings related to patient's condition, differential diagnosis, treatment plan and final disposition.    Differential diagnosis includes but is not limited to fecal impaction, diverticulitis, gastroenteritis.        ED Course as of 07/10/25 0324   Wed Jul 09, 2025   2321 BUN: 11.0 [MP]   2321 Creatinine(!): 1.14 [MP]   2321 Total Bilirubin(!): 2.1 [MP]   2344 WBC(!): 12.04 [MP]   2344 Hemoglobin: 13.5 [MP]   Thu Jul 10, 2025   0323 Patient presents to emergency department with rectal pain after ileostomy 1 month ago.  She had CT abdomen and pelvis performed as outpatient earlier this morning.  Patient given soapsuds enema and states her symptoms have completely resolved, although she did not have a significant amount of output.  Does have mild leukocytosis although patient has been vomiting.  Very minimal elevation in her renal function.  Treated with IV fluid and Zofran here today with no further vomiting.  Patient requesting discharge home.  Will have her follow-up  with primary care and general surgery.  Discussed ED return precautions.  She is otherwise well-appearing, hemodynamically stable, and therefore appropriate for discharge. [MP]      ED Course User Index  [MP] Patito Orosco PA-C             AS OF 03:23 EDT VITALS:    BP - 123/79  HR - 90  TEMP - 97.7 °F (36.5 °C)  O2 SATS - 96%    COMPLEXITY OF CARE  Admission was considered but after careful review of the patient's presentation, physical examination, diagnostic results, and response to treatment the patient may be safely discharged with outpatient follow-up.      DIAGNOSIS  Final diagnoses:   Generalized abdominal pain   Status post ileostomy         DISPOSITION  ED Disposition       ED Disposition   Discharge    Condition   Stable    Comment   --                Please note that portions of this document were completed with a voice recognition program.    Note Disclaimer: At [facility], we believe that sharing information builds trust and better relationships. You are receiving this note because you recently visited [facility]. It is possible you will see health information before a provider has talked with you about it. This kind of information can be easy to misunderstand. To help you fully understand what it means for your health, we urge you to discuss this note with your provider.     Patito Orosco PA-C  07/10/25 0324

## 2025-07-11 ENCOUNTER — OFFICE VISIT (OUTPATIENT)
Dept: SURGERY | Facility: CLINIC | Age: 70
End: 2025-07-11
Payer: MEDICARE

## 2025-07-11 VITALS
WEIGHT: 236.6 LBS | SYSTOLIC BLOOD PRESSURE: 132 MMHG | HEIGHT: 62 IN | DIASTOLIC BLOOD PRESSURE: 76 MMHG | OXYGEN SATURATION: 99 % | BODY MASS INDEX: 43.54 KG/M2 | HEART RATE: 82 BPM

## 2025-07-11 DIAGNOSIS — Z93.2 ILEOSTOMY IN PLACE: ICD-10-CM

## 2025-07-11 DIAGNOSIS — K56.41 FECAL IMPACTION: Primary | ICD-10-CM

## 2025-07-11 NOTE — PROGRESS NOTES
GENERAL SURGERY OFFICE NOTE      HISTORY OF PRESENT ILLNESS:  This is a 69 y.o. female who underwent laparotomy with small bowel resection x 2 and double barrel ileostomy for bleeding small bowel diverticulosis on 5/29.  She has been having thIn high volume ileostomy output as well as pelvic pain and nausea limiting p.o. intake.  I obtained labs which demonstrate mild acute kidney injury.  CT scan demonstrated rectal fecal impaction, no evidence of obstruction.  I instructed her to give herself a couple of enemas and increase the amount of loperamide she has been taking.  She ended up in the emergency department, received IV fluids, and was able to have a sizable bowel movement following enema.  She is subsequently feeling a little better, otherwise she has been doing well.  She still ambulating with a walker and working with physical therapy.  She has been drinking 4 bottles of water per day, a boost, and a portion of her meals.      DATA REVIEWED:  Creatinine 1.01, BUN 11, HCO3 19.2, sodium 136  WBC 4.88, hemoglobin 12.5, platelets 219    CT scan abdomen and pelvis with contrast demonstrates no evidence of bowel obstruction, no fluid collection, and moderate to large amount of inspissated stool in the sigmoid colon and rectum    PHYSICAL EXAM:  General: not sick  Respiratory: nonlabored breathing on room air  Abdomen: soft, nontender, nondistended, ileostomy pink with toothpaste consistency feculent output      Vitals:    07/11/25 0849   BP: 132/76   Pulse: 82   SpO2: 99%       Body mass index is 43.27 kg/m².    ASSESSMENT / PLAN:  We discussed that I suspect her symptoms are due to dehydration related to high ileostomy output, and fecal impaction.  We reviewed her medications.  I recommended increasing her loperamide to 2 mg 4 times before meals and at bedtime, however this may need to be titrated and  asked her to give my office a call if her output remains high.  I recommended she drink 6-8 bottles of water per  day, as well as two boost or similar protein shakes.  I provided her with a prescription for Zofran, however asked she give my office a call if she remains nauseated or has any vomiting.  She is scheduled for ileostomy reversal in early September.  We discussed given her body habitus I suspect she may require reopening of her laparotomy to accomplish this.     Jose G Ny MD  General Surgery  [facility]    [address], [address]  [place] [address]  P: [phone]  F: [phone]

## 2025-07-14 ENCOUNTER — APPOINTMENT (OUTPATIENT)
Dept: CT IMAGING | Facility: HOSPITAL | Age: 70
End: 2025-07-14
Payer: MEDICARE

## 2025-07-14 ENCOUNTER — HOSPITAL ENCOUNTER (OUTPATIENT)
Facility: HOSPITAL | Age: 70
Setting detail: OBSERVATION
Discharge: HOME | End: 2025-07-15
Attending: EMERGENCY MEDICINE | Admitting: EMERGENCY MEDICINE
Payer: MEDICARE

## 2025-07-14 DIAGNOSIS — R10.30 LOWER ABDOMINAL PAIN: ICD-10-CM

## 2025-07-14 DIAGNOSIS — R19.8 HIGH OUTPUT ILEOSTOMY: Primary | ICD-10-CM

## 2025-07-14 DIAGNOSIS — E87.1 HYPONATREMIA: ICD-10-CM

## 2025-07-14 DIAGNOSIS — R11.2 NAUSEA AND VOMITING, UNSPECIFIED VOMITING TYPE: ICD-10-CM

## 2025-07-14 DIAGNOSIS — Z93.2 HIGH OUTPUT ILEOSTOMY: Primary | ICD-10-CM

## 2025-07-14 PROBLEM — R10.9 ABDOMINAL PAIN: Status: ACTIVE | Noted: 2025-07-14

## 2025-07-14 LAB
ALBUMIN SERPL-MCNC: 3.8 G/DL (ref 3.5–5.2)
ALBUMIN/GLOB SERPL: 1.2 G/DL
ALP SERPL-CCNC: 88 U/L (ref 39–117)
ALT SERPL W P-5'-P-CCNC: 30 U/L (ref 1–33)
ANION GAP SERPL CALCULATED.3IONS-SCNC: 10.7 MMOL/L (ref 5–15)
AST SERPL-CCNC: 34 U/L (ref 1–32)
BASOPHILS # BLD AUTO: 0.02 10*3/MM3 (ref 0–0.2)
BASOPHILS NFR BLD AUTO: 0.3 % (ref 0–1.5)
BILIRUB SERPL-MCNC: 1.4 MG/DL (ref 0–1.2)
BILIRUB UR QL STRIP: NEGATIVE
BUN SERPL-MCNC: 17 MG/DL (ref 8–23)
BUN/CREAT SERPL: 17.9 (ref 7–25)
CALCIUM SPEC-SCNC: 8.6 MG/DL (ref 8.6–10.5)
CHLORIDE SERPL-SCNC: 96 MMOL/L (ref 98–107)
CLARITY UR: CLEAR
CO2 SERPL-SCNC: 21.3 MMOL/L (ref 22–29)
COLOR UR: YELLOW
CREAT SERPL-MCNC: 0.95 MG/DL (ref 0.57–1)
D-LACTATE SERPL-SCNC: 1.6 MMOL/L (ref 0.5–2)
DEPRECATED RDW RBC AUTO: 53.2 FL (ref 37–54)
EGFRCR SERPLBLD CKD-EPI 2021: 64.2 ML/MIN/1.73
EOSINOPHIL # BLD AUTO: 0.02 10*3/MM3 (ref 0–0.4)
EOSINOPHIL NFR BLD AUTO: 0.3 % (ref 0.3–6.2)
ERYTHROCYTE [DISTWIDTH] IN BLOOD BY AUTOMATED COUNT: 15.7 % (ref 12.3–15.4)
GLOBULIN UR ELPH-MCNC: 3.3 GM/DL
GLUCOSE SERPL-MCNC: 123 MG/DL (ref 65–99)
GLUCOSE UR STRIP-MCNC: NEGATIVE MG/DL
HCT VFR BLD AUTO: 36.7 % (ref 34–46.6)
HGB BLD-MCNC: 12 G/DL (ref 12–15.9)
HGB UR QL STRIP.AUTO: NEGATIVE
HOLD SPECIMEN: NORMAL
HOLD SPECIMEN: NORMAL
IMM GRANULOCYTES # BLD AUTO: 0.02 10*3/MM3 (ref 0–0.05)
IMM GRANULOCYTES NFR BLD AUTO: 0.3 % (ref 0–0.5)
KETONES UR QL STRIP: NEGATIVE
LEUKOCYTE ESTERASE UR QL STRIP.AUTO: NEGATIVE
LIPASE SERPL-CCNC: 29 U/L (ref 13–60)
LYMPHOCYTES # BLD AUTO: 1 10*3/MM3 (ref 0.7–3.1)
LYMPHOCYTES NFR BLD AUTO: 13.2 % (ref 19.6–45.3)
MCH RBC QN AUTO: 30.5 PG (ref 26.6–33)
MCHC RBC AUTO-ENTMCNC: 32.7 G/DL (ref 31.5–35.7)
MCV RBC AUTO: 93.1 FL (ref 79–97)
MONOCYTES # BLD AUTO: 0.79 10*3/MM3 (ref 0.1–0.9)
MONOCYTES NFR BLD AUTO: 10.5 % (ref 5–12)
NEUTROPHILS NFR BLD AUTO: 5.7 10*3/MM3 (ref 1.7–7)
NEUTROPHILS NFR BLD AUTO: 75.4 % (ref 42.7–76)
NITRITE UR QL STRIP: NEGATIVE
NRBC BLD AUTO-RTO: 0 /100 WBC (ref 0–0.2)
PH UR STRIP.AUTO: 6 [PH] (ref 5–8)
PLATELET # BLD AUTO: 197 10*3/MM3 (ref 140–450)
PMV BLD AUTO: 9 FL (ref 6–12)
POTASSIUM SERPL-SCNC: 4 MMOL/L (ref 3.5–5.2)
PROT SERPL-MCNC: 7.1 G/DL (ref 6–8.5)
PROT UR QL STRIP: NEGATIVE
RBC # BLD AUTO: 3.94 10*6/MM3 (ref 3.77–5.28)
SODIUM SERPL-SCNC: 128 MMOL/L (ref 136–145)
SP GR UR STRIP: <=1.005 (ref 1–1.03)
UROBILINOGEN UR QL STRIP: NORMAL
WBC NRBC COR # BLD AUTO: 7.55 10*3/MM3 (ref 3.4–10.8)
WHOLE BLOOD HOLD COAG: NORMAL
WHOLE BLOOD HOLD SPECIMEN: NORMAL

## 2025-07-14 PROCEDURE — 25010000002 ONDANSETRON PER 1 MG

## 2025-07-14 PROCEDURE — 81003 URINALYSIS AUTO W/O SCOPE: CPT | Performed by: EMERGENCY MEDICINE

## 2025-07-14 PROCEDURE — 25010000002 ONDANSETRON PER 1 MG: Performed by: EMERGENCY MEDICINE

## 2025-07-14 PROCEDURE — 96376 TX/PRO/DX INJ SAME DRUG ADON: CPT

## 2025-07-14 PROCEDURE — 25010000002 MORPHINE PER 10 MG

## 2025-07-14 PROCEDURE — G0378 HOSPITAL OBSERVATION PER HR: HCPCS

## 2025-07-14 PROCEDURE — 25010000002 MORPHINE PER 10 MG: Performed by: EMERGENCY MEDICINE

## 2025-07-14 PROCEDURE — 96374 THER/PROPH/DIAG INJ IV PUSH: CPT

## 2025-07-14 PROCEDURE — 96375 TX/PRO/DX INJ NEW DRUG ADDON: CPT

## 2025-07-14 PROCEDURE — 99285 EMERGENCY DEPT VISIT HI MDM: CPT

## 2025-07-14 PROCEDURE — 83605 ASSAY OF LACTIC ACID: CPT

## 2025-07-14 PROCEDURE — 83690 ASSAY OF LIPASE: CPT

## 2025-07-14 PROCEDURE — 74177 CT ABD & PELVIS W/CONTRAST: CPT

## 2025-07-14 PROCEDURE — 36415 COLL VENOUS BLD VENIPUNCTURE: CPT

## 2025-07-14 PROCEDURE — 25510000001 IOPAMIDOL 61 % SOLUTION: Performed by: EMERGENCY MEDICINE

## 2025-07-14 PROCEDURE — 25810000003 SODIUM CHLORIDE 0.9 % SOLUTION: Performed by: EMERGENCY MEDICINE

## 2025-07-14 PROCEDURE — 80053 COMPREHEN METABOLIC PANEL: CPT

## 2025-07-14 PROCEDURE — 85025 COMPLETE CBC W/AUTO DIFF WBC: CPT

## 2025-07-14 RX ORDER — POLYETHYLENE GLYCOL 3350 17 G/17G
17 POWDER, FOR SOLUTION ORAL DAILY PRN
Status: DISCONTINUED | OUTPATIENT
Start: 2025-07-14 | End: 2025-07-15

## 2025-07-14 RX ORDER — CYCLOBENZAPRINE HCL 10 MG
10 TABLET ORAL 3 TIMES DAILY PRN
Status: DISCONTINUED | OUTPATIENT
Start: 2025-07-14 | End: 2025-07-15 | Stop reason: HOSPADM

## 2025-07-14 RX ORDER — ATORVASTATIN CALCIUM 20 MG/1
40 TABLET, FILM COATED ORAL DAILY
Status: DISCONTINUED | OUTPATIENT
Start: 2025-07-15 | End: 2025-07-15 | Stop reason: HOSPADM

## 2025-07-14 RX ORDER — SODIUM CHLORIDE 0.9 % (FLUSH) 0.9 %
10 SYRINGE (ML) INJECTION AS NEEDED
Status: DISCONTINUED | OUTPATIENT
Start: 2025-07-14 | End: 2025-07-15 | Stop reason: HOSPADM

## 2025-07-14 RX ORDER — SODIUM CHLORIDE 9 MG/ML
125 INJECTION, SOLUTION INTRAVENOUS CONTINUOUS
Status: DISCONTINUED | OUTPATIENT
Start: 2025-07-14 | End: 2025-07-15 | Stop reason: HOSPADM

## 2025-07-14 RX ORDER — SODIUM CHLORIDE 9 MG/ML
40 INJECTION, SOLUTION INTRAVENOUS AS NEEDED
Status: DISCONTINUED | OUTPATIENT
Start: 2025-07-14 | End: 2025-07-15 | Stop reason: HOSPADM

## 2025-07-14 RX ORDER — IOPAMIDOL 612 MG/ML
85 INJECTION, SOLUTION INTRAVASCULAR
Status: COMPLETED | OUTPATIENT
Start: 2025-07-14 | End: 2025-07-14

## 2025-07-14 RX ORDER — NITROGLYCERIN 0.4 MG/1
0.4 TABLET SUBLINGUAL
Status: DISCONTINUED | OUTPATIENT
Start: 2025-07-14 | End: 2025-07-15 | Stop reason: HOSPADM

## 2025-07-14 RX ORDER — BISACODYL 5 MG/1
5 TABLET, DELAYED RELEASE ORAL DAILY PRN
Status: DISCONTINUED | OUTPATIENT
Start: 2025-07-14 | End: 2025-07-15

## 2025-07-14 RX ORDER — MORPHINE SULFATE 2 MG/ML
4 INJECTION, SOLUTION INTRAMUSCULAR; INTRAVENOUS ONCE
Status: COMPLETED | OUTPATIENT
Start: 2025-07-14 | End: 2025-07-14

## 2025-07-14 RX ORDER — TRAZODONE HYDROCHLORIDE 100 MG/1
300 TABLET ORAL NIGHTLY
Status: DISCONTINUED | OUTPATIENT
Start: 2025-07-14 | End: 2025-07-15 | Stop reason: HOSPADM

## 2025-07-14 RX ORDER — AMOXICILLIN 250 MG
2 CAPSULE ORAL 2 TIMES DAILY PRN
Status: DISCONTINUED | OUTPATIENT
Start: 2025-07-14 | End: 2025-07-15

## 2025-07-14 RX ORDER — SODIUM CHLORIDE 0.9 % (FLUSH) 0.9 %
10 SYRINGE (ML) INJECTION EVERY 12 HOURS SCHEDULED
Status: DISCONTINUED | OUTPATIENT
Start: 2025-07-14 | End: 2025-07-15 | Stop reason: HOSPADM

## 2025-07-14 RX ORDER — ACETAMINOPHEN 500 MG
500 TABLET ORAL EVERY 6 HOURS PRN
Status: ON HOLD | COMMUNITY

## 2025-07-14 RX ORDER — ONDANSETRON 2 MG/ML
4 INJECTION INTRAMUSCULAR; INTRAVENOUS ONCE
Status: COMPLETED | OUTPATIENT
Start: 2025-07-14 | End: 2025-07-14

## 2025-07-14 RX ORDER — PANTOPRAZOLE SODIUM 40 MG/10ML
40 INJECTION, POWDER, LYOPHILIZED, FOR SOLUTION INTRAVENOUS NIGHTLY
Status: DISCONTINUED | OUTPATIENT
Start: 2025-07-14 | End: 2025-07-15

## 2025-07-14 RX ORDER — BISACODYL 10 MG
10 SUPPOSITORY, RECTAL RECTAL DAILY PRN
Status: DISCONTINUED | OUTPATIENT
Start: 2025-07-14 | End: 2025-07-15

## 2025-07-14 RX ORDER — MORPHINE SULFATE 2 MG/ML
4 INJECTION, SOLUTION INTRAMUSCULAR; INTRAVENOUS EVERY 4 HOURS PRN
Status: DISCONTINUED | OUTPATIENT
Start: 2025-07-14 | End: 2025-07-15 | Stop reason: HOSPADM

## 2025-07-14 RX ORDER — ONDANSETRON 2 MG/ML
4 INJECTION INTRAMUSCULAR; INTRAVENOUS EVERY 6 HOURS PRN
Status: DISCONTINUED | OUTPATIENT
Start: 2025-07-14 | End: 2025-07-15 | Stop reason: HOSPADM

## 2025-07-14 RX ADMIN — IOPAMIDOL 85 ML: 612 INJECTION, SOLUTION INTRAVENOUS at 20:09

## 2025-07-14 RX ADMIN — SODIUM CHLORIDE 1000 ML: 9 INJECTION, SOLUTION INTRAVENOUS at 19:07

## 2025-07-14 RX ADMIN — PANTOPRAZOLE SODIUM 40 MG: 40 INJECTION, POWDER, FOR SOLUTION INTRAVENOUS at 23:03

## 2025-07-14 RX ADMIN — TRAZODONE HYDROCHLORIDE 300 MG: 100 TABLET ORAL at 23:04

## 2025-07-14 RX ADMIN — CYCLOBENZAPRINE HYDROCHLORIDE 10 MG: 10 TABLET, FILM COATED ORAL at 23:03

## 2025-07-14 RX ADMIN — ONDANSETRON 4 MG: 2 INJECTION, SOLUTION INTRAMUSCULAR; INTRAVENOUS at 23:03

## 2025-07-14 RX ADMIN — ONDANSETRON 4 MG: 2 INJECTION, SOLUTION INTRAMUSCULAR; INTRAVENOUS at 19:01

## 2025-07-14 RX ADMIN — MORPHINE SULFATE 4 MG: 2 INJECTION, SOLUTION INTRAMUSCULAR; INTRAVENOUS at 23:03

## 2025-07-14 RX ADMIN — Medication 10 ML: at 22:29

## 2025-07-14 RX ADMIN — MORPHINE SULFATE 4 MG: 2 INJECTION, SOLUTION INTRAMUSCULAR; INTRAVENOUS at 19:03

## 2025-07-14 RX ADMIN — SODIUM CHLORIDE 125 ML/HR: 9 INJECTION, SOLUTION INTRAVENOUS at 22:28

## 2025-07-14 NOTE — ED TRIAGE NOTES
Patient to ed from home via ems with complaints of abd pain N/V. Patient was seen on Friday for same

## 2025-07-14 NOTE — ED PROVIDER NOTES
EMERGENCY DEPARTMENT ENCOUNTER    Room Number:  29/29  PCP: Cristiano Logan DO  Historian: Patient      HPI:  Chief Complaint: Abdominal pain, nausea, vomiting, constipation.  A complete HPI/ROS/PMH/PSH/SH/FH are unobtainable due to: None    Context: Samia Gaming is a 69 y.o. female who presents to the ED via [facility] c/o acute persistent abdominal pains, nausea, vomiting, and constipation.  Was seen here a week ago with fecal impaction, had a bowel movement after enema but has not had any further bowel movement.  Ostomy is outputting.  Nausea vomiting is new.  No fevers or chills.      MEDICAL RECORD REVIEW    External (non-ED) record review: Surgical office visit reviewed with Dr. Ny on July 11, 2025, patient is secondary to laparotomy with small bowel obstruction x 2 and double barrel ileostomy for bleeding and small bowel diverticulosis on May 29.  Has been having high volume ileostomy output and pelvic pain.              PAST MEDICAL HISTORY  Active Ambulatory Problems     Diagnosis Date Noted    GI bleed 05/22/2025    GIB (gastrointestinal bleeding) 05/23/2025    Ileostomy in place 06/13/2025    Status post reversal of ileostomy 06/13/2025     Resolved Ambulatory Problems     Diagnosis Date Noted    No Resolved Ambulatory Problems     Past Medical History:   Diagnosis Date    Arthritis     Elevated cholesterol          PAST SURGICAL HISTORY  Past Surgical History:   Procedure Laterality Date    CHOLECYSTECTOMY  2005    COLONOSCOPY      COLONOSCOPY N/A 5/24/2025    Procedure: COLONOSCOPY TO CECUM AND TERMINAL ILEUM AT BEDSIDE;  Surgeon: Bi Hutson MD;  Location: [facility] ENDOSCOPY;  Service: Gastroenterology;  Laterality: N/A;  PRE- GI BLEED  POST- DIVERTICULOSIS, HEMORRHOIDS    ENDOSCOPY N/A 5/23/2025    Procedure: ESOPHAGOGASTRODUODENOSCOPY;  Surgeon: Keyur Neely MD;  Location: [facility] ENDOSCOPY;  Service: Gastroenterology;  Laterality: N/A;  pre: GI bleed  post:errosive gastritis,  duodenitis    ENTEROSCOPY SMALL BOWEL N/A 5/26/2025    Procedure: ENTEROSCOPY SMALL BOWEL at bedside;  Surgeon: Justin Garza MD;  Location: [facility] ENDOSCOPY;  Service: Gastroenterology;  Laterality: N/A;  pre- melena  post- mild gastric antrum erosion    EXPLORATORY LAPAROTOMY N/A 5/29/2025    Procedure: LAPAROTOMY EXPLORATORY, SMALL BOWEL RESECTION DISTAL AND PROXIMAL, ILEOSTOMY;  Surgeon: Jose G Ny MD;  Location: [facility] MAIN OR;  Service: General;  Laterality: N/A;         FAMILY HISTORY  Family History   Family history unknown: Yes         SOCIAL HISTORY  Social History     Socioeconomic History    Marital status: Single   Tobacco Use    Smoking status: Never    Smokeless tobacco: Never   Vaping Use    Vaping status: Never Used   Substance and Sexual Activity    Alcohol use: Never    Drug use: Never    Sexual activity: Defer     Partners: Male         ALLERGIES  Patient has no known allergies.        REVIEW OF SYSTEMS  Review of Systems     All systems reviewed and negative except for those discussed in HPI.       PHYSICAL EXAM    I have reviewed the triage vital signs and nursing notes.    ED Triage Vitals [07/14/25 1721]   Temp Heart Rate Resp BP SpO2   97.4 °F (36.3 °C) 88 18 106/59 100 %      Temp src Heart Rate Source Patient Position BP Location FiO2 (%)   Oral -- -- -- --       Physical Exam  General: Appears mildly uncomfortable, nontoxic, nondiaphoretic  HEENT: Mucous membranes moist, atraumatic, EOMI  Neck: Full ROM  Pulm: Symmetric chest rise, nonlabored, lungs CTAB  Cardiovascular: Regular rate and rhythm, intact distal pulses  GI: Soft, ileostomy in place with liquid output, mild generalized discomfort to palpation, nondistended, no rebound, no guarding, bowel sounds present  MSK: Full ROM, no deformity  Skin: Warm, dry  Neuro: Awake, alert, oriented x 4, GCS 15, moving all extremities, no focal deficits  Psych: Calm, cooperative        LAB RESULTS  Recent Results (from the past 24  hours)   Comprehensive Metabolic Panel    Collection Time: 07/14/25  5:58 PM    Specimen: Arm, Right; Blood   Result Value Ref Range    Glucose 123 (H) 65 - 99 mg/dL    BUN 17.0 8.0 - 23.0 mg/dL    Creatinine 0.95 0.57 - 1.00 mg/dL    Sodium 128 (L) 136 - 145 mmol/L    Potassium 4.0 3.5 - 5.2 mmol/L    Chloride 96 (L) 98 - 107 mmol/L    CO2 21.3 (L) 22.0 - 29.0 mmol/L    Calcium 8.6 8.6 - 10.5 mg/dL    Total Protein 7.1 6.0 - 8.5 g/dL    Albumin 3.8 3.5 - 5.2 g/dL    ALT (SGPT) 30 1 - 33 U/L    AST (SGOT) 34 (H) 1 - 32 U/L    Alkaline Phosphatase 88 39 - 117 U/L    Total Bilirubin 1.4 (H) 0.0 - 1.2 mg/dL    Globulin 3.3 gm/dL    A/G Ratio 1.2 g/dL    BUN/Creatinine Ratio 17.9 7.0 - 25.0    Anion Gap 10.7 5.0 - 15.0 mmol/L    eGFR 64.2 >60.0 mL/min/1.73   Lipase    Collection Time: 07/14/25  5:58 PM    Specimen: Arm, Right; Blood   Result Value Ref Range    Lipase 29 13 - 60 U/L   Lactic Acid, Plasma    Collection Time: 07/14/25  5:58 PM    Specimen: Arm, Right; Blood   Result Value Ref Range    Lactate 1.6 0.5 - 2.0 mmol/L   Green Top (Gel)    Collection Time: 07/14/25  5:58 PM   Result Value Ref Range    Extra Tube Hold for add-ons.    Lavender Top    Collection Time: 07/14/25  5:58 PM   Result Value Ref Range    Extra Tube hold for add-on    Gold Top - SST    Collection Time: 07/14/25  5:58 PM   Result Value Ref Range    Extra Tube Hold for add-ons.    Light Blue Top    Collection Time: 07/14/25  5:58 PM   Result Value Ref Range    Extra Tube Hold for add-ons.    CBC Auto Differential    Collection Time: 07/14/25  5:58 PM    Specimen: Arm, Right; Blood   Result Value Ref Range    WBC 7.55 3.40 - 10.80 10*3/mm3    RBC 3.94 3.77 - 5.28 10*6/mm3    Hemoglobin 12.0 12.0 - 15.9 g/dL    Hematocrit 36.7 34.0 - 46.6 %    MCV 93.1 79.0 - 97.0 fL    MCH 30.5 26.6 - 33.0 pg    MCHC 32.7 31.5 - 35.7 g/dL    RDW 15.7 (H) 12.3 - 15.4 %    RDW-SD 53.2 37.0 - 54.0 fl    MPV 9.0 6.0 - 12.0 fL    Platelets 197 140 - 450 10*3/mm3     Neutrophil % 75.4 42.7 - 76.0 %    Lymphocyte % 13.2 (L) 19.6 - 45.3 %    Monocyte % 10.5 5.0 - 12.0 %    Eosinophil % 0.3 0.3 - 6.2 %    Basophil % 0.3 0.0 - 1.5 %    Immature Grans % 0.3 0.0 - 0.5 %    Neutrophils, Absolute 5.70 1.70 - 7.00 10*3/mm3    Lymphocytes, Absolute 1.00 0.70 - 3.10 10*3/mm3    Monocytes, Absolute 0.79 0.10 - 0.90 10*3/mm3    Eosinophils, Absolute 0.02 0.00 - 0.40 10*3/mm3    Basophils, Absolute 0.02 0.00 - 0.20 10*3/mm3    Immature Grans, Absolute 0.02 0.00 - 0.05 10*3/mm3    nRBC 0.0 0.0 - 0.2 /100 WBC   Urinalysis With Microscopic If Indicated (No Culture) - Urine, Clean Catch    Collection Time: 07/14/25  6:51 PM    Specimen: Urine, Clean Catch   Result Value Ref Range    Color, UA Yellow Yellow, Straw    Appearance, UA Clear Clear    pH, UA 6.0 5.0 - 8.0    Specific Gravity, UA <=1.005 1.005 - 1.030    Glucose, UA Negative Negative    Ketones, UA Negative Negative    Bilirubin, UA Negative Negative    Blood, UA Negative Negative    Protein, UA Negative Negative    Leuk Esterase, UA Negative Negative    Nitrite, UA Negative Negative    Urobilinogen, UA 0.2 E.U./dL 0.2 - 1.0 E.U./dL       Ordered the above labs and independently interpreted results. My findings will be discussed in the medical decision making section below        RADIOLOGY  CT Abdomen Pelvis With Contrast  Result Date: 7/14/2025  CT ABDOMEN AND PELVIS WITH IV CONTRAST  HISTORY: Abdominal pain, nausea vomiting and constipation  TECHNIQUE: Radiation dose reduction techniques were utilized, including automated exposure control and exposure modulation based on body size. Axial images were obtained through the abdomen and pelvis after the administration of IV contrast. Coronal and sagittal reformatted images obtained.  COMPARISON: 7/9/2025  FINDINGS:  ABDOMEN: Mild atelectasis within the lung bases. The liver is unremarkable. Cholecystectomy. The spleen is unremarkable. Kidneys, adrenal glands, and pancreas are unremarkable.  No evidence of bowel obstruction. There is a right-sided loop ileostomy again demonstrated.  PELVIS: There is some inflammatory change in the pelvis adjacent to a small bowel anastomosis. There is no fluid collection or extraluminal air. The bladder is unremarkable. The uterus and adnexal structures are unremarkable. Colonic diverticulosis without evidence for diverticulitis. Normal appendix. Lumbar spine degenerative changes      1. Prior small bowel resection and right-sided loop ileostomy. 2. There is some inflammatory change within the pelvis adjacent to the small bowel anastomosis. There is no evidence for small bowel obstruction. There is no adjacent fluid collection or extraluminal air  Radiation dose reduction techniques were utilized, including automated exposure control and exposure modulation based on body size.   This report was finalized on 7/14/2025 8:28 PM by Dr. Jose G Pemberton M.D on Workstation: [id number]        Ordered the above noted radiological studies.  Independently interpreted by me and my independent review of findings can be found in the ED Course.  See dictation for official radiology interpretation.      PROCEDURES    Procedures        MEDICATIONS GIVEN IN ER    Medications   sodium chloride 0.9 % flush 10 mL (has no administration in time range)   sodium chloride 0.9 % bolus 1,000 mL (0 mL Intravenous Stopped 7/14/25 2014)   morphine injection 4 mg (4 mg Intravenous Given 7/14/25 1903)   ondansetron (ZOFRAN) injection 4 mg (4 mg Intravenous Given 7/14/25 1901)   iopamidol (ISOVUE-300) 61 % injection 85 mL (85 mL Intravenous Given 7/14/25 2009)         PROGRESS, DATA ANALYSIS, CONSULTS, AND MEDICAL DECISION MAKING    Please note that this section constitutes my independent interpretation of clinical data including lab results, radiology, EKG's.  This constitutes my independent professional opinion regarding differential diagnosis and management of this patient.  It may include any  factors such as history from outside sources, review of external records, social determinants of health, management of medications, response to those treatments, and discussions with other providers.    Differential Diagnosis and Plan: Initial concern for ongoing constipation, bowel obstruction, enteritis, colitis, gastritis, dehydration, renal failure, electrolyte imbalance, among others.  Plan for labs, CT scan, supportive care, and reevaluation with results.    Additional sources:  - Discussed/ obtained information from independent historians:       - (Social Determinants of Health): None     - Shared decision making:  Patient and family at bedside fully updated on and in agreement with the course and plan moving forward    ED Course as of 07/14/25 2135 Mon Jul 14, 2025 1844 WBC: 7.55 [DC]   1844 Hemoglobin: 12.0 [DC]   1844 Lactate: 1.6 [DC]   1844 Lipase: 29 [DC]   1844 Glucose(!): 123 [DC]   1844 BUN: 17.0 [DC]   1844 Creatinine: 0.95 [DC]   1844 Sodium(!): 128 [DC]   1844 Potassium: 4.0 [DC]   1844 ALT (SGPT): 30 [DC]   1844 AST (SGOT)(!): 34 [DC]   1844 Alkaline Phosphatase: 88 [DC]   1844 Total Bilirubin(!): 1.4  Improved from prior [DC]   2017 CT Abdomen Pelvis With Contrast  Per my independent interpretation of the CT abdomen pelvis, no overtly evident bowel obstruction, does have some stranding in the mid/lower abdomen, no significant fecal impaction noted, subtle findings could be missed and will defer to final radiology report [DC]   2036 CT Abdomen Pelvis With Contrast  Radiology report reviewed, prior small bowel resection with right-sided loop ileostomy in place, inflammatory change in the pelvis adjacent to small bowel anastomosis but no evidence for obstruction. [DC]   2046 Discussed with Dr. Paz, surgery, discussed patient's clinical course and findings today, treatment modalities, he has reviewed labs and imaging studies and he feels like this is not representative of an acute infection  that requires antibiotics, he recommends rehydration and can place in the observation unit and they will monitor over there and evaluate in the morning. [DC]   2118 Patient updated on the course and plan and surgical recommendations and need for hospitalization.  All questions and concerns addressed. [DC]   2134 Glucose(!): 123  Discussed with KYLAH Kaur, observation unit, discussed patient's clinical course and findings today, treatment modalities, surgery consult and recommendations, and need for hospitalization. [DC]      ED Course User Index  [DC] Kennedy Hernandez MD       Hospitalization Considered?: yes    Orders Placed During This Visit:  Orders Placed This Encounter   Procedures    CT Abdomen Pelvis With Contrast    [place] Draw    Comprehensive Metabolic Panel    Lipase    Urinalysis With Microscopic If Indicated (No Culture) - Urine, Clean Catch    Lactic Acid, Plasma    CBC Auto Differential    NPO Diet NPO Type: Strict NPO    Undress & Gown    Surgery (on-call MD unless specified)    Insert Peripheral IV    Initiate Emergency Department Observation Status    CBC & Differential    Green Top (Gel)    Lavender Top    Gold Top - SST    Light Blue Top       Additional orders considered but not placed:      Independent interpretation of labs, radiology studies, and discussions with consultants: See ED Course        AS OF 21:35 EDT VITALS:    BP - 119/68  HR - 79  TEMP - 97.4 °F (36.3 °C) (Oral)  02 SATS - 100%          DIAGNOSIS  Final diagnoses:   Nausea and vomiting, unspecified vomiting type   Lower abdominal pain   Hyponatremia         DISPOSITION  ED Disposition       ED Disposition   Decision to Admit    Condition   --    Comment   --                Please note that portions of this document were completed with a voice recognition program.    Note Disclaimer: At [facility], we believe that sharing information builds trust and better relationships. You are receiving this note because you recently  visited [facility]. It is possible you will see health information before a provider has talked with you about it. This kind of information can be easy to misunderstand. To help you fully understand what it means for your health, we urge you to discuss this note with your provider.                       Kennedy Hernandez MD  07/14/25 0242

## 2025-07-15 VITALS
TEMPERATURE: 98.1 F | WEIGHT: 235.89 LBS | DIASTOLIC BLOOD PRESSURE: 82 MMHG | OXYGEN SATURATION: 98 % | SYSTOLIC BLOOD PRESSURE: 116 MMHG | RESPIRATION RATE: 18 BRPM | HEART RATE: 90 BPM | HEIGHT: 62 IN | BODY MASS INDEX: 43.41 KG/M2

## 2025-07-15 PROBLEM — Z93.2 HIGH OUTPUT ILEOSTOMY: Status: ACTIVE | Noted: 2025-07-15

## 2025-07-15 PROBLEM — N17.9 ACUTE KIDNEY INJURY: Status: ACTIVE | Noted: 2025-07-15

## 2025-07-15 PROBLEM — R19.8 HIGH OUTPUT ILEOSTOMY: Status: ACTIVE | Noted: 2025-07-15

## 2025-07-15 LAB
ALBUMIN SERPL-MCNC: 3.7 G/DL (ref 3.5–5.2)
ALBUMIN/GLOB SERPL: 1.2 G/DL
ALP SERPL-CCNC: 86 U/L (ref 39–117)
ALT SERPL W P-5'-P-CCNC: 28 U/L (ref 1–33)
ANION GAP SERPL CALCULATED.3IONS-SCNC: 13.9 MMOL/L (ref 5–15)
AST SERPL-CCNC: 30 U/L (ref 1–32)
BILIRUB SERPL-MCNC: 1.4 MG/DL (ref 0–1.2)
BUN SERPL-MCNC: 11 MG/DL (ref 8–23)
BUN/CREAT SERPL: 13.9 (ref 7–25)
CALCIUM SPEC-SCNC: 8.3 MG/DL (ref 8.6–10.5)
CHLORIDE SERPL-SCNC: 103 MMOL/L (ref 98–107)
CO2 SERPL-SCNC: 19.1 MMOL/L (ref 22–29)
CREAT SERPL-MCNC: 0.79 MG/DL (ref 0.57–1)
DEPRECATED RDW RBC AUTO: 54.3 FL (ref 37–54)
EGFRCR SERPLBLD CKD-EPI 2021: 80.1 ML/MIN/1.73
ERYTHROCYTE [DISTWIDTH] IN BLOOD BY AUTOMATED COUNT: 15.3 % (ref 12.3–15.4)
GLOBULIN UR ELPH-MCNC: 3.2 GM/DL
GLUCOSE SERPL-MCNC: 114 MG/DL (ref 65–99)
HCT VFR BLD AUTO: 38.7 % (ref 34–46.6)
HGB BLD-MCNC: 12.5 G/DL (ref 12–15.9)
INR PPP: 1.13 (ref 0.9–1.1)
MCH RBC QN AUTO: 30.9 PG (ref 26.6–33)
MCHC RBC AUTO-ENTMCNC: 32.3 G/DL (ref 31.5–35.7)
MCV RBC AUTO: 95.6 FL (ref 79–97)
PLATELET # BLD AUTO: 206 10*3/MM3 (ref 140–450)
PMV BLD AUTO: 9.7 FL (ref 6–12)
POTASSIUM SERPL-SCNC: 3.7 MMOL/L (ref 3.5–5.2)
PROT SERPL-MCNC: 6.9 G/DL (ref 6–8.5)
PROTHROMBIN TIME: 14.4 SECONDS (ref 11.7–14.2)
RBC # BLD AUTO: 4.05 10*6/MM3 (ref 3.77–5.28)
SODIUM SERPL-SCNC: 136 MMOL/L (ref 136–145)
WBC NRBC COR # BLD AUTO: 7.29 10*3/MM3 (ref 3.4–10.8)

## 2025-07-15 PROCEDURE — G0378 HOSPITAL OBSERVATION PER HR: HCPCS

## 2025-07-15 PROCEDURE — 80053 COMPREHEN METABOLIC PANEL: CPT

## 2025-07-15 PROCEDURE — 85027 COMPLETE CBC AUTOMATED: CPT

## 2025-07-15 PROCEDURE — 99024 POSTOP FOLLOW-UP VISIT: CPT | Performed by: STUDENT IN AN ORGANIZED HEALTH CARE EDUCATION/TRAINING PROGRAM

## 2025-07-15 PROCEDURE — 85610 PROTHROMBIN TIME: CPT

## 2025-07-15 PROCEDURE — 25010000002 MORPHINE PER 10 MG

## 2025-07-15 PROCEDURE — 96376 TX/PRO/DX INJ SAME DRUG ADON: CPT

## 2025-07-15 RX ORDER — LOPERAMIDE HYDROCHLORIDE 2 MG/1
4 CAPSULE ORAL
Status: DISCONTINUED | OUTPATIENT
Start: 2025-07-15 | End: 2025-07-15 | Stop reason: HOSPADM

## 2025-07-15 RX ORDER — DIPHENOXYLATE HYDROCHLORIDE AND ATROPINE SULFATE 2.5; .025 MG/1; MG/1
1 TABLET ORAL
Qty: 90 TABLET | Refills: 2 | Status: ON HOLD | OUTPATIENT
Start: 2025-07-15 | End: 2026-07-15

## 2025-07-15 RX ORDER — PANTOPRAZOLE SODIUM 40 MG/1
40 TABLET, DELAYED RELEASE ORAL
Status: DISCONTINUED | OUTPATIENT
Start: 2025-07-15 | End: 2025-07-15 | Stop reason: HOSPADM

## 2025-07-15 RX ORDER — DIPHENOXYLATE HYDROCHLORIDE AND ATROPINE SULFATE 2.5; .025 MG/1; MG/1
1 TABLET ORAL
Status: DISCONTINUED | OUTPATIENT
Start: 2025-07-15 | End: 2025-07-15 | Stop reason: HOSPADM

## 2025-07-15 RX ADMIN — LOPERAMIDE HYDROCHLORIDE 4 MG: 2 CAPSULE ORAL at 08:04

## 2025-07-15 RX ADMIN — MORPHINE SULFATE 4 MG: 2 INJECTION, SOLUTION INTRAMUSCULAR; INTRAVENOUS at 04:42

## 2025-07-15 RX ADMIN — AVOBENZONE, HOMOSALATE, OCTISALATE, OCTOCRYLENE, AND OXYBENZONE 1 PACKET: 29.4; 147; 49; 25.4; 58.8 LOTION TOPICAL at 08:04

## 2025-07-15 RX ADMIN — ATORVASTATIN CALCIUM 40 MG: 20 TABLET, FILM COATED ORAL at 08:04

## 2025-07-15 RX ADMIN — DIPHENOXYLATE HYDROCHLORIDE AND ATROPINE SULFATE 1 TABLET: 2.5; .025 TABLET ORAL at 11:10

## 2025-07-15 RX ADMIN — LOPERAMIDE HYDROCHLORIDE 4 MG: 2 CAPSULE ORAL at 11:10

## 2025-07-15 RX ADMIN — PANTOPRAZOLE SODIUM 40 MG: 40 TABLET, DELAYED RELEASE ORAL at 08:04

## 2025-07-15 NOTE — ED NOTES
"Nursing report ED to floor  Samia Gaming  69 y.o.  female    HPI :  HPI  Stated Reason for Visit: abd pain  History Obtained From: patient    Chief Complaint  Chief Complaint   Patient presents with    Abdominal Pain       Admitting doctor:   Sanjeev Bennett MD    Admitting diagnosis:   The primary encounter diagnosis was Nausea and vomiting, unspecified vomiting type. Diagnoses of Lower abdominal pain and Hyponatremia were also pertinent to this visit.    Code status:   Current Code Status       Date Active Code Status Order ID Comments User Context       Prior            Allergies:   Patient has no known allergies.    Isolation:   No active isolations    Intake and Output    Intake/Output Summary (Last 24 hours) at 7/14/2025 2141  Last data filed at 7/14/2025 2014  Gross per 24 hour   Intake 1000 ml   Output --   Net 1000 ml       Weight:       07/14/25  1721   Weight: 107 kg (235 lb 14.3 oz)       Most recent vitals:   Vitals:    07/14/25 1721 07/14/25 1900 07/14/25 2100   BP: 106/59 115/80 119/68   Pulse: 88 79    Resp: 18     Temp: 97.4 °F (36.3 °C)     TempSrc: Oral     SpO2: 100% 100%    Weight: 107 kg (235 lb 14.3 oz)     Height: 157.5 cm (62.01\")         Active LDAs/IV Access:   Lines, Drains & Airways       Active LDAs       Name Placement date Placement time Site Days    Peripheral IV 07/14/25 1901 20 G Right Antecubital 07/14/25 1901  Antecubital  less than 1    Ileostomy Loop RUQ 05/29/25  --  RUQ  46                    Labs (abnormal labs have a star):   Labs Reviewed   COMPREHENSIVE METABOLIC PANEL - Abnormal; Notable for the following components:       Result Value    Glucose 123 (*)     Sodium 128 (*)     Chloride 96 (*)     CO2 21.3 (*)     AST (SGOT) 34 (*)     Total Bilirubin 1.4 (*)     All other components within normal limits    Narrative:     GFR Categories in Chronic Kidney Disease (CKD)              GFR Category          GFR (mL/min/1.73)    Interpretation  G1                    90 or " greater        Normal or high (1)  G2                    60-89                Mild decrease (1)  G3a                   45-59                Mild to moderate decrease  G3b                   30-44                Moderate to severe decrease  G4                    15-29                Severe decrease  G5                    14 or less           Kidney failure    (1)In the absence of evidence of kidney disease, neither GFR category G1 or G2 fulfill the criteria for CKD.    eGFR calculation 2021 CKD-EPI creatinine equation, which does not include race as a factor   CBC WITH AUTO DIFFERENTIAL - Abnormal; Notable for the following components:    RDW 15.7 (*)     Lymphocyte % 13.2 (*)     All other components within normal limits   LIPASE - Normal   URINALYSIS W/ MICROSCOPIC IF INDICATED (NO CULTURE) - Normal    Narrative:     Urine microscopic not indicated.   LACTIC ACID, PLASMA - Normal   RAINBOW DRAW    Narrative:     The following orders were created for panel order [place] Draw.  Procedure                               Abnormality         Status                     ---------                               -----------         ------                     Green Top (Gel)[[id number]]                                  Final result               Lavender Top[[id number]]                                     Final result               Gold Top - SST[[id number]]                                   Final result               Light Blue Top[[id number]]                                   Final result                 Please view results for these tests on the individual orders.   CBC AND DIFFERENTIAL    Narrative:     The following orders were created for panel order CBC & Differential.  Procedure                               Abnormality         Status                     ---------                               -----------         ------                     CBC Auto Differential[[id number]]        Abnormal            Final result                  Please view results for these tests on the individual orders.   GREEN TOP   LAVENDER TOP   GOLD TOP - SST   LIGHT BLUE TOP       EKG:   No orders to display       Meds given in ED:   Medications   sodium chloride 0.9 % flush 10 mL (has no administration in time range)   sodium chloride 0.9 % bolus 1,000 mL (0 mL Intravenous Stopped 7/14/25 2014)   morphine injection 4 mg (4 mg Intravenous Given 7/14/25 1903)   ondansetron (ZOFRAN) injection 4 mg (4 mg Intravenous Given 7/14/25 1901)   iopamidol (ISOVUE-300) 61 % injection 85 mL (85 mL Intravenous Given 7/14/25 2009)       Imaging results:  CT Abdomen Pelvis With Contrast  Result Date: 7/14/2025  1. Prior small bowel resection and right-sided loop ileostomy. 2. There is some inflammatory change within the pelvis adjacent to the small bowel anastomosis. There is no evidence for small bowel obstruction. There is no adjacent fluid collection or extraluminal air  Radiation dose reduction techniques were utilized, including automated exposure control and exposure modulation based on body size.   This report was finalized on 7/14/2025 8:28 PM by Dr. Jose G Pemberton M.D on Workstation: [id number]        Ambulatory status:   - ax1    Social issues:   Social History     Socioeconomic History    Marital status: Single   Tobacco Use    Smoking status: Never    Smokeless tobacco: Never   Vaping Use    Vaping status: Never Used   Substance and Sexual Activity    Alcohol use: Never    Drug use: Never    Sexual activity: Defer     Partners: Male       Peripheral Neurovascular  Peripheral Neurovascular (Adult)  Peripheral Neurovascular WDL: WDL    Neuro Cognitive  Neuro Cognitive (Adult)  Cognitive/Neuro/Behavioral WDL: WDL  Sedation Group  POSS (Pasero Opioid-Induced Sed Scale): 1 - Awake and alert    Learning  Learning Assessment  Learning Readiness and Ability: no barriers identified    Respiratory  Respiratory  Airway WDL: WDL  Respiratory WDL  Respiratory WDL:  WDL    Abdominal Pain       Pain Assessments  Pain (Adult)  (0-10) Pain Rating: Rest: 6  (0-10) Pain Rating: Activity: 8  Pain Location: abdomen  Pain Side/Orientation: anterior  Response to Pain Interventions: interventions effective per patient    NIH Stroke Scale       Otilia Ortiz RN  07/14/25 21:41 EDT

## 2025-07-15 NOTE — H&P
[facility]   HISTORY AND PHYSICAL    Patient Name: Samia Gaming  : 1955  MRN: [id number]  Primary Care Physician:  Cristiano Logan DO  Date of admission: 2025    Subjective   Subjective     Chief Complaint:   Chief Complaint   Patient presents with    Abdominal Pain         HPI:    Samia Gaming is a 69 y.o. female presents with abdominal pain and nausea vomiting. She underwent ex lap with small bowel resection on 2025 and an ileostomy was created with Dr. Ny due to severe GI bleed.  She was seen in the ED on  with GI discomfort and was found to be constipated with fecal impaction on CT, this was suspected to stem from dehydration due to high ileostomy output.  She reports she had a bowel movement after enema and no further stool output, although her ileostomy continues to have higher than normal, loose output. Repeat CT in the ED shows inflammatory changes within the pelvis adjacent to the small bowel anastomosis without fluid collection or extraluminal air.  She denies any bloody stools or ileostomy output, denies bilious or bloody emesis.  Denies fevers.    Review of Systems   All systems were reviewed and negative except for: Abdominal pain, nausea, vomiting.      Personal History     Past Medical History:   Diagnosis Date    Arthritis     Elevated cholesterol        Past Surgical History:   Procedure Laterality Date    CHOLECYSTECTOMY      COLONOSCOPY      COLONOSCOPY N/A 2025    Procedure: COLONOSCOPY TO CECUM AND TERMINAL ILEUM AT BEDSIDE;  Surgeon: Bi Hutson MD;  Location: [facility] ENDOSCOPY;  Service: Gastroenterology;  Laterality: N/A;  PRE- GI BLEED  POST- DIVERTICULOSIS, HEMORRHOIDS    ENDOSCOPY N/A 2025    Procedure: ESOPHAGOGASTRODUODENOSCOPY;  Surgeon: Keyur Neely MD;  Location: [facility] ENDOSCOPY;  Service: Gastroenterology;  Laterality: N/A;  pre: GI bleed  post:errosive gastritis, duodenitis    ENTEROSCOPY SMALL BOWEL N/A 2025     Procedure: ENTEROSCOPY SMALL BOWEL at bedside;  Surgeon: Justin Garza MD;  Location: [facility] ENDOSCOPY;  Service: Gastroenterology;  Laterality: N/A;  pre- melena  post- mild gastric antrum erosion    EXPLORATORY LAPAROTOMY N/A 5/29/2025    Procedure: LAPAROTOMY EXPLORATORY, SMALL BOWEL RESECTION DISTAL AND PROXIMAL, ILEOSTOMY;  Surgeon: Jose G Ny MD;  Location: [facility] MAIN OR;  Service: General;  Laterality: N/A;       Family History: Family history is unknown by patient. Otherwise pertinent FHx was reviewed and not pertinent to current issue.    Social History:  reports that she has never smoked. She has never used smokeless tobacco. She reports that she does not drink alcohol and does not use drugs.    Home Medications:  HYDROcodone-acetaminophen, atorvastatin, loperamide, mineral oil, ondansetron, oxyCODONE-acetaminophen, pantoprazole, psyllium, and traZODone    Allergies:  No Known Allergies    Objective   Objective     Vitals:   Temp:  [97.4 °F (36.3 °C)] 97.4 °F (36.3 °C)  Heart Rate:  [79-88] 79  Resp:  [18] 18  BP: (106-119)/(59-80) 119/68   Physical Exam:     Constitutional: Awake, alert. Well developed for age. Nontoxic appearing.   Eyes: PERRL x2, sclerae anicteric, no conjunctival injection. No EOM abnormalities.   HENT: NCAT, mucous membranes moist,   Neck: Supple, no thyromegaly, no lymphadenopathy, trachea midline  Respiratory: Clear to auscultation bilaterally, nonlabored respirations   Cardiovascular: RRR, no murmurs, rubs, or gallops, palpable pedal pulses bilaterally. No appreciable edema.   Gastrointestinal: Positive bowel sounds. + Tenderness  Musculoskeletal: No bilateral ankle edema, no clubbing or cyanosis to extremities. No obvious deformities.   Psychiatric: Appropriate affect, cooperative. Converses appropriately for age.   Neurologic: Oriented x 3, strength symmetric in all extremities. Cranial nerves grossly intact to confrontation, speech clear  Skin: No rashes, skin  intact.     Result Review    Result Review:  I have personally reviewed the results from the time of this admission to 7/14/2025 21:53 EDT and agree with these findings:  [x]  Laboratory list / accordion  []  Microbiology  [x]  Radiology  []  EKG/Telemetry   []  Cardiology/Vascular   []  Pathology  []  Old records  []  Other:  Most notable findings include:     Repeat CT in the ED shows some inflammatory changes within the pelvis adjacent to the small bowel anastomosis without fluid collection or extraluminal air.  CMP shows sodium is 128, AST is 34, T. bili is 1.4.  UA is bland, CBC is unremarkable.    Assessment & Plan   Assessment / Plan     Brief Patient Summary:  Samia Gaming is a 69 y.o. female who presents with abdominal pain, nausea and vomiting.  She recently had a small bowel resection due to severe GI bleeding.  She has ileostomy with increased, loose output but she denies any blood in the ileostomy or bilious or bloody emesis.  Repeat CT showed pelvic inflammation next to the small bowel anastomosis but there is no extraluminal air or fluid collection.    Active Hospital Problems:  Active Hospital Problems    Diagnosis     **Abdominal pain      Plan:     Abdominal pain  Nausea and vomiting  - CT abdomen showed pelvic inflammation without extraluminal air or fluid collection  - Afebrile, CBC is reassuring  - UA negative  - Continuous cardiac monitoring  - Antiemetic, analgesic as needed  - General Surgery consult      VTE Prophylaxis:  Mechanical VTE prophylaxis orders are present.        CODE STATUS: Full code     Admission Status:  I believe this patient meets observation status.    Electronically signed by KYLAH Kaur, 07/14/25, 9:53 PM EDT.        80 minutes has been spent by [facility] providers in the care of this patient while under observation status on this date 07/14/25      I have worn appropriate PPE during this patient encounter, sanitized my hands both  with entering and exiting patient's room.    I have discussed plan of care with patient including advance care plan and/or surrogate decision maker.  Patient advises that their daughter, Brittny Gaming, will be their primary surrogate decision maker

## 2025-07-15 NOTE — DISCHARGE INSTRUCTIONS
Your ileostomy regimen should be:  - Protonix 40mg every morning before breakfast  - Metamucil / fiber- one dose every morning with a full glass of water  - Loperamide / immodium 4mg 4 times daily before meals and at bedtime  - Lomotil (diphenoxylate-atropine) 2.5mg 3 times per day before meals    Please measure your ileostomy output, the goal is to have less than 1000 mL of output in 24 hours. The consistency should be similar to toothpaste.    Make sure you drink enough fluids, 6-8 glasses of water per day, I recommend something with electrolytes such as gatorade.    Please call my office [phone] if you are vomiting, stop having ileostomy output, have more than 1 liter of ileostomy output, or have any questions.

## 2025-07-15 NOTE — PROGRESS NOTES
Pt feeling better today, no nausea or vomiting, eating without issue.  Denies any significant abdominal pains.  Continued loose output from the ostomy.      On exam,  General: No acute distress, nontoxic  HEENT: EOMI  Pulm: Symmetric chest rise, nonlabored breathing  CV: Regular rate and rhythm  GI: Nondistended, colostomy bag with loose output, nontender abdomen  MSK: No deformity  Skin: Warm, dry  Neuro: Awake, alert, oriented x 4, moving all extremities, no focal deficits  Psych: Calm, cooperative    Vital signs and nursing notes reviewed.           Plan: General Surgery has evaluated bedside, p.o. challenge today and as long as she continues to feel well today can discharge later today.  Patient overall well-appearing in no acute distress.  Labs today show improved sodium levels, stable renal function, stable liver findings.  Will continue to monitor through today with hopeful plans for discharge.  All questions and concerns addressed.         MD Attestation Note    SHARED VISIT: This visit was performed by BOTH a physician and an APC. The substantive portion of the medical decision making was performed by this attesting physician who made or approved the management plan and takes responsibility for patient management. All studies in the APC note (if performed) were independently interpreted by me.

## 2025-07-15 NOTE — PLAN OF CARE
Goal Outcome Evaluation:      Patient stable overnight. Patient c/o of pain and nausea. Zofran and Morphine given. Patient started to desat, so NC @ 2L was put on. Plan for surg consult. Care ongoing.

## 2025-07-15 NOTE — CONSULTS
General Surgery History and Physical      Summary:    Samia Gaming is a 69 y.o. lady who underwent laparotomy with small bowel resection x 2 and double barrel ileostomy for bleeding small bowel diverticulosis on 5/29/2025.  She presented with acute kidney injury, high ileostomy output, and decreased p.o. intake with nausea and vomiting yesterday.  CT scans showed minimal inflammation around the anastomosis, no free air or fluid, no abscess, and no evidence of obstruction.  She underwent IV fluid resuscitation in the emergency department and is feeling much better.  She ate most of her breakfast and has no nausea.  I do not think she needs any antibiotics.  I will increase her antisecretory regimen to include Lomotil 2.5 mg 3 times daily before meals in addition to 4 mg loperamide 4 times daily, fiber, and PPI.  I encouraged her to drink at least 6 to 8 glasses of fluids per day.  I will have her measure her ileostomy output and asked that she let me know if this is greater than 1000 mL in a 24-hour period, she stops having ileostomy output, or has any vomiting.  She is scheduled for ileostomy reversal in early September.  If she continues to have issues with high ileostomy output and dehydration, we will discuss proceeding with ileostomy reversal, however would like to optimize medical management first.  If she does okay with lunch and her ileostomy output remains reasonable, recommend discharge home from the observation unit.      History of Present Illness:    I saw her in my office on Friday after obtaining a CT scan which was unremarkable and labs demonstrating mild acute kidney injury with decreased p.o. intake.  I increased her loperamide at that time.  However over the last 48 hours she has been nauseated with poor p.o. intake and very thin ileostomy output.  She reports emptying her ileostomy appliance 6 times per day.  She has no abdominal pain.  She denies any fever.    Past Medical History:   Past Medical  History:   Diagnosis Date    Arthritis     Elevated cholesterol           Past Surgical History:    Past Surgical History:   Procedure Laterality Date    CHOLECYSTECTOMY  2005    COLONOSCOPY      COLONOSCOPY N/A 5/24/2025    Procedure: COLONOSCOPY TO CECUM AND TERMINAL ILEUM AT BEDSIDE;  Surgeon: Bi Hutson MD;  Location: [facility] ENDOSCOPY;  Service: Gastroenterology;  Laterality: N/A;  PRE- GI BLEED  POST- DIVERTICULOSIS, HEMORRHOIDS    ENDOSCOPY N/A 5/23/2025    Procedure: ESOPHAGOGASTRODUODENOSCOPY;  Surgeon: Keyur Neely MD;  Location: [facility] ENDOSCOPY;  Service: Gastroenterology;  Laterality: N/A;  pre: GI bleed  post:errosive gastritis, duodenitis    ENTEROSCOPY SMALL BOWEL N/A 5/26/2025    Procedure: ENTEROSCOPY SMALL BOWEL at bedside;  Surgeon: Justin Garza MD;  Location: [facility] ENDOSCOPY;  Service: Gastroenterology;  Laterality: N/A;  pre- melena  post- mild gastric antrum erosion    EXPLORATORY LAPAROTOMY N/A 5/29/2025    Procedure: LAPAROTOMY EXPLORATORY, SMALL BOWEL RESECTION DISTAL AND PROXIMAL, ILEOSTOMY;  Surgeon: Jose G Ny MD;  Location: [facility] MAIN OR;  Service: General;  Laterality: N/A;        Family History:    Family History   Family history unknown: Yes          Medications:   Reviewed in the chart      Laboratory Results  CMP on 7/9: Creatinine 1.14, BUN 11, HCO3 15.2, sodium 138  CMP on 7/14: Creatinine 0.95, BUN 17, HCO3 21, chloride 96, sodium 128  CMP on 7/15: Creatinine 0.79, BUN 11, HCO3 19, chloride 103, sodium 136  CBC on 7/14: WBC 7.55, hemoglobin 12, platelets 197    Radiology  I have personally reviewed CT scan of the abdomen and pelvis with contrast with small amount of inflammatory change around the small bowel anastomosis without any free air or fluid, no abscess, no upstream bowel obstruction, otherwise unremarkable.        Physical Exam:   Vitals:    07/15/25 0732   BP: 148/78   Pulse: 90   Resp: 18   Temp:    SpO2: 98%      General: not sick, awake  and alert  Cardiac: normal rate, no peripheral edema  Respiratory: nonlabored breathing on room air  Abdomen: soft, nontender, nondistended, no guarding, incision well-healed, ileostomy pink with thin liquid output    Body mass index is 43.13 kg/m².       Jose G Ny M.D.  General Surgery  [facility]    [address], [address]  [place], [place], [address]  P: [phone]  F: [phone]

## 2025-07-15 NOTE — PLAN OF CARE
Goal Outcome Evaluation:              Outcome Evaluation: all d/c goals met. iv removed with tip intact. d/c instructions given, pt and family verbalized understanding. all questions answered, no concerns noted. pt okay for d/c per care team.

## 2025-07-15 NOTE — DISCHARGE SUMMARY
ED OBSERVATION PROGRESS/DISCHARGE SUMMARY    Date of Admission: 7/14/2025   LOS: 0 days   PCP: Cristiano Logan, DO    Final Diagnosis: Abdominal pain    Hospital Outcome:     69-year-old female status post small bowel resection on 5/29/2025 admitted to the ED observation unit for further evaluation due to abdominal pain with nausea and vomiting.    Sodium was low at 128 on admission, improved to 136 after IV fluids.  Creatinine has improved from 0.95-0.79 today.  She has been afebrile.  No leukocytosis.  Urinalysis bland.  CT abdomen and pelvis showed prior small bowel resection and right sided loop ileostomy.  There are some inflammatory changes within the pelvis adjacent to the small bowel anastomosis.  There is no evidence for small bowel obstruction.  No adjacent fluid collection or extraluminal air.    General surgery was consulted has evaluated the patient.  They recommended starting Lomotil 2.5 mg 3 times daily before meals.  We will continue her 4 mg of loperamide 4 times daily, fiber and PPI.    Abdominal pain has resolved.  She is tolerating p.o. intake.  No nausea or vomiting today.  Ileostomy output is improved with addition of Lomotil.  She will be discharged home.    ROS:  General: no fevers, chills  Respiratory: no cough, dyspnea  Cardiovascular: no chest pain, palpitations  Abdomen: No abdominal pain, nausea, vomiting, or diarrhea  Neurologic: No focal weakness    Objective   Physical Exam:  I have reviewed the vital signs.  Temp:  [97.4 °F (36.3 °C)-98.1 °F (36.7 °C)] 98.1 °F (36.7 °C)  Heart Rate:  [78-90] 90  Resp:  [16-18] 18  BP: (103-148)/(59-82) 116/82  General Appearance:    Alert, cooperative, no distress  Head:    Normocephalic, atraumatic  Eyes:    Sclerae anicteric  Neck:   Supple, no mass  Lungs: Clear to auscultation bilaterally, respirations unlabored  Heart: Regular rate and rhythm, S1 and S2 normal, no murmur, rub or gallop  Abdomen:  Soft, nontender, bowel sounds active,  nondistended, ileostomy pink, small amount of brown liquid stool noted in bag   Extremities: No clubbing, cyanosis, or edema to lower extremities  Pulses:  2+ and symmetric in distal lower extremities  Skin: No rashes   Neurologic: Oriented x3, Normal strength to extremities    Results Review:    I have reviewed the labs, radiology results and diagnostic studies.    Results from last 7 days   Lab Units 07/15/25  0502   WBC 10*3/mm3 7.29   HEMOGLOBIN g/dL 12.5   HEMATOCRIT % 38.7   PLATELETS 10*3/mm3 206     Results from last 7 days   Lab Units 07/15/25  0502 07/14/25  1758 07/09/25  2242   SODIUM mmol/L 136 128* 138   POTASSIUM mmol/L 3.7 4.0 4.3   CHLORIDE mmol/L 103 96* 101   CO2 mmol/L 19.1* 21.3* 15.2*   BUN mg/dL 11.0 17.0 11.0   CREATININE mg/dL 0.79 0.95 1.14*   CALCIUM mg/dL 8.3* 8.6 8.5*   BILIRUBIN mg/dL 1.4* 1.4* 2.1*   ALK PHOS U/L 86 88 101   ALT (SGPT) U/L 28 30 31   AST (SGOT) U/L 30 34* 33*   GLUCOSE mg/dL 114* 123* 144*     Imaging Results (Last 24 Hours)       Procedure Component Value Units Date/Time    CT Abdomen Pelvis With Contrast [[id number]] Collected: 07/14/25 2023     Updated: 07/14/25 2031    Narrative:      CT ABDOMEN AND PELVIS WITH IV CONTRAST     HISTORY: Abdominal pain, nausea vomiting and constipation     TECHNIQUE: Radiation dose reduction techniques were utilized, including  automated exposure control and exposure modulation based on body size.  Axial images were obtained through the abdomen and pelvis after the  administration of IV contrast. Coronal and sagittal reformatted images  obtained.     COMPARISON: 7/9/2025     FINDINGS:      ABDOMEN:  Mild atelectasis within the lung bases. The liver is unremarkable.  Cholecystectomy. The spleen is unremarkable. Kidneys, adrenal glands,  and pancreas are unremarkable. No evidence of bowel obstruction. There  is a right-sided loop ileostomy again demonstrated.     PELVIS:  There is some inflammatory change in the pelvis adjacent to a  small  bowel anastomosis. There is no fluid collection or extraluminal air. The  bladder is unremarkable. The uterus and adnexal structures are  unremarkable. Colonic diverticulosis without evidence for  diverticulitis. Normal appendix. Lumbar spine degenerative changes       Impression:      1. Prior small bowel resection and right-sided loop ileostomy.  2. There is some inflammatory change within the pelvis adjacent to the  small bowel anastomosis. There is no evidence for small bowel  obstruction. There is no adjacent fluid collection or extraluminal air     Radiation dose reduction techniques were utilized, including automated  exposure control and exposure modulation based on body size.        This report was finalized on 7/14/2025 8:28 PM by Dr. Jose G Pemberton M.D on Workstation: [id number]               I have reviewed the medications.     Discharge Medications        New Medications        Instructions Start Date   diphenoxylate-atropine 2.5-0.025 MG per tablet  Commonly known as: Lomotil   1 tablet, Oral, 3 Times Daily Before Meals             Continue These Medications        Instructions Start Date   acetaminophen 500 MG tablet  Commonly known as: TYLENOL   500 mg, Every 6 Hours PRN      atorvastatin 40 MG tablet  Commonly known as: LIPITOR   40 mg, Every Night at Bedtime      loperamide 2 MG capsule  Commonly known as: IMODIUM   2 mg, Oral, 4 Times Daily Before Meals & Nightly      loperamide 2 MG capsule  Commonly known as: IMODIUM   4 mg, Oral, 4 Times Daily Before Meals & Nightly      mineral oil enema   1 enema, Rectal, Daily      ondansetron 4 MG tablet  Commonly known as: Zofran   4 mg, Oral, Daily PRN      pantoprazole 40 MG EC tablet  Commonly known as: Protonix   40 mg, Oral, Daily      psyllium 58.6 % powder  Commonly known as: METAMUCIL   1 packet, Oral, Daily      traZODone 150 MG tablet  Commonly known as: DESYREL   300 mg, Nightly             Stop These Medications       HYDROcodone-acetaminophen 5-325 MG per tablet  Commonly known as: NORCO     oxyCODONE-acetaminophen 5-325 MG per tablet  Commonly known as: Percocet             ---------------------------------------------------------------------------------------------  Assessment & Plan   Assessment/Problem List    Abdominal pain    Acute kidney injury    High output ileostomy    Plan:    Abdominal pain  Nausea and vomiting  CT abdomen showed pelvic inflammation without extraluminal air or fluid collection  UA negative  General Surgery consulted  Continue Imodium, Metamucil, PPI  Add Lomotil  Follow-up with general surgery as scheduled  Plan for ileostomy reversal in September     VTE Prophylaxis:  Mechanical VTE prophylaxis orders are present.     Disposition: Home    Follow-up after Discharge: Primary care and general surgery    This note will serve as a discharge summary    Marivel Menon, APRN 07/15/25 13:27 EDT    I have worn appropriate PPE during this patient encounter, sanitized my hands both with entering and exiting patient's room.    32 minutes has been spent by [facility] providers in the care of this patient while under observation status on this date 07/15/25

## 2025-07-16 NOTE — CASE MANAGEMENT/SOCIAL WORK
Case Management Discharge Note      Final Note: Home via private vehicle         Selected Continued Care - Discharged on 7/15/2025 Admission date: 7/14/2025 - Discharge disposition: Home or Self Care      Destination    No services have been selected for the patient.                Durable Medical Equipment    No services have been selected for the patient.                Dialysis/Infusion    No services have been selected for the patient.                Home Medical Care    No services have been selected for the patient.                Therapy    No services have been selected for the patient.                Community Resources    No services have been selected for the patient.                Community & DME    No services have been selected for the patient.                    Selected Continued Care - Prior Encounters Includes continued care and service providers with selected services from prior encounters from 4/15/2025 to 7/15/2025      Discharged on 6/7/2025 Admission date: 5/22/2025 - Discharge disposition: Home-Health Care c      Home Medical Care       Service Provider Services Address Phone Fax Patient Preferred    [facility] - [facility] [address] [address]  [place] [id number] [phone] --                          Transportation Services  Transportation: Private Transportation  Private: Car    Final Discharge Disposition Code: 01 - home or self-care

## 2025-07-19 NOTE — PROGRESS NOTES
"Patient confused and now refusing care.  Daughter at bedside.  Systolic blood pressure in the 100s.  Ongoing rectal bleeding some of it is old blood.  However hemoglobin continues to drop and now down to 6.7.  Abdomen soft and nontender.    Spoke at length with patient and daughter at bedside.  Patient is concerned that she is getting the \"wrong blood\".  Because it keeps coming out of her rectum.  Explained that she has active ongoing bleeding and without transfusions her life would be at risk.  Spoke to daughter and after discussion we will give 2 mg Haldol for severe anxiety by the patient.  Increase fluids to normal saline at 150 cc an hour.  Stat general surgery consult and stat CT angio of the abdomen and pelvis.  Patient has already had extensive workup without finding source of bleeding.    I spent 35 mins critical care time in care of this patient outside of any procedures and not overlapping with any other provider.     Electronically signed by Jerrell Anaya MD, 05/26/25, 11:18 PM EDT.   " Patient walking in hallway, escorted back to room by security. This RN in room with patient, patient a/o. Dr adams informed patient wanting to leave.     Andree Hargrove, RN  07/19/25 0953

## 2025-07-28 ENCOUNTER — HOSPITAL ENCOUNTER (INPATIENT)
Facility: HOSPITAL | Age: 70
LOS: 2 days | Discharge: HOME | End: 2025-07-31
Attending: STUDENT IN AN ORGANIZED HEALTH CARE EDUCATION/TRAINING PROGRAM | Admitting: HOSPITALIST
Payer: MEDICARE

## 2025-07-28 ENCOUNTER — APPOINTMENT (OUTPATIENT)
Dept: CT IMAGING | Facility: HOSPITAL | Age: 70
End: 2025-07-28
Payer: MEDICARE

## 2025-07-28 DIAGNOSIS — E83.42 HYPOMAGNESEMIA: ICD-10-CM

## 2025-07-28 DIAGNOSIS — R11.2 NAUSEA AND VOMITING, UNSPECIFIED VOMITING TYPE: ICD-10-CM

## 2025-07-28 DIAGNOSIS — E87.1 HYPONATREMIA: Primary | ICD-10-CM

## 2025-07-28 PROBLEM — E87.20 METABOLIC ACIDOSIS: Status: ACTIVE | Noted: 2025-07-28

## 2025-07-28 LAB
ALBUMIN SERPL-MCNC: 4 G/DL (ref 3.5–5.2)
ALBUMIN/GLOB SERPL: 1.1 G/DL
ALP SERPL-CCNC: 117 U/L (ref 39–117)
ALT SERPL W P-5'-P-CCNC: 32 U/L (ref 1–33)
ANION GAP SERPL CALCULATED.3IONS-SCNC: 19.3 MMOL/L (ref 5–15)
AST SERPL-CCNC: 37 U/L (ref 1–32)
BACTERIA UR QL AUTO: ABNORMAL /HPF
BASOPHILS # BLD AUTO: 0.03 10*3/MM3 (ref 0–0.2)
BASOPHILS NFR BLD AUTO: 0.5 % (ref 0–1.5)
BILIRUB SERPL-MCNC: 1.8 MG/DL (ref 0–1.2)
BILIRUB UR QL STRIP: NEGATIVE
BUN SERPL-MCNC: 26 MG/DL (ref 8–23)
BUN/CREAT SERPL: 20.2 (ref 7–25)
CALCIUM SPEC-SCNC: 8.5 MG/DL (ref 8.6–10.5)
CHLORIDE SERPL-SCNC: 88 MMOL/L (ref 98–107)
CLARITY UR: ABNORMAL
CO2 SERPL-SCNC: 17.7 MMOL/L (ref 22–29)
COLOR UR: YELLOW
CREAT SERPL-MCNC: 1.29 MG/DL (ref 0.57–1)
D-LACTATE SERPL-SCNC: 1.4 MMOL/L (ref 0.5–2)
D-LACTATE SERPL-SCNC: 2.3 MMOL/L (ref 0.5–2)
DEPRECATED RDW RBC AUTO: 49.2 FL (ref 37–54)
EGFRCR SERPLBLD CKD-EPI 2021: 44.5 ML/MIN/1.73
EOSINOPHIL # BLD AUTO: 0.04 10*3/MM3 (ref 0–0.4)
EOSINOPHIL NFR BLD AUTO: 0.6 % (ref 0.3–6.2)
ERYTHROCYTE [DISTWIDTH] IN BLOOD BY AUTOMATED COUNT: 15 % (ref 12.3–15.4)
GLOBULIN UR ELPH-MCNC: 3.5 GM/DL
GLUCOSE SERPL-MCNC: 114 MG/DL (ref 65–99)
GLUCOSE UR STRIP-MCNC: NEGATIVE MG/DL
HCT VFR BLD AUTO: 39.3 % (ref 34–46.6)
HGB BLD-MCNC: 13 G/DL (ref 12–15.9)
HGB UR QL STRIP.AUTO: NEGATIVE
HOLD SPECIMEN: NORMAL
HOLD SPECIMEN: NORMAL
HYALINE CASTS UR QL AUTO: ABNORMAL /LPF
IMM GRANULOCYTES # BLD AUTO: 0.01 10*3/MM3 (ref 0–0.05)
IMM GRANULOCYTES NFR BLD AUTO: 0.2 % (ref 0–0.5)
KETONES UR QL STRIP: ABNORMAL
LEUKOCYTE ESTERASE UR QL STRIP.AUTO: ABNORMAL
LIPASE SERPL-CCNC: 54 U/L (ref 13–60)
LYMPHOCYTES # BLD AUTO: 1.35 10*3/MM3 (ref 0.7–3.1)
LYMPHOCYTES NFR BLD AUTO: 20.8 % (ref 19.6–45.3)
MAGNESIUM SERPL-MCNC: 0.8 MG/DL (ref 1.6–2.4)
MCH RBC QN AUTO: 30 PG (ref 26.6–33)
MCHC RBC AUTO-ENTMCNC: 33.1 G/DL (ref 31.5–35.7)
MCV RBC AUTO: 90.8 FL (ref 79–97)
MONOCYTES # BLD AUTO: 0.61 10*3/MM3 (ref 0.1–0.9)
MONOCYTES NFR BLD AUTO: 9.4 % (ref 5–12)
NEUTROPHILS NFR BLD AUTO: 4.46 10*3/MM3 (ref 1.7–7)
NEUTROPHILS NFR BLD AUTO: 68.5 % (ref 42.7–76)
NITRITE UR QL STRIP: NEGATIVE
NRBC BLD AUTO-RTO: 0 /100 WBC (ref 0–0.2)
OSMOLALITY SERPL: 273 MOSM/KG (ref 280–301)
OSMOLALITY UR: 363 MOSM/KG
PH UR STRIP.AUTO: 5.5 [PH] (ref 5–8)
PHOSPHATE SERPL-MCNC: 3.1 MG/DL (ref 2.5–4.5)
PLATELET # BLD AUTO: 297 10*3/MM3 (ref 140–450)
PMV BLD AUTO: 9.4 FL (ref 6–12)
POTASSIUM SERPL-SCNC: 4.5 MMOL/L (ref 3.5–5.2)
PROT SERPL-MCNC: 7.5 G/DL (ref 6–8.5)
PROT UR QL STRIP: ABNORMAL
RBC # BLD AUTO: 4.33 10*6/MM3 (ref 3.77–5.28)
RBC # UR STRIP: ABNORMAL /HPF
REF LAB TEST METHOD: ABNORMAL
SODIUM SERPL-SCNC: 125 MMOL/L (ref 136–145)
SODIUM UR-SCNC: <20 MMOL/L
SP GR UR STRIP: 1.01 (ref 1–1.03)
SQUAMOUS #/AREA URNS HPF: ABNORMAL /HPF
UROBILINOGEN UR QL STRIP: ABNORMAL
WBC # UR STRIP: ABNORMAL /HPF
WBC NRBC COR # BLD AUTO: 6.5 10*3/MM3 (ref 3.4–10.8)
WHOLE BLOOD HOLD COAG: NORMAL
WHOLE BLOOD HOLD SPECIMEN: NORMAL

## 2025-07-28 PROCEDURE — 25010000002 ONDANSETRON PER 1 MG: Performed by: STUDENT IN AN ORGANIZED HEALTH CARE EDUCATION/TRAINING PROGRAM

## 2025-07-28 PROCEDURE — G0378 HOSPITAL OBSERVATION PER HR: HCPCS

## 2025-07-28 PROCEDURE — 99285 EMERGENCY DEPT VISIT HI MDM: CPT

## 2025-07-28 PROCEDURE — 25010000002 MORPHINE PER 10 MG: Performed by: STUDENT IN AN ORGANIZED HEALTH CARE EDUCATION/TRAINING PROGRAM

## 2025-07-28 PROCEDURE — 84100 ASSAY OF PHOSPHORUS: CPT | Performed by: STUDENT IN AN ORGANIZED HEALTH CARE EDUCATION/TRAINING PROGRAM

## 2025-07-28 PROCEDURE — 25810000003 SODIUM CHLORIDE 0.9 % SOLUTION: Performed by: NURSE PRACTITIONER

## 2025-07-28 PROCEDURE — 83605 ASSAY OF LACTIC ACID: CPT | Performed by: STUDENT IN AN ORGANIZED HEALTH CARE EDUCATION/TRAINING PROGRAM

## 2025-07-28 PROCEDURE — 85025 COMPLETE CBC W/AUTO DIFF WBC: CPT

## 2025-07-28 PROCEDURE — 36415 COLL VENOUS BLD VENIPUNCTURE: CPT

## 2025-07-28 PROCEDURE — 83605 ASSAY OF LACTIC ACID: CPT

## 2025-07-28 PROCEDURE — 25010000002 MAGNESIUM SULFATE 2 GM/50ML SOLUTION: Performed by: STUDENT IN AN ORGANIZED HEALTH CARE EDUCATION/TRAINING PROGRAM

## 2025-07-28 PROCEDURE — 25010000002 MAGNESIUM SULFATE 4 GM/100ML SOLUTION: Performed by: STUDENT IN AN ORGANIZED HEALTH CARE EDUCATION/TRAINING PROGRAM

## 2025-07-28 PROCEDURE — 83935 ASSAY OF URINE OSMOLALITY: CPT | Performed by: STUDENT IN AN ORGANIZED HEALTH CARE EDUCATION/TRAINING PROGRAM

## 2025-07-28 PROCEDURE — 25810000003 SODIUM CHLORIDE 0.9 % SOLUTION: Performed by: STUDENT IN AN ORGANIZED HEALTH CARE EDUCATION/TRAINING PROGRAM

## 2025-07-28 PROCEDURE — 84300 ASSAY OF URINE SODIUM: CPT | Performed by: STUDENT IN AN ORGANIZED HEALTH CARE EDUCATION/TRAINING PROGRAM

## 2025-07-28 PROCEDURE — 83690 ASSAY OF LIPASE: CPT

## 2025-07-28 PROCEDURE — 81001 URINALYSIS AUTO W/SCOPE: CPT | Performed by: STUDENT IN AN ORGANIZED HEALTH CARE EDUCATION/TRAINING PROGRAM

## 2025-07-28 PROCEDURE — 83930 ASSAY OF BLOOD OSMOLALITY: CPT | Performed by: STUDENT IN AN ORGANIZED HEALTH CARE EDUCATION/TRAINING PROGRAM

## 2025-07-28 PROCEDURE — 80053 COMPREHEN METABOLIC PANEL: CPT

## 2025-07-28 PROCEDURE — 25510000001 IOPAMIDOL 61 % SOLUTION: Performed by: STUDENT IN AN ORGANIZED HEALTH CARE EDUCATION/TRAINING PROGRAM

## 2025-07-28 PROCEDURE — 83735 ASSAY OF MAGNESIUM: CPT | Performed by: STUDENT IN AN ORGANIZED HEALTH CARE EDUCATION/TRAINING PROGRAM

## 2025-07-28 PROCEDURE — 74177 CT ABD & PELVIS W/CONTRAST: CPT

## 2025-07-28 RX ORDER — FAMOTIDINE 20 MG/1
20 TABLET, FILM COATED ORAL 2 TIMES DAILY PRN
Status: DISCONTINUED | OUTPATIENT
Start: 2025-07-28 | End: 2025-07-29

## 2025-07-28 RX ORDER — SODIUM CHLORIDE 9 MG/ML
40 INJECTION, SOLUTION INTRAVENOUS AS NEEDED
Status: DISCONTINUED | OUTPATIENT
Start: 2025-07-28 | End: 2025-07-31 | Stop reason: HOSPADM

## 2025-07-28 RX ORDER — BISACODYL 5 MG/1
5 TABLET, DELAYED RELEASE ORAL DAILY PRN
Status: DISCONTINUED | OUTPATIENT
Start: 2025-07-28 | End: 2025-07-29

## 2025-07-28 RX ORDER — ONDANSETRON 2 MG/ML
4 INJECTION INTRAMUSCULAR; INTRAVENOUS ONCE
Status: COMPLETED | OUTPATIENT
Start: 2025-07-28 | End: 2025-07-28

## 2025-07-28 RX ORDER — ACETAMINOPHEN 325 MG/1
650 TABLET ORAL EVERY 4 HOURS PRN
Status: DISCONTINUED | OUTPATIENT
Start: 2025-07-28 | End: 2025-07-31 | Stop reason: HOSPADM

## 2025-07-28 RX ORDER — SODIUM CHLORIDE 0.9 % (FLUSH) 0.9 %
10 SYRINGE (ML) INJECTION EVERY 12 HOURS SCHEDULED
Status: DISCONTINUED | OUTPATIENT
Start: 2025-07-28 | End: 2025-07-31 | Stop reason: HOSPADM

## 2025-07-28 RX ORDER — AMOXICILLIN 250 MG
2 CAPSULE ORAL 2 TIMES DAILY PRN
Status: DISCONTINUED | OUTPATIENT
Start: 2025-07-28 | End: 2025-07-29

## 2025-07-28 RX ORDER — BISACODYL 10 MG
10 SUPPOSITORY, RECTAL RECTAL DAILY PRN
Status: DISCONTINUED | OUTPATIENT
Start: 2025-07-28 | End: 2025-07-29

## 2025-07-28 RX ORDER — MORPHINE SULFATE 2 MG/ML
4 INJECTION, SOLUTION INTRAMUSCULAR; INTRAVENOUS ONCE
Status: COMPLETED | OUTPATIENT
Start: 2025-07-28 | End: 2025-07-28

## 2025-07-28 RX ORDER — POLYETHYLENE GLYCOL 3350 17 G/17G
17 POWDER, FOR SOLUTION ORAL DAILY PRN
Status: DISCONTINUED | OUTPATIENT
Start: 2025-07-28 | End: 2025-07-29

## 2025-07-28 RX ORDER — ACETAMINOPHEN 160 MG/5ML
650 SOLUTION ORAL EVERY 4 HOURS PRN
Status: DISCONTINUED | OUTPATIENT
Start: 2025-07-28 | End: 2025-07-31 | Stop reason: HOSPADM

## 2025-07-28 RX ORDER — SODIUM CHLORIDE 0.9 % (FLUSH) 0.9 %
10 SYRINGE (ML) INJECTION AS NEEDED
Status: DISCONTINUED | OUTPATIENT
Start: 2025-07-28 | End: 2025-07-31 | Stop reason: HOSPADM

## 2025-07-28 RX ORDER — NITROGLYCERIN 0.4 MG/1
0.4 TABLET SUBLINGUAL
Status: DISCONTINUED | OUTPATIENT
Start: 2025-07-28 | End: 2025-07-31 | Stop reason: HOSPADM

## 2025-07-28 RX ORDER — ACETAMINOPHEN 650 MG/1
650 SUPPOSITORY RECTAL EVERY 4 HOURS PRN
Status: DISCONTINUED | OUTPATIENT
Start: 2025-07-28 | End: 2025-07-31 | Stop reason: HOSPADM

## 2025-07-28 RX ORDER — SODIUM CHLORIDE 9 MG/ML
100 INJECTION, SOLUTION INTRAVENOUS CONTINUOUS
Status: ACTIVE | OUTPATIENT
Start: 2025-07-28 | End: 2025-07-29

## 2025-07-28 RX ORDER — ONDANSETRON 2 MG/ML
4 INJECTION INTRAMUSCULAR; INTRAVENOUS EVERY 6 HOURS PRN
Status: DISCONTINUED | OUTPATIENT
Start: 2025-07-28 | End: 2025-07-31 | Stop reason: HOSPADM

## 2025-07-28 RX ORDER — MAGNESIUM SULFATE HEPTAHYDRATE 40 MG/ML
4 INJECTION, SOLUTION INTRAVENOUS EVERY 4 HOURS
Status: COMPLETED | OUTPATIENT
Start: 2025-07-28 | End: 2025-07-29

## 2025-07-28 RX ORDER — MAGNESIUM SULFATE HEPTAHYDRATE 40 MG/ML
2 INJECTION, SOLUTION INTRAVENOUS
Status: COMPLETED | OUTPATIENT
Start: 2025-07-28 | End: 2025-07-28

## 2025-07-28 RX ORDER — IOPAMIDOL 612 MG/ML
85 INJECTION, SOLUTION INTRAVASCULAR
Status: COMPLETED | OUTPATIENT
Start: 2025-07-28 | End: 2025-07-28

## 2025-07-28 RX ADMIN — SODIUM CHLORIDE 100 ML/HR: 9 INJECTION, SOLUTION INTRAVENOUS at 22:59

## 2025-07-28 RX ADMIN — MAGNESIUM SULFATE HEPTAHYDRATE 2 G: 40 INJECTION, SOLUTION INTRAVENOUS at 18:34

## 2025-07-28 RX ADMIN — MAGNESIUM SULFATE HEPTAHYDRATE 4 G: 40 INJECTION, SOLUTION INTRAVENOUS at 21:13

## 2025-07-28 RX ADMIN — ONDANSETRON 4 MG: 2 INJECTION, SOLUTION INTRAMUSCULAR; INTRAVENOUS at 16:42

## 2025-07-28 RX ADMIN — SODIUM CHLORIDE 1000 ML: 9 INJECTION, SOLUTION INTRAVENOUS at 16:43

## 2025-07-28 RX ADMIN — MORPHINE SULFATE 4 MG: 2 INJECTION, SOLUTION INTRAMUSCULAR; INTRAVENOUS at 17:23

## 2025-07-28 RX ADMIN — MORPHINE SULFATE 4 MG: 2 INJECTION, SOLUTION INTRAMUSCULAR; INTRAVENOUS at 21:44

## 2025-07-28 RX ADMIN — IOPAMIDOL 85 ML: 612 INJECTION, SOLUTION INTRAVENOUS at 17:48

## 2025-07-28 RX ADMIN — Medication 10 ML: at 22:59

## 2025-07-28 NOTE — ED PROVIDER NOTES
EMERGENCY DEPARTMENT ENCOUNTER  Room Number:  12/12  PCP: Cristiano Logan DO  Independent Historians: Patient      HPI:  Chief Complaint: had concerns including Vomiting.       Context: Samia Gaming is a 69 y.o. female with a medical history of GI bleed, chronic abdominal pain who presents to the ED c/o acute abdominal pain, nausea, vomiting, generalized malaise.  Patient has recently been hospitalized with similar symptoms and was hyponatremic at that time.  That improved after fluid administration.  Patient had been seen by general surgery and started on new medications.  Patient states she initially felt better after discharge but has gone downhill again with worsening abdominal pain, significant ostomy output, nausea and vomiting.  Patient has had chills but no fevers.      Review of prior external notes (non-ED) -and- Review of prior external test results outside of this encounter: Discharge summary from 7/15/2025 reviewed and notable for hospitalization secondary to abdominal pain with intractable nausea and vomiting.  Patient noted to have a sodium of 128 which improved after IV fluid administration.  CT abdomen and pelvis showed right-sided loop ileostomy and previous bowel restriction however no acute changes.  General surgery saw the patient and recommended starting Lomotil.  Abdominal pain improved and patient was able to tolerate oral intake.  Patient eventually discharged home.    Prescription drug monitoring program review:         PAST MEDICAL HISTORY  Active Ambulatory Problems     Diagnosis Date Noted    GI bleed 05/22/2025    GIB (gastrointestinal bleeding) 05/23/2025    Ileostomy in place 06/13/2025    Status post reversal of ileostomy 06/13/2025    Abdominal pain 07/14/2025    Acute kidney injury 07/15/2025    High output ileostomy 07/15/2025     Resolved Ambulatory Problems     Diagnosis Date Noted    No Resolved Ambulatory Problems     Past Medical History:   Diagnosis Date    Arthritis      Elevated cholesterol          PAST SURGICAL HISTORY  Past Surgical History:   Procedure Laterality Date    CHOLECYSTECTOMY  2005    COLONOSCOPY      COLONOSCOPY N/A 5/24/2025    Procedure: COLONOSCOPY TO CECUM AND TERMINAL ILEUM AT BEDSIDE;  Surgeon: Bi Hutson MD;  Location: [facility] ENDOSCOPY;  Service: Gastroenterology;  Laterality: N/A;  PRE- GI BLEED  POST- DIVERTICULOSIS, HEMORRHOIDS    ENDOSCOPY N/A 5/23/2025    Procedure: ESOPHAGOGASTRODUODENOSCOPY;  Surgeon: Keyur Neely MD;  Location: [facility] ENDOSCOPY;  Service: Gastroenterology;  Laterality: N/A;  pre: GI bleed  post:errosive gastritis, duodenitis    ENTEROSCOPY SMALL BOWEL N/A 5/26/2025    Procedure: ENTEROSCOPY SMALL BOWEL at bedside;  Surgeon: Justin Garza MD;  Location: [facility] ENDOSCOPY;  Service: Gastroenterology;  Laterality: N/A;  pre- melena  post- mild gastric antrum erosion    EXPLORATORY LAPAROTOMY N/A 5/29/2025    Procedure: LAPAROTOMY EXPLORATORY, SMALL BOWEL RESECTION DISTAL AND PROXIMAL, ILEOSTOMY;  Surgeon: Jose G Ny MD;  Location: [facility] MAIN OR;  Service: General;  Laterality: N/A;         FAMILY HISTORY  Family History   Family history unknown: Yes         SOCIAL HISTORY  Social History     Socioeconomic History    Marital status: Single   Tobacco Use    Smoking status: Never    Smokeless tobacco: Never   Vaping Use    Vaping status: Never Used   Substance and Sexual Activity    Alcohol use: Never    Drug use: Never    Sexual activity: Defer     Partners: Male       Chronic or social conditions impacting patient care (Social Determinants of Health):  Social Drivers of Health     Tobacco Use: Low Risk  (7/14/2025)    Patient History     Smoking Tobacco Use: Never     Smokeless Tobacco Use: Never     Passive Exposure: Not on file   Alcohol Use: Not At Risk (7/14/2025)    AUDIT-C     Frequency of Alcohol Consumption: Never     Average Number of Drinks: Patient does not drink     Frequency of Binge Drinking: Never    Financial Resource Strain: Not on file   Food Insecurity: Not on file (9/27/2023)   Transportation Needs: Not on file   Physical Activity: Not on file   Stress: Not on file   Social Connections: Not on file   Interpersonal Safety: Not At Risk (7/28/2025)    Abuse Screen     Unsafe at Home or Work/School: no     Feels Threatened by Someone?: no     Does Anyone Keep You from Contacting Others or Doint Things Outside the Home?: no     Physical Sign of Abuse Present: no   Depression: Not on file   Housing Stability: Unknown (7/14/2025)    Housing Stability     Current Living Arrangements: home     Potentially Unsafe Housing Conditions: Not on file   Utilities: Not on file   Health Literacy: Unknown (5/27/2025)    Education     Help with school or training?: Not on file     Preferred Language: English   Employment: Not on file   Disabilities: Not At Risk (7/14/2025)    Disabilities     Concentrating, Remembering, or Making Decisions Difficulty: no     Doing Errands Independently Difficulty: no       ALLERGIES  Patient has no known allergies.      REVIEW OF SYSTEMS  Review of Systems  Included in HPI  All systems reviewed and negative except for those discussed in HPI.      PHYSICAL EXAM    I have reviewed the triage vital signs and nursing notes.    ED Triage Vitals [07/28/25 1333]   Temp Heart Rate Resp BP SpO2   99.8 °F (37.7 °C) 86 16 111/78 97 %      Temp src Heart Rate Source Patient Position BP Location FiO2 (%)   -- -- -- -- --       Physical Exam  GENERAL: alert, no acute distress  SKIN: Warm, dry  HENT: Normocephalic, atraumatic  EYES: no scleral icterus  CV: regular rhythm, regular rate  RESPIRATORY: normal effort, lungs clear  ABDOMEN: soft, mild generalized tenderness to palpation, ostomy present in the right side of the abdomen, no guarding noted  MUSCULOSKELETAL: no deformity  NEURO: alert, moves all extremities, follows commands            LAB RESULTS  Recent Results (from the past 24 hours)    Comprehensive Metabolic Panel    Collection Time: 07/28/25  1:57 PM    Specimen: Arm, Right; Blood   Result Value Ref Range    Glucose 114 (H) 65 - 99 mg/dL    BUN 26.0 (H) 8.0 - 23.0 mg/dL    Creatinine 1.29 (H) 0.57 - 1.00 mg/dL    Sodium 125 (L) 136 - 145 mmol/L    Potassium 4.5 3.5 - 5.2 mmol/L    Chloride 88 (L) 98 - 107 mmol/L    CO2 17.7 (L) 22.0 - 29.0 mmol/L    Calcium 8.5 (L) 8.6 - 10.5 mg/dL    Total Protein 7.5 6.0 - 8.5 g/dL    Albumin 4.0 3.5 - 5.2 g/dL    ALT (SGPT) 32 1 - 33 U/L    AST (SGOT) 37 (H) 1 - 32 U/L    Alkaline Phosphatase 117 39 - 117 U/L    Total Bilirubin 1.8 (H) 0.0 - 1.2 mg/dL    Globulin 3.5 gm/dL    A/G Ratio 1.1 g/dL    BUN/Creatinine Ratio 20.2 7.0 - 25.0    Anion Gap 19.3 (H) 5.0 - 15.0 mmol/L    eGFR 44.5 (L) >60.0 mL/min/1.73   Lipase    Collection Time: 07/28/25  1:57 PM    Specimen: Arm, Right; Blood   Result Value Ref Range    Lipase 54 13 - 60 U/L   Lactic Acid, Plasma    Collection Time: 07/28/25  1:57 PM    Specimen: Arm, Right; Blood   Result Value Ref Range    Lactate 2.3 (C) 0.5 - 2.0 mmol/L   Green Top (Gel)    Collection Time: 07/28/25  1:57 PM   Result Value Ref Range    Extra Tube Hold for add-ons.    Lavender Top    Collection Time: 07/28/25  1:57 PM   Result Value Ref Range    Extra Tube hold for add-on    Gold Top - SST    Collection Time: 07/28/25  1:57 PM   Result Value Ref Range    Extra Tube Hold for add-ons.    Light Blue Top    Collection Time: 07/28/25  1:57 PM   Result Value Ref Range    Extra Tube Hold for add-ons.    CBC Auto Differential    Collection Time: 07/28/25  1:57 PM    Specimen: Arm, Right; Blood   Result Value Ref Range    WBC 6.50 3.40 - 10.80 10*3/mm3    RBC 4.33 3.77 - 5.28 10*6/mm3    Hemoglobin 13.0 12.0 - 15.9 g/dL    Hematocrit 39.3 34.0 - 46.6 %    MCV 90.8 79.0 - 97.0 fL    MCH 30.0 26.6 - 33.0 pg    MCHC 33.1 31.5 - 35.7 g/dL    RDW 15.0 12.3 - 15.4 %    RDW-SD 49.2 37.0 - 54.0 fl    MPV 9.4 6.0 - 12.0 fL    Platelets 297 140 -  450 10*3/mm3    Neutrophil % 68.5 42.7 - 76.0 %    Lymphocyte % 20.8 19.6 - 45.3 %    Monocyte % 9.4 5.0 - 12.0 %    Eosinophil % 0.6 0.3 - 6.2 %    Basophil % 0.5 0.0 - 1.5 %    Immature Grans % 0.2 0.0 - 0.5 %    Neutrophils, Absolute 4.46 1.70 - 7.00 10*3/mm3    Lymphocytes, Absolute 1.35 0.70 - 3.10 10*3/mm3    Monocytes, Absolute 0.61 0.10 - 0.90 10*3/mm3    Eosinophils, Absolute 0.04 0.00 - 0.40 10*3/mm3    Basophils, Absolute 0.03 0.00 - 0.20 10*3/mm3    Immature Grans, Absolute 0.01 0.00 - 0.05 10*3/mm3    nRBC 0.0 0.0 - 0.2 /100 WBC   Magnesium    Collection Time: 07/28/25  1:57 PM    Specimen: Arm, Right; Blood   Result Value Ref Range    Magnesium 0.8 (C) 1.6 - 2.4 mg/dL   Phosphorus    Collection Time: 07/28/25  1:57 PM    Specimen: Arm, Right; Blood   Result Value Ref Range    Phosphorus 3.1 2.5 - 4.5 mg/dL   Osmolality, Serum    Collection Time: 07/28/25  1:57 PM    Specimen: Arm, Right; Blood   Result Value Ref Range    Osmolality 273 (L) 280 - 301 mOsm/kg   Urinalysis With Microscopic If Indicated (No Culture) - Urine, Clean Catch    Collection Time: 07/28/25  5:33 PM    Specimen: Urine, Clean Catch   Result Value Ref Range    Color, UA Yellow Yellow, Straw    Appearance, UA Cloudy (A) Clear    pH, UA 5.5 5.0 - 8.0    Specific Gravity, UA 1.013 1.005 - 1.030    Glucose, UA Negative Negative    Ketones, UA 15 mg/dL (1+) (A) Negative    Bilirubin, UA Negative Negative    Blood, UA Negative Negative    Protein, UA Trace (A) Negative    Leuk Esterase, UA Moderate (2+) (A) Negative    Nitrite, UA Negative Negative    Urobilinogen, UA 1.0 E.U./dL 0.2 - 1.0 E.U./dL   Sodium, Urine, Random - Urine, Clean Catch    Collection Time: 07/28/25  5:33 PM    Specimen: Urine, Clean Catch   Result Value Ref Range    Sodium, Urine <20 mmol/L   Osmolality, Urine - Urine, Clean Catch    Collection Time: 07/28/25  5:33 PM    Specimen: Urine, Clean Catch   Result Value Ref Range    Osmolality, Urine 363 mOsm/kg    Urinalysis, Microscopic Only - Urine, Clean Catch    Collection Time: 07/28/25  5:33 PM    Specimen: Urine, Clean Catch   Result Value Ref Range    RBC, UA 0-2 None Seen, 0-2 /HPF    WBC, UA 21-50 (A) None Seen, 0-2 /HPF    Bacteria, UA None Seen None Seen /HPF    Squamous Epithelial Cells, UA 7-12 (A) None Seen, 0-2 /HPF    Hyaline Casts, UA 21-30 None Seen /LPF    Methodology Manual Light Microscopy    STAT Lactic Acid, Reflex    Collection Time: 07/28/25  6:36 PM    Specimen: Blood   Result Value Ref Range    Lactate 1.4 0.5 - 2.0 mmol/L         RADIOLOGY  CT Abdomen Pelvis With Contrast  Result Date: 7/28/2025  CT ABDOMEN AND PELVIS WITH IV CONTRAST  HISTORY: 69-year-old female with intractable nausea and vomiting. Status post exploratory laparotomy and small bowel resection on 05/29/2025. Cholecystectomy in the past.  TECHNIQUE: Radiation dose reduction techniques were utilized, including automated exposure control and exposure modulation based on body size. 3 mm images were obtained through the abdomen and pelvis after the administration of IV contrast. Compared with prior CT abdomen/pelvis 07/14/2025.  FINDINGS: 1. There is no acute bowel abnormality. There is no bowel ileus or obstruction. No free fluid or fluid collection at the primary small bowel anastomosis within the pelvis, and there is no significant small bowel thickening. - There is no evidence for infectious colitis or colonic diverticulitis. - There is no appendicitis.  2. There is no acute abnormality at the liver and there is no biliary dilatation. Pancreas, spleen, adrenals, and kidneys appear unremarkable. Urinary bladder is nearly collapsed. Uterus and adnexa appear unremarkable.  3. There is no pneumonia or pleural effusions at the visualized lung bases.            MEDICATIONS GIVEN IN ER  Medications   sodium chloride 0.9 % flush 10 mL (has no administration in time range)   Potassium Replacement - Follow Nurse / BPA Driven Protocol (has  no administration in time range)   Magnesium Standard Dose Replacement - Follow Nurse / BPA Driven Protocol (has no administration in time range)   Phosphorus Replacement - Follow Nurse / BPA Driven Protocol (has no administration in time range)   Calcium Replacement - Follow Nurse / BPA Driven Protocol (has no administration in time range)   magnesium sulfate 2g/50 mL (PREMIX) infusion (0 g Intravenous Stopped 7/28/25 2112)     Followed by   magnesium sulfate 4g/100mL (PREMIX) infusion (4 g Intravenous New Bag 7/28/25 2113)   ondansetron (ZOFRAN) injection 4 mg (4 mg Intravenous Given 7/28/25 1642)   sodium chloride 0.9 % bolus 1,000 mL (0 mL Intravenous Stopped 7/28/25 1834)   morphine injection 4 mg (4 mg Intravenous Given 7/28/25 1723)   iopamidol (ISOVUE-300) 61 % injection 85 mL (85 mL Intravenous Given 7/28/25 1748)         ORDERS PLACED DURING THIS VISIT:  Orders Placed This Encounter   Procedures    CT Abdomen Pelvis With Contrast    [facility] Draw    Comprehensive Metabolic Panel    Lipase    Urinalysis With Microscopic If Indicated (No Culture) - Urine, Clean Catch    Lactic Acid, Plasma    CBC Auto Differential    STAT Lactic Acid, Reflex    Magnesium    Phosphorus    Osmolality, Serum    Sodium, Urine, Random - Urine, Clean Catch    Osmolality, Urine - Urine, Clean Catch    Urinalysis, Microscopic Only - Urine, Clean Catch    Magnesium    NPO Diet NPO Type: Strict NPO    Undress & Gown    [facility] (on-call MD unless specified) Details    Insert Peripheral IV    Initiate Observation Status    CBC & Differential    Green Top (Gel)    Lavender Top    Gold Top - SST    Light Blue Top         OUTPATIENT MEDICATION MANAGEMENT:  Current Facility-Administered Medications Ordered in Epic   Medication Dose Route Frequency Provider Last Rate Last Admin    Calcium Replacement - Follow Nurse / BPA Driven Protocol   Not Applicable PRScott Madrid MD        Magnesium Standard Dose Replacement - Follow Nurse / BPA  Driven Protocol   Not Applicable Scott Mendez MD        magnesium sulfate 4g/100mL (PREMIX) infusion  4 g Intravenous Q4H Scott Acuna MD   4 g at 07/28/25 2113    Phosphorus Replacement - Follow Nurse / BPA Driven Protocol   Not Applicable Scott Mendez MD        Potassium Replacement - Follow Nurse / BPA Driven Protocol   Not Applicable Scott Mendez MD        sodium chloride 0.9 % flush 10 mL  10 mL Intravenous PRScott Madrid MD         Current Outpatient Medications Ordered in Epic   Medication Sig Dispense Refill    acetaminophen (TYLENOL) 500 MG tablet Take 1 tablet by mouth Every 6 (Six) Hours As Needed for Mild Pain.      atorvastatin (LIPITOR) 40 MG tablet Take 1 tablet by mouth every night at bedtime.      diphenoxylate-atropine (Lomotil) 2.5-0.025 MG per tablet Take 1 tablet by mouth 3 (Three) Times a Day Before Meals. 90 tablet 2    loperamide (IMODIUM) 2 MG capsule Take 1 capsule by mouth 4 (Four) Times a Day Before Meals & at Bedtime. 120 capsule 6    loperamide (IMODIUM) 2 MG capsule Take 2 capsules by mouth 4 (Four) Times a Day Before Meals & at Bedtime. 240 capsule 2    mineral oil enema Insert 1 each into the rectum Daily. 4 each 0    ondansetron (Zofran) 4 MG tablet Take 1 tablet by mouth Daily As Needed for Nausea or Vomiting. 15 tablet 0    pantoprazole (Protonix) 40 MG EC tablet Take 1 tablet by mouth Daily. 90 tablet 1    psyllium (METAMUCIL) 58.6 % powder Take 1 packet by mouth Daily. 1040 g 3    traZODone (DESYREL) 150 MG tablet Take 2 tablets by mouth Every Night.           PROCEDURES  Procedures            PROGRESS, DATA ANALYSIS, CONSULTS, AND MEDICAL DECISION MAKING  All labs have been independently interpreted by me.  All radiology studies have been reviewed by me. All EKG's have been independently viewed and interpreted by me.  Discussion below represents my analysis of pertinent findings related to patient's condition, differential diagnosis,  treatment plan and final disposition.    Differential diagnosis includes but is not limited to electrolyte abnormality, dehydration, dumping syndrome, gastroenteritis, volvulus, mesenteric ischemia, biliary pathology, aortic pathology, colitis.    Clinical Scores:                                       ED Course as of 07/28/25 2132 Mon Jul 28, 2025   1620 Creatinine(!): 1.29 [MW]   1620 Sodium(!): 125 [MW]   1620 CO2(!): 17.7 [MW]   1620 Anion Gap(!): 19.3 [MW]   1726 Lactate(!!): 2.3 [MW]   1726 Magnesium(!!): 0.8 [MW]   2031 CT abdomen and pelvis interpreted by me and demonstrates No evidence of obstruction or free air [MW]   2032 Workup in the emergency department is notable for severe hypomagnesemia, hyponatremia, acidosis and mild elevation of lactic acid.  Patient has been treated with morphine, IV fluids, magnesium.  Radiological evaluation with no acute findings.  Will admit for further evaluation and management of intractable nausea and vomiting and electrolyte derangement. [MW]   2132 Discussed with Martine SONG with [facility] who agrees to admit [MW]      ED Course User Index  [MW] Scott Acuna MD             AS OF 21:32 EDT VITALS:    BP - 111/78  HR - 86  TEMP - 98 °F (36.7 °C) (Oral)  O2 SATS - 97%    COMPLEXITY OF CARE  The patient requires admission.      DIAGNOSIS  Final diagnoses:   Hyponatremia   Nausea and vomiting, unspecified vomiting type   Hypomagnesemia         DISPOSITION  ED Disposition       ED Disposition   Decision to Admit    Condition   --    Comment   Level of Care: Telemetry [5]   Diagnosis: Hyponatremia [[id number]]   Admitting Physician: ROSA MARIA JOHN [[id number]]   Attending Physician: ROSA MARIA JOHN [[id number]]   Is patient appropriate for Inpatient Observation Unit?: No [0]                  Please note that portions of this document were completed with a voice recognition program.    Note Disclaimer: At [facility], we believe that sharing information builds trust and better  relationships. You are receiving this note because you recently visited [facility]. It is possible you will see health information before a provider has talked with you about it. This kind of information can be easy to misunderstand. To help you fully understand what it means for your health, we urge you to discuss this note with your provider.         Scott Acuna MD  07/28/25 0305

## 2025-07-29 LAB
ADV 40+41 DNA STL QL NAA+NON-PROBE: NOT DETECTED
ALBUMIN SERPL-MCNC: 3.6 G/DL (ref 3.5–5.2)
ALBUMIN/GLOB SERPL: 1.1 G/DL
ALP SERPL-CCNC: 108 U/L (ref 39–117)
ALT SERPL W P-5'-P-CCNC: 25 U/L (ref 1–33)
ANION GAP SERPL CALCULATED.3IONS-SCNC: 15.4 MMOL/L (ref 5–15)
AST SERPL-CCNC: 30 U/L (ref 1–32)
ASTRO TYP 1-8 RNA STL QL NAA+NON-PROBE: NOT DETECTED
BILIRUB SERPL-MCNC: 1.5 MG/DL (ref 0–1.2)
BUN SERPL-MCNC: 19 MG/DL (ref 8–23)
BUN/CREAT SERPL: 18.4 (ref 7–25)
C CAYETANENSIS DNA STL QL NAA+NON-PROBE: NOT DETECTED
C COLI+JEJ+UPSA DNA STL QL NAA+NON-PROBE: NOT DETECTED
CALCIUM SPEC-SCNC: 8.4 MG/DL (ref 8.6–10.5)
CHLORIDE SERPL-SCNC: 95 MMOL/L (ref 98–107)
CO2 SERPL-SCNC: 19.6 MMOL/L (ref 22–29)
CREAT SERPL-MCNC: 1.03 MG/DL (ref 0.57–1)
CRYPTOSP DNA STL QL NAA+NON-PROBE: NOT DETECTED
DEPRECATED RDW RBC AUTO: 52.6 FL (ref 37–54)
E HISTOLYT DNA STL QL NAA+NON-PROBE: NOT DETECTED
EAEC PAA PLAS AGGR+AATA ST NAA+NON-PRB: NOT DETECTED
EC STX1+STX2 GENES STL QL NAA+NON-PROBE: NOT DETECTED
EGFRCR SERPLBLD CKD-EPI 2021: 58.3 ML/MIN/1.73
EPEC EAE GENE STL QL NAA+NON-PROBE: NOT DETECTED
ERYTHROCYTE [DISTWIDTH] IN BLOOD BY AUTOMATED COUNT: 15.8 % (ref 12.3–15.4)
ETEC LTA+ST1A+ST1B TOX ST NAA+NON-PROBE: NOT DETECTED
G LAMBLIA DNA STL QL NAA+NON-PROBE: NOT DETECTED
GLOBULIN UR ELPH-MCNC: 3.4 GM/DL
GLUCOSE SERPL-MCNC: 131 MG/DL (ref 65–99)
HCT VFR BLD AUTO: 37.1 % (ref 34–46.6)
HGB BLD-MCNC: 12.5 G/DL (ref 12–15.9)
MAGNESIUM SERPL-MCNC: 4.2 MG/DL (ref 1.6–2.4)
MCH RBC QN AUTO: 30.7 PG (ref 26.6–33)
MCHC RBC AUTO-ENTMCNC: 33.7 G/DL (ref 31.5–35.7)
MCV RBC AUTO: 91.2 FL (ref 79–97)
NOROVIRUS GI+II RNA STL QL NAA+NON-PROBE: NOT DETECTED
P SHIGELLOIDES DNA STL QL NAA+NON-PROBE: NOT DETECTED
PLATELET # BLD AUTO: 260 10*3/MM3 (ref 140–450)
PMV BLD AUTO: 9.6 FL (ref 6–12)
POTASSIUM SERPL-SCNC: 4.1 MMOL/L (ref 3.5–5.2)
PROT SERPL-MCNC: 7 G/DL (ref 6–8.5)
RBC # BLD AUTO: 4.07 10*6/MM3 (ref 3.77–5.28)
RVA RNA STL QL NAA+NON-PROBE: NOT DETECTED
S ENT+BONG DNA STL QL NAA+NON-PROBE: NOT DETECTED
SAPO I+II+IV+V RNA STL QL NAA+NON-PROBE: NOT DETECTED
SHIGELLA SP+EIEC IPAH ST NAA+NON-PROBE: NOT DETECTED
SODIUM SERPL-SCNC: 130 MMOL/L (ref 136–145)
V CHOL+PARA+VUL DNA STL QL NAA+NON-PROBE: NOT DETECTED
V CHOLERAE DNA STL QL NAA+NON-PROBE: NOT DETECTED
WBC NRBC COR # BLD AUTO: 6.68 10*3/MM3 (ref 3.4–10.8)
Y ENTEROCOL DNA STL QL NAA+NON-PROBE: NOT DETECTED

## 2025-07-29 PROCEDURE — 25010000002 PROCHLORPERAZINE 10 MG/2ML SOLUTION: Performed by: STUDENT IN AN ORGANIZED HEALTH CARE EDUCATION/TRAINING PROGRAM

## 2025-07-29 PROCEDURE — 25010000002 MAGNESIUM SULFATE 4 GM/100ML SOLUTION: Performed by: STUDENT IN AN ORGANIZED HEALTH CARE EDUCATION/TRAINING PROGRAM

## 2025-07-29 PROCEDURE — 80053 COMPREHEN METABOLIC PANEL: CPT | Performed by: NURSE PRACTITIONER

## 2025-07-29 PROCEDURE — 99024 POSTOP FOLLOW-UP VISIT: CPT | Performed by: STUDENT IN AN ORGANIZED HEALTH CARE EDUCATION/TRAINING PROGRAM

## 2025-07-29 PROCEDURE — 25010000002 ONDANSETRON PER 1 MG: Performed by: NURSE PRACTITIONER

## 2025-07-29 PROCEDURE — 25010000002 MORPHINE PER 10 MG: Performed by: HOSPITALIST

## 2025-07-29 PROCEDURE — 87507 IADNA-DNA/RNA PROBE TQ 12-25: CPT | Performed by: STUDENT IN AN ORGANIZED HEALTH CARE EDUCATION/TRAINING PROGRAM

## 2025-07-29 PROCEDURE — 83735 ASSAY OF MAGNESIUM: CPT | Performed by: STUDENT IN AN ORGANIZED HEALTH CARE EDUCATION/TRAINING PROGRAM

## 2025-07-29 PROCEDURE — 85027 COMPLETE CBC AUTOMATED: CPT | Performed by: NURSE PRACTITIONER

## 2025-07-29 PROCEDURE — 25810000003 SODIUM CHLORIDE 0.9 % SOLUTION: Performed by: STUDENT IN AN ORGANIZED HEALTH CARE EDUCATION/TRAINING PROGRAM

## 2025-07-29 PROCEDURE — 36415 COLL VENOUS BLD VENIPUNCTURE: CPT | Performed by: NURSE PRACTITIONER

## 2025-07-29 RX ORDER — DIPHENOXYLATE HYDROCHLORIDE AND ATROPINE SULFATE 2.5; .025 MG/1; MG/1
1 TABLET ORAL
Status: DISCONTINUED | OUTPATIENT
Start: 2025-07-29 | End: 2025-07-29

## 2025-07-29 RX ORDER — PANTOPRAZOLE SODIUM 40 MG/10ML
40 INJECTION, POWDER, LYOPHILIZED, FOR SOLUTION INTRAVENOUS
Status: DISCONTINUED | OUTPATIENT
Start: 2025-07-29 | End: 2025-07-31 | Stop reason: HOSPADM

## 2025-07-29 RX ORDER — SODIUM CHLORIDE 9 MG/ML
100 INJECTION, SOLUTION INTRAVENOUS CONTINUOUS
Status: ACTIVE | OUTPATIENT
Start: 2025-07-29 | End: 2025-07-30

## 2025-07-29 RX ORDER — PANTOPRAZOLE SODIUM 40 MG/1
40 TABLET, DELAYED RELEASE ORAL DAILY
Status: DISCONTINUED | OUTPATIENT
Start: 2025-07-29 | End: 2025-07-29

## 2025-07-29 RX ORDER — PROCHLORPERAZINE EDISYLATE 5 MG/ML
10 INJECTION INTRAMUSCULAR; INTRAVENOUS EVERY 6 HOURS PRN
Status: DISCONTINUED | OUTPATIENT
Start: 2025-07-29 | End: 2025-07-31 | Stop reason: HOSPADM

## 2025-07-29 RX ORDER — HYDROCODONE BITARTRATE AND ACETAMINOPHEN 5; 325 MG/1; MG/1
1 TABLET ORAL EVERY 4 HOURS PRN
Refills: 0 | Status: DISCONTINUED | OUTPATIENT
Start: 2025-07-29 | End: 2025-07-31 | Stop reason: HOSPADM

## 2025-07-29 RX ORDER — DIPHENOXYLATE HYDROCHLORIDE AND ATROPINE SULFATE 2.5; .025 MG/1; MG/1
2 TABLET ORAL
Status: DISCONTINUED | OUTPATIENT
Start: 2025-07-29 | End: 2025-07-31 | Stop reason: HOSPADM

## 2025-07-29 RX ORDER — LOPERAMIDE HYDROCHLORIDE 2 MG/1
4 CAPSULE ORAL
Status: DISCONTINUED | OUTPATIENT
Start: 2025-07-29 | End: 2025-07-31 | Stop reason: HOSPADM

## 2025-07-29 RX ORDER — MORPHINE SULFATE 2 MG/ML
4 INJECTION, SOLUTION INTRAMUSCULAR; INTRAVENOUS EVERY 4 HOURS PRN
Status: DISCONTINUED | OUTPATIENT
Start: 2025-07-29 | End: 2025-07-31 | Stop reason: HOSPADM

## 2025-07-29 RX ORDER — ATORVASTATIN CALCIUM 20 MG/1
40 TABLET, FILM COATED ORAL DAILY
Status: DISCONTINUED | OUTPATIENT
Start: 2025-07-29 | End: 2025-07-31 | Stop reason: HOSPADM

## 2025-07-29 RX ADMIN — ONDANSETRON 4 MG: 2 INJECTION, SOLUTION INTRAMUSCULAR; INTRAVENOUS at 09:35

## 2025-07-29 RX ADMIN — MAGNESIUM SULFATE HEPTAHYDRATE 4 G: 40 INJECTION, SOLUTION INTRAVENOUS at 01:50

## 2025-07-29 RX ADMIN — PROCHLORPERAZINE EDISYLATE 10 MG: 5 INJECTION INTRAMUSCULAR; INTRAVENOUS at 16:51

## 2025-07-29 RX ADMIN — DIPHENOXYLATE HYDROCHLORIDE AND ATROPINE SULFATE 2 TABLET: 2.5; .025 TABLET ORAL at 16:51

## 2025-07-29 RX ADMIN — ATORVASTATIN CALCIUM 40 MG: 20 TABLET, FILM COATED ORAL at 20:26

## 2025-07-29 RX ADMIN — LOPERAMIDE HYDROCHLORIDE 4 MG: 2 CAPSULE ORAL at 16:51

## 2025-07-29 RX ADMIN — PANTOPRAZOLE SODIUM 40 MG: 40 INJECTION, POWDER, FOR SOLUTION INTRAVENOUS at 16:51

## 2025-07-29 RX ADMIN — Medication 5 MG: at 20:26

## 2025-07-29 RX ADMIN — MORPHINE SULFATE 4 MG: 2 INJECTION, SOLUTION INTRAMUSCULAR; INTRAVENOUS at 16:51

## 2025-07-29 RX ADMIN — SODIUM CHLORIDE 100 ML/HR: 9 INJECTION, SOLUTION INTRAVENOUS at 14:53

## 2025-07-29 RX ADMIN — DIPHENOXYLATE HYDROCHLORIDE AND ATROPINE SULFATE 2 TABLET: 2.5; .025 TABLET ORAL at 20:26

## 2025-07-29 RX ADMIN — LOPERAMIDE HYDROCHLORIDE 4 MG: 2 CAPSULE ORAL at 20:26

## 2025-07-29 NOTE — ED NOTES
"Nursing report ED to floor  Samia Gaming  69 y.o.  female    HPI :  HPI  Stated Reason for Visit: n/v  History Obtained From: EMS, patient    Chief Complaint  Chief Complaint   Patient presents with    Vomiting       Admitting doctor:   Ze Cole MD    Admitting diagnosis:   The primary encounter diagnosis was Hyponatremia. Diagnoses of Nausea and vomiting, unspecified vomiting type and Hypomagnesemia were also pertinent to this visit.    Code status:   Current Code Status       Date Active Code Status Order ID Comments User Context       Prior            Allergies:   Patient has no known allergies.    Isolation:   No active isolations    Intake and Output    Intake/Output Summary (Last 24 hours) at 7/28/2025 2136  Last data filed at 7/28/2025 2112  Gross per 24 hour   Intake 650 ml   Output --   Net 650 ml       Weight:       07/28/25  1333   Weight: 107 kg (235 lb 14.3 oz)       Most recent vitals:   Vitals:    07/28/25 1333 07/28/25 2027   BP: 111/78    Pulse: 86    Resp: 16    Temp: 99.8 °F (37.7 °C) 98 °F (36.7 °C)   TempSrc:  Oral   SpO2: 97%    Weight: 107 kg (235 lb 14.3 oz)    Height: 157.5 cm (62\")        Active LDAs/IV Access:   Lines, Drains & Airways       Active LDAs       Name Placement date Placement time Site Days    Peripheral IV 07/28/25 1331 20 G Right Antecubital 07/28/25  1331  Antecubital  less than 1    Ileostomy Loop RUQ 05/29/25  --  RUQ  60                    Labs (abnormal labs have a star):   Labs Reviewed   COMPREHENSIVE METABOLIC PANEL - Abnormal; Notable for the following components:       Result Value    Glucose 114 (*)     BUN 26.0 (*)     Creatinine 1.29 (*)     Sodium 125 (*)     Chloride 88 (*)     CO2 17.7 (*)     Calcium 8.5 (*)     AST (SGOT) 37 (*)     Total Bilirubin 1.8 (*)     Anion Gap 19.3 (*)     eGFR 44.5 (*)     All other components within normal limits    Narrative:     GFR Categories in Chronic Kidney Disease (CKD)              GFR Category          GFR " (mL/min/1.73)    Interpretation  G1                    90 or greater        Normal or high (1)  G2                    60-89                Mild decrease (1)  G3a                   45-59                Mild to moderate decrease  G3b                   30-44                Moderate to severe decrease  G4                    15-29                Severe decrease  G5                    14 or less           Kidney failure    (1)In the absence of evidence of kidney disease, neither GFR category G1 or G2 fulfill the criteria for CKD.    eGFR calculation 2021 CKD-EPI creatinine equation, which does not include race as a factor   URINALYSIS W/ MICROSCOPIC IF INDICATED (NO CULTURE) - Abnormal; Notable for the following components:    Appearance, UA Cloudy (*)     Ketones, UA 15 mg/dL (1+) (*)     Protein, UA Trace (*)     Leuk Esterase, UA Moderate (2+) (*)     All other components within normal limits   LACTIC ACID, PLASMA - Abnormal; Notable for the following components:    Lactate 2.3 (*)     All other components within normal limits   MAGNESIUM - Abnormal; Notable for the following components:    Magnesium 0.8 (*)     All other components within normal limits   OSMOLALITY, SERUM - Abnormal; Notable for the following components:    Osmolality 273 (*)     All other components within normal limits   URINALYSIS, MICROSCOPIC ONLY - Abnormal; Notable for the following components:    WBC, UA 21-50 (*)     Squamous Epithelial Cells, UA 7-12 (*)     All other components within normal limits   LIPASE - Normal   CBC WITH AUTO DIFFERENTIAL - Normal   LACTIC ACID, REFLEX - Normal   PHOSPHORUS - Normal   RAINBOW DRAW    Narrative:     The following orders were created for panel order [place] Draw.  Procedure                               Abnormality         Status                     ---------                               -----------         ------                     Green Top (Gel)[[id number]]                                  Final  result               Lavender Top[[id number]]                                     Final result               Gold Top - SST[[id number]]                                   Final result               Light Blue Top[[id number]]                                   Final result                 Please view results for these tests on the individual orders.   SODIUM, URINE, RANDOM    Narrative:     Reference intervals for random urine have not been established.  Clinical usage is dependent upon physician's interpretation in combination with other laboratory tests.      OSMOLALITY, URINE    Narrative:     Osmo Normal Reference Ranges:    Random:  mOsm/kg H2O, depending on fluid intake.  Random: >850 mOsm/kg H20, after 12 hour fluid restriction.    24 Hour: 300-900 mOsm/kg H2O.   CBC AND DIFFERENTIAL    Narrative:     The following orders were created for panel order CBC & Differential.  Procedure                               Abnormality         Status                     ---------                               -----------         ------                     CBC Auto Differential[[id number]]        Normal              Final result                 Please view results for these tests on the individual orders.   GREEN TOP   LAVENDER TOP   GOLD TOP - SST   LIGHT BLUE TOP       EKG:   No orders to display       Meds given in ED:   Medications   sodium chloride 0.9 % flush 10 mL (has no administration in time range)   Potassium Replacement - Follow Nurse / BPA Driven Protocol (has no administration in time range)   Magnesium Standard Dose Replacement - Follow Nurse / BPA Driven Protocol (has no administration in time range)   Phosphorus Replacement - Follow Nurse / BPA Driven Protocol (has no administration in time range)   Calcium Replacement - Follow Nurse / BPA Driven Protocol (has no administration in time range)   magnesium sulfate 2g/50 mL (PREMIX) infusion (0 g Intravenous Stopped 7/28/25 2112)     Followed by   magnesium  sulfate 4g/100mL (PREMIX) infusion (4 g Intravenous New Bag 7/28/25 2113)   ondansetron (ZOFRAN) injection 4 mg (4 mg Intravenous Given 7/28/25 1642)   sodium chloride 0.9 % bolus 1,000 mL (0 mL Intravenous Stopped 7/28/25 1834)   morphine injection 4 mg (4 mg Intravenous Given 7/28/25 1723)   iopamidol (ISOVUE-300) 61 % injection 85 mL (85 mL Intravenous Given 7/28/25 1748)       Imaging results:  No radiology results for the last day    Ambulatory status:   - Standby    Social issues:   Social History     Socioeconomic History    Marital status: Single   Tobacco Use    Smoking status: Never    Smokeless tobacco: Never   Vaping Use    Vaping status: Never Used   Substance and Sexual Activity    Alcohol use: Never    Drug use: Never    Sexual activity: Defer     Partners: Male       Peripheral Neurovascular  Peripheral Neurovascular (Adult)  Peripheral Neurovascular WDL: WDL    Neuro Cognitive  Neuro Cognitive (Adult)  Cognitive/Neuro/Behavioral WDL: WDL, level of consciousness, orientation  Level of Consciousness: Alert  Orientation: oriented x 4    Learning  Learning Assessment  Learning Readiness and Ability: no barriers identified    Respiratory  Respiratory WDL  Respiratory WDL: WDL    Abdominal Pain       Pain Assessments  Pain (Adult)  (0-10) Pain Rating: Rest: 8  (0-10) Pain Rating: Activity: 8  Pain Location: abdomen  Pain Side/Orientation: generalized    NIH Stroke Scale       Nery Jerome RN  07/28/25 21:36 EDT

## 2025-07-29 NOTE — NURSING NOTE
"   07/29/25 1116   Ileostomy Loop RUQ   Placement date: If unknown, DO NOT use \"Add Comment\" note: 05/29/25   Inserted by: Dr Ny  Ileostomy Type: Loop  Location: RUQ   Stomal Appliance 2 piece;Clean;Dry;Intact;Changed;Drainable   Stoma Appearance round;moist;red   Peristomal Assessment Excoriation   Accessories/Skin Care convex wafer;cleansed with water;skin barrier ring   Stoma Function stool   Stool Color green   Stool Consistency liquid   Treatment Bag change;Site care     Reason for Visit: WOC Team consult for Ileostomy pouch change.     Treatment Plan/Recommendations: Patient to ED for dehydration. States she hasn't felt like eating lately. Her stool is liquid. Discussed regarding taking medication to thicken stool , states she takes imodium. Explained she may need to take lomotil . Discussed foods to thicken the stool. Laura stomal skin is very excoriated. States she changes her pouch every 3 days. [place] applied to skin. Placed in 2 3/4 2 piece tristan convex with ring. She could wear a 2 1/4 convex flange.       Wound Team Follow up Plan: One extra pouch left in room. Plan to change pouch 2 times per week, Tuesday and Friday.     "

## 2025-07-29 NOTE — CONSULTS
General Surgery History and Physical      Summary:    Samia Gaming is a 69 y.o. lady who underwent small bowel resection and loop ileostomy for bleeding small bowel diverticulosis on 5/29 presenting with acute kidney injury, high ileostomy output, nausea and vomiting.  This has been an ongoing problem and required adjustment of antisecretory regimen.  She is scheduled for ileostomy reversal in early September.  She is also has associated skin maceration around her stoma from thin output.  I recommend clear liquid diet for right now, IV fluid resuscitation and I will increase her antimotility regimen to include PPI twice daily, loperamide 4 mg 4 times daily, and lomotil 5 mg 4 times daily, placement, as well as peristomal skin barrier measures.  Will check a GI panel.  If unable to get and maintain control of her ileostomy output, may need to go ahead and reverse her ileostomy, however we will try maximal medical management first.      History of Present Illness:    She has had thin high volume ileostomy output and is unclear if she has been taking her Lomotil at home.  She is also developed some skin excoriation around her stoma site.  She had some nausea and vomiting yesterday as well as dark urine consistent with dehydration, and was unable to take any of her antisecondary medications.  She denies fevers but does report having some chills.  She has had some sick contacts, her grandchildren had gastroenteritis of sorts.    Past Medical History:   Past Medical History:   Diagnosis Date    Arthritis     Elevated cholesterol           Past Surgical History:    Past Surgical History:   Procedure Laterality Date    CHOLECYSTECTOMY  2005    COLONOSCOPY      COLONOSCOPY N/A 5/24/2025    Procedure: COLONOSCOPY TO CECUM AND TERMINAL ILEUM AT BEDSIDE;  Surgeon: Bi Hutson MD;  Location: [facility] ENDOSCOPY;  Service: Gastroenterology;  Laterality: N/A;  PRE- GI BLEED  POST- DIVERTICULOSIS, HEMORRHOIDS    ENDOSCOPY N/A  5/23/2025    Procedure: ESOPHAGOGASTRODUODENOSCOPY;  Surgeon: Keyur Neely MD;  Location: [facility] ENDOSCOPY;  Service: Gastroenterology;  Laterality: N/A;  pre: GI bleed  post:errosive gastritis, duodenitis    ENTEROSCOPY SMALL BOWEL N/A 5/26/2025    Procedure: ENTEROSCOPY SMALL BOWEL at bedside;  Surgeon: Justin Garza MD;  Location: [facility] ENDOSCOPY;  Service: Gastroenterology;  Laterality: N/A;  pre- melena  post- mild gastric antrum erosion    EXPLORATORY LAPAROTOMY N/A 5/29/2025    Procedure: LAPAROTOMY EXPLORATORY, SMALL BOWEL RESECTION DISTAL AND PROXIMAL, ILEOSTOMY;  Surgeon: Jose G Ny MD;  Location: [facility] MAIN OR;  Service: General;  Laterality: N/A;        Family History:    Family History   Family history unknown: Yes            Medications:   Reviewed in the chart      Laboratory Results  Creatinine 1.03 from 1.29, BUN 19 from 26, HCO3 19.6 from 17.7, chloride 95 from 88, sodium 130 from 125, magnesium 4.2 from 0.8  Lactic acid 1.4 from 2.3  WBC 6.68, hemoglobin 12.5, platelets 260    Urinalysis relatively unremarkable    Radiology  I have personally reviewed CT scan of the abdomen pelvis demonstrating normal bowel gas pattern with no evidence of obstruction.  There is no fluid collection or free air.      Physical Exam:   Vitals:    07/29/25 1151   BP: 119/66   Pulse:    Resp:    Temp: 97.8 °F (36.6 °C)   SpO2:       General: not sick, awake and alert  Cardiac: normal rate, no JVD, no peripheral edema  Respiratory: nonlabored breathing on room air  Abdomen: soft, minimal tenderness to deep palpation in upper and lower abdomen, nondistended, no guarding, incision well-healed, peristomal skin maceration and excoriation, ileostomy productive of thin liquid output    Body mass index is 42.22 kg/m².       Jose G Ny M.D.  General Surgery  [facility]    [address], [address]  [place], [place], [address]  P: [phone]  F: [phone]

## 2025-07-29 NOTE — H&P
HISTORY AND PHYSICAL   [facility]        Patient Identification:  Name: Samia Gaming  Age: 69 y.o.  Sex: female  :  1955  MRN: [id number]                     Primary Care Physician: Cristiano Logan DO    Chief Complaint: Abdominal pain with nausea and vomiting    History of Present Illness:      The patient is a 69 y.o. female with a medical history of GI bleed, chronic abdominal pain who presents to the hospital complaining of abdominal pain, nausea, vomiting, generalized malaise.  Patient has recently been hospitalized with similar symptoms and was hyponatremic at that time.  That improved after fluid administration.  Patient had been seen by general surgery and started on new medications.  Patient states she initially felt better after discharge but has gone downhill again with worsening abdominal pain, significant ostomy output, nausea and vomiting.  Patient has had chills but no fevers.  The patient was admitted to the hospital  for further evaluation and treatment of abdominal pain with nausea and vomiting.    Past Medical History:  Past Medical History:   Diagnosis Date    Arthritis     Elevated cholesterol      Past Surgical History:  Past Surgical History:   Procedure Laterality Date    CHOLECYSTECTOMY      COLONOSCOPY      COLONOSCOPY N/A 2025    Procedure: COLONOSCOPY TO CECUM AND TERMINAL ILEUM AT BEDSIDE;  Surgeon: Bi Hutson MD;  Location: [facility];  Service: Gastroenterology;  Laterality: N/A;  PRE- GI BLEED  POST- DIVERTICULOSIS, HEMORRHOIDS    ENDOSCOPY N/A 2025    Procedure: ESOPHAGOGASTRODUODENOSCOPY;  Surgeon: Keyur Neely MD;  Location: [facility];  Service: Gastroenterology;  Laterality: N/A;  pre: GI bleed  post:errosive gastritis, duodenitis    ENTEROSCOPY SMALL BOWEL N/A 2025    Procedure: ENTEROSCOPY SMALL BOWEL at bedside;  Surgeon: Justin Garza MD;  Location: [facility];  Service: Gastroenterology;   Laterality: N/A;  pre- melena  post- mild gastric antrum erosion    EXPLORATORY LAPAROTOMY N/A 5/29/2025    Procedure: LAPAROTOMY EXPLORATORY, SMALL BOWEL RESECTION DISTAL AND PROXIMAL, ILEOSTOMY;  Surgeon: Jose G Ny MD;  Location: [facility] MAIN OR;  Service: General;  Laterality: N/A;      Home Meds:  Medications Prior to Admission   Medication Sig Dispense Refill Last Dose/Taking    acetaminophen (TYLENOL) 500 MG tablet Take 1 tablet by mouth Every 6 (Six) Hours As Needed for Mild Pain.   Taking As Needed    atorvastatin (LIPITOR) 40 MG tablet Take 1 tablet by mouth every night at bedtime.   Taking    diphenoxylate-atropine (Lomotil) 2.5-0.025 MG per tablet Take 1 tablet by mouth 3 (Three) Times a Day Before Meals. 90 tablet 2 Taking    loperamide (IMODIUM) 2 MG capsule Take 2 capsules by mouth 4 (Four) Times a Day Before Meals & at Bedtime. 240 capsule 2 Taking    mineral oil enema Insert 1 each into the rectum Daily. (Patient taking differently: Insert 1 each into the rectum Daily As Needed for Constipation.) 4 each 0 Taking Differently    ondansetron (Zofran) 4 MG tablet Take 1 tablet by mouth Daily As Needed for Nausea or Vomiting. (Patient taking differently: Take 1 tablet by mouth Every 8 (Eight) Hours As Needed for Nausea or Vomiting.) 15 tablet 0 Taking Differently    pantoprazole (Protonix) 40 MG EC tablet Take 1 tablet by mouth Daily. 90 tablet 1 Taking    psyllium (METAMUCIL) 58.6 % powder Take 1 packet by mouth Daily. 1040 g 3 Taking    traZODone (DESYREL) 150 MG tablet Take 2 tablets by mouth Every Night.   Taking     Current meds    Current Facility-Administered Medications:     acetaminophen (TYLENOL) tablet 650 mg, 650 mg, Oral, Q4H PRN **OR** acetaminophen (TYLENOL) 160 MG/5ML oral solution 650 mg, 650 mg, Oral, Q4H PRN **OR** acetaminophen (TYLENOL) suppository 650 mg, 650 mg, Rectal, Q4H PRN, Martine Carlos APRN    sennosides-docusate (PERICOLACE) 8.6-50 MG per tablet 2 tablet, 2  tablet, Oral, BID PRN **AND** polyethylene glycol (MIRALAX) packet 17 g, 17 g, Oral, Daily PRN **AND** bisacodyl (DULCOLAX) EC tablet 5 mg, 5 mg, Oral, Daily PRN **AND** bisacodyl (DULCOLAX) suppository 10 mg, 10 mg, Rectal, Daily PRN, Martine Carlos APRN    Calcium Replacement - Follow Nurse / BPA Driven Protocol, , Not Applicable, PRArmand MASON Matthew J, MD    famotidine (PEPCID) tablet 20 mg, 20 mg, Oral, BID PRN, Martine Carlos APRN    Magnesium Standard Dose Replacement - Follow Nurse / BPA Driven Protocol, , Not Applicable, PRMARLON, Scott Acuna MD    melatonin tablet 5 mg, 5 mg, Oral, Nightly, Martine Carlos APRN    nitroglycerin (NITROSTAT) SL tablet 0.4 mg, 0.4 mg, Sublingual, Q5 Min PRN, Martine Carlos APRN    ondansetron (ZOFRAN) injection 4 mg, 4 mg, Intravenous, Q6H PRN, Martine Carlos APRN, 4 mg at 07/29/25 0935    Phosphorus Replacement - Follow Nurse / BPA Driven Protocol, , Not Applicable, PRMARLON, Scott Acuna MD    Potassium Replacement - Follow Nurse / BPA Driven Protocol, , Not Applicable, Armand DOWELL Matthew J, MD    sodium chloride 0.9 % flush 10 mL, 10 mL, Intravenous, PRN, Scott Acuna MD    sodium chloride 0.9 % flush 10 mL, 10 mL, Intravenous, Q12H, Martine Carlos APRN, 10 mL at 07/28/25 1172    sodium chloride 0.9 % flush 10 mL, 10 mL, Intravenous, PRN, Martine Carlos APRN    sodium chloride 0.9 % infusion 40 mL, 40 mL, Intravenous, PRN, Martine Carlos APRN  Allergies:  No Known Allergies  Immunizations:  Immunization History   Administered Date(s) Administered    COVID-19 (PFIZER) Purple Cap Monovalent 01/14/2021, 02/04/2021, 11/12/2021    Pneumococcal Conjugate 20-Valent (PCV20) 05/13/2025    Tdap 06/10/2013, 05/13/2025     Social History:   Social History     Social History Narrative    Not on file     Social History     Socioeconomic History    Marital status: Single   Tobacco Use    Smoking status: Never    Smokeless tobacco:  "Never   Vaping Use    Vaping status: Never Used   Substance and Sexual Activity    Alcohol use: Never    Drug use: Never    Sexual activity: Defer     Partners: Male       Family History:  Family History   Family history unknown: Yes        Review of Systems  See history of present illness and past medical history.  Patient denies headache, dizziness, syncope, falls, trauma, change in vision, change in hearing, change in taste, changes in weight, changes in appetite, focal weakness, numbness, or paresthesia.  Patient denies chest pain, palpitations, dyspnea, orthopnea, PND, cough, sinus pressure, rhinorrhea, epistaxis, hemoptysis, hematemesis, diarrhea, constipation or hematochezia. Denies cold or heat intolerance, polydipsia, polyuria, polyphagia. Denies hematuria, pyuria, dysuria, hesitancy, frequency or urgency.   Denies fever, chills, sweats, night sweats.  Denies missing any routine medications. Remainder of ROS is negative.    Objective:  tMax 24 hrs: Temp (24hrs), Av.2 °F (36.8 °C), Min:97.5 °F (36.4 °C), Max:99.8 °F (37.7 °C)    Vitals Ranges:   Temp:  [97.5 °F (36.4 °C)-99.8 °F (37.7 °C)] 97.8 °F (36.6 °C)  Heart Rate:  [69-86] 74  Resp:  [14-16] 14  BP: (106-139)/(59-78) 119/66      Exam:  /66   Pulse 74   Temp 97.8 °F (36.6 °C)   Resp 14   Ht 157.5 cm (62\")   Wt 105 kg (230 lb 13.2 oz)   SpO2 100%   BMI 42.22 kg/m²     General Appearance:    Alert, cooperative, no distress, appears stated age   Head:    Normocephalic, without obvious abnormality, atraumatic   Eyes:    PERRL, conjunctivae/corneas clear, EOM's intact, both eyes   Ears:    Normal external ear canals, both ears   Nose:   Nares normal, septum midline, mucosa normal, no drainage    or sinus tenderness   Throat:   Lips, mucosa, and tongue normal   Neck:   Supple, symmetrical, trachea midline, no adenopathy;     thyroid:  no enlargement/tenderness/nodules; no carotid    bruit or JVD   Back:     Symmetric, no curvature, ROM " normal, no CVA tenderness   Lungs:     Clear to auscultation bilaterally, respirations unlabored   Chest Wall:    No tenderness or deformity    Heart:    Regular rate and rhythm, S1 and S2 normal, no murmur, rub   or gallop   Abdomen:     Soft, ileostomy present, mild diffuse tenderness, bowel sounds active all four quadrants,     no masses, no hepatomegaly, no splenomegaly   Extremities:   Extremities normal, atraumatic, no cyanosis or edema   Pulses:   2+ and symmetric all extremities   Skin:   Skin color, texture, turgor normal, no rashes or lesions   Lymph nodes:   Cervical, supraclavicular, and axillary nodes normal   Neurologic:   CNII-XII intact, normal strength, sensation intact throughout      .    Data Review:  Lab Results (last 72 hours)       Procedure Component Value Units Date/Time    Magnesium [[id number]]  (Abnormal) Collected: 07/29/25 0855    Specimen: Blood Updated: 07/29/25 1031     Magnesium 4.2 mg/dL     Comprehensive Metabolic Panel [[id number]]  (Abnormal) Collected: 07/29/25 0855    Specimen: Blood Updated: 07/29/25 1031     Glucose 131 mg/dL      BUN 19.0 mg/dL      Creatinine 1.03 mg/dL      Sodium 130 mmol/L      Potassium 4.1 mmol/L      Comment: Slight hemolysis detected by analyzer. Result may be falsely elevated.        Chloride 95 mmol/L      CO2 19.6 mmol/L      Calcium 8.4 mg/dL      Total Protein 7.0 g/dL      Albumin 3.6 g/dL      ALT (SGPT) 25 U/L      AST (SGOT) 30 U/L      Comment: Slight hemolysis detected by analyzer. Result may be falsely elevated.        Alkaline Phosphatase 108 U/L      Total Bilirubin 1.5 mg/dL      Globulin 3.4 gm/dL      A/G Ratio 1.1 g/dL      BUN/Creatinine Ratio 18.4     Anion Gap 15.4 mmol/L      eGFR 58.3 mL/min/1.73     Narrative:      GFR Categories in Chronic Kidney Disease (CKD)              GFR Category          GFR (mL/min/1.73)    Interpretation  G1                    90 or greater        Normal or high (1)  G2                    60-89                 Mild decrease (1)  G3a                   45-59                Mild to moderate decrease  G3b                   30-44                Moderate to severe decrease  G4                    15-29                Severe decrease  G5                    14 or less           Kidney failure    (1)In the absence of evidence of kidney disease, neither GFR category G1 or G2 fulfill the criteria for CKD.    eGFR calculation 2021 CKD-EPI creatinine equation, which does not include race as a factor    CBC (No Diff) [[id number]]  (Abnormal) Collected: 07/29/25 0855    Specimen: Blood Updated: 07/29/25 1007     WBC 6.68 10*3/mm3      RBC 4.07 10*6/mm3      Hemoglobin 12.5 g/dL      Hematocrit 37.1 %      MCV 91.2 fL      MCH 30.7 pg      MCHC 33.7 g/dL      RDW 15.8 %      RDW-SD 52.6 fl      MPV 9.6 fL      Platelets 260 10*3/mm3     STAT Lactic Acid, Reflex [[id number]]  (Normal) Collected: 07/28/25 1836    Specimen: Blood Updated: 07/28/25 1924     Lactate 1.4 mmol/L     Osmolality, Urine - Urine, Clean Catch [[id number]] Collected: 07/28/25 1733    Specimen: Urine, Clean Catch Updated: 07/28/25 1847     Osmolality, Urine 363 mOsm/kg     Narrative:      Osmo Normal Reference Ranges:    Random:  mOsm/kg H2O, depending on fluid intake.  Random: >850 mOsm/kg H20, after 12 hour fluid restriction.    24 Hour: 300-900 mOsm/kg H2O.    Urinalysis, Microscopic Only - Urine, Clean Catch [[id number]]  (Abnormal) Collected: 07/28/25 1733    Specimen: Urine, Clean Catch Updated: 07/28/25 1846     RBC, UA 0-2 /HPF      WBC, UA 21-50 /HPF      Bacteria, UA None Seen /HPF      Squamous Epithelial Cells, UA 7-12 /HPF      Hyaline Casts, UA 21-30 /LPF      Methodology Manual Light Microscopy    Urinalysis With Microscopic If Indicated (No Culture) - Urine, Clean Catch [[id number]]  (Abnormal) Collected: 07/28/25 1733    Specimen: Urine, Clean Catch Updated: 07/28/25 1809     Color, UA Yellow     Appearance, UA Cloudy     pH, UA 5.5      Specific Gravity, UA 1.013     Glucose, UA Negative     Ketones, UA 15 mg/dL (1+)     Bilirubin, UA Negative     Blood, UA Negative     Protein, UA Trace     Leuk Esterase, UA Moderate (2+)     Nitrite, UA Negative     Urobilinogen, UA 1.0 E.U./dL    Sodium, Urine, Random - Urine, Clean Catch [[id number]] Collected: 07/28/25 1733    Specimen: Urine, Clean Catch Updated: 07/28/25 1754     Sodium, Urine <20 mmol/L     Narrative:      Reference intervals for random urine have not been established.  Clinical usage is dependent upon physician's interpretation in combination with other laboratory tests.       Magnesium [[id number]]  (Abnormal) Collected: 07/28/25 1357    Specimen: Blood from Arm, Right Updated: 07/28/25 1718     Magnesium 0.8 mg/dL     Phosphorus [[id number]]  (Normal) Collected: 07/28/25 1357    Specimen: Blood from Arm, Right Updated: 07/28/25 1642     Phosphorus 3.1 mg/dL     Osmolality, Serum [[id number]]  (Abnormal) Collected: 07/28/25 1357    Specimen: Blood from Arm, Right Updated: 07/28/25 1632     Osmolality 273 mOsm/kg     Lactic Acid, Plasma [[id number]]  (Abnormal) Collected: 07/28/25 1357    Specimen: Blood from Arm, Right Updated: 07/28/25 1431     Lactate 2.3 mmol/L     Lipase [[id number]]  (Normal) Collected: 07/28/25 1357    Specimen: Blood from Arm, Right Updated: 07/28/25 1428     Lipase 54 U/L     Comprehensive Metabolic Panel [[id number]]  (Abnormal) Collected: 07/28/25 1357    Specimen: Blood from Arm, Right Updated: 07/28/25 1428     Glucose 114 mg/dL      BUN 26.0 mg/dL      Creatinine 1.29 mg/dL      Sodium 125 mmol/L      Potassium 4.5 mmol/L      Chloride 88 mmol/L      CO2 17.7 mmol/L      Calcium 8.5 mg/dL      Total Protein 7.5 g/dL      Albumin 4.0 g/dL      ALT (SGPT) 32 U/L      AST (SGOT) 37 U/L      Alkaline Phosphatase 117 U/L      Total Bilirubin 1.8 mg/dL      Globulin 3.5 gm/dL      A/G Ratio 1.1 g/dL      BUN/Creatinine Ratio 20.2     Anion Gap 19.3 mmol/L      eGFR 44.5  mL/min/1.73     Narrative:      GFR Categories in Chronic Kidney Disease (CKD)              GFR Category          GFR (mL/min/1.73)    Interpretation  G1                    90 or greater        Normal or high (1)  G2                    60-89                Mild decrease (1)  G3a                   45-59                Mild to moderate decrease  G3b                   30-44                Moderate to severe decrease  G4                    15-29                Severe decrease  G5                    14 or less           Kidney failure    (1)In the absence of evidence of kidney disease, neither GFR category G1 or G2 fulfill the criteria for CKD.    eGFR calculation 2021 CKD-EPI creatinine equation, which does not include race as a factor    [place] Draw [[id number]] Collected: 07/28/25 1357    Specimen: Blood from Arm, Right Updated: 07/28/25 1415    Narrative:      The following orders were created for panel order [place] Draw.  Procedure                               Abnormality         Status                     ---------                               -----------         ------                     Green Top (Gel)[[id number]]                                  Final result               Lavender Top[[id number]]                                     Final result               Gold Top - SST[[id number]]                                   Final result               Light Blue Top[[id number]]                                   Final result                 Please view results for these tests on the individual orders.    Light Blue Top [[id number]] Collected: 07/28/25 1357    Specimen: Blood from Arm, Right Updated: 07/28/25 1415     Extra Tube Hold for add-ons.     Comment: Auto resulted       Green Top (Gel) [[id number]] Collected: 07/28/25 1357    Specimen: Blood from Arm, Right Updated: 07/28/25 1415     Extra Tube Hold for add-ons.     Comment: Auto resulted.       Lavender Top [[id number]] Collected: 07/28/25 1357    Specimen: Blood  from Arm, Right Updated: 07/28/25 1415     Extra Tube hold for add-on     Comment: Auto resulted       Gold Top - SST [[id number]] Collected: 07/28/25 1357    Specimen: Blood from Arm, Right Updated: 07/28/25 1415     Extra Tube Hold for add-ons.     Comment: Auto resulted.       CBC & Differential [[id number]]  (Normal) Collected: 07/28/25 1357    Specimen: Blood from Arm, Right Updated: 07/28/25 1409    Narrative:      The following orders were created for panel order CBC & Differential.  Procedure                               Abnormality         Status                     ---------                               -----------         ------                     CBC Auto Differential[[id number]]        Normal              Final result                 Please view results for these tests on the individual orders.    CBC Auto Differential [[id number]]  (Normal) Collected: 07/28/25 1357    Specimen: Blood from Arm, Right Updated: 07/28/25 1409     WBC 6.50 10*3/mm3      RBC 4.33 10*6/mm3      Hemoglobin 13.0 g/dL      Hematocrit 39.3 %      MCV 90.8 fL      MCH 30.0 pg      MCHC 33.1 g/dL      RDW 15.0 %      RDW-SD 49.2 fl      MPV 9.4 fL      Platelets 297 10*3/mm3      Neutrophil % 68.5 %      Lymphocyte % 20.8 %      Monocyte % 9.4 %      Eosinophil % 0.6 %      Basophil % 0.5 %      Immature Grans % 0.2 %      Neutrophils, Absolute 4.46 10*3/mm3      Lymphocytes, Absolute 1.35 10*3/mm3      Monocytes, Absolute 0.61 10*3/mm3      Eosinophils, Absolute 0.04 10*3/mm3      Basophils, Absolute 0.03 10*3/mm3      Immature Grans, Absolute 0.01 10*3/mm3      nRBC 0.0 /100 WBC                      Imaging Results (All)       Procedure Component Value Units Date/Time    CT Abdomen Pelvis With Contrast [[id number]] Collected: 07/28/25 1832     Updated: 07/29/25 0629    Narrative:      CT ABDOMEN AND PELVIS WITH IV CONTRAST     HISTORY: 69-year-old female with intractable nausea and vomiting. Status  post exploratory laparotomy and  small bowel resection on 05/29/2025.  Cholecystectomy in the past.     TECHNIQUE: Radiation dose reduction techniques were utilized, including  automated exposure control and exposure modulation based on body size.   3 mm images were obtained through the abdomen and pelvis after the  administration of IV contrast. Compared with prior CT abdomen/pelvis  07/14/2025.     FINDINGS:  1. There is no acute bowel abnormality. There is no bowel ileus or  obstruction. No free fluid or fluid collection at the primary small  bowel anastomosis within the pelvis, and there is no significant small  bowel thickening.  - There is no evidence for infectious colitis or colonic diverticulitis.  - There is no appendicitis.     2. There is no acute abnormality at the liver and there is no biliary  dilatation. Pancreas, spleen, adrenals, and kidneys appear unremarkable.  Urinary bladder is nearly collapsed. Uterus and adnexa appear  unremarkable.     3. There is no pneumonia or pleural effusions at the visualized lung  bases.           This report was finalized on 7/29/2025 6:26 AM by Dr. Meghan Reina M.D on  Workstation: [id number]             Past Medical History:   Diagnosis Date    Arthritis     Elevated cholesterol        Assessment:  Active Hospital Problems    Diagnosis  POA    **Hyponatremia [E87.1]  Yes    N&V (nausea and vomiting) [R11.2]  Yes    Hypomagnesemia [E83.42]  Yes    Metabolic acidosis [E87.20]  Yes    TREVA (acute kidney injury) [N17.9]  Yes    Abdominal pain [R10.9]  Yes      Resolved Hospital Problems   No resolved problems to display.       Plan:  The patient is admitted to the hospital and will consult her general surgeon.  Will give some IV fluid for hydration and medication for pain and nausea.  Follow-up on labs.    Cirilo Delacruz MD  7/29/2025  12:06 EDT

## 2025-07-30 LAB
ANION GAP SERPL CALCULATED.3IONS-SCNC: 11 MMOL/L (ref 5–15)
BASOPHILS # BLD AUTO: 0.04 10*3/MM3 (ref 0–0.2)
BASOPHILS NFR BLD AUTO: 0.7 % (ref 0–1.5)
BUN SERPL-MCNC: 10 MG/DL (ref 8–23)
BUN/CREAT SERPL: 14.3 (ref 7–25)
CALCIUM SPEC-SCNC: 8 MG/DL (ref 8.6–10.5)
CHLORIDE SERPL-SCNC: 103 MMOL/L (ref 98–107)
CO2 SERPL-SCNC: 18 MMOL/L (ref 22–29)
CREAT SERPL-MCNC: 0.7 MG/DL (ref 0.57–1)
DEPRECATED RDW RBC AUTO: 54.4 FL (ref 37–54)
EGFRCR SERPLBLD CKD-EPI 2021: 92.6 ML/MIN/1.73
EOSINOPHIL # BLD AUTO: 0.16 10*3/MM3 (ref 0–0.4)
EOSINOPHIL NFR BLD AUTO: 2.7 % (ref 0.3–6.2)
ERYTHROCYTE [DISTWIDTH] IN BLOOD BY AUTOMATED COUNT: 14.9 % (ref 12.3–15.4)
GLUCOSE SERPL-MCNC: 107 MG/DL (ref 65–99)
HCT VFR BLD AUTO: 35.4 % (ref 34–46.6)
HGB BLD-MCNC: 11 G/DL (ref 12–15.9)
IMM GRANULOCYTES # BLD AUTO: 0.01 10*3/MM3 (ref 0–0.05)
IMM GRANULOCYTES NFR BLD AUTO: 0.2 % (ref 0–0.5)
LYMPHOCYTES # BLD AUTO: 1 10*3/MM3 (ref 0.7–3.1)
LYMPHOCYTES NFR BLD AUTO: 17.1 % (ref 19.6–45.3)
MCH RBC QN AUTO: 30.1 PG (ref 26.6–33)
MCHC RBC AUTO-ENTMCNC: 31.1 G/DL (ref 31.5–35.7)
MCV RBC AUTO: 97 FL (ref 79–97)
MONOCYTES # BLD AUTO: 0.73 10*3/MM3 (ref 0.1–0.9)
MONOCYTES NFR BLD AUTO: 12.5 % (ref 5–12)
NEUTROPHILS NFR BLD AUTO: 3.9 10*3/MM3 (ref 1.7–7)
NEUTROPHILS NFR BLD AUTO: 66.8 % (ref 42.7–76)
NRBC BLD AUTO-RTO: 0 /100 WBC (ref 0–0.2)
PLATELET # BLD AUTO: 212 10*3/MM3 (ref 140–450)
PMV BLD AUTO: 9.7 FL (ref 6–12)
POTASSIUM SERPL-SCNC: 3.8 MMOL/L (ref 3.5–5.2)
RBC # BLD AUTO: 3.65 10*6/MM3 (ref 3.77–5.28)
SODIUM SERPL-SCNC: 132 MMOL/L (ref 136–145)
WBC NRBC COR # BLD AUTO: 5.84 10*3/MM3 (ref 3.4–10.8)

## 2025-07-30 PROCEDURE — 85025 COMPLETE CBC W/AUTO DIFF WBC: CPT | Performed by: HOSPITALIST

## 2025-07-30 PROCEDURE — 80048 BASIC METABOLIC PNL TOTAL CA: CPT | Performed by: HOSPITALIST

## 2025-07-30 PROCEDURE — 99024 POSTOP FOLLOW-UP VISIT: CPT

## 2025-07-30 RX ADMIN — PANTOPRAZOLE SODIUM 40 MG: 40 INJECTION, POWDER, FOR SOLUTION INTRAVENOUS at 08:34

## 2025-07-30 RX ADMIN — DIPHENOXYLATE HYDROCHLORIDE AND ATROPINE SULFATE 2 TABLET: 2.5; .025 TABLET ORAL at 17:20

## 2025-07-30 RX ADMIN — LOPERAMIDE HYDROCHLORIDE 4 MG: 2 CAPSULE ORAL at 08:33

## 2025-07-30 RX ADMIN — LOPERAMIDE HYDROCHLORIDE 4 MG: 2 CAPSULE ORAL at 17:20

## 2025-07-30 RX ADMIN — Medication 10 ML: at 21:08

## 2025-07-30 RX ADMIN — Medication 10 ML: at 08:34

## 2025-07-30 RX ADMIN — PANTOPRAZOLE SODIUM 40 MG: 40 INJECTION, POWDER, FOR SOLUTION INTRAVENOUS at 17:20

## 2025-07-30 RX ADMIN — ATORVASTATIN CALCIUM 40 MG: 20 TABLET, FILM COATED ORAL at 21:12

## 2025-07-30 RX ADMIN — DIPHENOXYLATE HYDROCHLORIDE AND ATROPINE SULFATE 2 TABLET: 2.5; .025 TABLET ORAL at 08:33

## 2025-07-30 RX ADMIN — LOPERAMIDE HYDROCHLORIDE 4 MG: 2 CAPSULE ORAL at 12:36

## 2025-07-30 RX ADMIN — DIPHENOXYLATE HYDROCHLORIDE AND ATROPINE SULFATE 2 TABLET: 2.5; .025 TABLET ORAL at 21:08

## 2025-07-30 RX ADMIN — DIPHENOXYLATE HYDROCHLORIDE AND ATROPINE SULFATE 2 TABLET: 2.5; .025 TABLET ORAL at 12:36

## 2025-07-30 RX ADMIN — LOPERAMIDE HYDROCHLORIDE 4 MG: 2 CAPSULE ORAL at 21:08

## 2025-07-30 RX ADMIN — Medication 5 MG: at 21:08

## 2025-07-30 NOTE — PROGRESS NOTES
Acute Care General Surgery Progress Note    Patient: Samia Gaming  YOB: 1955  MRN: [id number]      Assessment  Samia Gaming is a 69 y.o. female with high ileostomy output and some dehydration with electrolyte imbalances s/p recent small bowel resectino with creation ileostomy for diverticulitis 5/29. Patient has been receiving IVF, PPI, lopeamide and lomotil. This morning she is feeling much better, her labs are significantly improving, her ostomy output has slowed down and thickened. Her abdominal exam is currently benign. No leukocytosis, Na 132 today, she remains afebrile with normal vitals.     Plan  Continue IVF and regular diet  Monitor I&O  Continue loperamide and lomotil, PPI  AM labs   Hopefully home tomorrow morning      Subjective  Denies nausea or vomiting, abdominal pain significantly improved, tolerating her diet    Objective    Vitals:    07/30/25 1200   BP: 125/70   Pulse: 86   Resp: 16   Temp: 97.1 °F (36.2 °C)   SpO2:            Physical Exam  Constitutional: alert, oriented, in no acute distress  Respiratory: Normal work of breathing, Symmetric excursion  Cardiovascular: Well pefused, no jugular venous distention evident   Abdominal: soft, non-distended, non-tender, ostomy functioning with decreased output that has thickened   Skin: warm, dry      Laboratory Results  Results from last 7 days   Lab Units 07/30/25  0821 07/29/25  0855 07/28/25  1357   WBC 10*3/mm3 5.84 6.68 6.50   HEMOGLOBIN g/dL 11.0* 12.5 13.0   HEMATOCRIT % 35.4 37.1 39.3   PLATELETS 10*3/mm3 212 260 297     Results from last 7 days   Lab Units 07/30/25  0821 07/29/25  0855 07/28/25  1357   SODIUM mmol/L 132* 130* 125*   POTASSIUM mmol/L 3.8 4.1 4.5   CHLORIDE mmol/L 103 95* 88*   CO2 mmol/L 18.0* 19.6* 17.7*   BUN mg/dL 10.0 19.0 26.0*   CREATININE mg/dL 0.70 1.03* 1.29*   CALCIUM mg/dL 8.0* 8.4* 8.5*   BILIRUBIN mg/dL  --  1.5* 1.8*   ALK PHOS U/L  --  108 117   ALT (SGPT) U/L  --  25 32   AST (SGOT) U/L  --  30  37*   GLUCOSE mg/dL 107* 131* 114*     I have personally reviewed 7/30 labs         KYLAH Langston  Acute Care General Surgery  [facility]    [address], [address]  [place], [address]  P: [phone]  F: [phone]

## 2025-07-30 NOTE — PROGRESS NOTES
"DAILY PROGRESS NOTE  [facility]    Patient Identification:  Name: Samia Gaming  Age: 69 y.o.  Sex: female  :  1955  MRN: [id number]         Primary Care Physician: Cristiano Logan DO    Subjective:  Interval History: Doing better today.  She was able to tolerate a diet.    Objective:    Scheduled Meds:atorvastatin, 40 mg, Oral, Daily  diphenoxylate-atropine, 2 tablet, Oral, 4x Daily AC & at Bedtime  loperamide, 4 mg, Oral, 4x Daily AC & at Bedtime  melatonin, 5 mg, Oral, Nightly  pantoprazole, 40 mg, Intravenous, BID AC  sodium chloride, 10 mL, Intravenous, Q12H      Continuous Infusions:sodium chloride, 100 mL/hr  sodium chloride, 100 mL/hr, Last Rate: 100 mL/hr (25 1453)        Vital signs in last 24 hours:  Temp:  [97.5 °F (36.4 °C)-97.6 °F (36.4 °C)] 97.5 °F (36.4 °C)  Heart Rate:  [66-99] 86  Resp:  [16] 16  BP: (106-136)/(58-65) 130/58    Intake/Output:    Intake/Output Summary (Last 24 hours) at 2025 1208  Last data filed at 2025 0043  Gross per 24 hour   Intake --   Output 100 ml   Net -100 ml       Exam:  /58   Pulse 86   Temp 97.5 °F (36.4 °C) (Oral)   Resp 16   Ht 157.5 cm (62\")   Wt 105 kg (230 lb 13.2 oz)   SpO2 100%   BMI 42.22 kg/m²     General Appearance:    Alert, cooperative, no distress   Head:    Normocephalic, without obvious abnormality, atraumatic   Eyes:       Throat:   Lips, tongue, gums normal   Neck:   Supple, symmetrical, trachea midline, no JVD   Lungs:     Clear to auscultation bilaterally, respirations unlabored   Chest Wall:    No tenderness or deformity    Heart:    Regular rate and rhythm, S1 and S2 normal, no murmur,no  rub or gallop   Abdomen:     Soft, nontender, bowel sounds active, no masses, no organomegaly    Extremities:   Extremities normal, atraumatic, no cyanosis or edema   Pulses:      Skin:   Skin is warm and dry,  no rashes or palpable lesions   Neurologic:   no focal deficits noted      Lab Results (last 72 hours)  "      Procedure Component Value Units Date/Time    Basic Metabolic Panel [[id number]]  (Abnormal) Collected: 07/30/25 0821    Specimen: Blood Updated: 07/30/25 0913     Glucose 107 mg/dL      BUN 10.0 mg/dL      Creatinine 0.70 mg/dL      Sodium 132 mmol/L      Potassium 3.8 mmol/L      Chloride 103 mmol/L      CO2 18.0 mmol/L      Calcium 8.0 mg/dL      BUN/Creatinine Ratio 14.3     Anion Gap 11.0 mmol/L      eGFR 92.6 mL/min/1.73     Narrative:      GFR Categories in Chronic Kidney Disease (CKD)              GFR Category          GFR (mL/min/1.73)    Interpretation  G1                    90 or greater        Normal or high (1)  G2                    60-89                Mild decrease (1)  G3a                   45-59                Mild to moderate decrease  G3b                   30-44                Moderate to severe decrease  G4                    15-29                Severe decrease  G5                    14 or less           Kidney failure    (1)In the absence of evidence of kidney disease, neither GFR category G1 or G2 fulfill the criteria for CKD.    eGFR calculation 2021 CKD-EPI creatinine equation, which does not include race as a factor    CBC & Differential [[id number]]  (Abnormal) Collected: 07/30/25 0821    Specimen: Blood Updated: 07/30/25 0901    Narrative:      The following orders were created for panel order CBC & Differential.  Procedure                               Abnormality         Status                     ---------                               -----------         ------                     CBC Auto Differential[[id number]]        Abnormal            Final result                 Please view results for these tests on the individual orders.    CBC Auto Differential [[id number]]  (Abnormal) Collected: 07/30/25 0821    Specimen: Blood Updated: 07/30/25 0901     WBC 5.84 10*3/mm3      RBC 3.65 10*6/mm3      Hemoglobin 11.0 g/dL      Hematocrit 35.4 %      MCV 97.0 fL      MCH 30.1 pg      MCHC  31.1 g/dL      RDW 14.9 %      RDW-SD 54.4 fl      MPV 9.7 fL      Platelets 212 10*3/mm3      Neutrophil % 66.8 %      Lymphocyte % 17.1 %      Monocyte % 12.5 %      Eosinophil % 2.7 %      Basophil % 0.7 %      Immature Grans % 0.2 %      Neutrophils, Absolute 3.90 10*3/mm3      Lymphocytes, Absolute 1.00 10*3/mm3      Monocytes, Absolute 0.73 10*3/mm3      Eosinophils, Absolute 0.16 10*3/mm3      Basophils, Absolute 0.04 10*3/mm3      Immature Grans, Absolute 0.01 10*3/mm3      nRBC 0.0 /100 WBC     Gastrointestinal Panel, PCR - Stool, Per Rectum [[id number]]  (Normal) Collected: 07/29/25 1728    Specimen: Stool from Per Rectum Updated: 07/29/25 1910     Campylobacter Not Detected     Plesiomonas shigelloides Not Detected     Salmonella Not Detected     Vibrio Not Detected     Vibrio cholerae Not Detected     Yersinia enterocolitica Not Detected     Enteroaggregative E. coli (EAEC) Not Detected     Enteropathogenic E. coli (EPEC) Not Detected     Enterotoxigenic E. coli (ETEC) lt/st Not Detected     Shiga-like toxin-producing E. coli (STEC) stx1/stx2 Not Detected     Shigella/Enteroinvasive E. coli (EIEC) Not Detected     Cryptosporidium Not Detected     Cyclospora cayetanensis Not Detected     Entamoeba histolytica Not Detected     Giardia lamblia Not Detected     Adenovirus F40/41 Not Detected     Astrovirus Not Detected     Norovirus GI/GII Not Detected     Rotavirus A Not Detected     Sapovirus (I, II, IV or V) Not Detected    Narrative:      If Aeromonas, Staphylococcus aureus or Bacillus cereus are suspected, please order [id number]: Stool Culture, Aeromonas, S aureus, B Cereus.    Magnesium [[id number]]  (Abnormal) Collected: 07/29/25 0855    Specimen: Blood Updated: 07/29/25 1031     Magnesium 4.2 mg/dL     Comprehensive Metabolic Panel [[id number]]  (Abnormal) Collected: 07/29/25 0855    Specimen: Blood Updated: 07/29/25 1031     Glucose 131 mg/dL      BUN 19.0 mg/dL      Creatinine 1.03 mg/dL      Sodium 130  mmol/L      Potassium 4.1 mmol/L      Comment: Slight hemolysis detected by analyzer. Result may be falsely elevated.        Chloride 95 mmol/L      CO2 19.6 mmol/L      Calcium 8.4 mg/dL      Total Protein 7.0 g/dL      Albumin 3.6 g/dL      ALT (SGPT) 25 U/L      AST (SGOT) 30 U/L      Comment: Slight hemolysis detected by analyzer. Result may be falsely elevated.        Alkaline Phosphatase 108 U/L      Total Bilirubin 1.5 mg/dL      Globulin 3.4 gm/dL      A/G Ratio 1.1 g/dL      BUN/Creatinine Ratio 18.4     Anion Gap 15.4 mmol/L      eGFR 58.3 mL/min/1.73     Narrative:      GFR Categories in Chronic Kidney Disease (CKD)              GFR Category          GFR (mL/min/1.73)    Interpretation  G1                    90 or greater        Normal or high (1)  G2                    60-89                Mild decrease (1)  G3a                   45-59                Mild to moderate decrease  G3b                   30-44                Moderate to severe decrease  G4                    15-29                Severe decrease  G5                    14 or less           Kidney failure    (1)In the absence of evidence of kidney disease, neither GFR category G1 or G2 fulfill the criteria for CKD.    eGFR calculation 2021 CKD-EPI creatinine equation, which does not include race as a factor    CBC (No Diff) [[id number]]  (Abnormal) Collected: 07/29/25 0855    Specimen: Blood Updated: 07/29/25 1007     WBC 6.68 10*3/mm3      RBC 4.07 10*6/mm3      Hemoglobin 12.5 g/dL      Hematocrit 37.1 %      MCV 91.2 fL      MCH 30.7 pg      MCHC 33.7 g/dL      RDW 15.8 %      RDW-SD 52.6 fl      MPV 9.6 fL      Platelets 260 10*3/mm3     STAT Lactic Acid, Reflex [[id number]]  (Normal) Collected: 07/28/25 1836    Specimen: Blood Updated: 07/28/25 1924     Lactate 1.4 mmol/L     Osmolality, Urine - Urine, Clean Catch [[id number]] Collected: 07/28/25 1733    Specimen: Urine, Clean Catch Updated: 07/28/25 1847     Osmolality, Urine 363 mOsm/kg      Narrative:      Osmo Normal Reference Ranges:    Random:  mOsm/kg H2O, depending on fluid intake.  Random: >850 mOsm/kg H20, after 12 hour fluid restriction.    24 Hour: 300-900 mOsm/kg H2O.    Urinalysis, Microscopic Only - Urine, Clean Catch [[id number]]  (Abnormal) Collected: 07/28/25 1733    Specimen: Urine, Clean Catch Updated: 07/28/25 1846     RBC, UA 0-2 /HPF      WBC, UA 21-50 /HPF      Bacteria, UA None Seen /HPF      Squamous Epithelial Cells, UA 7-12 /HPF      Hyaline Casts, UA 21-30 /LPF      Methodology Manual Light Microscopy    Urinalysis With Microscopic If Indicated (No Culture) - Urine, Clean Catch [[id number]]  (Abnormal) Collected: 07/28/25 1733    Specimen: Urine, Clean Catch Updated: 07/28/25 1809     Color, UA Yellow     Appearance, UA Cloudy     pH, UA 5.5     Specific Gravity, UA 1.013     Glucose, UA Negative     Ketones, UA 15 mg/dL (1+)     Bilirubin, UA Negative     Blood, UA Negative     Protein, UA Trace     Leuk Esterase, UA Moderate (2+)     Nitrite, UA Negative     Urobilinogen, UA 1.0 E.U./dL    Sodium, Urine, Random - Urine, Clean Catch [[id number]] Collected: 07/28/25 1733    Specimen: Urine, Clean Catch Updated: 07/28/25 1754     Sodium, Urine <20 mmol/L     Narrative:      Reference intervals for random urine have not been established.  Clinical usage is dependent upon physician's interpretation in combination with other laboratory tests.       Magnesium [[id number]]  (Abnormal) Collected: 07/28/25 1357    Specimen: Blood from Arm, Right Updated: 07/28/25 1718     Magnesium 0.8 mg/dL     Phosphorus [[id number]]  (Normal) Collected: 07/28/25 1357    Specimen: Blood from Arm, Right Updated: 07/28/25 1642     Phosphorus 3.1 mg/dL     Osmolality, Serum [[id number]]  (Abnormal) Collected: 07/28/25 1357    Specimen: Blood from Arm, Right Updated: 07/28/25 1632     Osmolality 273 mOsm/kg     Lactic Acid, Plasma [[id number]]  (Abnormal) Collected: 07/28/25 1357    Specimen: Blood from  Arm, Right Updated: 07/28/25 1431     Lactate 2.3 mmol/L     Lipase [[id number]]  (Normal) Collected: 07/28/25 1357    Specimen: Blood from Arm, Right Updated: 07/28/25 1428     Lipase 54 U/L     Comprehensive Metabolic Panel [[id number]]  (Abnormal) Collected: 07/28/25 1357    Specimen: Blood from Arm, Right Updated: 07/28/25 1428     Glucose 114 mg/dL      BUN 26.0 mg/dL      Creatinine 1.29 mg/dL      Sodium 125 mmol/L      Potassium 4.5 mmol/L      Chloride 88 mmol/L      CO2 17.7 mmol/L      Calcium 8.5 mg/dL      Total Protein 7.5 g/dL      Albumin 4.0 g/dL      ALT (SGPT) 32 U/L      AST (SGOT) 37 U/L      Alkaline Phosphatase 117 U/L      Total Bilirubin 1.8 mg/dL      Globulin 3.5 gm/dL      A/G Ratio 1.1 g/dL      BUN/Creatinine Ratio 20.2     Anion Gap 19.3 mmol/L      eGFR 44.5 mL/min/1.73     Narrative:      GFR Categories in Chronic Kidney Disease (CKD)              GFR Category          GFR (mL/min/1.73)    Interpretation  G1                    90 or greater        Normal or high (1)  G2                    60-89                Mild decrease (1)  G3a                   45-59                Mild to moderate decrease  G3b                   30-44                Moderate to severe decrease  G4                    15-29                Severe decrease  G5                    14 or less           Kidney failure    (1)In the absence of evidence of kidney disease, neither GFR category G1 or G2 fulfill the criteria for CKD.    eGFR calculation 2021 CKD-EPI creatinine equation, which does not include race as a factor    [place] Draw [[id number]] Collected: 07/28/25 1357    Specimen: Blood from Arm, Right Updated: 07/28/25 1415    Narrative:      The following orders were created for panel order [place] Draw.  Procedure                               Abnormality         Status                     ---------                               -----------         ------                     Green Top (Gel)[[id number]]                                   Final result               Lavender Top[[id number]]                                     Final result               Gold Top - SST[[id number]]                                   Final result               Light Blue Top[[id number]]                                   Final result                 Please view results for these tests on the individual orders.    Light Blue Top [[id number]] Collected: 07/28/25 1357    Specimen: Blood from Arm, Right Updated: 07/28/25 1415     Extra Tube Hold for add-ons.     Comment: Auto resulted       Green Top (Gel) [[id number]] Collected: 07/28/25 1357    Specimen: Blood from Arm, Right Updated: 07/28/25 1415     Extra Tube Hold for add-ons.     Comment: Auto resulted.       Lavender Top [[id number]] Collected: 07/28/25 1357    Specimen: Blood from Arm, Right Updated: 07/28/25 1415     Extra Tube hold for add-on     Comment: Auto resulted       Gold Top - SST [[id number]] Collected: 07/28/25 1357    Specimen: Blood from Arm, Right Updated: 07/28/25 1415     Extra Tube Hold for add-ons.     Comment: Auto resulted.       CBC & Differential [[id number]]  (Normal) Collected: 07/28/25 1357    Specimen: Blood from Arm, Right Updated: 07/28/25 1409    Narrative:      The following orders were created for panel order CBC & Differential.  Procedure                               Abnormality         Status                     ---------                               -----------         ------                     CBC Auto Differential[[id number]]        Normal              Final result                 Please view results for these tests on the individual orders.    CBC Auto Differential [[id number]]  (Normal) Collected: 07/28/25 1357    Specimen: Blood from Arm, Right Updated: 07/28/25 1409     WBC 6.50 10*3/mm3      RBC 4.33 10*6/mm3      Hemoglobin 13.0 g/dL      Hematocrit 39.3 %      MCV 90.8 fL      MCH 30.0 pg      MCHC 33.1 g/dL      RDW 15.0 %      RDW-SD 49.2 fl      MPV 9.4 fL   "    Platelets 297 10*3/mm3      Neutrophil % 68.5 %      Lymphocyte % 20.8 %      Monocyte % 9.4 %      Eosinophil % 0.6 %      Basophil % 0.5 %      Immature Grans % 0.2 %      Neutrophils, Absolute 4.46 10*3/mm3      Lymphocytes, Absolute 1.35 10*3/mm3      Monocytes, Absolute 0.61 10*3/mm3      Eosinophils, Absolute 0.04 10*3/mm3      Basophils, Absolute 0.03 10*3/mm3      Immature Grans, Absolute 0.01 10*3/mm3      nRBC 0.0 /100 WBC           Data Review:  Results from last 7 days   Lab Units 07/30/25  0821 07/29/25  0855 07/28/25  1357   SODIUM mmol/L 132* 130* 125*   POTASSIUM mmol/L 3.8 4.1 4.5   CHLORIDE mmol/L 103 95* 88*   CO2 mmol/L 18.0* 19.6* 17.7*   BUN mg/dL 10.0 19.0 26.0*   CREATININE mg/dL 0.70 1.03* 1.29*   GLUCOSE mg/dL 107* 131* 114*   CALCIUM mg/dL 8.0* 8.4* 8.5*     Results from last 7 days   Lab Units 07/30/25  0821 07/29/25  0855 07/28/25  1357   WBC 10*3/mm3 5.84 6.68 6.50   HEMOGLOBIN g/dL 11.0* 12.5 13.0   HEMATOCRIT % 35.4 37.1 39.3   PLATELETS 10*3/mm3 212 260 297             Lab Results   Lab Value Date/Time    TROPONINT 7 05/22/2025 1802    TROPONINT <6 05/22/2025 1611         Results from last 7 days   Lab Units 07/29/25  0855 07/28/25  1357   ALK PHOS U/L 108 117   BILIRUBIN mg/dL 1.5* 1.8*   ALT (SGPT) U/L 25 32   AST (SGOT) U/L 30 37*             No results found for: \"POCGLU\"        Past Medical History:   Diagnosis Date    Arthritis     Elevated cholesterol        Assessment:  Active Hospital Problems    Diagnosis  POA    **Hyponatremia [E87.1]  Yes    N&V (nausea and vomiting) [R11.2]  Yes    Hypomagnesemia [E83.42]  Yes    Metabolic acidosis [E87.20]  Yes    TREVA (acute kidney injury) [N17.9]  Yes    Abdominal pain [R10.9]  Yes      Resolved Hospital Problems   No resolved problems to display.       Plan:  Will see how she does with diet and follow-up labs tomorrow.  Probably home tomorrow if tolerating and the labs look stable to improving.    Cirilo Delacruz, " MD  7/30/2025  12:08 EDT

## 2025-07-30 NOTE — PLAN OF CARE
Goal Outcome Evaluation:  Plan of Care Reviewed With: patient        Progress: improving  Outcome Evaluation: Rested some overnight, had difficulty sleeping but says she feels better. Low output from ostomy.

## 2025-07-31 ENCOUNTER — READMISSION MANAGEMENT (OUTPATIENT)
Dept: CALL CENTER | Facility: HOSPITAL | Age: 70
End: 2025-07-31
Payer: MEDICARE

## 2025-07-31 VITALS
SYSTOLIC BLOOD PRESSURE: 120 MMHG | WEIGHT: 230.82 LBS | BODY MASS INDEX: 42.48 KG/M2 | DIASTOLIC BLOOD PRESSURE: 68 MMHG | HEIGHT: 62 IN | OXYGEN SATURATION: 99 % | TEMPERATURE: 97.8 F | RESPIRATION RATE: 16 BRPM | HEART RATE: 80 BPM

## 2025-07-31 LAB
ANION GAP SERPL CALCULATED.3IONS-SCNC: 11 MMOL/L (ref 5–15)
BASOPHILS # BLD AUTO: 0.03 10*3/MM3 (ref 0–0.2)
BASOPHILS NFR BLD AUTO: 0.5 % (ref 0–1.5)
BUN SERPL-MCNC: 5 MG/DL (ref 8–23)
BUN/CREAT SERPL: 7.7 (ref 7–25)
CALCIUM SPEC-SCNC: 8.5 MG/DL (ref 8.6–10.5)
CHLORIDE SERPL-SCNC: 103 MMOL/L (ref 98–107)
CO2 SERPL-SCNC: 20 MMOL/L (ref 22–29)
CREAT SERPL-MCNC: 0.65 MG/DL (ref 0.57–1)
DEPRECATED RDW RBC AUTO: 52.1 FL (ref 37–54)
EGFRCR SERPLBLD CKD-EPI 2021: 94.3 ML/MIN/1.73
EOSINOPHIL # BLD AUTO: 0.17 10*3/MM3 (ref 0–0.4)
EOSINOPHIL NFR BLD AUTO: 2.9 % (ref 0.3–6.2)
ERYTHROCYTE [DISTWIDTH] IN BLOOD BY AUTOMATED COUNT: 15.3 % (ref 12.3–15.4)
GLUCOSE SERPL-MCNC: 110 MG/DL (ref 65–99)
HCT VFR BLD AUTO: 33.2 % (ref 34–46.6)
HGB BLD-MCNC: 11 G/DL (ref 12–15.9)
IMM GRANULOCYTES # BLD AUTO: 0.01 10*3/MM3 (ref 0–0.05)
IMM GRANULOCYTES NFR BLD AUTO: 0.2 % (ref 0–0.5)
LYMPHOCYTES # BLD AUTO: 1.13 10*3/MM3 (ref 0.7–3.1)
LYMPHOCYTES NFR BLD AUTO: 19.5 % (ref 19.6–45.3)
MCH RBC QN AUTO: 30.6 PG (ref 26.6–33)
MCHC RBC AUTO-ENTMCNC: 33.1 G/DL (ref 31.5–35.7)
MCV RBC AUTO: 92.2 FL (ref 79–97)
MONOCYTES # BLD AUTO: 0.84 10*3/MM3 (ref 0.1–0.9)
MONOCYTES NFR BLD AUTO: 14.5 % (ref 5–12)
NEUTROPHILS NFR BLD AUTO: 3.62 10*3/MM3 (ref 1.7–7)
NEUTROPHILS NFR BLD AUTO: 62.4 % (ref 42.7–76)
NRBC BLD AUTO-RTO: 0 /100 WBC (ref 0–0.2)
PLATELET # BLD AUTO: 208 10*3/MM3 (ref 140–450)
PMV BLD AUTO: 9.4 FL (ref 6–12)
POTASSIUM SERPL-SCNC: 4 MMOL/L (ref 3.5–5.2)
RBC # BLD AUTO: 3.6 10*6/MM3 (ref 3.77–5.28)
SODIUM SERPL-SCNC: 134 MMOL/L (ref 136–145)
WBC NRBC COR # BLD AUTO: 5.8 10*3/MM3 (ref 3.4–10.8)

## 2025-07-31 PROCEDURE — 99024 POSTOP FOLLOW-UP VISIT: CPT

## 2025-07-31 PROCEDURE — 85025 COMPLETE CBC W/AUTO DIFF WBC: CPT | Performed by: HOSPITALIST

## 2025-07-31 PROCEDURE — 25010000002 ONDANSETRON PER 1 MG: Performed by: NURSE PRACTITIONER

## 2025-07-31 PROCEDURE — 80048 BASIC METABOLIC PNL TOTAL CA: CPT | Performed by: HOSPITALIST

## 2025-07-31 RX ADMIN — PANTOPRAZOLE SODIUM 40 MG: 40 INJECTION, POWDER, FOR SOLUTION INTRAVENOUS at 08:05

## 2025-07-31 RX ADMIN — DIPHENOXYLATE HYDROCHLORIDE AND ATROPINE SULFATE 2 TABLET: 2.5; .025 TABLET ORAL at 08:05

## 2025-07-31 RX ADMIN — ONDANSETRON 4 MG: 2 INJECTION, SOLUTION INTRAMUSCULAR; INTRAVENOUS at 12:27

## 2025-07-31 RX ADMIN — LOPERAMIDE HYDROCHLORIDE 4 MG: 2 CAPSULE ORAL at 08:05

## 2025-07-31 RX ADMIN — Medication 1 PACKET: at 08:06

## 2025-07-31 RX ADMIN — Medication 10 ML: at 08:05

## 2025-07-31 RX ADMIN — LOPERAMIDE HYDROCHLORIDE 4 MG: 2 CAPSULE ORAL at 12:24

## 2025-07-31 RX ADMIN — DIPHENOXYLATE HYDROCHLORIDE AND ATROPINE SULFATE 2 TABLET: 2.5; .025 TABLET ORAL at 12:24

## 2025-07-31 NOTE — PLAN OF CARE
Problem: Adult Inpatient Plan of Care  Goal: Plan of Care Review  Outcome: Adequate for Care Transition  Goal: Patient-Specific Goal (Individualized)  Outcome: Adequate for Care Transition  Goal: Absence of Hospital-Acquired Illness or Injury  Outcome: Adequate for Care Transition  Intervention: Identify and Manage Fall Risk  Description: Perform standard risk assessment on admission using a validated tool or comprehensive approach appropriate to the patient; reassess fall risk frequently, with change in status or transfer to another level of care.Communicate risk to interprofessional healthcare team; ensure fall risk visible cue.Determine need for increased observation, equipment and environmental modification, as well as use of supportive, nonskid footwear.Adjust safety measures to individual needs and identified risk factors.Reinforce the importance of active participation with fall risk prevention, safety, and physical activity with the patient and family.Perform regular intentional rounding to assess need for position change, pain assessment and personal needs, including assistance with toileting.  Recent Flowsheet Documentation  Taken 7/31/2025 0805 by Lamar Quigley RN  Safety Promotion/Fall Prevention:   safety round/check completed   room organization consistent  Intervention: Prevent Skin Injury  Description: Perform a screening for skin injury risk, such as pressure or moisture-associated skin damage on admission and at regular intervals throughout hospital stay.Keep all areas of skin (especially folds) clean and dry.Maintain adequate skin hydration.Relieve and redistribute pressure and protect bony prominences and skin at risk for injury; implement measures based on patient-specific risk factors.Match turning and repositioning schedule to clinical condition.Encourage weight shift frequently; assist with reposition if unable to complete independently.Float heels off bed; avoid pressure on the  Achilles tendon.Keep skin free from extended contact with medical devices.Optimize nutrition and hydration.Encourage functional activity and mobility, as early as tolerated.Use aids (e.g., slide boards, mechanical lift) during transfer.  Recent Flowsheet Documentation  Taken 7/31/2025 0805 by Lamar Quigley RN  Body Position: position changed independently  Skin Protection: incontinence pads utilized  Intervention: Prevent Infection  Description: Maintain skin and mucous membrane integrity; promote hand, oral and pulmonary hygiene.Optimize fluid balance, nutrition, sleep and glycemic control to maximize infection resistance.Identify potential sources of infection early to prevent or mitigate progression of infection (e.g., wound, lines, devices).Evaluate ongoing need for invasive devices; remove promptly when no longer indicated.Review vaccination status.  Recent Flowsheet Documentation  Taken 7/31/2025 1600 by Lamar Quigley RN  Infection Prevention:   hand hygiene promoted   personal protective equipment utilized   single patient room provided   visitors restricted/screened  Taken 7/31/2025 1400 by Lamar Quigley RN  Infection Prevention:   hand hygiene promoted   personal protective equipment utilized   single patient room provided   visitors restricted/screened  Taken 7/31/2025 1200 by Lamar Quigley RN  Infection Prevention:   hand hygiene promoted   personal protective equipment utilized   single patient room provided   visitors restricted/screened  Taken 7/31/2025 1000 by Lamar Quigley RN  Infection Prevention:   hand hygiene promoted   personal protective equipment utilized   single patient room provided   visitors restricted/screened  Taken 7/31/2025 0800 by Lamar Quigley RN  Infection Prevention:   hand hygiene promoted   personal protective equipment utilized   single patient room provided   visitors restricted/screened  Goal: Optimal Comfort and Wellbeing  Outcome: Adequate for Care  Transition  Intervention: Provide Person-Centered Care  Description: Use a family-focused approach to care; encourage support system presence and participation.Develop trust and rapport by proactively providing information, encouraging questions, addressing concerns and offering reassurance.Acknowledge emotional response to hospitalization.Recognize and utilize personal coping strategies and strengths; develop goals via shared decision-making.Honor spiritual and cultural preferences.  Recent Flowsheet Documentation  Taken 7/31/2025 0805 by Lamar Quigley RN  Trust Relationship/Rapport:   care explained   choices provided  Goal: Readiness for Transition of Care  Outcome: Adequate for Care Transition   Goal Outcome Evaluation:

## 2025-07-31 NOTE — PROGRESS NOTES
Acute Care General Surgery Progress Note    Patient: Samia Gaming  YOB: 1955  MRN: [id number]      Assessment  Samia Gaming is a 69 y.o. female with high ileostomy output and some dehydration with electrolyte imbalances status post recent small bowel resection with creation ileostomy for diverticulitis on 5/29.  Patient feeling much better this afternoon and would really like to go home.  Ostomy output has decreased and the volume has thickened.  Her labs have improved today.  AVSS.    Plan  Okay for discharge from a general surgery standpoint, continue loperamide and Lomotil at home, continue to monitor output at home, discussed s/sx to return to the ED      Subjective  Some nausea and vomiting this morning after breakfast, feeling much better this afternoon    Objective    Vitals:    07/31/25 1114   BP: 120/68   Pulse: 80   Resp: 16   Temp: 97.8 °F (36.6 °C)   SpO2: 99%             Physical Exam  Constitutional: alert, oriented in no acute distress  Respiratory: Normal work of breathing, Symmetric excursion  Cardiovascular: Well pefused, no jugular venous distention evident   Abdominal: soft, non-tender, ostomy pink and functioning   Skin: warm, dry      Laboratory Results  Results from last 7 days   Lab Units 07/31/25  0552 07/30/25  0821 07/29/25  0855 07/28/25  1357   WBC 10*3/mm3 5.80 5.84 6.68 6.50   HEMOGLOBIN g/dL 11.0* 11.0* 12.5 13.0   HEMATOCRIT % 33.2* 35.4 37.1 39.3   PLATELETS 10*3/mm3 208 212 260 297     Results from last 7 days   Lab Units 07/31/25  0552 07/30/25  0821 07/29/25  0855 07/28/25  1357   SODIUM mmol/L 134* 132* 130* 125*   POTASSIUM mmol/L 4.0 3.8 4.1 4.5   CHLORIDE mmol/L 103 103 95* 88*   CO2 mmol/L 20.0* 18.0* 19.6* 17.7*   BUN mg/dL 5.0* 10.0 19.0 26.0*   CREATININE mg/dL 0.65 0.70 1.03* 1.29*   CALCIUM mg/dL 8.5* 8.0* 8.4* 8.5*   BILIRUBIN mg/dL  --   --  1.5* 1.8*   ALK PHOS U/L  --   --  108 117   ALT (SGPT) U/L  --   --  25 32   AST (SGOT) U/L  --   --  30 37*    GLUCOSE mg/dL 110* 107* 131* 114*       I have personally reviewed 7/31 labs        KYLAH Langston  Acute Care General Surgery  [facility]    [address], [address]  [place], [place], [address]  P: [phone]  F: [phone]

## 2025-07-31 NOTE — PROGRESS NOTES
"DAILY PROGRESS NOTE  [facility]    Patient Identification:  Name: Samia Gaming  Age: 69 y.o.  Sex: female  :  1955  MRN: [id number]         Primary Care Physician: Cristiano Logan DO    Subjective:  Interval History: Doing better today.  She did vomit this morning after eating some oatmeal.  Objective:    Scheduled Meds:atorvastatin, 40 mg, Oral, Daily  diphenoxylate-atropine, 2 tablet, Oral, 4x Daily AC & at Bedtime  loperamide, 4 mg, Oral, 4x Daily AC & at Bedtime  melatonin, 5 mg, Oral, Nightly  pantoprazole, 40 mg, Intravenous, BID AC  Psyllium, 1 packet, Oral, Daily  sodium chloride, 10 mL, Intravenous, Q12H      Continuous Infusions:       Vital signs in last 24 hours:  Temp:  [97.6 °F (36.4 °C)-97.9 °F (36.6 °C)] 97.8 °F (36.6 °C)  Heart Rate:  [68-91] 80  Resp:  [14-16] 16  BP: (118-142)/(53-94) 120/68    Intake/Output:    Intake/Output Summary (Last 24 hours) at 2025 1250  Last data filed at 2025 1141  Gross per 24 hour   Intake 350 ml   Output 275 ml   Net 75 ml       Exam:  /68 (BP Location: Left arm, Patient Position: Sitting)   Pulse 80   Temp 97.8 °F (36.6 °C) (Oral)   Resp 16   Ht 157.5 cm (62\")   Wt 105 kg (230 lb 13.2 oz)   SpO2 99%   BMI 42.22 kg/m²     General Appearance:    Alert, cooperative, no distress   Head:    Normocephalic, without obvious abnormality, atraumatic   Eyes:       Throat:   Lips, tongue, gums normal   Neck:   Supple, symmetrical, trachea midline, no JVD   Lungs:     Clear to auscultation bilaterally, respirations unlabored   Chest Wall:    No tenderness or deformity    Heart:    Regular rate and rhythm, S1 and S2 normal, no murmur,no  rub or gallop   Abdomen:     Soft, nontender, bowel sounds active, no masses, no organomegaly    Extremities:   Extremities normal, atraumatic, no cyanosis or edema   Pulses:      Skin:   Skin is warm and dry,  no rashes or palpable lesions   Neurologic:   no focal deficits noted      Lab Results " (last 72 hours)       Procedure Component Value Units Date/Time    Basic Metabolic Panel [[id number]]  (Abnormal) Collected: 07/30/25 0821    Specimen: Blood Updated: 07/30/25 0913     Glucose 107 mg/dL      BUN 10.0 mg/dL      Creatinine 0.70 mg/dL      Sodium 132 mmol/L      Potassium 3.8 mmol/L      Chloride 103 mmol/L      CO2 18.0 mmol/L      Calcium 8.0 mg/dL      BUN/Creatinine Ratio 14.3     Anion Gap 11.0 mmol/L      eGFR 92.6 mL/min/1.73     Narrative:      GFR Categories in Chronic Kidney Disease (CKD)              GFR Category          GFR (mL/min/1.73)    Interpretation  G1                    90 or greater        Normal or high (1)  G2                    60-89                Mild decrease (1)  G3a                   45-59                Mild to moderate decrease  G3b                   30-44                Moderate to severe decrease  G4                    15-29                Severe decrease  G5                    14 or less           Kidney failure    (1)In the absence of evidence of kidney disease, neither GFR category G1 or G2 fulfill the criteria for CKD.    eGFR calculation 2021 CKD-EPI creatinine equation, which does not include race as a factor    CBC & Differential [[id number]]  (Abnormal) Collected: 07/30/25 0821    Specimen: Blood Updated: 07/30/25 0901    Narrative:      The following orders were created for panel order CBC & Differential.  Procedure                               Abnormality         Status                     ---------                               -----------         ------                     CBC Auto Differential[[id number]]        Abnormal            Final result                 Please view results for these tests on the individual orders.    CBC Auto Differential [[id number]]  (Abnormal) Collected: 07/30/25 0821    Specimen: Blood Updated: 07/30/25 0901     WBC 5.84 10*3/mm3      RBC 3.65 10*6/mm3      Hemoglobin 11.0 g/dL      Hematocrit 35.4 %      MCV 97.0 fL      MCH 30.1  pg      MCHC 31.1 g/dL      RDW 14.9 %      RDW-SD 54.4 fl      MPV 9.7 fL      Platelets 212 10*3/mm3      Neutrophil % 66.8 %      Lymphocyte % 17.1 %      Monocyte % 12.5 %      Eosinophil % 2.7 %      Basophil % 0.7 %      Immature Grans % 0.2 %      Neutrophils, Absolute 3.90 10*3/mm3      Lymphocytes, Absolute 1.00 10*3/mm3      Monocytes, Absolute 0.73 10*3/mm3      Eosinophils, Absolute 0.16 10*3/mm3      Basophils, Absolute 0.04 10*3/mm3      Immature Grans, Absolute 0.01 10*3/mm3      nRBC 0.0 /100 WBC     Gastrointestinal Panel, PCR - Stool, Per Rectum [[id number]]  (Normal) Collected: 07/29/25 1728    Specimen: Stool from Per Rectum Updated: 07/29/25 1910     Campylobacter Not Detected     Plesiomonas shigelloides Not Detected     Salmonella Not Detected     Vibrio Not Detected     Vibrio cholerae Not Detected     Yersinia enterocolitica Not Detected     Enteroaggregative E. coli (EAEC) Not Detected     Enteropathogenic E. coli (EPEC) Not Detected     Enterotoxigenic E. coli (ETEC) lt/st Not Detected     Shiga-like toxin-producing E. coli (STEC) stx1/stx2 Not Detected     Shigella/Enteroinvasive E. coli (EIEC) Not Detected     Cryptosporidium Not Detected     Cyclospora cayetanensis Not Detected     Entamoeba histolytica Not Detected     Giardia lamblia Not Detected     Adenovirus F40/41 Not Detected     Astrovirus Not Detected     Norovirus GI/GII Not Detected     Rotavirus A Not Detected     Sapovirus (I, II, IV or V) Not Detected    Narrative:      If Aeromonas, Staphylococcus aureus or Bacillus cereus are suspected, please order [id number]: Stool Culture, Aeromonas, S aureus, B Cereus.    Magnesium [[id number]]  (Abnormal) Collected: 07/29/25 0855    Specimen: Blood Updated: 07/29/25 1031     Magnesium 4.2 mg/dL     Comprehensive Metabolic Panel [[id number]]  (Abnormal) Collected: 07/29/25 0855    Specimen: Blood Updated: 07/29/25 1031     Glucose 131 mg/dL      BUN 19.0 mg/dL      Creatinine 1.03 mg/dL       Sodium 130 mmol/L      Potassium 4.1 mmol/L      Comment: Slight hemolysis detected by analyzer. Result may be falsely elevated.        Chloride 95 mmol/L      CO2 19.6 mmol/L      Calcium 8.4 mg/dL      Total Protein 7.0 g/dL      Albumin 3.6 g/dL      ALT (SGPT) 25 U/L      AST (SGOT) 30 U/L      Comment: Slight hemolysis detected by analyzer. Result may be falsely elevated.        Alkaline Phosphatase 108 U/L      Total Bilirubin 1.5 mg/dL      Globulin 3.4 gm/dL      A/G Ratio 1.1 g/dL      BUN/Creatinine Ratio 18.4     Anion Gap 15.4 mmol/L      eGFR 58.3 mL/min/1.73     Narrative:      GFR Categories in Chronic Kidney Disease (CKD)              GFR Category          GFR (mL/min/1.73)    Interpretation  G1                    90 or greater        Normal or high (1)  G2                    60-89                Mild decrease (1)  G3a                   45-59                Mild to moderate decrease  G3b                   30-44                Moderate to severe decrease  G4                    15-29                Severe decrease  G5                    14 or less           Kidney failure    (1)In the absence of evidence of kidney disease, neither GFR category G1 or G2 fulfill the criteria for CKD.    eGFR calculation 2021 CKD-EPI creatinine equation, which does not include race as a factor    CBC (No Diff) [[id number]]  (Abnormal) Collected: 07/29/25 0855    Specimen: Blood Updated: 07/29/25 1007     WBC 6.68 10*3/mm3      RBC 4.07 10*6/mm3      Hemoglobin 12.5 g/dL      Hematocrit 37.1 %      MCV 91.2 fL      MCH 30.7 pg      MCHC 33.7 g/dL      RDW 15.8 %      RDW-SD 52.6 fl      MPV 9.6 fL      Platelets 260 10*3/mm3     STAT Lactic Acid, Reflex [[id number]]  (Normal) Collected: 07/28/25 1836    Specimen: Blood Updated: 07/28/25 1924     Lactate 1.4 mmol/L     Osmolality, Urine - Urine, Clean Catch [[id number]] Collected: 07/28/25 1733    Specimen: Urine, Clean Catch Updated: 07/28/25 1847     Osmolality, Urine  363 mOsm/kg     Narrative:      Osmo Normal Reference Ranges:    Random:  mOsm/kg H2O, depending on fluid intake.  Random: >850 mOsm/kg H20, after 12 hour fluid restriction.    24 Hour: 300-900 mOsm/kg H2O.    Urinalysis, Microscopic Only - Urine, Clean Catch [[id number]]  (Abnormal) Collected: 07/28/25 1733    Specimen: Urine, Clean Catch Updated: 07/28/25 1846     RBC, UA 0-2 /HPF      WBC, UA 21-50 /HPF      Bacteria, UA None Seen /HPF      Squamous Epithelial Cells, UA 7-12 /HPF      Hyaline Casts, UA 21-30 /LPF      Methodology Manual Light Microscopy    Urinalysis With Microscopic If Indicated (No Culture) - Urine, Clean Catch [[id number]]  (Abnormal) Collected: 07/28/25 1733    Specimen: Urine, Clean Catch Updated: 07/28/25 1809     Color, UA Yellow     Appearance, UA Cloudy     pH, UA 5.5     Specific Gravity, UA 1.013     Glucose, UA Negative     Ketones, UA 15 mg/dL (1+)     Bilirubin, UA Negative     Blood, UA Negative     Protein, UA Trace     Leuk Esterase, UA Moderate (2+)     Nitrite, UA Negative     Urobilinogen, UA 1.0 E.U./dL    Sodium, Urine, Random - Urine, Clean Catch [[id number]] Collected: 07/28/25 1733    Specimen: Urine, Clean Catch Updated: 07/28/25 1754     Sodium, Urine <20 mmol/L     Narrative:      Reference intervals for random urine have not been established.  Clinical usage is dependent upon physician's interpretation in combination with other laboratory tests.       Magnesium [[id number]]  (Abnormal) Collected: 07/28/25 1357    Specimen: Blood from Arm, Right Updated: 07/28/25 1718     Magnesium 0.8 mg/dL     Phosphorus [[id number]]  (Normal) Collected: 07/28/25 1357    Specimen: Blood from Arm, Right Updated: 07/28/25 1642     Phosphorus 3.1 mg/dL     Osmolality, Serum [[id number]]  (Abnormal) Collected: 07/28/25 1357    Specimen: Blood from Arm, Right Updated: 07/28/25 1632     Osmolality 273 mOsm/kg     Lactic Acid, Plasma [[id number]]  (Abnormal) Collected: 07/28/25 1357     Specimen: Blood from Arm, Right Updated: 07/28/25 1431     Lactate 2.3 mmol/L     Lipase [[id number]]  (Normal) Collected: 07/28/25 1357    Specimen: Blood from Arm, Right Updated: 07/28/25 1428     Lipase 54 U/L     Comprehensive Metabolic Panel [[id number]]  (Abnormal) Collected: 07/28/25 1357    Specimen: Blood from Arm, Right Updated: 07/28/25 1428     Glucose 114 mg/dL      BUN 26.0 mg/dL      Creatinine 1.29 mg/dL      Sodium 125 mmol/L      Potassium 4.5 mmol/L      Chloride 88 mmol/L      CO2 17.7 mmol/L      Calcium 8.5 mg/dL      Total Protein 7.5 g/dL      Albumin 4.0 g/dL      ALT (SGPT) 32 U/L      AST (SGOT) 37 U/L      Alkaline Phosphatase 117 U/L      Total Bilirubin 1.8 mg/dL      Globulin 3.5 gm/dL      A/G Ratio 1.1 g/dL      BUN/Creatinine Ratio 20.2     Anion Gap 19.3 mmol/L      eGFR 44.5 mL/min/1.73     Narrative:      GFR Categories in Chronic Kidney Disease (CKD)              GFR Category          GFR (mL/min/1.73)    Interpretation  G1                    90 or greater        Normal or high (1)  G2                    60-89                Mild decrease (1)  G3a                   45-59                Mild to moderate decrease  G3b                   30-44                Moderate to severe decrease  G4                    15-29                Severe decrease  G5                    14 or less           Kidney failure    (1)In the absence of evidence of kidney disease, neither GFR category G1 or G2 fulfill the criteria for CKD.    eGFR calculation 2021 CKD-EPI creatinine equation, which does not include race as a factor    [place] Draw [[id number]] Collected: 07/28/25 1357    Specimen: Blood from Arm, Right Updated: 07/28/25 1415    Narrative:      The following orders were created for panel order [place] Draw.  Procedure                               Abnormality         Status                     ---------                               -----------         ------                     [facility]  (Gel)[[id number]]                                  Final result               Lavender Top[[id number]]                                     Final result               Gold Top - SST[[id number]]                                   Final result               Light Blue Top[[id number]]                                   Final result                 Please view results for these tests on the individual orders.    Light Blue Top [[id number]] Collected: 07/28/25 1357    Specimen: Blood from Arm, Right Updated: 07/28/25 1415     Extra Tube Hold for add-ons.     Comment: Auto resulted       Green Top (Gel) [[id number]] Collected: 07/28/25 1357    Specimen: Blood from Arm, Right Updated: 07/28/25 1415     Extra Tube Hold for add-ons.     Comment: Auto resulted.       Lavender Top [[id number]] Collected: 07/28/25 1357    Specimen: Blood from Arm, Right Updated: 07/28/25 1415     Extra Tube hold for add-on     Comment: Auto resulted       Gold Top - SST [[id number]] Collected: 07/28/25 1357    Specimen: Blood from Arm, Right Updated: 07/28/25 1415     Extra Tube Hold for add-ons.     Comment: Auto resulted.       CBC & Differential [[id number]]  (Normal) Collected: 07/28/25 1357    Specimen: Blood from Arm, Right Updated: 07/28/25 1409    Narrative:      The following orders were created for panel order CBC & Differential.  Procedure                               Abnormality         Status                     ---------                               -----------         ------                     CBC Auto Differential[[id number]]        Normal              Final result                 Please view results for these tests on the individual orders.    CBC Auto Differential [[id number]]  (Normal) Collected: 07/28/25 1357    Specimen: Blood from Arm, Right Updated: 07/28/25 1409     WBC 6.50 10*3/mm3      RBC 4.33 10*6/mm3      Hemoglobin 13.0 g/dL      Hematocrit 39.3 %      MCV 90.8 fL      MCH 30.0 pg      MCHC 33.1 g/dL      RDW 15.0 %       "RDW-SD 49.2 fl      MPV 9.4 fL      Platelets 297 10*3/mm3      Neutrophil % 68.5 %      Lymphocyte % 20.8 %      Monocyte % 9.4 %      Eosinophil % 0.6 %      Basophil % 0.5 %      Immature Grans % 0.2 %      Neutrophils, Absolute 4.46 10*3/mm3      Lymphocytes, Absolute 1.35 10*3/mm3      Monocytes, Absolute 0.61 10*3/mm3      Eosinophils, Absolute 0.04 10*3/mm3      Basophils, Absolute 0.03 10*3/mm3      Immature Grans, Absolute 0.01 10*3/mm3      nRBC 0.0 /100 WBC           Data Review:  Results from last 7 days   Lab Units 07/31/25  0552 07/30/25  0821 07/29/25  0855   SODIUM mmol/L 134* 132* 130*   POTASSIUM mmol/L 4.0 3.8 4.1   CHLORIDE mmol/L 103 103 95*   CO2 mmol/L 20.0* 18.0* 19.6*   BUN mg/dL 5.0* 10.0 19.0   CREATININE mg/dL 0.65 0.70 1.03*   GLUCOSE mg/dL 110* 107* 131*   CALCIUM mg/dL 8.5* 8.0* 8.4*     Results from last 7 days   Lab Units 07/31/25  0552 07/30/25  0821 07/29/25  0855   WBC 10*3/mm3 5.80 5.84 6.68   HEMOGLOBIN g/dL 11.0* 11.0* 12.5   HEMATOCRIT % 33.2* 35.4 37.1   PLATELETS 10*3/mm3 208 212 260             Lab Results   Lab Value Date/Time    TROPONINT 7 05/22/2025 1802    TROPONINT <6 05/22/2025 1611         Results from last 7 days   Lab Units 07/29/25  0855 07/28/25  1357   ALK PHOS U/L 108 117   BILIRUBIN mg/dL 1.5* 1.8*   ALT (SGPT) U/L 25 32   AST (SGOT) U/L 30 37*             No results found for: \"POCGLU\"        Past Medical History:   Diagnosis Date    Arthritis     Elevated cholesterol        Assessment:  Active Hospital Problems    Diagnosis  POA    **Hyponatremia [E87.1]  Yes    N&V (nausea and vomiting) [R11.2]  Yes    Hypomagnesemia [E83.42]  Yes    Metabolic acidosis [E87.20]  Yes    TREVA (acute kidney injury) [N17.9]  Yes    Abdominal pain [R10.9]  Yes      Resolved Hospital Problems   No resolved problems to display.       Plan:  Will see how she does with diet and follow-up labs tomorrow.  Probably home tomorrow if tolerating and the labs look stable to improving.  " Discussed with the nurse.    Cirilo Delacruz MD  7/31/2025  12:50 EDT

## 2025-07-31 NOTE — OUTREACH NOTE
Prep Survey      Flowsheet Row Responses   [facility] facility patient discharged from? [place]   Is LACE score < 7 ? No   Eligibility Readm Mgmt   Discharge diagnosis *Hyponatremia   Does the patient have one of the following disease processes/diagnoses(primary or secondary)? Other   Does the patient have Home health ordered? Yes   What is the Home health agency?  [facility]   Is there a DME ordered? No   Prep survey completed? Yes            RAE PIZANO - Registered Nurse

## 2025-07-31 NOTE — CASE MANAGEMENT/SOCIAL WORK
Discharge Planning Assessment  [facility]     Patient Name: Samia Gaming  MRN: [id number]  Today's Date: 7/31/2025    Admit Date: 7/28/2025    Plan: Patient plans to return home upon discharge with continued home health through [facility]. Family to transport. No needs anticipated.   Discharge Needs Assessment       Row Name 07/31/25 1254       Living Environment    People in Home child(jolly), adult    Name(s) of People in Home Brittny (daughter)    Current Living Arrangements home    Potentially Unsafe Housing Conditions none    Primary Care Provided by self;child(jolly)    Provides Primary Care For no one, unable/limited ability to care for self    Family Caregiver if Needed child(jolly), adult    Family Caregiver Names Brittny (daughter)    Quality of Family Relationships helpful;involved;supportive    Able to Return to Prior Arrangements yes    Living Arrangement Comments Patient resides with daughter, Brittny, in private 2 level home. She is able to stay on 1st level. No vocalized environmental concerns.       Resource/Environmental Concerns    Resource/Environmental Concerns none    Transportation Concerns none       Transition Planning    Patient/Family Anticipates Transition to home with family;home with help/services  Continued Saira WAGONER (PT/RN)    Patient/Family Anticipated Services at Transition home health care  Continued Saira WAGONER (PT/RN)    Transportation Anticipated family or friend will provide       Discharge Needs Assessment    Equipment Currently Used at Home walker, rolling;hospital bed;shower chair    Concerns to be Addressed no discharge needs identified;denies needs/concerns at this time    Anticipated Changes Related to Illness none    Equipment Needed After Discharge none    Outpatient/Agency/Support Group Needs homecare agency  Continued Saira WAGONER (PT/RN)    Discharge Facility/Level of Care Needs home with home health  Continued Saira WAGONER (PT/RN)    Patient's Choice of Community Agency(s) Continued  Saira  (PT/RN)                   Discharge Plan       Row Name 07/31/25 1256       Plan    Plan Patient plans to return home upon discharge with continued home health through [facility]. Family to transport. No needs anticipated.    Patient/Family in Agreement with Plan yes    Plan Comments Patient plans to return home upon discharge, where she lives with her daughter, Brittny, in private 2 level home with no vocalized environmental concerns. She is able to stay on the 1st level with bathroom/bedroom available. She owns a walker, hospital bed, shower chair, and ostomy supplies. No DME needs. She denies any falls in the home. She reports she drives and also has reliable transportation from family, if needed. She uses [facility] and denies issues obtaining/affording medications. She declines Meds to Beds. She [facility] and [facility] (twice a week). She would like to continue their services upon discharge. I will place referral [facility] to [facility]. She reports that her family can provide discharge transportation. No needs anticipated. Encouraged to contact [facility] should needs change.                  Continued Care and Services - Admitted Since 7/28/2025    No active coordination exists.       Selected Continued Care - Prior Encounters Includes continued care and service providers with selected services from prior encounters from 4/29/2025 to 7/31/2025      Discharged on 6/7/2025 Admission date: 5/22/2025 - Discharge disposition: Home-Health Care [facility]      Home Medical Care       Service Provider Services Address Phone Fax Patient Preferred    [facility] - [facility] [place], operated by [facility] [id number] [place] [address] [phone] --                             Demographic Summary       Row Name 07/31/25 1251       General Information    Admission Type inpatient    Arrived From home    Required Notices Provided Important  Message from Medicare  Verified IMM on chart.    Referral Source admission list;emergency department    Reason for Consult discharge planning    Preferred Language English    General Information Comments Facesheet verified. Role of CCP explained. Appropriate PPE worn at all times.                   Functional Status       Row Name 07/31/25 1252       Functional Status    Usual Activity Tolerance good    Current Activity Tolerance good       Functional Status, IADL    Medications assistive equipment and person    Meal Preparation assistive equipment and person    Housekeeping assistive equipment and person    Laundry assistive equipment and person    Shopping assistive equipment and person    IADL Comments Patient reports modified independence with use of rolling walker and PRN assistance from daughter, with whom she lives.       Mental Status    General Appearance WDL WDL       Mental Status Summary    Recent Changes in Mental Status/Cognitive Functioning no changes                   Psychosocial       Row Name 07/31/25 1253       Behavior WDL    Behavior WDL WDL       Emotion Mood WDL    Emotion/Mood/Affect WDL WDL       Speech WDL    Speech WDL WDL       Perceptual State WDL    Perceptual State WDL WDL       Thought Process WDL    Thought Process WDL WDL       Intellectual Performance WDL    Intellectual Performance WDL WDL    Level of Consciousness Alert       Coping/Stress    Major Change/Loss/Stressor none    Patient Personal Strengths expressive of emotions;expressive of needs;strong support system    Sources of Support adult child(jolly)       Developmental Stage (Eriksson's Stages of Development)    Developmental Stage Stage 8 (65 years-death/Late Adulthood) Integrity vs. Despair                   Abuse/Neglect       Row Name 07/31/25 1254       Personal Safety    Physical Signs of Abuse Present no                   Legal    No documentation.                  Substance Abuse    No documentation.                   Patient Forms    No documentation.                     Jaxon Santillan RN

## 2025-07-31 NOTE — DISCHARGE PLACEMENT REQUEST
"Samia Gaming (69 y.o. Female)       Date of Birth   1955    Social Security Number       Address   [address] [place] [address]    Home Phone   [phone]    MRN   [id number]       Mandaeism   Mu-ism    Marital Status   Single                            Admission Date   7/28/2025    Admission Type   Emergency    Admitting Provider   Cirilo Delacruz MD    Attending Provider   Cirilo Delacruz MD    Department, Room/Bed   [facility] POD F, F49/1       Discharge Date       Discharge Disposition       Discharge Destination                                 Attending Provider: Cirilo Delacruz MD    Allergies: No Known Allergies    Isolation: None   Infection: None   Code Status: CPR    Ht: 157.5 cm (62\")   Wt: 105 kg (230 lb 13.2 oz)    Admission Cmt: None   Principal Problem: Hyponatremia [E87.1]                   Active Insurance as of 7/28/2025       Primary Coverage       Payor Plan Insurance Group Employer/Plan Group    MEDICARE MEDICARE A & B        Payor Plan Address Payor Plan Phone Number Payor Plan Fax Number Effective Dates    [address] [phone]  9/1/2018 - None Entered    [facility] [address]         Subscriber Name Subscriber Birth Date Member ID       SAMIA GAMING 1955 [id number]               Secondary Coverage       Payor Plan Insurance Group Employer/Plan Group    AARP MC SUP AAR HEALTH CARE OPTIONS        Payor Plan Address Payor Plan Phone Number Payor Plan Fax Number Effective Dates    [facility] [phone]  1/1/2024 - None Entered    [address]       [facility] [address]         Subscriber Name Subscriber Birth Date Member ID       SAMIA GAMING 1955 [id number]                     Emergency Contacts        (Rel.) Home Phone Work Phone Mobile Phone    IVETANATOLY (Grandchild) [phone] -- [phone]    GamingBrittny puente (Daughter) -- -- [phone]                "

## 2025-08-01 NOTE — CASE MANAGEMENT/SOCIAL WORK
Case Management Discharge Note      Final Note: [facility].         Selected Continued Care - Discharged on 7/31/2025 Admission date: 7/28/2025 - Discharge disposition: Home or Self Care      Destination    No services have been selected for the patient.                Durable Medical Equipment    No services have been selected for the patient.                Dialysis/Infusion    No services have been selected for the patient.                Home Medical Care Coordination complete.      Service Provider Services Address Phone Fax Patient Preferred    [facility] [id number], [facility] [address] [phone] Yes       Internal Comment last updated by Jaxon Santillan RN 7/31/2025 1301    Patient is current with [facility].                          Therapy    No services have been selected for the patient.                Community Resources    No services have been selected for the patient.                Community & DME    No services have been selected for the patient.                    Selected Continued Care - Prior Encounters Includes continued care and service providers with selected services from prior encounters from 4/29/2025 to 7/31/2025      Discharged on 6/7/2025 Admission date: 5/22/2025 - Discharge disposition: Home-Health Care [facility] [place]      Home Medical Care       Service Provider Services Address Phone Fax Patient Preferred    [facility] [address], [facility] [address] [phone] --                          Transportation Services  Transportation: Private Transportation  Private: Car    Final Discharge Disposition Code: 06 - home with home health care

## 2025-08-04 ENCOUNTER — READMISSION MANAGEMENT (OUTPATIENT)
Dept: CALL CENTER | Facility: HOSPITAL | Age: 70
End: 2025-08-04
Payer: MEDICARE

## 2025-08-09 ENCOUNTER — HOSPITAL ENCOUNTER (INPATIENT)
Facility: HOSPITAL | Age: 70
LOS: 17 days | Discharge: HOME | End: 2025-08-27
Attending: STUDENT IN AN ORGANIZED HEALTH CARE EDUCATION/TRAINING PROGRAM | Admitting: STUDENT IN AN ORGANIZED HEALTH CARE EDUCATION/TRAINING PROGRAM
Payer: MEDICARE

## 2025-08-09 DIAGNOSIS — Z98.890 STATUS POST REVERSAL OF ILEOSTOMY: Primary | ICD-10-CM

## 2025-08-09 DIAGNOSIS — R11.2 INTRACTABLE NAUSEA AND VOMITING: ICD-10-CM

## 2025-08-09 DIAGNOSIS — R19.8 HIGH OUTPUT ILEOSTOMY: ICD-10-CM

## 2025-08-09 DIAGNOSIS — E87.20 METABOLIC ACIDOSIS: ICD-10-CM

## 2025-08-09 DIAGNOSIS — E87.1 HYPONATREMIA: ICD-10-CM

## 2025-08-09 DIAGNOSIS — Z93.2 HIGH OUTPUT ILEOSTOMY: ICD-10-CM

## 2025-08-09 DIAGNOSIS — Z93.2 ILEOSTOMY IN PLACE: ICD-10-CM

## 2025-08-09 DIAGNOSIS — N17.9 AKI (ACUTE KIDNEY INJURY): ICD-10-CM

## 2025-08-09 LAB
ALBUMIN SERPL-MCNC: 4 G/DL (ref 3.5–5.2)
ALBUMIN/GLOB SERPL: 1.1 G/DL
ALP SERPL-CCNC: 111 U/L (ref 39–117)
ALT SERPL W P-5'-P-CCNC: 29 U/L (ref 1–33)
ANION GAP SERPL CALCULATED.3IONS-SCNC: 17.9 MMOL/L (ref 5–15)
AST SERPL-CCNC: 39 U/L (ref 1–32)
BASOPHILS # BLD AUTO: 0.04 10*3/MM3 (ref 0–0.2)
BASOPHILS NFR BLD AUTO: 0.5 % (ref 0–1.5)
BILIRUB SERPL-MCNC: 2.3 MG/DL (ref 0–1.2)
BUN SERPL-MCNC: 9 MG/DL (ref 8–23)
BUN/CREAT SERPL: 7 (ref 7–25)
CALCIUM SPEC-SCNC: 9.5 MG/DL (ref 8.6–10.5)
CHLORIDE SERPL-SCNC: 86 MMOL/L (ref 98–107)
CO2 SERPL-SCNC: 19.1 MMOL/L (ref 22–29)
CREAT SERPL-MCNC: 1.29 MG/DL (ref 0.57–1)
DEPRECATED RDW RBC AUTO: 51.6 FL (ref 37–54)
EGFRCR SERPLBLD CKD-EPI 2021: 44.5 ML/MIN/1.73
EOSINOPHIL # BLD AUTO: 0.01 10*3/MM3 (ref 0–0.4)
EOSINOPHIL NFR BLD AUTO: 0.1 % (ref 0.3–6.2)
ERYTHROCYTE [DISTWIDTH] IN BLOOD BY AUTOMATED COUNT: 14.9 % (ref 12.3–15.4)
GLOBULIN UR ELPH-MCNC: 3.6 GM/DL
GLUCOSE SERPL-MCNC: 115 MG/DL (ref 65–99)
HCT VFR BLD AUTO: 41 % (ref 34–46.6)
HGB BLD-MCNC: 13.5 G/DL (ref 12–15.9)
IMM GRANULOCYTES # BLD AUTO: 0.02 10*3/MM3 (ref 0–0.05)
IMM GRANULOCYTES NFR BLD AUTO: 0.3 % (ref 0–0.5)
LIPASE SERPL-CCNC: 27 U/L (ref 13–60)
LYMPHOCYTES # BLD AUTO: 1.3 10*3/MM3 (ref 0.7–3.1)
LYMPHOCYTES NFR BLD AUTO: 16.6 % (ref 19.6–45.3)
MCH RBC QN AUTO: 30.5 PG (ref 26.6–33)
MCHC RBC AUTO-ENTMCNC: 32.9 G/DL (ref 31.5–35.7)
MCV RBC AUTO: 92.8 FL (ref 79–97)
MONOCYTES # BLD AUTO: 0.97 10*3/MM3 (ref 0.1–0.9)
MONOCYTES NFR BLD AUTO: 12.4 % (ref 5–12)
NEUTROPHILS NFR BLD AUTO: 5.48 10*3/MM3 (ref 1.7–7)
NEUTROPHILS NFR BLD AUTO: 70.1 % (ref 42.7–76)
NRBC BLD AUTO-RTO: 0 /100 WBC (ref 0–0.2)
PLATELET # BLD AUTO: 241 10*3/MM3 (ref 140–450)
PMV BLD AUTO: 9.9 FL (ref 6–12)
POTASSIUM SERPL-SCNC: 4.5 MMOL/L (ref 3.5–5.2)
PROT SERPL-MCNC: 7.6 G/DL (ref 6–8.5)
RBC # BLD AUTO: 4.42 10*6/MM3 (ref 3.77–5.28)
SODIUM SERPL-SCNC: 123 MMOL/L (ref 136–145)
WBC NRBC COR # BLD AUTO: 7.82 10*3/MM3 (ref 3.4–10.8)

## 2025-08-09 PROCEDURE — 25010000002 MORPHINE PER 10 MG: Performed by: EMERGENCY MEDICINE

## 2025-08-09 PROCEDURE — 82010 KETONE BODYS QUAN: CPT | Performed by: EMERGENCY MEDICINE

## 2025-08-09 PROCEDURE — 99291 CRITICAL CARE FIRST HOUR: CPT

## 2025-08-09 PROCEDURE — 25810000003 LACTATED RINGERS SOLUTION: Performed by: EMERGENCY MEDICINE

## 2025-08-09 PROCEDURE — 80053 COMPREHEN METABOLIC PANEL: CPT | Performed by: STUDENT IN AN ORGANIZED HEALTH CARE EDUCATION/TRAINING PROGRAM

## 2025-08-09 PROCEDURE — 83690 ASSAY OF LIPASE: CPT | Performed by: STUDENT IN AN ORGANIZED HEALTH CARE EDUCATION/TRAINING PROGRAM

## 2025-08-09 PROCEDURE — 85025 COMPLETE CBC W/AUTO DIFF WBC: CPT | Performed by: STUDENT IN AN ORGANIZED HEALTH CARE EDUCATION/TRAINING PROGRAM

## 2025-08-09 PROCEDURE — 25010000002 ONDANSETRON PER 1 MG: Performed by: EMERGENCY MEDICINE

## 2025-08-09 RX ORDER — ONDANSETRON 2 MG/ML
4 INJECTION INTRAMUSCULAR; INTRAVENOUS ONCE
Status: COMPLETED | OUTPATIENT
Start: 2025-08-09 | End: 2025-08-09

## 2025-08-09 RX ORDER — MORPHINE SULFATE 2 MG/ML
4 INJECTION, SOLUTION INTRAMUSCULAR; INTRAVENOUS ONCE
Status: COMPLETED | OUTPATIENT
Start: 2025-08-09 | End: 2025-08-09

## 2025-08-09 RX ADMIN — ONDANSETRON 4 MG: 2 INJECTION INTRAMUSCULAR; INTRAVENOUS at 23:35

## 2025-08-09 RX ADMIN — MORPHINE SULFATE 4 MG: 2 INJECTION, SOLUTION INTRAMUSCULAR; INTRAVENOUS at 23:36

## 2025-08-09 RX ADMIN — SODIUM CHLORIDE, POTASSIUM CHLORIDE, SODIUM LACTATE AND CALCIUM CHLORIDE 1000 ML: 600; 310; 30; 20 INJECTION, SOLUTION INTRAVENOUS at 23:36

## 2025-08-10 ENCOUNTER — APPOINTMENT (OUTPATIENT)
Dept: CT IMAGING | Facility: HOSPITAL | Age: 70
End: 2025-08-10
Payer: MEDICARE

## 2025-08-10 PROBLEM — K21.9 GERD (GASTROESOPHAGEAL REFLUX DISEASE): Status: ACTIVE | Noted: 2025-08-10

## 2025-08-10 PROBLEM — N39.0 UTI (URINARY TRACT INFECTION): Status: ACTIVE | Noted: 2025-08-10

## 2025-08-10 PROBLEM — E78.5 HLD (HYPERLIPIDEMIA): Status: ACTIVE | Noted: 2025-08-10

## 2025-08-10 LAB
ADV 40+41 DNA STL QL NAA+NON-PROBE: NOT DETECTED
ALBUMIN SERPL-MCNC: 3.4 G/DL (ref 3.5–5.2)
ALBUMIN/GLOB SERPL: 1.1 G/DL
ALP SERPL-CCNC: 88 U/L (ref 39–117)
ALT SERPL W P-5'-P-CCNC: 20 U/L (ref 1–33)
ANION GAP SERPL CALCULATED.3IONS-SCNC: 13.7 MMOL/L (ref 5–15)
AST SERPL-CCNC: 30 U/L (ref 1–32)
ASTRO TYP 1-8 RNA STL QL NAA+NON-PROBE: NOT DETECTED
ATMOSPHERIC PRESS: 753.4 MMHG
B-OH-BUTYR SERPL-SCNC: 1.7 MMOL/L (ref 0.02–0.27)
BACTERIA UR QL AUTO: ABNORMAL /HPF
BASE EXCESS BLDV CALC-SCNC: -0.1 MMOL/L (ref -2–2)
BILIRUB SERPL-MCNC: 1.8 MG/DL (ref 0–1.2)
BILIRUB UR QL STRIP: NEGATIVE
BUN SERPL-MCNC: 9 MG/DL (ref 8–23)
BUN/CREAT SERPL: 6.7 (ref 7–25)
C CAYETANENSIS DNA STL QL NAA+NON-PROBE: NOT DETECTED
C COLI+JEJ+UPSA DNA STL QL NAA+NON-PROBE: NOT DETECTED
C DIFF TOX GENS STL QL NAA+PROBE: NEGATIVE
CALCIUM SPEC-SCNC: 8.7 MG/DL (ref 8.6–10.5)
CHLORIDE SERPL-SCNC: 92 MMOL/L (ref 98–107)
CHLORIDE UR-SCNC: <20 MMOL/L
CLARITY UR: ABNORMAL
CO2 SERPL-SCNC: 21.3 MMOL/L (ref 22–29)
COLOR UR: YELLOW
CREAT SERPL-MCNC: 1.34 MG/DL (ref 0.57–1)
CRYPTOSP DNA STL QL NAA+NON-PROBE: NOT DETECTED
DEPRECATED RDW RBC AUTO: 50.7 FL (ref 37–54)
DEVICE COMMENT: ABNORMAL
E HISTOLYT DNA STL QL NAA+NON-PROBE: NOT DETECTED
EAEC PAA PLAS AGGR+AATA ST NAA+NON-PRB: NOT DETECTED
EC STX1+STX2 GENES STL QL NAA+NON-PROBE: NOT DETECTED
EGFRCR SERPLBLD CKD-EPI 2021: 42.5 ML/MIN/1.73
EPEC EAE GENE STL QL NAA+NON-PROBE: NOT DETECTED
ERYTHROCYTE [DISTWIDTH] IN BLOOD BY AUTOMATED COUNT: 15 % (ref 12.3–15.4)
ETEC LTA+ST1A+ST1B TOX ST NAA+NON-PROBE: NOT DETECTED
G LAMBLIA DNA STL QL NAA+NON-PROBE: NOT DETECTED
GLOBULIN UR ELPH-MCNC: 3.1 GM/DL
GLUCOSE SERPL-MCNC: 104 MG/DL (ref 65–99)
GLUCOSE UR STRIP-MCNC: NEGATIVE MG/DL
HCO3 BLDV-SCNC: 21.3 MMOL/L (ref 22–28)
HCT VFR BLD AUTO: 36.4 % (ref 34–46.6)
HGB BLD-MCNC: 12.1 G/DL (ref 12–15.9)
HGB UR QL STRIP.AUTO: ABNORMAL
HYALINE CASTS UR QL AUTO: ABNORMAL /LPF
KETONES UR QL STRIP: ABNORMAL
LEUKOCYTE ESTERASE UR QL STRIP.AUTO: ABNORMAL
MCH RBC QN AUTO: 30.9 PG (ref 26.6–33)
MCHC RBC AUTO-ENTMCNC: 33.2 G/DL (ref 31.5–35.7)
MCV RBC AUTO: 92.9 FL (ref 79–97)
MODALITY: ABNORMAL
NITRITE UR QL STRIP: NEGATIVE
NOROVIRUS GI+II RNA STL QL NAA+NON-PROBE: NOT DETECTED
OSMOLALITY UR: 204 MOSM/KG
P SHIGELLOIDES DNA STL QL NAA+NON-PROBE: NOT DETECTED
PCO2 BLDV: 25.7 MM HG (ref 41–51)
PH BLDV: 7.53 PH UNITS (ref 7.31–7.41)
PH UR STRIP.AUTO: <=5 [PH] (ref 5–8)
PLATELET # BLD AUTO: 188 10*3/MM3 (ref 140–450)
PMV BLD AUTO: 10 FL (ref 6–12)
PO2 BLDV: 36 MM HG (ref 35–45)
POTASSIUM SERPL-SCNC: 3.9 MMOL/L (ref 3.5–5.2)
PROT SERPL-MCNC: 6.5 G/DL (ref 6–8.5)
PROT UR QL STRIP: ABNORMAL
RBC # BLD AUTO: 3.92 10*6/MM3 (ref 3.77–5.28)
RBC # UR STRIP: ABNORMAL /HPF
REF LAB TEST METHOD: ABNORMAL
RVA RNA STL QL NAA+NON-PROBE: NOT DETECTED
S ENT+BONG DNA STL QL NAA+NON-PROBE: NOT DETECTED
SAO2 % BLDCOV: 77.3 % (ref 45–75)
SAPO I+II+IV+V RNA STL QL NAA+NON-PROBE: NOT DETECTED
SHIGELLA SP+EIEC IPAH ST NAA+NON-PROBE: NOT DETECTED
SODIUM SERPL-SCNC: 127 MMOL/L (ref 136–145)
SODIUM UR-SCNC: <20 MMOL/L
SP GR UR STRIP: 1.02 (ref 1–1.03)
SQUAMOUS #/AREA URNS HPF: ABNORMAL /HPF
TOTAL RATE: 16 BREATHS/MINUTE
UROBILINOGEN UR QL STRIP: ABNORMAL
V CHOL+PARA+VUL DNA STL QL NAA+NON-PROBE: NOT DETECTED
V CHOLERAE DNA STL QL NAA+NON-PROBE: NOT DETECTED
WBC # UR STRIP: ABNORMAL /HPF
WBC NRBC COR # BLD AUTO: 6.43 10*3/MM3 (ref 3.4–10.8)
Y ENTEROCOL DNA STL QL NAA+NON-PROBE: NOT DETECTED

## 2025-08-10 PROCEDURE — 87086 URINE CULTURE/COLONY COUNT: CPT | Performed by: EMERGENCY MEDICINE

## 2025-08-10 PROCEDURE — 36415 COLL VENOUS BLD VENIPUNCTURE: CPT | Performed by: NURSE PRACTITIONER

## 2025-08-10 PROCEDURE — 25510000001 IOPAMIDOL 61 % SOLUTION: Performed by: EMERGENCY MEDICINE

## 2025-08-10 PROCEDURE — 74177 CT ABD & PELVIS W/CONTRAST: CPT

## 2025-08-10 PROCEDURE — 81001 URINALYSIS AUTO W/SCOPE: CPT | Performed by: STUDENT IN AN ORGANIZED HEALTH CARE EDUCATION/TRAINING PROGRAM

## 2025-08-10 PROCEDURE — 80053 COMPREHEN METABOLIC PANEL: CPT | Performed by: NURSE PRACTITIONER

## 2025-08-10 PROCEDURE — 25010000002 ONDANSETRON PER 1 MG: Performed by: NURSE PRACTITIONER

## 2025-08-10 PROCEDURE — 85027 COMPLETE CBC AUTOMATED: CPT | Performed by: NURSE PRACTITIONER

## 2025-08-10 PROCEDURE — 63710000001 ONDANSETRON ODT 4 MG TABLET DISPERSIBLE: Performed by: NURSE PRACTITIONER

## 2025-08-10 PROCEDURE — 87077 CULTURE AEROBIC IDENTIFY: CPT | Performed by: EMERGENCY MEDICINE

## 2025-08-10 PROCEDURE — 84300 ASSAY OF URINE SODIUM: CPT | Performed by: NURSE PRACTITIONER

## 2025-08-10 PROCEDURE — 87507 IADNA-DNA/RNA PROBE TQ 12-25: CPT | Performed by: NURSE PRACTITIONER

## 2025-08-10 PROCEDURE — 87186 SC STD MICRODIL/AGAR DIL: CPT | Performed by: EMERGENCY MEDICINE

## 2025-08-10 PROCEDURE — 82803 BLOOD GASES ANY COMBINATION: CPT | Performed by: EMERGENCY MEDICINE

## 2025-08-10 PROCEDURE — 83935 ASSAY OF URINE OSMOLALITY: CPT | Performed by: NURSE PRACTITIONER

## 2025-08-10 PROCEDURE — 87493 C DIFF AMPLIFIED PROBE: CPT | Performed by: NURSE PRACTITIONER

## 2025-08-10 PROCEDURE — 25810000003 SODIUM CHLORIDE 0.9 % SOLUTION: Performed by: NURSE PRACTITIONER

## 2025-08-10 PROCEDURE — 82436 ASSAY OF URINE CHLORIDE: CPT | Performed by: NURSE PRACTITIONER

## 2025-08-10 PROCEDURE — 25010000002 CEFAZOLIN PER 500 MG: Performed by: EMERGENCY MEDICINE

## 2025-08-10 PROCEDURE — 25010000002 HYDROMORPHONE PER 4 MG: Performed by: NURSE PRACTITIONER

## 2025-08-10 RX ORDER — OXYCODONE AND ACETAMINOPHEN 5; 325 MG/1; MG/1
1 TABLET ORAL EVERY 4 HOURS PRN
Status: DISCONTINUED | OUTPATIENT
Start: 2025-08-10 | End: 2025-08-13

## 2025-08-10 RX ORDER — NITROGLYCERIN 0.4 MG/1
0.4 TABLET SUBLINGUAL
Status: DISCONTINUED | OUTPATIENT
Start: 2025-08-10 | End: 2025-08-27 | Stop reason: HOSPADM

## 2025-08-10 RX ORDER — ACETAMINOPHEN 650 MG/1
650 SUPPOSITORY RECTAL EVERY 4 HOURS PRN
Status: DISCONTINUED | OUTPATIENT
Start: 2025-08-10 | End: 2025-08-13

## 2025-08-10 RX ORDER — ACETAMINOPHEN 160 MG/5ML
650 SOLUTION ORAL EVERY 4 HOURS PRN
Status: DISCONTINUED | OUTPATIENT
Start: 2025-08-10 | End: 2025-08-13

## 2025-08-10 RX ORDER — ONDANSETRON 4 MG/1
4 TABLET, ORALLY DISINTEGRATING ORAL EVERY 6 HOURS PRN
Status: DISCONTINUED | OUTPATIENT
Start: 2025-08-10 | End: 2025-08-16

## 2025-08-10 RX ORDER — TRAZODONE HYDROCHLORIDE 100 MG/1
300 TABLET ORAL NIGHTLY
Status: DISCONTINUED | OUTPATIENT
Start: 2025-08-10 | End: 2025-08-27 | Stop reason: HOSPADM

## 2025-08-10 RX ORDER — CALCIUM CARBONATE 500 MG/1
2 TABLET, CHEWABLE ORAL 2 TIMES DAILY PRN
Status: DISCONTINUED | OUTPATIENT
Start: 2025-08-10 | End: 2025-08-27 | Stop reason: HOSPADM

## 2025-08-10 RX ORDER — HYDROXYZINE HYDROCHLORIDE 25 MG/1
25 TABLET, FILM COATED ORAL 3 TIMES DAILY PRN
Status: DISCONTINUED | OUTPATIENT
Start: 2025-08-10 | End: 2025-08-18

## 2025-08-10 RX ORDER — SODIUM CHLORIDE 0.9 % (FLUSH) 0.9 %
10 SYRINGE (ML) INJECTION EVERY 12 HOURS SCHEDULED
Status: DISCONTINUED | OUTPATIENT
Start: 2025-08-10 | End: 2025-08-27 | Stop reason: SDUPTHER

## 2025-08-10 RX ORDER — ACETAMINOPHEN 325 MG/1
650 TABLET ORAL EVERY 4 HOURS PRN
Status: DISCONTINUED | OUTPATIENT
Start: 2025-08-10 | End: 2025-08-13

## 2025-08-10 RX ORDER — IOPAMIDOL 612 MG/ML
85 INJECTION, SOLUTION INTRAVASCULAR
Status: COMPLETED | OUTPATIENT
Start: 2025-08-10 | End: 2025-08-10

## 2025-08-10 RX ORDER — SODIUM CHLORIDE 9 MG/ML
40 INJECTION, SOLUTION INTRAVENOUS AS NEEDED
Status: DISCONTINUED | OUTPATIENT
Start: 2025-08-10 | End: 2025-08-27 | Stop reason: SDUPTHER

## 2025-08-10 RX ORDER — PANTOPRAZOLE SODIUM 40 MG/1
40 TABLET, DELAYED RELEASE ORAL DAILY
Status: DISCONTINUED | OUTPATIENT
Start: 2025-08-10 | End: 2025-08-13

## 2025-08-10 RX ORDER — SODIUM CHLORIDE 9 MG/ML
100 INJECTION, SOLUTION INTRAVENOUS CONTINUOUS
Status: ACTIVE | OUTPATIENT
Start: 2025-08-10 | End: 2025-08-12

## 2025-08-10 RX ORDER — HYDROMORPHONE HYDROCHLORIDE 1 MG/ML
0.5 INJECTION, SOLUTION INTRAMUSCULAR; INTRAVENOUS; SUBCUTANEOUS
Status: DISCONTINUED | OUTPATIENT
Start: 2025-08-10 | End: 2025-08-10

## 2025-08-10 RX ORDER — ONDANSETRON 2 MG/ML
4 INJECTION INTRAMUSCULAR; INTRAVENOUS EVERY 6 HOURS PRN
Status: DISCONTINUED | OUTPATIENT
Start: 2025-08-10 | End: 2025-08-16

## 2025-08-10 RX ORDER — SODIUM CHLORIDE 0.9 % (FLUSH) 0.9 %
10 SYRINGE (ML) INJECTION AS NEEDED
Status: DISCONTINUED | OUTPATIENT
Start: 2025-08-10 | End: 2025-08-27 | Stop reason: SDUPTHER

## 2025-08-10 RX ORDER — ATORVASTATIN CALCIUM 20 MG/1
40 TABLET, FILM COATED ORAL DAILY
Status: DISCONTINUED | OUTPATIENT
Start: 2025-08-10 | End: 2025-08-27 | Stop reason: HOSPADM

## 2025-08-10 RX ADMIN — OXYCODONE AND ACETAMINOPHEN 1 TABLET: 5; 325 TABLET ORAL at 11:06

## 2025-08-10 RX ADMIN — ONDANSETRON 4 MG: 4 TABLET, ORALLY DISINTEGRATING ORAL at 21:23

## 2025-08-10 RX ADMIN — TRAZODONE HYDROCHLORIDE 300 MG: 100 TABLET ORAL at 21:23

## 2025-08-10 RX ADMIN — ONDANSETRON 4 MG: 2 INJECTION INTRAMUSCULAR; INTRAVENOUS at 07:57

## 2025-08-10 RX ADMIN — ONDANSETRON 4 MG: 2 INJECTION INTRAMUSCULAR; INTRAVENOUS at 14:37

## 2025-08-10 RX ADMIN — Medication 10 ML: at 07:58

## 2025-08-10 RX ADMIN — ATORVASTATIN CALCIUM 40 MG: 20 TABLET, FILM COATED ORAL at 07:57

## 2025-08-10 RX ADMIN — SODIUM CHLORIDE 100 ML/HR: 9 INJECTION, SOLUTION INTRAVENOUS at 11:50

## 2025-08-10 RX ADMIN — HYDROXYZINE HYDROCHLORIDE 25 MG: 25 TABLET ORAL at 17:42

## 2025-08-10 RX ADMIN — HYDROMORPHONE HYDROCHLORIDE 0.5 MG: 1 INJECTION, SOLUTION INTRAMUSCULAR; INTRAVENOUS; SUBCUTANEOUS at 04:13

## 2025-08-10 RX ADMIN — AVOBENZONE, HOMOSALATE, OCTISALATE, OCTOCRYLENE, AND OXYBENZONE 1 PACKET: 29.4; 147; 49; 25.4; 58.8 LOTION TOPICAL at 07:58

## 2025-08-10 RX ADMIN — HYDROXYZINE HYDROCHLORIDE 25 MG: 25 TABLET ORAL at 09:44

## 2025-08-10 RX ADMIN — SODIUM CHLORIDE 100 ML/HR: 9 INJECTION, SOLUTION INTRAVENOUS at 21:28

## 2025-08-10 RX ADMIN — Medication 10 ML: at 21:24

## 2025-08-10 RX ADMIN — IOPAMIDOL 85 ML: 612 INJECTION, SOLUTION INTRAVENOUS at 00:19

## 2025-08-10 RX ADMIN — SODIUM CHLORIDE 100 ML/HR: 9 INJECTION, SOLUTION INTRAVENOUS at 02:38

## 2025-08-10 RX ADMIN — HYDROMORPHONE HYDROCHLORIDE 0.5 MG: 1 INJECTION, SOLUTION INTRAMUSCULAR; INTRAVENOUS; SUBCUTANEOUS at 07:57

## 2025-08-10 RX ADMIN — PANTOPRAZOLE SODIUM 40 MG: 40 TABLET, DELAYED RELEASE ORAL at 07:56

## 2025-08-10 RX ADMIN — CEFAZOLIN 1000 MG: 1 INJECTION, POWDER, FOR SOLUTION INTRAMUSCULAR; INTRAVENOUS at 02:07

## 2025-08-10 RX ADMIN — Medication 10 ML: at 03:55

## 2025-08-10 RX ADMIN — OXYCODONE AND ACETAMINOPHEN 1 TABLET: 5; 325 TABLET ORAL at 15:20

## 2025-08-10 RX ADMIN — OXYCODONE AND ACETAMINOPHEN 1 TABLET: 5; 325 TABLET ORAL at 21:23

## 2025-08-11 ENCOUNTER — READMISSION MANAGEMENT (OUTPATIENT)
Dept: CALL CENTER | Facility: HOSPITAL | Age: 70
End: 2025-08-11
Payer: MEDICARE

## 2025-08-11 PROCEDURE — 63710000001 ONDANSETRON ODT 4 MG TABLET DISPERSIBLE: Performed by: NURSE PRACTITIONER

## 2025-08-11 PROCEDURE — 97162 PT EVAL MOD COMPLEX 30 MIN: CPT

## 2025-08-11 PROCEDURE — 99024 POSTOP FOLLOW-UP VISIT: CPT | Performed by: STUDENT IN AN ORGANIZED HEALTH CARE EDUCATION/TRAINING PROGRAM

## 2025-08-11 PROCEDURE — 25010000002 ONDANSETRON PER 1 MG: Performed by: NURSE PRACTITIONER

## 2025-08-11 PROCEDURE — 97165 OT EVAL LOW COMPLEX 30 MIN: CPT

## 2025-08-11 PROCEDURE — 25810000003 SODIUM CHLORIDE 0.9 % SOLUTION: Performed by: STUDENT IN AN ORGANIZED HEALTH CARE EDUCATION/TRAINING PROGRAM

## 2025-08-11 PROCEDURE — 97530 THERAPEUTIC ACTIVITIES: CPT

## 2025-08-11 RX ORDER — SODIUM CHLORIDE 9 MG/ML
125 INJECTION, SOLUTION INTRAVENOUS CONTINUOUS
Status: DISCONTINUED | OUTPATIENT
Start: 2025-08-11 | End: 2025-08-13

## 2025-08-11 RX ORDER — DIPHENOXYLATE HYDROCHLORIDE AND ATROPINE SULFATE 2.5; .025 MG/1; MG/1
1 TABLET ORAL
Status: DISCONTINUED | OUTPATIENT
Start: 2025-08-11 | End: 2025-08-13

## 2025-08-11 RX ORDER — METRONIDAZOLE 500 MG/1
500 TABLET ORAL EVERY 8 HOURS SCHEDULED
Status: DISCONTINUED | OUTPATIENT
Start: 2025-08-12 | End: 2025-08-13

## 2025-08-11 RX ORDER — DIPHENOXYLATE HYDROCHLORIDE AND ATROPINE SULFATE 2.5; .025 MG/1; MG/1
1 TABLET ORAL
Status: DISCONTINUED | OUTPATIENT
Start: 2025-08-11 | End: 2025-08-11

## 2025-08-11 RX ORDER — LOPERAMIDE HYDROCHLORIDE 2 MG/1
4 CAPSULE ORAL
Status: DISCONTINUED | OUTPATIENT
Start: 2025-08-11 | End: 2025-08-13

## 2025-08-11 RX ORDER — NEOMYCIN SULFATE 500 MG/1
1000 TABLET ORAL 3 TIMES DAILY
Status: DISCONTINUED | OUTPATIENT
Start: 2025-08-12 | End: 2025-08-13

## 2025-08-11 RX ADMIN — Medication 10 ML: at 21:24

## 2025-08-11 RX ADMIN — HYDROXYZINE HYDROCHLORIDE 25 MG: 25 TABLET ORAL at 20:35

## 2025-08-11 RX ADMIN — ONDANSETRON 4 MG: 2 INJECTION INTRAMUSCULAR; INTRAVENOUS at 20:36

## 2025-08-11 RX ADMIN — LOPERAMIDE HYDROCHLORIDE 4 MG: 2 CAPSULE ORAL at 20:35

## 2025-08-11 RX ADMIN — DIPHENOXYLATE HYDROCHLORIDE AND ATROPINE SULFATE 1 TABLET: 2.5; .025 TABLET ORAL at 16:22

## 2025-08-11 RX ADMIN — OXYCODONE AND ACETAMINOPHEN 1 TABLET: 5; 325 TABLET ORAL at 09:09

## 2025-08-11 RX ADMIN — TRAZODONE HYDROCHLORIDE 300 MG: 100 TABLET ORAL at 21:22

## 2025-08-11 RX ADMIN — ONDANSETRON 4 MG: 4 TABLET, ORALLY DISINTEGRATING ORAL at 09:20

## 2025-08-11 RX ADMIN — PANTOPRAZOLE SODIUM 40 MG: 40 TABLET, DELAYED RELEASE ORAL at 09:09

## 2025-08-11 RX ADMIN — LOPERAMIDE HYDROCHLORIDE 4 MG: 2 CAPSULE ORAL at 16:23

## 2025-08-11 RX ADMIN — Medication 10 ML: at 09:09

## 2025-08-11 RX ADMIN — AVOBENZONE, HOMOSALATE, OCTISALATE, OCTOCRYLENE, AND OXYBENZONE 1 PACKET: 29.4; 147; 49; 25.4; 58.8 LOTION TOPICAL at 09:10

## 2025-08-11 RX ADMIN — OXYCODONE AND ACETAMINOPHEN 1 TABLET: 5; 325 TABLET ORAL at 20:37

## 2025-08-11 RX ADMIN — ATORVASTATIN CALCIUM 40 MG: 20 TABLET, FILM COATED ORAL at 09:09

## 2025-08-11 RX ADMIN — SODIUM CHLORIDE 125 ML/HR: 9 INJECTION, SOLUTION INTRAVENOUS at 20:36

## 2025-08-11 RX ADMIN — HYDROXYZINE HYDROCHLORIDE 25 MG: 25 TABLET ORAL at 09:09

## 2025-08-11 RX ADMIN — SODIUM CHLORIDE 1000 ML: 9 INJECTION, SOLUTION INTRAVENOUS at 18:27

## 2025-08-12 LAB
ALBUMIN SERPL-MCNC: 2.7 G/DL (ref 3.5–5.2)
ANION GAP SERPL CALCULATED.3IONS-SCNC: 8.3 MMOL/L (ref 5–15)
BACTERIA SPEC AEROBE CULT: ABNORMAL
BASOPHILS # BLD AUTO: 0.02 10*3/MM3 (ref 0–0.2)
BASOPHILS NFR BLD AUTO: 0.5 % (ref 0–1.5)
BUN SERPL-MCNC: 4 MG/DL (ref 8–23)
BUN/CREAT SERPL: 4.4 (ref 7–25)
CALCIUM SPEC-SCNC: 7.6 MG/DL (ref 8.6–10.5)
CHLORIDE SERPL-SCNC: 106 MMOL/L (ref 98–107)
CO2 SERPL-SCNC: 20.7 MMOL/L (ref 22–29)
CREAT SERPL-MCNC: 0.9 MG/DL (ref 0.57–1)
DEPRECATED RDW RBC AUTO: 50.6 FL (ref 37–54)
EGFRCR SERPLBLD CKD-EPI 2021: 68.5 ML/MIN/1.73
EOSINOPHIL # BLD AUTO: 0.21 10*3/MM3 (ref 0–0.4)
EOSINOPHIL NFR BLD AUTO: 5.7 % (ref 0.3–6.2)
ERYTHROCYTE [DISTWIDTH] IN BLOOD BY AUTOMATED COUNT: 14.5 % (ref 12.3–15.4)
GLUCOSE SERPL-MCNC: 89 MG/DL (ref 65–99)
HCT VFR BLD AUTO: 30 % (ref 34–46.6)
HGB BLD-MCNC: 9.6 G/DL (ref 12–15.9)
IMM GRANULOCYTES # BLD AUTO: 0.01 10*3/MM3 (ref 0–0.05)
IMM GRANULOCYTES NFR BLD AUTO: 0.3 % (ref 0–0.5)
LYMPHOCYTES # BLD AUTO: 1.16 10*3/MM3 (ref 0.7–3.1)
LYMPHOCYTES NFR BLD AUTO: 31.4 % (ref 19.6–45.3)
MAGNESIUM SERPL-MCNC: 1.3 MG/DL (ref 1.6–2.4)
MCH RBC QN AUTO: 30.3 PG (ref 26.6–33)
MCHC RBC AUTO-ENTMCNC: 32 G/DL (ref 31.5–35.7)
MCV RBC AUTO: 94.6 FL (ref 79–97)
MONOCYTES # BLD AUTO: 0.52 10*3/MM3 (ref 0.1–0.9)
MONOCYTES NFR BLD AUTO: 14.1 % (ref 5–12)
NEUTROPHILS NFR BLD AUTO: 1.78 10*3/MM3 (ref 1.7–7)
NEUTROPHILS NFR BLD AUTO: 48 % (ref 42.7–76)
NRBC BLD AUTO-RTO: 0 /100 WBC (ref 0–0.2)
PHOSPHATE SERPL-MCNC: 3.3 MG/DL (ref 2.5–4.5)
PLATELET # BLD AUTO: 157 10*3/MM3 (ref 140–450)
PMV BLD AUTO: 9.6 FL (ref 6–12)
POTASSIUM SERPL-SCNC: 4.2 MMOL/L (ref 3.5–5.2)
RBC # BLD AUTO: 3.17 10*6/MM3 (ref 3.77–5.28)
SODIUM SERPL-SCNC: 135 MMOL/L (ref 136–145)
WBC NRBC COR # BLD AUTO: 3.7 10*3/MM3 (ref 3.4–10.8)

## 2025-08-12 PROCEDURE — 99024 POSTOP FOLLOW-UP VISIT: CPT | Performed by: STUDENT IN AN ORGANIZED HEALTH CARE EDUCATION/TRAINING PROGRAM

## 2025-08-12 PROCEDURE — 25010000002 MAGNESIUM SULFATE 2 GM/50ML SOLUTION: Performed by: STUDENT IN AN ORGANIZED HEALTH CARE EDUCATION/TRAINING PROGRAM

## 2025-08-12 PROCEDURE — 85025 COMPLETE CBC W/AUTO DIFF WBC: CPT | Performed by: HOSPITALIST

## 2025-08-12 PROCEDURE — 25810000003 SODIUM CHLORIDE 0.9 % SOLUTION: Performed by: STUDENT IN AN ORGANIZED HEALTH CARE EDUCATION/TRAINING PROGRAM

## 2025-08-12 PROCEDURE — 80069 RENAL FUNCTION PANEL: CPT | Performed by: HOSPITALIST

## 2025-08-12 PROCEDURE — 83735 ASSAY OF MAGNESIUM: CPT | Performed by: HOSPITALIST

## 2025-08-12 RX ORDER — MAGNESIUM SULFATE HEPTAHYDRATE 40 MG/ML
2 INJECTION, SOLUTION INTRAVENOUS ONCE
Status: COMPLETED | OUTPATIENT
Start: 2025-08-12 | End: 2025-08-12

## 2025-08-12 RX ORDER — SODIUM CHLORIDE, SODIUM LACTATE, POTASSIUM CHLORIDE, CALCIUM CHLORIDE 600; 310; 30; 20 MG/100ML; MG/100ML; MG/100ML; MG/100ML
100 INJECTION, SOLUTION INTRAVENOUS CONTINUOUS
Status: DISCONTINUED | OUTPATIENT
Start: 2025-08-13 | End: 2025-08-13

## 2025-08-12 RX ADMIN — PANTOPRAZOLE SODIUM 40 MG: 40 TABLET, DELAYED RELEASE ORAL at 09:31

## 2025-08-12 RX ADMIN — NEOMYCIN SULFATE 1000 MG: 500 TABLET ORAL at 21:47

## 2025-08-12 RX ADMIN — NEOMYCIN SULFATE 1000 MG: 500 TABLET ORAL at 15:17

## 2025-08-12 RX ADMIN — Medication 10 ML: at 21:48

## 2025-08-12 RX ADMIN — SODIUM CHLORIDE 125 ML/HR: 9 INJECTION, SOLUTION INTRAVENOUS at 15:20

## 2025-08-12 RX ADMIN — METRONIDAZOLE 500 MG: 500 TABLET ORAL at 15:18

## 2025-08-12 RX ADMIN — AVOBENZONE, HOMOSALATE, OCTISALATE, OCTOCRYLENE, AND OXYBENZONE 1 PACKET: 29.4; 147; 49; 25.4; 58.8 LOTION TOPICAL at 09:31

## 2025-08-12 RX ADMIN — METRONIDAZOLE 500 MG: 500 TABLET ORAL at 21:47

## 2025-08-12 RX ADMIN — DIPHENOXYLATE HYDROCHLORIDE AND ATROPINE SULFATE 1 TABLET: 2.5; .025 TABLET ORAL at 09:30

## 2025-08-12 RX ADMIN — TRAZODONE HYDROCHLORIDE 300 MG: 100 TABLET ORAL at 21:47

## 2025-08-12 RX ADMIN — Medication 10 ML: at 09:31

## 2025-08-12 RX ADMIN — ATORVASTATIN CALCIUM 40 MG: 20 TABLET, FILM COATED ORAL at 09:31

## 2025-08-12 RX ADMIN — LOPERAMIDE HYDROCHLORIDE 4 MG: 2 CAPSULE ORAL at 10:39

## 2025-08-12 RX ADMIN — LOPERAMIDE HYDROCHLORIDE 4 MG: 2 CAPSULE ORAL at 09:31

## 2025-08-12 RX ADMIN — LOPERAMIDE HYDROCHLORIDE 4 MG: 2 CAPSULE ORAL at 17:57

## 2025-08-12 RX ADMIN — DIPHENOXYLATE HYDROCHLORIDE AND ATROPINE SULFATE 1 TABLET: 2.5; .025 TABLET ORAL at 10:39

## 2025-08-12 RX ADMIN — MAGNESIUM SULFATE HEPTAHYDRATE 2 G: 40 INJECTION, SOLUTION INTRAVENOUS at 10:39

## 2025-08-12 RX ADMIN — DIPHENOXYLATE HYDROCHLORIDE AND ATROPINE SULFATE 1 TABLET: 2.5; .025 TABLET ORAL at 17:56

## 2025-08-12 RX ADMIN — LOPERAMIDE HYDROCHLORIDE 4 MG: 2 CAPSULE ORAL at 21:47

## 2025-08-13 ENCOUNTER — ANESTHESIA EVENT (OUTPATIENT)
Dept: PERIOP | Facility: HOSPITAL | Age: 70
End: 2025-08-13
Payer: MEDICARE

## 2025-08-13 ENCOUNTER — ANESTHESIA (OUTPATIENT)
Dept: PERIOP | Facility: HOSPITAL | Age: 70
End: 2025-08-13
Payer: MEDICARE

## 2025-08-13 PROBLEM — E43 SEVERE PROTEIN-CALORIE MALNUTRITION: Status: ACTIVE | Noted: 2025-08-13

## 2025-08-13 LAB
ANION GAP SERPL CALCULATED.3IONS-SCNC: 9 MMOL/L (ref 5–15)
BUN SERPL-MCNC: 2 MG/DL (ref 8–23)
BUN/CREAT SERPL: 3 (ref 7–25)
CALCIUM SPEC-SCNC: 7.8 MG/DL (ref 8.6–10.5)
CHLORIDE SERPL-SCNC: 106 MMOL/L (ref 98–107)
CO2 SERPL-SCNC: 21 MMOL/L (ref 22–29)
CREAT SERPL-MCNC: 0.67 MG/DL (ref 0.57–1)
EGFRCR SERPLBLD CKD-EPI 2021: 93.6 ML/MIN/1.73
GLUCOSE SERPL-MCNC: 93 MG/DL (ref 65–99)
MAGNESIUM SERPL-MCNC: 1.8 MG/DL (ref 1.6–2.4)
POTASSIUM SERPL-SCNC: 3.8 MMOL/L (ref 3.5–5.2)
SODIUM SERPL-SCNC: 136 MMOL/L (ref 136–145)

## 2025-08-13 PROCEDURE — 25010000002 ONDANSETRON PER 1 MG

## 2025-08-13 PROCEDURE — 25010000002 BUPIVACAINE LIPOSOME 1.3 % SUSPENSION: Performed by: STUDENT IN AN ORGANIZED HEALTH CARE EDUCATION/TRAINING PROGRAM

## 2025-08-13 PROCEDURE — 25810000003 LACTATED RINGERS PER 1000 ML: Performed by: STUDENT IN AN ORGANIZED HEALTH CARE EDUCATION/TRAINING PROGRAM

## 2025-08-13 PROCEDURE — 88304 TISSUE EXAM BY PATHOLOGIST: CPT | Performed by: STUDENT IN AN ORGANIZED HEALTH CARE EDUCATION/TRAINING PROGRAM

## 2025-08-13 PROCEDURE — 25010000002 LABETALOL 5 MG/ML SOLUTION: Performed by: ANESTHESIOLOGY

## 2025-08-13 PROCEDURE — 25010000002 FAMOTIDINE 10 MG/ML SOLUTION: Performed by: STUDENT IN AN ORGANIZED HEALTH CARE EDUCATION/TRAINING PROGRAM

## 2025-08-13 PROCEDURE — 44625 REPAIR BOWEL OPENING: CPT | Performed by: STUDENT IN AN ORGANIZED HEALTH CARE EDUCATION/TRAINING PROGRAM

## 2025-08-13 PROCEDURE — 25010000002 CEFOXITIN PER 1 G: Performed by: STUDENT IN AN ORGANIZED HEALTH CARE EDUCATION/TRAINING PROGRAM

## 2025-08-13 PROCEDURE — 25010000002 HYDROMORPHONE 1 MG/ML SOLUTION: Performed by: ANESTHESIOLOGY

## 2025-08-13 PROCEDURE — 25010000002 CEFOXITIN PER 1 G

## 2025-08-13 PROCEDURE — 25010000002 PROPOFOL 10 MG/ML EMULSION: Performed by: NURSE ANESTHETIST, CERTIFIED REGISTERED

## 2025-08-13 PROCEDURE — 25010000002 FENTANYL CITRATE (PF) 50 MCG/ML SOLUTION: Performed by: NURSE ANESTHETIST, CERTIFIED REGISTERED

## 2025-08-13 PROCEDURE — 25010000002 LIDOCAINE 2% SOLUTION: Performed by: NURSE ANESTHETIST, CERTIFIED REGISTERED

## 2025-08-13 PROCEDURE — 99024 POSTOP FOLLOW-UP VISIT: CPT | Performed by: STUDENT IN AN ORGANIZED HEALTH CARE EDUCATION/TRAINING PROGRAM

## 2025-08-13 PROCEDURE — 25010000002 DEXAMETHASONE SODIUM PHOSPHATE 20 MG/5ML SOLUTION: Performed by: NURSE ANESTHETIST, CERTIFIED REGISTERED

## 2025-08-13 PROCEDURE — 83735 ASSAY OF MAGNESIUM: CPT | Performed by: STUDENT IN AN ORGANIZED HEALTH CARE EDUCATION/TRAINING PROGRAM

## 2025-08-13 PROCEDURE — 25010000002 HYDROMORPHONE PER 4 MG

## 2025-08-13 PROCEDURE — 25010000002 SUGAMMADEX 200 MG/2ML SOLUTION

## 2025-08-13 PROCEDURE — 25010000002 PHENYLEPHRINE 10 MG/ML SOLUTION: Performed by: NURSE ANESTHETIST, CERTIFIED REGISTERED

## 2025-08-13 PROCEDURE — 80048 BASIC METABOLIC PNL TOTAL CA: CPT | Performed by: STUDENT IN AN ORGANIZED HEALTH CARE EDUCATION/TRAINING PROGRAM

## 2025-08-13 PROCEDURE — 25010000002 HYDROMORPHONE PER 4 MG: Performed by: STUDENT IN AN ORGANIZED HEALTH CARE EDUCATION/TRAINING PROGRAM

## 2025-08-13 DEVICE — PROXIMATE RELOADABLE LINEAR CUTTER WITH SAFETY LOCK-OUT, 75MM
Type: IMPLANTABLE DEVICE | Site: ABDOMEN | Status: FUNCTIONAL
Brand: PROXIMATE

## 2025-08-13 DEVICE — PROXIMATE LINEAR CUTTER RELOAD, BLUE, 75MM
Type: IMPLANTABLE DEVICE | Site: ABDOMEN | Status: FUNCTIONAL
Brand: PROXIMATE

## 2025-08-13 RX ORDER — ONDANSETRON 2 MG/ML
INJECTION INTRAMUSCULAR; INTRAVENOUS AS NEEDED
Status: DISCONTINUED | OUTPATIENT
Start: 2025-08-13 | End: 2025-08-13 | Stop reason: SURG

## 2025-08-13 RX ORDER — BUPIVACAINE HCL/EPINEPHRINE 0.5-1:200K
VIAL (ML) INJECTION AS NEEDED
Status: DISCONTINUED | OUTPATIENT
Start: 2025-08-13 | End: 2025-08-13 | Stop reason: HOSPADM

## 2025-08-13 RX ORDER — ROCURONIUM BROMIDE 10 MG/ML
INJECTION, SOLUTION INTRAVENOUS AS NEEDED
Status: DISCONTINUED | OUTPATIENT
Start: 2025-08-13 | End: 2025-08-13 | Stop reason: SURG

## 2025-08-13 RX ORDER — LABETALOL HYDROCHLORIDE 5 MG/ML
INJECTION, SOLUTION INTRAVENOUS AS NEEDED
Status: DISCONTINUED | OUTPATIENT
Start: 2025-08-13 | End: 2025-08-13 | Stop reason: SURG

## 2025-08-13 RX ORDER — CEFOXITIN 2 G/1
INJECTION, POWDER, FOR SOLUTION INTRAVENOUS AS NEEDED
Status: DISCONTINUED | OUTPATIENT
Start: 2025-08-13 | End: 2025-08-13 | Stop reason: SURG

## 2025-08-13 RX ORDER — ACETAMINOPHEN 650 MG
TABLET, EXTENDED RELEASE ORAL AS NEEDED
Status: DISCONTINUED | OUTPATIENT
Start: 2025-08-13 | End: 2025-08-13 | Stop reason: HOSPADM

## 2025-08-13 RX ORDER — ENOXAPARIN SODIUM 100 MG/ML
40 INJECTION SUBCUTANEOUS EVERY 24 HOURS
Status: DISCONTINUED | OUTPATIENT
Start: 2025-08-14 | End: 2025-08-15

## 2025-08-13 RX ORDER — HYDROMORPHONE HYDROCHLORIDE 1 MG/ML
0.5 INJECTION, SOLUTION INTRAMUSCULAR; INTRAVENOUS; SUBCUTANEOUS
Status: DISCONTINUED | OUTPATIENT
Start: 2025-08-13 | End: 2025-08-16

## 2025-08-13 RX ORDER — PHENYLEPHRINE HYDROCHLORIDE 10 MG/ML
INJECTION INTRAVENOUS AS NEEDED
Status: DISCONTINUED | OUTPATIENT
Start: 2025-08-13 | End: 2025-08-13 | Stop reason: SURG

## 2025-08-13 RX ORDER — MIDAZOLAM HYDROCHLORIDE 1 MG/ML
0.5 INJECTION, SOLUTION INTRAMUSCULAR; INTRAVENOUS
Status: DISCONTINUED | OUTPATIENT
Start: 2025-08-13 | End: 2025-08-13 | Stop reason: HOSPADM

## 2025-08-13 RX ORDER — MAGNESIUM HYDROXIDE 1200 MG/15ML
LIQUID ORAL AS NEEDED
Status: DISCONTINUED | OUTPATIENT
Start: 2025-08-13 | End: 2025-08-13 | Stop reason: HOSPADM

## 2025-08-13 RX ORDER — EPHEDRINE SULFATE 50 MG/ML
INJECTION, SOLUTION INTRAVENOUS AS NEEDED
Status: DISCONTINUED | OUTPATIENT
Start: 2025-08-13 | End: 2025-08-13 | Stop reason: SURG

## 2025-08-13 RX ORDER — SODIUM CHLORIDE, SODIUM LACTATE, POTASSIUM CHLORIDE, CALCIUM CHLORIDE 600; 310; 30; 20 MG/100ML; MG/100ML; MG/100ML; MG/100ML
9 INJECTION, SOLUTION INTRAVENOUS CONTINUOUS
Status: DISCONTINUED | OUTPATIENT
Start: 2025-08-13 | End: 2025-08-13

## 2025-08-13 RX ORDER — SODIUM CHLORIDE 0.9 % (FLUSH) 0.9 %
3-10 SYRINGE (ML) INJECTION AS NEEDED
Status: DISCONTINUED | OUTPATIENT
Start: 2025-08-13 | End: 2025-08-13 | Stop reason: HOSPADM

## 2025-08-13 RX ORDER — FENTANYL CITRATE 50 UG/ML
50 INJECTION, SOLUTION INTRAMUSCULAR; INTRAVENOUS ONCE AS NEEDED
Status: DISCONTINUED | OUTPATIENT
Start: 2025-08-13 | End: 2025-08-13 | Stop reason: HOSPADM

## 2025-08-13 RX ORDER — SODIUM CHLORIDE 0.9 % (FLUSH) 0.9 %
3 SYRINGE (ML) INJECTION EVERY 12 HOURS SCHEDULED
Status: DISCONTINUED | OUTPATIENT
Start: 2025-08-13 | End: 2025-08-13 | Stop reason: HOSPADM

## 2025-08-13 RX ORDER — LIDOCAINE HYDROCHLORIDE 10 MG/ML
0.5 INJECTION, SOLUTION INFILTRATION; PERINEURAL ONCE AS NEEDED
Status: DISCONTINUED | OUTPATIENT
Start: 2025-08-13 | End: 2025-08-13 | Stop reason: HOSPADM

## 2025-08-13 RX ORDER — FAMOTIDINE 10 MG/ML
20 INJECTION, SOLUTION INTRAVENOUS ONCE
Status: COMPLETED | OUTPATIENT
Start: 2025-08-13 | End: 2025-08-13

## 2025-08-13 RX ORDER — DEXAMETHASONE SODIUM PHOSPHATE 4 MG/ML
INJECTION, SOLUTION INTRA-ARTICULAR; INTRALESIONAL; INTRAMUSCULAR; INTRAVENOUS; SOFT TISSUE AS NEEDED
Status: DISCONTINUED | OUTPATIENT
Start: 2025-08-13 | End: 2025-08-13 | Stop reason: SURG

## 2025-08-13 RX ORDER — FENTANYL CITRATE 50 UG/ML
INJECTION, SOLUTION INTRAMUSCULAR; INTRAVENOUS AS NEEDED
Status: DISCONTINUED | OUTPATIENT
Start: 2025-08-13 | End: 2025-08-13 | Stop reason: SURG

## 2025-08-13 RX ORDER — PANTOPRAZOLE SODIUM 40 MG/10ML
40 INJECTION, POWDER, LYOPHILIZED, FOR SOLUTION INTRAVENOUS
Status: DISCONTINUED | OUTPATIENT
Start: 2025-08-14 | End: 2025-08-16

## 2025-08-13 RX ORDER — LIDOCAINE HYDROCHLORIDE 20 MG/ML
INJECTION, SOLUTION INFILTRATION; PERINEURAL AS NEEDED
Status: DISCONTINUED | OUTPATIENT
Start: 2025-08-13 | End: 2025-08-13 | Stop reason: SURG

## 2025-08-13 RX ORDER — PROPOFOL 10 MG/ML
VIAL (ML) INTRAVENOUS AS NEEDED
Status: DISCONTINUED | OUTPATIENT
Start: 2025-08-13 | End: 2025-08-13 | Stop reason: SURG

## 2025-08-13 RX ORDER — HYDROMORPHONE HYDROCHLORIDE 1 MG/ML
0.5 INJECTION, SOLUTION INTRAMUSCULAR; INTRAVENOUS; SUBCUTANEOUS
Status: DISCONTINUED | OUTPATIENT
Start: 2025-08-13 | End: 2025-08-13 | Stop reason: HOSPADM

## 2025-08-13 RX ORDER — DEXMEDETOMIDINE HYDROCHLORIDE 100 UG/ML
INJECTION, SOLUTION INTRAVENOUS AS NEEDED
Status: DISCONTINUED | OUTPATIENT
Start: 2025-08-13 | End: 2025-08-13 | Stop reason: SURG

## 2025-08-13 RX ADMIN — ONDANSETRON 4 MG: 2 INJECTION, SOLUTION INTRAMUSCULAR; INTRAVENOUS at 16:49

## 2025-08-13 RX ADMIN — EPHEDRINE SULFATE 10 MG: 50 INJECTION INTRAVENOUS at 14:36

## 2025-08-13 RX ADMIN — HYDROMORPHONE HYDROCHLORIDE 0.5 MG: 1 INJECTION, SOLUTION INTRAMUSCULAR; INTRAVENOUS; SUBCUTANEOUS at 15:22

## 2025-08-13 RX ADMIN — HYDROMORPHONE HYDROCHLORIDE 0.5 MG: 1 INJECTION, SOLUTION INTRAMUSCULAR; INTRAVENOUS; SUBCUTANEOUS at 23:12

## 2025-08-13 RX ADMIN — SODIUM CHLORIDE, POTASSIUM CHLORIDE, SODIUM LACTATE AND CALCIUM CHLORIDE 100 ML/HR: 600; 310; 30; 20 INJECTION, SOLUTION INTRAVENOUS at 01:14

## 2025-08-13 RX ADMIN — SUGAMMADEX 200 MG: 100 INJECTION, SOLUTION INTRAVENOUS at 17:20

## 2025-08-13 RX ADMIN — SODIUM CHLORIDE 2 G: 900 INJECTION INTRAVENOUS at 13:57

## 2025-08-13 RX ADMIN — FENTANYL CITRATE 50 MCG: 50 INJECTION, SOLUTION INTRAMUSCULAR; INTRAVENOUS at 14:51

## 2025-08-13 RX ADMIN — EPHEDRINE SULFATE 10 MG: 50 INJECTION INTRAVENOUS at 14:33

## 2025-08-13 RX ADMIN — CEFOXITIN 2 G: 2 INJECTION, POWDER, FOR SOLUTION INTRAVENOUS at 15:52

## 2025-08-13 RX ADMIN — DEXAMETHASONE SODIUM PHOSPHATE 8 MG: 4 INJECTION, SOLUTION INTRAMUSCULAR; INTRAVENOUS at 14:33

## 2025-08-13 RX ADMIN — ROCURONIUM BROMIDE 10 MG: 10 INJECTION INTRAVENOUS at 15:59

## 2025-08-13 RX ADMIN — LABETALOL HYDROCHLORIDE 10 MG: 5 INJECTION, SOLUTION INTRAVENOUS at 15:29

## 2025-08-13 RX ADMIN — FAMOTIDINE 20 MG: 10 INJECTION INTRAVENOUS at 13:40

## 2025-08-13 RX ADMIN — PROPOFOL 80 MG: 10 INJECTION, EMULSION INTRAVENOUS at 14:25

## 2025-08-13 RX ADMIN — PHENYLEPHRINE HYDROCHLORIDE 100 MCG: 10 INJECTION INTRAVENOUS at 14:33

## 2025-08-13 RX ADMIN — SODIUM CHLORIDE, POTASSIUM CHLORIDE, SODIUM LACTATE AND CALCIUM CHLORIDE 9 ML/HR: 600; 310; 30; 20 INJECTION, SOLUTION INTRAVENOUS at 13:32

## 2025-08-13 RX ADMIN — ROCURONIUM BROMIDE 50 MG: 10 INJECTION INTRAVENOUS at 14:25

## 2025-08-13 RX ADMIN — SODIUM CHLORIDE, POTASSIUM CHLORIDE, SODIUM LACTATE AND CALCIUM CHLORIDE: 600; 310; 30; 20 INJECTION, SOLUTION INTRAVENOUS at 15:40

## 2025-08-13 RX ADMIN — TRAZODONE HYDROCHLORIDE 300 MG: 100 TABLET ORAL at 23:11

## 2025-08-13 RX ADMIN — ROCURONIUM BROMIDE 20 MG: 10 INJECTION INTRAVENOUS at 15:16

## 2025-08-13 RX ADMIN — HYDROMORPHONE HYDROCHLORIDE 0.5 MG: 1 INJECTION, SOLUTION INTRAMUSCULAR; INTRAVENOUS; SUBCUTANEOUS at 17:56

## 2025-08-13 RX ADMIN — LIDOCAINE HYDROCHLORIDE 100 MG: 20 INJECTION, SOLUTION INFILTRATION; PERINEURAL at 14:25

## 2025-08-13 RX ADMIN — HYDROMORPHONE HYDROCHLORIDE 0.5 MG: 1 INJECTION, SOLUTION INTRAMUSCULAR; INTRAVENOUS; SUBCUTANEOUS at 16:32

## 2025-08-13 RX ADMIN — HYDROMORPHONE HYDROCHLORIDE 0.5 MG: 1 INJECTION, SOLUTION INTRAMUSCULAR; INTRAVENOUS; SUBCUTANEOUS at 20:27

## 2025-08-13 RX ADMIN — Medication 10 ML: at 20:27

## 2025-08-13 RX ADMIN — SODIUM CHLORIDE, POTASSIUM CHLORIDE, SODIUM LACTATE AND CALCIUM CHLORIDE 100 ML/HR: 600; 310; 30; 20 INJECTION, SOLUTION INTRAVENOUS at 11:34

## 2025-08-13 RX ADMIN — DEXMEDETOMIDINE 30 MCG: 100 INJECTION, SOLUTION INTRAVENOUS at 15:10

## 2025-08-13 RX ADMIN — FENTANYL CITRATE 50 MCG: 50 INJECTION, SOLUTION INTRAMUSCULAR; INTRAVENOUS at 14:21

## 2025-08-14 LAB
ANION GAP SERPL CALCULATED.3IONS-SCNC: 8 MMOL/L (ref 5–15)
BUN SERPL-MCNC: 4 MG/DL (ref 8–23)
BUN/CREAT SERPL: 5.7 (ref 7–25)
CALCIUM SPEC-SCNC: 7.6 MG/DL (ref 8.6–10.5)
CHLORIDE SERPL-SCNC: 107 MMOL/L (ref 98–107)
CO2 SERPL-SCNC: 21 MMOL/L (ref 22–29)
CREAT SERPL-MCNC: 0.7 MG/DL (ref 0.57–1)
DEPRECATED RDW RBC AUTO: 48.8 FL (ref 37–54)
EGFRCR SERPLBLD CKD-EPI 2021: 92.6 ML/MIN/1.73
ERYTHROCYTE [DISTWIDTH] IN BLOOD BY AUTOMATED COUNT: 14.3 % (ref 12.3–15.4)
GLUCOSE SERPL-MCNC: 133 MG/DL (ref 65–99)
HCT VFR BLD AUTO: 34.6 % (ref 34–46.6)
HGB BLD-MCNC: 11.2 G/DL (ref 12–15.9)
MAGNESIUM SERPL-MCNC: 1.6 MG/DL (ref 1.6–2.4)
MCH RBC QN AUTO: 30.2 PG (ref 26.6–33)
MCHC RBC AUTO-ENTMCNC: 32.4 G/DL (ref 31.5–35.7)
MCV RBC AUTO: 93.3 FL (ref 79–97)
PLATELET # BLD AUTO: 168 10*3/MM3 (ref 140–450)
PMV BLD AUTO: 9.8 FL (ref 6–12)
POTASSIUM SERPL-SCNC: 4.5 MMOL/L (ref 3.5–5.2)
RBC # BLD AUTO: 3.71 10*6/MM3 (ref 3.77–5.28)
SODIUM SERPL-SCNC: 136 MMOL/L (ref 136–145)
WBC NRBC COR # BLD AUTO: 8.58 10*3/MM3 (ref 3.4–10.8)

## 2025-08-14 PROCEDURE — 80048 BASIC METABOLIC PNL TOTAL CA: CPT | Performed by: STUDENT IN AN ORGANIZED HEALTH CARE EDUCATION/TRAINING PROGRAM

## 2025-08-14 PROCEDURE — 25010000002 HYDROMORPHONE PER 4 MG: Performed by: STUDENT IN AN ORGANIZED HEALTH CARE EDUCATION/TRAINING PROGRAM

## 2025-08-14 PROCEDURE — 83735 ASSAY OF MAGNESIUM: CPT | Performed by: STUDENT IN AN ORGANIZED HEALTH CARE EDUCATION/TRAINING PROGRAM

## 2025-08-14 PROCEDURE — 97530 THERAPEUTIC ACTIVITIES: CPT

## 2025-08-14 PROCEDURE — 97162 PT EVAL MOD COMPLEX 30 MIN: CPT

## 2025-08-14 PROCEDURE — 99024 POSTOP FOLLOW-UP VISIT: CPT | Performed by: STUDENT IN AN ORGANIZED HEALTH CARE EDUCATION/TRAINING PROGRAM

## 2025-08-14 PROCEDURE — 25010000002 ENOXAPARIN PER 10 MG: Performed by: STUDENT IN AN ORGANIZED HEALTH CARE EDUCATION/TRAINING PROGRAM

## 2025-08-14 PROCEDURE — 85027 COMPLETE CBC AUTOMATED: CPT | Performed by: STUDENT IN AN ORGANIZED HEALTH CARE EDUCATION/TRAINING PROGRAM

## 2025-08-14 RX ORDER — LIDOCAINE 4 G/G
2 PATCH TOPICAL
Status: DISCONTINUED | OUTPATIENT
Start: 2025-08-14 | End: 2025-08-27 | Stop reason: HOSPADM

## 2025-08-14 RX ORDER — DEXTROSE MONOHYDRATE, SODIUM CHLORIDE, AND POTASSIUM CHLORIDE 50; 1.49; 4.5 G/1000ML; G/1000ML; G/1000ML
125 INJECTION, SOLUTION INTRAVENOUS CONTINUOUS
Status: DISPENSED | OUTPATIENT
Start: 2025-08-14 | End: 2025-08-15

## 2025-08-14 RX ADMIN — HYDROMORPHONE HYDROCHLORIDE 0.5 MG: 1 INJECTION, SOLUTION INTRAMUSCULAR; INTRAVENOUS; SUBCUTANEOUS at 13:15

## 2025-08-14 RX ADMIN — PANTOPRAZOLE SODIUM 40 MG: 40 INJECTION, POWDER, LYOPHILIZED, FOR SOLUTION INTRAVENOUS at 06:40

## 2025-08-14 RX ADMIN — HYDROMORPHONE HYDROCHLORIDE 0.5 MG: 1 INJECTION, SOLUTION INTRAMUSCULAR; INTRAVENOUS; SUBCUTANEOUS at 17:33

## 2025-08-14 RX ADMIN — TRAZODONE HYDROCHLORIDE 300 MG: 100 TABLET ORAL at 20:31

## 2025-08-14 RX ADMIN — HYDROMORPHONE HYDROCHLORIDE 0.5 MG: 1 INJECTION, SOLUTION INTRAMUSCULAR; INTRAVENOUS; SUBCUTANEOUS at 10:53

## 2025-08-14 RX ADMIN — DEXTROSE MONOHYDRATE, SODIUM CHLORIDE, AND POTASSIUM CHLORIDE 125 ML/HR: 50; 1.49; 4.5 INJECTION, SOLUTION INTRAVENOUS at 08:39

## 2025-08-14 RX ADMIN — Medication 10 ML: at 08:39

## 2025-08-14 RX ADMIN — HYDROMORPHONE HYDROCHLORIDE 0.5 MG: 1 INJECTION, SOLUTION INTRAMUSCULAR; INTRAVENOUS; SUBCUTANEOUS at 15:32

## 2025-08-14 RX ADMIN — LIDOCAINE 2 PATCH: 4 PATCH TOPICAL at 08:37

## 2025-08-14 RX ADMIN — DEXTROSE MONOHYDRATE, SODIUM CHLORIDE, AND POTASSIUM CHLORIDE 125 ML/HR: 50; 1.49; 4.5 INJECTION, SOLUTION INTRAVENOUS at 20:30

## 2025-08-14 RX ADMIN — ATORVASTATIN CALCIUM 40 MG: 20 TABLET, FILM COATED ORAL at 08:37

## 2025-08-14 RX ADMIN — HYDROMORPHONE HYDROCHLORIDE 0.5 MG: 1 INJECTION, SOLUTION INTRAMUSCULAR; INTRAVENOUS; SUBCUTANEOUS at 08:37

## 2025-08-14 RX ADMIN — ENOXAPARIN SODIUM 40 MG: 100 INJECTION SUBCUTANEOUS at 08:37

## 2025-08-14 RX ADMIN — Medication 10 ML: at 21:45

## 2025-08-14 RX ADMIN — HYDROMORPHONE HYDROCHLORIDE 0.5 MG: 1 INJECTION, SOLUTION INTRAMUSCULAR; INTRAVENOUS; SUBCUTANEOUS at 19:35

## 2025-08-15 LAB
ALBUMIN SERPL-MCNC: 2.6 G/DL (ref 3.5–5.2)
ANION GAP SERPL CALCULATED.3IONS-SCNC: 7.4 MMOL/L (ref 5–15)
BASOPHILS # BLD AUTO: 0.03 10*3/MM3 (ref 0–0.2)
BASOPHILS NFR BLD AUTO: 0.4 % (ref 0–1.5)
BUN SERPL-MCNC: 5 MG/DL (ref 8–23)
BUN/CREAT SERPL: 8.2 (ref 7–25)
CALCIUM SPEC-SCNC: 8.2 MG/DL (ref 8.6–10.5)
CHLORIDE SERPL-SCNC: 108 MMOL/L (ref 98–107)
CO2 SERPL-SCNC: 21.6 MMOL/L (ref 22–29)
CREAT SERPL-MCNC: 0.61 MG/DL (ref 0.57–1)
CYTO UR: NORMAL
DEPRECATED RDW RBC AUTO: 50.9 FL (ref 37–54)
EGFRCR SERPLBLD CKD-EPI 2021: 95.7 ML/MIN/1.73
EOSINOPHIL # BLD AUTO: 0.04 10*3/MM3 (ref 0–0.4)
EOSINOPHIL NFR BLD AUTO: 0.5 % (ref 0.3–6.2)
ERYTHROCYTE [DISTWIDTH] IN BLOOD BY AUTOMATED COUNT: 14.5 % (ref 12.3–15.4)
GLUCOSE SERPL-MCNC: 131 MG/DL (ref 65–99)
HCT VFR BLD AUTO: 31.8 % (ref 34–46.6)
HGB BLD-MCNC: 10 G/DL (ref 12–15.9)
IMM GRANULOCYTES # BLD AUTO: 0.03 10*3/MM3 (ref 0–0.05)
IMM GRANULOCYTES NFR BLD AUTO: 0.4 % (ref 0–0.5)
LAB AP CASE REPORT: NORMAL
LYMPHOCYTES # BLD AUTO: 1.25 10*3/MM3 (ref 0.7–3.1)
LYMPHOCYTES NFR BLD AUTO: 15 % (ref 19.6–45.3)
MAGNESIUM SERPL-MCNC: 1.5 MG/DL (ref 1.6–2.4)
MCH RBC QN AUTO: 30.1 PG (ref 26.6–33)
MCHC RBC AUTO-ENTMCNC: 31.4 G/DL (ref 31.5–35.7)
MCV RBC AUTO: 95.8 FL (ref 79–97)
MONOCYTES # BLD AUTO: 0.66 10*3/MM3 (ref 0.1–0.9)
MONOCYTES NFR BLD AUTO: 7.9 % (ref 5–12)
NEUTROPHILS NFR BLD AUTO: 6.32 10*3/MM3 (ref 1.7–7)
NEUTROPHILS NFR BLD AUTO: 75.8 % (ref 42.7–76)
NRBC BLD AUTO-RTO: 0 /100 WBC (ref 0–0.2)
PATH REPORT.FINAL DX SPEC: NORMAL
PATH REPORT.GROSS SPEC: NORMAL
PHOSPHATE SERPL-MCNC: 1.9 MG/DL (ref 2.5–4.5)
PHOSPHATE SERPL-MCNC: 2.6 MG/DL (ref 2.5–4.5)
PLATELET # BLD AUTO: 149 10*3/MM3 (ref 140–450)
PMV BLD AUTO: 9.8 FL (ref 6–12)
POTASSIUM SERPL-SCNC: 4.1 MMOL/L (ref 3.5–5.2)
RBC # BLD AUTO: 3.32 10*6/MM3 (ref 3.77–5.28)
SODIUM SERPL-SCNC: 137 MMOL/L (ref 136–145)
WBC NRBC COR # BLD AUTO: 8.33 10*3/MM3 (ref 3.4–10.8)

## 2025-08-15 PROCEDURE — 85025 COMPLETE CBC W/AUTO DIFF WBC: CPT | Performed by: HOSPITALIST

## 2025-08-15 PROCEDURE — 83735 ASSAY OF MAGNESIUM: CPT | Performed by: HOSPITALIST

## 2025-08-15 PROCEDURE — 25010000002 MAGNESIUM SULFATE 2 GM/50ML SOLUTION: Performed by: STUDENT IN AN ORGANIZED HEALTH CARE EDUCATION/TRAINING PROGRAM

## 2025-08-15 PROCEDURE — 84100 ASSAY OF PHOSPHORUS: CPT | Performed by: STUDENT IN AN ORGANIZED HEALTH CARE EDUCATION/TRAINING PROGRAM

## 2025-08-15 PROCEDURE — 25010000002 ENOXAPARIN PER 10 MG: Performed by: STUDENT IN AN ORGANIZED HEALTH CARE EDUCATION/TRAINING PROGRAM

## 2025-08-15 PROCEDURE — 97116 GAIT TRAINING THERAPY: CPT

## 2025-08-15 PROCEDURE — 25010000002 HYDROMORPHONE PER 4 MG: Performed by: STUDENT IN AN ORGANIZED HEALTH CARE EDUCATION/TRAINING PROGRAM

## 2025-08-15 PROCEDURE — 93010 ELECTROCARDIOGRAM REPORT: CPT | Performed by: INTERNAL MEDICINE

## 2025-08-15 PROCEDURE — 99024 POSTOP FOLLOW-UP VISIT: CPT | Performed by: STUDENT IN AN ORGANIZED HEALTH CARE EDUCATION/TRAINING PROGRAM

## 2025-08-15 PROCEDURE — 97530 THERAPEUTIC ACTIVITIES: CPT

## 2025-08-15 PROCEDURE — 25810000003 SODIUM CHLORIDE 0.9 % SOLUTION: Performed by: STUDENT IN AN ORGANIZED HEALTH CARE EDUCATION/TRAINING PROGRAM

## 2025-08-15 PROCEDURE — 80069 RENAL FUNCTION PANEL: CPT | Performed by: HOSPITALIST

## 2025-08-15 PROCEDURE — 93005 ELECTROCARDIOGRAM TRACING: CPT | Performed by: INTERNAL MEDICINE

## 2025-08-15 PROCEDURE — 25010000002 ONDANSETRON PER 1 MG: Performed by: STUDENT IN AN ORGANIZED HEALTH CARE EDUCATION/TRAINING PROGRAM

## 2025-08-15 RX ORDER — ACETAMINOPHEN 160 MG/5ML
500 SOLUTION ORAL EVERY 6 HOURS PRN
Status: DISCONTINUED | OUTPATIENT
Start: 2025-08-15 | End: 2025-08-15

## 2025-08-15 RX ORDER — MAGNESIUM SULFATE HEPTAHYDRATE 40 MG/ML
2 INJECTION, SOLUTION INTRAVENOUS
Status: COMPLETED | OUTPATIENT
Start: 2025-08-15 | End: 2025-08-15

## 2025-08-15 RX ORDER — ENOXAPARIN SODIUM 100 MG/ML
40 INJECTION SUBCUTANEOUS 2 TIMES DAILY
Status: DISCONTINUED | OUTPATIENT
Start: 2025-08-15 | End: 2025-08-16

## 2025-08-15 RX ORDER — ACETAMINOPHEN 500 MG
1000 TABLET ORAL EVERY 8 HOURS
Status: DISCONTINUED | OUTPATIENT
Start: 2025-08-15 | End: 2025-08-18

## 2025-08-15 RX ADMIN — ENOXAPARIN SODIUM 40 MG: 100 INJECTION SUBCUTANEOUS at 08:16

## 2025-08-15 RX ADMIN — ENOXAPARIN SODIUM 40 MG: 100 INJECTION SUBCUTANEOUS at 21:53

## 2025-08-15 RX ADMIN — ATORVASTATIN CALCIUM 40 MG: 20 TABLET, FILM COATED ORAL at 08:16

## 2025-08-15 RX ADMIN — Medication 10 ML: at 08:18

## 2025-08-15 RX ADMIN — MAGNESIUM SULFATE IN WATER FOR 2 G: 40 INJECTION INTRAVENOUS at 16:29

## 2025-08-15 RX ADMIN — DEXTROSE MONOHYDRATE, SODIUM CHLORIDE, AND POTASSIUM CHLORIDE 125 ML/HR: 50; 1.49; 4.5 INJECTION, SOLUTION INTRAVENOUS at 04:43

## 2025-08-15 RX ADMIN — ONDANSETRON 4 MG: 2 INJECTION INTRAMUSCULAR; INTRAVENOUS at 17:47

## 2025-08-15 RX ADMIN — MAGNESIUM SULFATE IN WATER FOR 2 G: 40 INJECTION INTRAVENOUS at 12:09

## 2025-08-15 RX ADMIN — Medication 10 ML: at 21:53

## 2025-08-15 RX ADMIN — HYDROMORPHONE HYDROCHLORIDE 0.5 MG: 1 INJECTION, SOLUTION INTRAMUSCULAR; INTRAVENOUS; SUBCUTANEOUS at 16:34

## 2025-08-15 RX ADMIN — HYDROMORPHONE HYDROCHLORIDE 0.5 MG: 1 INJECTION, SOLUTION INTRAMUSCULAR; INTRAVENOUS; SUBCUTANEOUS at 12:02

## 2025-08-15 RX ADMIN — ACETAMINOPHEN 1000 MG: 500 TABLET, FILM COATED ORAL at 11:54

## 2025-08-15 RX ADMIN — TRAZODONE HYDROCHLORIDE 300 MG: 100 TABLET ORAL at 21:52

## 2025-08-15 RX ADMIN — LIDOCAINE 2 PATCH: 4 PATCH TOPICAL at 08:16

## 2025-08-15 RX ADMIN — HYDROMORPHONE HYDROCHLORIDE 0.5 MG: 1 INJECTION, SOLUTION INTRAMUSCULAR; INTRAVENOUS; SUBCUTANEOUS at 04:44

## 2025-08-15 RX ADMIN — SODIUM PHOSPHATE, MONOBASIC, MONOHYDRATE AND SODIUM PHOSPHATE, DIBASIC ANHYDROUS 15 MMOL: 142; 276 INJECTION, SOLUTION INTRAVENOUS at 11:55

## 2025-08-15 RX ADMIN — HYDROMORPHONE HYDROCHLORIDE 0.5 MG: 1 INJECTION, SOLUTION INTRAMUSCULAR; INTRAVENOUS; SUBCUTANEOUS at 14:25

## 2025-08-15 RX ADMIN — HYDROMORPHONE HYDROCHLORIDE 0.5 MG: 1 INJECTION, SOLUTION INTRAMUSCULAR; INTRAVENOUS; SUBCUTANEOUS at 21:52

## 2025-08-15 RX ADMIN — MAGNESIUM SULFATE IN WATER FOR 2 G: 40 INJECTION INTRAVENOUS at 14:25

## 2025-08-15 RX ADMIN — PANTOPRAZOLE SODIUM 40 MG: 40 INJECTION, POWDER, LYOPHILIZED, FOR SOLUTION INTRAVENOUS at 04:43

## 2025-08-15 RX ADMIN — HYDROMORPHONE HYDROCHLORIDE 0.5 MG: 1 INJECTION, SOLUTION INTRAMUSCULAR; INTRAVENOUS; SUBCUTANEOUS at 08:16

## 2025-08-16 ENCOUNTER — APPOINTMENT (OUTPATIENT)
Dept: GENERAL RADIOLOGY | Facility: HOSPITAL | Age: 70
End: 2025-08-16
Payer: MEDICARE

## 2025-08-16 LAB
ANION GAP SERPL CALCULATED.3IONS-SCNC: 8 MMOL/L (ref 5–15)
BASOPHILS # BLD AUTO: 0.03 10*3/MM3 (ref 0–0.2)
BASOPHILS NFR BLD AUTO: 0.5 % (ref 0–1.5)
BUN SERPL-MCNC: 8 MG/DL (ref 8–23)
BUN/CREAT SERPL: 15.4 (ref 7–25)
CALCIUM SPEC-SCNC: 7.7 MG/DL (ref 8.6–10.5)
CHLORIDE SERPL-SCNC: 107 MMOL/L (ref 98–107)
CO2 SERPL-SCNC: 23 MMOL/L (ref 22–29)
CREAT SERPL-MCNC: 0.52 MG/DL (ref 0.57–1)
DEPRECATED RDW RBC AUTO: 50.2 FL (ref 37–54)
EGFRCR SERPLBLD CKD-EPI 2021: 99.5 ML/MIN/1.73
EOSINOPHIL # BLD AUTO: 0.07 10*3/MM3 (ref 0–0.4)
EOSINOPHIL NFR BLD AUTO: 1.1 % (ref 0.3–6.2)
ERYTHROCYTE [DISTWIDTH] IN BLOOD BY AUTOMATED COUNT: 14.5 % (ref 12.3–15.4)
GLUCOSE SERPL-MCNC: 106 MG/DL (ref 65–99)
HCT VFR BLD AUTO: 31 % (ref 34–46.6)
HGB BLD-MCNC: 10.1 G/DL (ref 12–15.9)
HGB BLD-MCNC: 9 G/DL (ref 12–15.9)
IMM GRANULOCYTES # BLD AUTO: 0.03 10*3/MM3 (ref 0–0.05)
IMM GRANULOCYTES NFR BLD AUTO: 0.5 % (ref 0–0.5)
LYMPHOCYTES # BLD AUTO: 1.08 10*3/MM3 (ref 0.7–3.1)
LYMPHOCYTES NFR BLD AUTO: 17.4 % (ref 19.6–45.3)
MAGNESIUM SERPL-MCNC: 2.2 MG/DL (ref 1.6–2.4)
MCH RBC QN AUTO: 30.8 PG (ref 26.6–33)
MCHC RBC AUTO-ENTMCNC: 32.6 G/DL (ref 31.5–35.7)
MCV RBC AUTO: 94.5 FL (ref 79–97)
MONOCYTES # BLD AUTO: 0.66 10*3/MM3 (ref 0.1–0.9)
MONOCYTES NFR BLD AUTO: 10.6 % (ref 5–12)
NEUTROPHILS NFR BLD AUTO: 4.35 10*3/MM3 (ref 1.7–7)
NEUTROPHILS NFR BLD AUTO: 69.9 % (ref 42.7–76)
NRBC BLD AUTO-RTO: 0 /100 WBC (ref 0–0.2)
PLATELET # BLD AUTO: 157 10*3/MM3 (ref 140–450)
PMV BLD AUTO: 9.6 FL (ref 6–12)
POTASSIUM SERPL-SCNC: 3.9 MMOL/L (ref 3.5–5.2)
QT INTERVAL: 353 MS
QTC INTERVAL: 422 MS
RBC # BLD AUTO: 3.28 10*6/MM3 (ref 3.77–5.28)
SODIUM SERPL-SCNC: 138 MMOL/L (ref 136–145)
WBC NRBC COR # BLD AUTO: 6.22 10*3/MM3 (ref 3.4–10.8)

## 2025-08-16 PROCEDURE — 85025 COMPLETE CBC W/AUTO DIFF WBC: CPT | Performed by: INTERNAL MEDICINE

## 2025-08-16 PROCEDURE — 25010000002 MORPHINE PER 10 MG: Performed by: STUDENT IN AN ORGANIZED HEALTH CARE EDUCATION/TRAINING PROGRAM

## 2025-08-16 PROCEDURE — 25010000002 ENOXAPARIN PER 10 MG: Performed by: STUDENT IN AN ORGANIZED HEALTH CARE EDUCATION/TRAINING PROGRAM

## 2025-08-16 PROCEDURE — 74018 RADEX ABDOMEN 1 VIEW: CPT

## 2025-08-16 PROCEDURE — 83735 ASSAY OF MAGNESIUM: CPT | Performed by: STUDENT IN AN ORGANIZED HEALTH CARE EDUCATION/TRAINING PROGRAM

## 2025-08-16 PROCEDURE — 25810000003 LACTATED RINGERS PER 1000 ML: Performed by: STUDENT IN AN ORGANIZED HEALTH CARE EDUCATION/TRAINING PROGRAM

## 2025-08-16 PROCEDURE — 80048 BASIC METABOLIC PNL TOTAL CA: CPT | Performed by: INTERNAL MEDICINE

## 2025-08-16 PROCEDURE — 85018 HEMOGLOBIN: CPT | Performed by: STUDENT IN AN ORGANIZED HEALTH CARE EDUCATION/TRAINING PROGRAM

## 2025-08-16 PROCEDURE — 25010000002 ONDANSETRON PER 1 MG: Performed by: STUDENT IN AN ORGANIZED HEALTH CARE EDUCATION/TRAINING PROGRAM

## 2025-08-16 PROCEDURE — 99024 POSTOP FOLLOW-UP VISIT: CPT | Performed by: STUDENT IN AN ORGANIZED HEALTH CARE EDUCATION/TRAINING PROGRAM

## 2025-08-16 PROCEDURE — 25010000002 HYDROMORPHONE PER 4 MG: Performed by: STUDENT IN AN ORGANIZED HEALTH CARE EDUCATION/TRAINING PROGRAM

## 2025-08-16 RX ORDER — PANTOPRAZOLE SODIUM 40 MG/10ML
40 INJECTION, POWDER, LYOPHILIZED, FOR SOLUTION INTRAVENOUS
Status: DISCONTINUED | OUTPATIENT
Start: 2025-08-16 | End: 2025-08-20

## 2025-08-16 RX ORDER — ENOXAPARIN SODIUM 100 MG/ML
40 INJECTION SUBCUTANEOUS EVERY 24 HOURS
Status: DISCONTINUED | OUTPATIENT
Start: 2025-08-17 | End: 2025-08-21 | Stop reason: DRUGHIGH

## 2025-08-16 RX ORDER — SODIUM CHLORIDE, SODIUM LACTATE, POTASSIUM CHLORIDE, CALCIUM CHLORIDE 600; 310; 30; 20 MG/100ML; MG/100ML; MG/100ML; MG/100ML
100 INJECTION, SOLUTION INTRAVENOUS CONTINUOUS
Status: ACTIVE | OUTPATIENT
Start: 2025-08-16 | End: 2025-08-17

## 2025-08-16 RX ORDER — OXYCODONE HYDROCHLORIDE 5 MG/1
5 TABLET ORAL EVERY 4 HOURS PRN
Refills: 0 | Status: DISCONTINUED | OUTPATIENT
Start: 2025-08-16 | End: 2025-08-16

## 2025-08-16 RX ORDER — PROCHLORPERAZINE EDISYLATE 5 MG/ML
10 INJECTION INTRAMUSCULAR; INTRAVENOUS EVERY 6 HOURS PRN
Status: DISCONTINUED | OUTPATIENT
Start: 2025-08-16 | End: 2025-08-16

## 2025-08-16 RX ORDER — MORPHINE SULFATE 2 MG/ML
2 INJECTION, SOLUTION INTRAMUSCULAR; INTRAVENOUS
Status: DISCONTINUED | OUTPATIENT
Start: 2025-08-16 | End: 2025-08-18

## 2025-08-16 RX ORDER — LOPERAMIDE HYDROCHLORIDE 2 MG/1
2 CAPSULE ORAL
Status: DISCONTINUED | OUTPATIENT
Start: 2025-08-16 | End: 2025-08-16

## 2025-08-16 RX ORDER — HYDROMORPHONE HYDROCHLORIDE 1 MG/ML
0.5 INJECTION, SOLUTION INTRAMUSCULAR; INTRAVENOUS; SUBCUTANEOUS EVERY 4 HOURS PRN
Status: DISCONTINUED | OUTPATIENT
Start: 2025-08-16 | End: 2025-08-16

## 2025-08-16 RX ORDER — LOPERAMIDE HYDROCHLORIDE 2 MG/1
2 CAPSULE ORAL
Status: DISCONTINUED | OUTPATIENT
Start: 2025-08-16 | End: 2025-08-18

## 2025-08-16 RX ORDER — LOPERAMIDE HYDROCHLORIDE 2 MG/1
4 CAPSULE ORAL
Status: DISCONTINUED | OUTPATIENT
Start: 2025-08-16 | End: 2025-08-16

## 2025-08-16 RX ORDER — ONDANSETRON 2 MG/ML
4 INJECTION INTRAMUSCULAR; INTRAVENOUS EVERY 6 HOURS PRN
Status: DISCONTINUED | OUTPATIENT
Start: 2025-08-16 | End: 2025-08-24

## 2025-08-16 RX ADMIN — SODIUM CHLORIDE, POTASSIUM CHLORIDE, SODIUM LACTATE AND CALCIUM CHLORIDE 100 ML/HR: 600; 310; 30; 20 INJECTION, SOLUTION INTRAVENOUS at 11:11

## 2025-08-16 RX ADMIN — ACETAMINOPHEN 1000 MG: 500 TABLET, FILM COATED ORAL at 17:44

## 2025-08-16 RX ADMIN — SODIUM CHLORIDE, POTASSIUM CHLORIDE, SODIUM LACTATE AND CALCIUM CHLORIDE 100 ML/HR: 600; 310; 30; 20 INJECTION, SOLUTION INTRAVENOUS at 21:48

## 2025-08-16 RX ADMIN — LOPERAMIDE HYDROCHLORIDE 2 MG: 2 CAPSULE ORAL at 12:42

## 2025-08-16 RX ADMIN — LOPERAMIDE HYDROCHLORIDE 2 MG: 2 CAPSULE ORAL at 21:49

## 2025-08-16 RX ADMIN — HYDROMORPHONE HYDROCHLORIDE 0.5 MG: 1 INJECTION, SOLUTION INTRAMUSCULAR; INTRAVENOUS; SUBCUTANEOUS at 10:22

## 2025-08-16 RX ADMIN — HYDROMORPHONE HYDROCHLORIDE 0.5 MG: 1 INJECTION, SOLUTION INTRAMUSCULAR; INTRAVENOUS; SUBCUTANEOUS at 05:52

## 2025-08-16 RX ADMIN — ENOXAPARIN SODIUM 40 MG: 100 INJECTION SUBCUTANEOUS at 10:27

## 2025-08-16 RX ADMIN — MORPHINE SULFATE 2 MG: 2 INJECTION, SOLUTION INTRAMUSCULAR; INTRAVENOUS at 12:47

## 2025-08-16 RX ADMIN — Medication 10 ML: at 21:50

## 2025-08-16 RX ADMIN — PANTOPRAZOLE SODIUM 40 MG: 40 INJECTION, POWDER, LYOPHILIZED, FOR SOLUTION INTRAVENOUS at 17:43

## 2025-08-16 RX ADMIN — ATORVASTATIN CALCIUM 40 MG: 20 TABLET, FILM COATED ORAL at 10:26

## 2025-08-16 RX ADMIN — LOPERAMIDE HYDROCHLORIDE 2 MG: 2 CAPSULE ORAL at 10:26

## 2025-08-16 RX ADMIN — LOPERAMIDE HYDROCHLORIDE 2 MG: 2 CAPSULE ORAL at 17:43

## 2025-08-16 RX ADMIN — MORPHINE SULFATE 2 MG: 2 INJECTION, SOLUTION INTRAMUSCULAR; INTRAVENOUS at 21:49

## 2025-08-16 RX ADMIN — LIDOCAINE 2 PATCH: 4 PATCH TOPICAL at 10:26

## 2025-08-16 RX ADMIN — TRAZODONE HYDROCHLORIDE 300 MG: 100 TABLET ORAL at 21:48

## 2025-08-16 RX ADMIN — ONDANSETRON 4 MG: 2 INJECTION INTRAMUSCULAR; INTRAVENOUS at 10:22

## 2025-08-16 RX ADMIN — Medication 10 ML: at 10:27

## 2025-08-16 RX ADMIN — PANTOPRAZOLE SODIUM 40 MG: 40 INJECTION, POWDER, LYOPHILIZED, FOR SOLUTION INTRAVENOUS at 05:31

## 2025-08-16 RX ADMIN — MORPHINE SULFATE 2 MG: 2 INJECTION, SOLUTION INTRAMUSCULAR; INTRAVENOUS at 17:49

## 2025-08-16 RX ADMIN — ACETAMINOPHEN 1000 MG: 500 TABLET, FILM COATED ORAL at 10:26

## 2025-08-17 LAB
ANION GAP SERPL CALCULATED.3IONS-SCNC: 6 MMOL/L (ref 5–15)
BUN SERPL-MCNC: 8 MG/DL (ref 8–23)
BUN/CREAT SERPL: 15.1 (ref 7–25)
CALCIUM SPEC-SCNC: 7.8 MG/DL (ref 8.6–10.5)
CHLORIDE SERPL-SCNC: 105 MMOL/L (ref 98–107)
CO2 SERPL-SCNC: 24 MMOL/L (ref 22–29)
CREAT SERPL-MCNC: 0.53 MG/DL (ref 0.57–1)
DEPRECATED RDW RBC AUTO: 48.3 FL (ref 37–54)
EGFRCR SERPLBLD CKD-EPI 2021: 99 ML/MIN/1.73
ERYTHROCYTE [DISTWIDTH] IN BLOOD BY AUTOMATED COUNT: 14.4 % (ref 12.3–15.4)
GLUCOSE SERPL-MCNC: 90 MG/DL (ref 65–99)
HCT VFR BLD AUTO: 26.5 % (ref 34–46.6)
HGB BLD-MCNC: 8.6 G/DL (ref 12–15.9)
MAGNESIUM SERPL-MCNC: 1.7 MG/DL (ref 1.6–2.4)
MCH RBC QN AUTO: 30.4 PG (ref 26.6–33)
MCHC RBC AUTO-ENTMCNC: 32.5 G/DL (ref 31.5–35.7)
MCV RBC AUTO: 93.6 FL (ref 79–97)
PLATELET # BLD AUTO: 148 10*3/MM3 (ref 140–450)
PMV BLD AUTO: 9.6 FL (ref 6–12)
POTASSIUM SERPL-SCNC: 4.1 MMOL/L (ref 3.5–5.2)
RBC # BLD AUTO: 2.83 10*6/MM3 (ref 3.77–5.28)
SODIUM SERPL-SCNC: 135 MMOL/L (ref 136–145)
WBC NRBC COR # BLD AUTO: 5.6 10*3/MM3 (ref 3.4–10.8)

## 2025-08-17 PROCEDURE — 25010000002 MORPHINE PER 10 MG: Performed by: STUDENT IN AN ORGANIZED HEALTH CARE EDUCATION/TRAINING PROGRAM

## 2025-08-17 PROCEDURE — 80048 BASIC METABOLIC PNL TOTAL CA: CPT | Performed by: INTERNAL MEDICINE

## 2025-08-17 PROCEDURE — 85027 COMPLETE CBC AUTOMATED: CPT | Performed by: STUDENT IN AN ORGANIZED HEALTH CARE EDUCATION/TRAINING PROGRAM

## 2025-08-17 PROCEDURE — 25810000003 LACTATED RINGERS PER 1000 ML: Performed by: STUDENT IN AN ORGANIZED HEALTH CARE EDUCATION/TRAINING PROGRAM

## 2025-08-17 PROCEDURE — 25010000002 ENOXAPARIN PER 10 MG: Performed by: STUDENT IN AN ORGANIZED HEALTH CARE EDUCATION/TRAINING PROGRAM

## 2025-08-17 PROCEDURE — 25010000002 ONDANSETRON PER 1 MG: Performed by: STUDENT IN AN ORGANIZED HEALTH CARE EDUCATION/TRAINING PROGRAM

## 2025-08-17 PROCEDURE — 99024 POSTOP FOLLOW-UP VISIT: CPT | Performed by: SURGERY

## 2025-08-17 PROCEDURE — 83735 ASSAY OF MAGNESIUM: CPT | Performed by: STUDENT IN AN ORGANIZED HEALTH CARE EDUCATION/TRAINING PROGRAM

## 2025-08-17 RX ADMIN — Medication 10 ML: at 07:59

## 2025-08-17 RX ADMIN — SODIUM CHLORIDE, POTASSIUM CHLORIDE, SODIUM LACTATE AND CALCIUM CHLORIDE 100 ML/HR: 600; 310; 30; 20 INJECTION, SOLUTION INTRAVENOUS at 07:51

## 2025-08-17 RX ADMIN — Medication 10 ML: at 22:31

## 2025-08-17 RX ADMIN — ONDANSETRON 4 MG: 2 INJECTION INTRAMUSCULAR; INTRAVENOUS at 16:36

## 2025-08-17 RX ADMIN — ACETAMINOPHEN 1000 MG: 500 TABLET, FILM COATED ORAL at 22:30

## 2025-08-17 RX ADMIN — PANTOPRAZOLE SODIUM 40 MG: 40 INJECTION, POWDER, LYOPHILIZED, FOR SOLUTION INTRAVENOUS at 06:45

## 2025-08-17 RX ADMIN — ANTACID TABLETS 2 TABLET: 500 TABLET, CHEWABLE ORAL at 16:37

## 2025-08-17 RX ADMIN — ATORVASTATIN CALCIUM 40 MG: 20 TABLET, FILM COATED ORAL at 07:52

## 2025-08-17 RX ADMIN — TRAZODONE HYDROCHLORIDE 300 MG: 100 TABLET ORAL at 22:30

## 2025-08-17 RX ADMIN — LOPERAMIDE HYDROCHLORIDE 2 MG: 2 CAPSULE ORAL at 11:33

## 2025-08-17 RX ADMIN — ENOXAPARIN SODIUM 40 MG: 100 INJECTION SUBCUTANEOUS at 16:37

## 2025-08-17 RX ADMIN — Medication 1 PACKET: at 07:53

## 2025-08-17 RX ADMIN — MORPHINE SULFATE 2 MG: 2 INJECTION, SOLUTION INTRAMUSCULAR; INTRAVENOUS at 22:30

## 2025-08-17 RX ADMIN — PANTOPRAZOLE SODIUM 40 MG: 40 INJECTION, POWDER, LYOPHILIZED, FOR SOLUTION INTRAVENOUS at 16:37

## 2025-08-17 RX ADMIN — MORPHINE SULFATE 2 MG: 2 INJECTION, SOLUTION INTRAMUSCULAR; INTRAVENOUS at 11:38

## 2025-08-17 RX ADMIN — ACETAMINOPHEN 1000 MG: 500 TABLET, FILM COATED ORAL at 11:33

## 2025-08-17 RX ADMIN — LIDOCAINE 2 PATCH: 4 PATCH TOPICAL at 07:53

## 2025-08-17 RX ADMIN — MORPHINE SULFATE 2 MG: 2 INJECTION, SOLUTION INTRAMUSCULAR; INTRAVENOUS at 16:36

## 2025-08-17 RX ADMIN — MORPHINE SULFATE 2 MG: 2 INJECTION, SOLUTION INTRAMUSCULAR; INTRAVENOUS at 06:45

## 2025-08-17 RX ADMIN — LOPERAMIDE HYDROCHLORIDE 2 MG: 2 CAPSULE ORAL at 06:45

## 2025-08-18 ENCOUNTER — APPOINTMENT (OUTPATIENT)
Dept: GENERAL RADIOLOGY | Facility: HOSPITAL | Age: 70
End: 2025-08-18
Payer: MEDICARE

## 2025-08-18 LAB
ANION GAP SERPL CALCULATED.3IONS-SCNC: 8.4 MMOL/L (ref 5–15)
BUN SERPL-MCNC: 8 MG/DL (ref 8–23)
BUN/CREAT SERPL: 14.3 (ref 7–25)
CALCIUM SPEC-SCNC: 8.4 MG/DL (ref 8.6–10.5)
CHLORIDE SERPL-SCNC: 101 MMOL/L (ref 98–107)
CO2 SERPL-SCNC: 27.6 MMOL/L (ref 22–29)
CREAT SERPL-MCNC: 0.56 MG/DL (ref 0.57–1)
DEPRECATED RDW RBC AUTO: 49.8 FL (ref 37–54)
EGFRCR SERPLBLD CKD-EPI 2021: 97.7 ML/MIN/1.73
ERYTHROCYTE [DISTWIDTH] IN BLOOD BY AUTOMATED COUNT: 14.3 % (ref 12.3–15.4)
GLUCOSE SERPL-MCNC: 100 MG/DL (ref 65–99)
HCT VFR BLD AUTO: 33.1 % (ref 34–46.6)
HGB BLD-MCNC: 10.7 G/DL (ref 12–15.9)
MCH RBC QN AUTO: 30.9 PG (ref 26.6–33)
MCHC RBC AUTO-ENTMCNC: 32.3 G/DL (ref 31.5–35.7)
MCV RBC AUTO: 95.7 FL (ref 79–97)
PLATELET # BLD AUTO: 184 10*3/MM3 (ref 140–450)
PMV BLD AUTO: 9.7 FL (ref 6–12)
POTASSIUM SERPL-SCNC: 4 MMOL/L (ref 3.5–5.2)
RBC # BLD AUTO: 3.46 10*6/MM3 (ref 3.77–5.28)
SODIUM SERPL-SCNC: 137 MMOL/L (ref 136–145)
WBC NRBC COR # BLD AUTO: 5.94 10*3/MM3 (ref 3.4–10.8)

## 2025-08-18 PROCEDURE — 85027 COMPLETE CBC AUTOMATED: CPT | Performed by: SURGERY

## 2025-08-18 PROCEDURE — 80048 BASIC METABOLIC PNL TOTAL CA: CPT | Performed by: INTERNAL MEDICINE

## 2025-08-18 PROCEDURE — 25010000002 ENOXAPARIN PER 10 MG: Performed by: STUDENT IN AN ORGANIZED HEALTH CARE EDUCATION/TRAINING PROGRAM

## 2025-08-18 PROCEDURE — 74018 RADEX ABDOMEN 1 VIEW: CPT

## 2025-08-18 PROCEDURE — 97530 THERAPEUTIC ACTIVITIES: CPT

## 2025-08-18 PROCEDURE — 25010000002 ONDANSETRON PER 1 MG: Performed by: STUDENT IN AN ORGANIZED HEALTH CARE EDUCATION/TRAINING PROGRAM

## 2025-08-18 PROCEDURE — 25810000003 LACTATED RINGERS PER 1000 ML: Performed by: STUDENT IN AN ORGANIZED HEALTH CARE EDUCATION/TRAINING PROGRAM

## 2025-08-18 PROCEDURE — 25810000003 LACTATED RINGERS SOLUTION: Performed by: STUDENT IN AN ORGANIZED HEALTH CARE EDUCATION/TRAINING PROGRAM

## 2025-08-18 PROCEDURE — 97116 GAIT TRAINING THERAPY: CPT

## 2025-08-18 PROCEDURE — 99024 POSTOP FOLLOW-UP VISIT: CPT | Performed by: STUDENT IN AN ORGANIZED HEALTH CARE EDUCATION/TRAINING PROGRAM

## 2025-08-18 RX ORDER — SCOPOLAMINE 1 MG/3D
1 PATCH, EXTENDED RELEASE TRANSDERMAL
Status: DISCONTINUED | OUTPATIENT
Start: 2025-08-18 | End: 2025-08-22

## 2025-08-18 RX ORDER — ACETAMINOPHEN 325 MG/1
650 TABLET ORAL EVERY 6 HOURS PRN
Status: DISCONTINUED | OUTPATIENT
Start: 2025-08-18 | End: 2025-08-19

## 2025-08-18 RX ORDER — SODIUM CHLORIDE, SODIUM LACTATE, POTASSIUM CHLORIDE, CALCIUM CHLORIDE 600; 310; 30; 20 MG/100ML; MG/100ML; MG/100ML; MG/100ML
50 INJECTION, SOLUTION INTRAVENOUS CONTINUOUS
Status: ACTIVE | OUTPATIENT
Start: 2025-08-18 | End: 2025-08-20

## 2025-08-18 RX ORDER — HYDROCODONE BITARTRATE AND ACETAMINOPHEN 5; 325 MG/1; MG/1
1 TABLET ORAL EVERY 6 HOURS PRN
Refills: 0 | Status: DISCONTINUED | OUTPATIENT
Start: 2025-08-18 | End: 2025-08-19

## 2025-08-18 RX ORDER — OXYCODONE HYDROCHLORIDE 5 MG/1
5 TABLET ORAL EVERY 6 HOURS PRN
Status: DISCONTINUED | OUTPATIENT
Start: 2025-08-18 | End: 2025-08-18

## 2025-08-18 RX ORDER — SODIUM CHLORIDE, SODIUM LACTATE, POTASSIUM CHLORIDE, CALCIUM CHLORIDE 600; 310; 30; 20 MG/100ML; MG/100ML; MG/100ML; MG/100ML
125 INJECTION, SOLUTION INTRAVENOUS CONTINUOUS
Status: ACTIVE | OUTPATIENT
Start: 2025-08-18 | End: 2025-08-19

## 2025-08-18 RX ORDER — DEXTROSE MONOHYDRATE, SODIUM CHLORIDE, AND POTASSIUM CHLORIDE 50; 1.49; 4.5 G/1000ML; G/1000ML; G/1000ML
125 INJECTION, SOLUTION INTRAVENOUS CONTINUOUS
Status: DISCONTINUED | OUTPATIENT
Start: 2025-08-18 | End: 2025-08-18

## 2025-08-18 RX ADMIN — PANTOPRAZOLE SODIUM 40 MG: 40 INJECTION, POWDER, LYOPHILIZED, FOR SOLUTION INTRAVENOUS at 16:58

## 2025-08-18 RX ADMIN — ATORVASTATIN CALCIUM 40 MG: 20 TABLET, FILM COATED ORAL at 08:38

## 2025-08-18 RX ADMIN — Medication 10 ML: at 20:30

## 2025-08-18 RX ADMIN — SCOPOLAMINE 1 PATCH: 1.5 PATCH, EXTENDED RELEASE TRANSDERMAL at 18:41

## 2025-08-18 RX ADMIN — ONDANSETRON 4 MG: 2 INJECTION INTRAMUSCULAR; INTRAVENOUS at 13:04

## 2025-08-18 RX ADMIN — TRAZODONE HYDROCHLORIDE 300 MG: 100 TABLET ORAL at 20:30

## 2025-08-18 RX ADMIN — ONDANSETRON 4 MG: 2 INJECTION INTRAMUSCULAR; INTRAVENOUS at 18:53

## 2025-08-18 RX ADMIN — ENOXAPARIN SODIUM 40 MG: 100 INJECTION SUBCUTANEOUS at 16:58

## 2025-08-18 RX ADMIN — ACETAMINOPHEN 650 MG: 325 TABLET ORAL at 16:58

## 2025-08-18 RX ADMIN — OXYCODONE 5 MG: 5 TABLET ORAL at 06:22

## 2025-08-18 RX ADMIN — SODIUM CHLORIDE, POTASSIUM CHLORIDE, SODIUM LACTATE AND CALCIUM CHLORIDE 125 ML/HR: 600; 310; 30; 20 INJECTION, SOLUTION INTRAVENOUS at 12:55

## 2025-08-18 RX ADMIN — DEXTROSE MONOHYDRATE, SODIUM CHLORIDE, AND POTASSIUM CHLORIDE 100 ML/HR: 50; 1.49; 4.5 INJECTION, SOLUTION INTRAVENOUS at 09:55

## 2025-08-18 RX ADMIN — PANTOPRAZOLE SODIUM 40 MG: 40 INJECTION, POWDER, LYOPHILIZED, FOR SOLUTION INTRAVENOUS at 06:22

## 2025-08-18 RX ADMIN — HYDROCODONE BITARTRATE AND ACETAMINOPHEN 1 TABLET: 5; 325 TABLET ORAL at 12:54

## 2025-08-18 RX ADMIN — Medication 10 ML: at 08:39

## 2025-08-18 RX ADMIN — LIDOCAINE 2 PATCH: 4 PATCH TOPICAL at 08:38

## 2025-08-18 RX ADMIN — SODIUM CHLORIDE, POTASSIUM CHLORIDE, SODIUM LACTATE AND CALCIUM CHLORIDE 125 ML/HR: 600; 310; 30; 20 INJECTION, SOLUTION INTRAVENOUS at 20:30

## 2025-08-18 RX ADMIN — SODIUM CHLORIDE, POTASSIUM CHLORIDE, SODIUM LACTATE AND CALCIUM CHLORIDE 500 ML: 600; 310; 30; 20 INJECTION, SOLUTION INTRAVENOUS at 08:39

## 2025-08-18 RX ADMIN — HYDROCODONE BITARTRATE AND ACETAMINOPHEN 1 TABLET: 5; 325 TABLET ORAL at 18:53

## 2025-08-18 RX ADMIN — ONDANSETRON 4 MG: 2 INJECTION INTRAMUSCULAR; INTRAVENOUS at 07:31

## 2025-08-19 LAB
ANION GAP SERPL CALCULATED.3IONS-SCNC: 8.7 MMOL/L (ref 5–15)
BUN SERPL-MCNC: 8 MG/DL (ref 8–23)
BUN/CREAT SERPL: 19 (ref 7–25)
CALCIUM SPEC-SCNC: 7.9 MG/DL (ref 8.6–10.5)
CHLORIDE SERPL-SCNC: 102 MMOL/L (ref 98–107)
CO2 SERPL-SCNC: 27.3 MMOL/L (ref 22–29)
CREAT SERPL-MCNC: 0.42 MG/DL (ref 0.57–1)
EGFRCR SERPLBLD CKD-EPI 2021: 104.7 ML/MIN/1.73
GLUCOSE SERPL-MCNC: 92 MG/DL (ref 65–99)
POTASSIUM SERPL-SCNC: 4 MMOL/L (ref 3.5–5.2)
SODIUM SERPL-SCNC: 138 MMOL/L (ref 136–145)

## 2025-08-19 PROCEDURE — 80048 BASIC METABOLIC PNL TOTAL CA: CPT | Performed by: INTERNAL MEDICINE

## 2025-08-19 PROCEDURE — 25810000003 LACTATED RINGERS PER 1000 ML: Performed by: INTERNAL MEDICINE

## 2025-08-19 PROCEDURE — 99024 POSTOP FOLLOW-UP VISIT: CPT | Performed by: STUDENT IN AN ORGANIZED HEALTH CARE EDUCATION/TRAINING PROGRAM

## 2025-08-19 PROCEDURE — 25010000002 ONDANSETRON PER 1 MG: Performed by: STUDENT IN AN ORGANIZED HEALTH CARE EDUCATION/TRAINING PROGRAM

## 2025-08-19 PROCEDURE — 25010000002 HYDROMORPHONE PER 4 MG: Performed by: STUDENT IN AN ORGANIZED HEALTH CARE EDUCATION/TRAINING PROGRAM

## 2025-08-19 PROCEDURE — 97116 GAIT TRAINING THERAPY: CPT

## 2025-08-19 PROCEDURE — 25010000002 ENOXAPARIN PER 10 MG: Performed by: STUDENT IN AN ORGANIZED HEALTH CARE EDUCATION/TRAINING PROGRAM

## 2025-08-19 RX ORDER — ACETAMINOPHEN 500 MG
1000 TABLET ORAL EVERY 8 HOURS
Status: DISCONTINUED | OUTPATIENT
Start: 2025-08-19 | End: 2025-08-21

## 2025-08-19 RX ORDER — HYDROMORPHONE HYDROCHLORIDE 1 MG/ML
0.25 INJECTION, SOLUTION INTRAMUSCULAR; INTRAVENOUS; SUBCUTANEOUS
Status: DISCONTINUED | OUTPATIENT
Start: 2025-08-19 | End: 2025-08-21

## 2025-08-19 RX ADMIN — ONDANSETRON 4 MG: 2 INJECTION INTRAMUSCULAR; INTRAVENOUS at 08:39

## 2025-08-19 RX ADMIN — SODIUM CHLORIDE, POTASSIUM CHLORIDE, SODIUM LACTATE AND CALCIUM CHLORIDE 50 ML/HR: 600; 310; 30; 20 INJECTION, SOLUTION INTRAVENOUS at 13:04

## 2025-08-19 RX ADMIN — HYDROMORPHONE HYDROCHLORIDE 0.25 MG: 1 INJECTION, SOLUTION INTRAMUSCULAR; INTRAVENOUS; SUBCUTANEOUS at 15:14

## 2025-08-19 RX ADMIN — ENOXAPARIN SODIUM 40 MG: 100 INJECTION SUBCUTANEOUS at 17:15

## 2025-08-19 RX ADMIN — LIDOCAINE 2 PATCH: 4 PATCH TOPICAL at 08:39

## 2025-08-19 RX ADMIN — TRAZODONE HYDROCHLORIDE 300 MG: 100 TABLET ORAL at 19:56

## 2025-08-19 RX ADMIN — PANTOPRAZOLE SODIUM 40 MG: 40 INJECTION, POWDER, LYOPHILIZED, FOR SOLUTION INTRAVENOUS at 17:15

## 2025-08-19 RX ADMIN — HYDROMORPHONE HYDROCHLORIDE 0.25 MG: 1 INJECTION, SOLUTION INTRAMUSCULAR; INTRAVENOUS; SUBCUTANEOUS at 22:16

## 2025-08-19 RX ADMIN — Medication 10 ML: at 22:16

## 2025-08-19 RX ADMIN — HYDROCODONE BITARTRATE AND ACETAMINOPHEN 1 TABLET: 5; 325 TABLET ORAL at 08:39

## 2025-08-19 RX ADMIN — ONDANSETRON 4 MG: 2 INJECTION INTRAMUSCULAR; INTRAVENOUS at 15:14

## 2025-08-19 RX ADMIN — ONDANSETRON 4 MG: 2 INJECTION INTRAMUSCULAR; INTRAVENOUS at 22:16

## 2025-08-19 RX ADMIN — HYDROCODONE BITARTRATE AND ACETAMINOPHEN 1 TABLET: 5; 325 TABLET ORAL at 01:24

## 2025-08-19 RX ADMIN — HYDROMORPHONE HYDROCHLORIDE 0.25 MG: 1 INJECTION, SOLUTION INTRAMUSCULAR; INTRAVENOUS; SUBCUTANEOUS at 19:57

## 2025-08-19 RX ADMIN — PANTOPRAZOLE SODIUM 40 MG: 40 INJECTION, POWDER, LYOPHILIZED, FOR SOLUTION INTRAVENOUS at 08:40

## 2025-08-19 RX ADMIN — Medication 10 ML: at 08:40

## 2025-08-19 RX ADMIN — ACETAMINOPHEN 1000 MG: 500 TABLET, FILM COATED ORAL at 10:59

## 2025-08-19 RX ADMIN — ACETAMINOPHEN 1000 MG: 500 TABLET, FILM COATED ORAL at 17:15

## 2025-08-19 RX ADMIN — HYDROMORPHONE HYDROCHLORIDE 0.25 MG: 1 INJECTION, SOLUTION INTRAMUSCULAR; INTRAVENOUS; SUBCUTANEOUS at 10:58

## 2025-08-19 RX ADMIN — HYDROMORPHONE HYDROCHLORIDE 0.25 MG: 1 INJECTION, SOLUTION INTRAMUSCULAR; INTRAVENOUS; SUBCUTANEOUS at 17:15

## 2025-08-19 RX ADMIN — HYDROMORPHONE HYDROCHLORIDE 0.25 MG: 1 INJECTION, SOLUTION INTRAMUSCULAR; INTRAVENOUS; SUBCUTANEOUS at 13:04

## 2025-08-19 RX ADMIN — ATORVASTATIN CALCIUM 40 MG: 20 TABLET, FILM COATED ORAL at 08:39

## 2025-08-19 RX ADMIN — ONDANSETRON 4 MG: 2 INJECTION INTRAMUSCULAR; INTRAVENOUS at 01:35

## 2025-08-20 LAB
ANION GAP SERPL CALCULATED.3IONS-SCNC: 10 MMOL/L (ref 5–15)
BUN SERPL-MCNC: 9 MG/DL (ref 8–23)
BUN/CREAT SERPL: 20 (ref 7–25)
CALCIUM SPEC-SCNC: 7.9 MG/DL (ref 8.6–10.5)
CHLORIDE SERPL-SCNC: 101 MMOL/L (ref 98–107)
CO2 SERPL-SCNC: 25 MMOL/L (ref 22–29)
CREAT SERPL-MCNC: 0.45 MG/DL (ref 0.57–1)
EGFRCR SERPLBLD CKD-EPI 2021: 103 ML/MIN/1.73
GLUCOSE SERPL-MCNC: 82 MG/DL (ref 65–99)
POTASSIUM SERPL-SCNC: 3.9 MMOL/L (ref 3.5–5.2)
SODIUM SERPL-SCNC: 136 MMOL/L (ref 136–145)

## 2025-08-20 PROCEDURE — 99024 POSTOP FOLLOW-UP VISIT: CPT | Performed by: STUDENT IN AN ORGANIZED HEALTH CARE EDUCATION/TRAINING PROGRAM

## 2025-08-20 PROCEDURE — 80048 BASIC METABOLIC PNL TOTAL CA: CPT | Performed by: INTERNAL MEDICINE

## 2025-08-20 PROCEDURE — 97530 THERAPEUTIC ACTIVITIES: CPT

## 2025-08-20 PROCEDURE — 25010000002 ONDANSETRON PER 1 MG: Performed by: STUDENT IN AN ORGANIZED HEALTH CARE EDUCATION/TRAINING PROGRAM

## 2025-08-20 PROCEDURE — 25010000002 HYDROMORPHONE PER 4 MG: Performed by: STUDENT IN AN ORGANIZED HEALTH CARE EDUCATION/TRAINING PROGRAM

## 2025-08-20 PROCEDURE — 25010000002 ENOXAPARIN PER 10 MG: Performed by: STUDENT IN AN ORGANIZED HEALTH CARE EDUCATION/TRAINING PROGRAM

## 2025-08-20 PROCEDURE — 25810000003 LACTATED RINGERS PER 1000 ML: Performed by: STUDENT IN AN ORGANIZED HEALTH CARE EDUCATION/TRAINING PROGRAM

## 2025-08-20 PROCEDURE — 97116 GAIT TRAINING THERAPY: CPT

## 2025-08-20 PROCEDURE — 97535 SELF CARE MNGMENT TRAINING: CPT

## 2025-08-20 PROCEDURE — 97168 OT RE-EVAL EST PLAN CARE: CPT

## 2025-08-20 PROCEDURE — 25810000003 LACTATED RINGERS PER 1000 ML: Performed by: INTERNAL MEDICINE

## 2025-08-20 RX ORDER — PANTOPRAZOLE SODIUM 40 MG/10ML
40 INJECTION, POWDER, LYOPHILIZED, FOR SOLUTION INTRAVENOUS
Status: DISCONTINUED | OUTPATIENT
Start: 2025-08-21 | End: 2025-08-27 | Stop reason: HOSPADM

## 2025-08-20 RX ORDER — OXYCODONE HYDROCHLORIDE 10 MG/1
10 TABLET ORAL EVERY 6 HOURS PRN
Status: DISCONTINUED | OUTPATIENT
Start: 2025-08-20 | End: 2025-08-21

## 2025-08-20 RX ORDER — SODIUM CHLORIDE, SODIUM LACTATE, POTASSIUM CHLORIDE, CALCIUM CHLORIDE 600; 310; 30; 20 MG/100ML; MG/100ML; MG/100ML; MG/100ML
50 INJECTION, SOLUTION INTRAVENOUS CONTINUOUS
Status: DISCONTINUED | OUTPATIENT
Start: 2025-08-20 | End: 2025-08-20

## 2025-08-20 RX ORDER — OXYCODONE HYDROCHLORIDE 5 MG/1
5 TABLET ORAL EVERY 6 HOURS PRN
Status: DISCONTINUED | OUTPATIENT
Start: 2025-08-20 | End: 2025-08-21

## 2025-08-20 RX ORDER — SODIUM CHLORIDE, SODIUM LACTATE, POTASSIUM CHLORIDE, CALCIUM CHLORIDE 600; 310; 30; 20 MG/100ML; MG/100ML; MG/100ML; MG/100ML
50 INJECTION, SOLUTION INTRAVENOUS CONTINUOUS
Status: DISCONTINUED | OUTPATIENT
Start: 2025-08-20 | End: 2025-08-21

## 2025-08-20 RX ORDER — OXYCODONE HYDROCHLORIDE 5 MG/1
5 TABLET ORAL EVERY 4 HOURS PRN
Status: DISCONTINUED | OUTPATIENT
Start: 2025-08-20 | End: 2025-08-20

## 2025-08-20 RX ADMIN — HYDROMORPHONE HYDROCHLORIDE 0.25 MG: 1 INJECTION, SOLUTION INTRAMUSCULAR; INTRAVENOUS; SUBCUTANEOUS at 17:38

## 2025-08-20 RX ADMIN — ACETAMINOPHEN 1000 MG: 500 TABLET, FILM COATED ORAL at 05:01

## 2025-08-20 RX ADMIN — ONDANSETRON 4 MG: 2 INJECTION INTRAMUSCULAR; INTRAVENOUS at 05:02

## 2025-08-20 RX ADMIN — PANTOPRAZOLE SODIUM 40 MG: 40 INJECTION, POWDER, LYOPHILIZED, FOR SOLUTION INTRAVENOUS at 06:35

## 2025-08-20 RX ADMIN — LIDOCAINE 2 PATCH: 4 PATCH TOPICAL at 08:26

## 2025-08-20 RX ADMIN — HYDROMORPHONE HYDROCHLORIDE 0.25 MG: 1 INJECTION, SOLUTION INTRAMUSCULAR; INTRAVENOUS; SUBCUTANEOUS at 20:22

## 2025-08-20 RX ADMIN — HYDROMORPHONE HYDROCHLORIDE 0.25 MG: 1 INJECTION, SOLUTION INTRAMUSCULAR; INTRAVENOUS; SUBCUTANEOUS at 11:17

## 2025-08-20 RX ADMIN — OXYCODONE HYDROCHLORIDE 10 MG: 10 TABLET ORAL at 17:45

## 2025-08-20 RX ADMIN — SODIUM CHLORIDE, POTASSIUM CHLORIDE, SODIUM LACTATE AND CALCIUM CHLORIDE 50 ML/HR: 600; 310; 30; 20 INJECTION, SOLUTION INTRAVENOUS at 13:38

## 2025-08-20 RX ADMIN — ONDANSETRON 4 MG: 2 INJECTION INTRAMUSCULAR; INTRAVENOUS at 11:17

## 2025-08-20 RX ADMIN — ACETAMINOPHEN 1000 MG: 500 TABLET, FILM COATED ORAL at 11:17

## 2025-08-20 RX ADMIN — ONDANSETRON 4 MG: 2 INJECTION INTRAMUSCULAR; INTRAVENOUS at 17:38

## 2025-08-20 RX ADMIN — HYDROMORPHONE HYDROCHLORIDE 0.25 MG: 1 INJECTION, SOLUTION INTRAMUSCULAR; INTRAVENOUS; SUBCUTANEOUS at 13:33

## 2025-08-20 RX ADMIN — HYDROMORPHONE HYDROCHLORIDE 0.25 MG: 1 INJECTION, SOLUTION INTRAMUSCULAR; INTRAVENOUS; SUBCUTANEOUS at 05:02

## 2025-08-20 RX ADMIN — TRAZODONE HYDROCHLORIDE 300 MG: 100 TABLET ORAL at 20:22

## 2025-08-20 RX ADMIN — Medication 10 ML: at 08:27

## 2025-08-20 RX ADMIN — HYDROMORPHONE HYDROCHLORIDE 0.25 MG: 1 INJECTION, SOLUTION INTRAMUSCULAR; INTRAVENOUS; SUBCUTANEOUS at 08:26

## 2025-08-20 RX ADMIN — SODIUM CHLORIDE, POTASSIUM CHLORIDE, SODIUM LACTATE AND CALCIUM CHLORIDE 50 ML/HR: 600; 310; 30; 20 INJECTION, SOLUTION INTRAVENOUS at 08:31

## 2025-08-20 RX ADMIN — HYDROMORPHONE HYDROCHLORIDE 0.25 MG: 1 INJECTION, SOLUTION INTRAMUSCULAR; INTRAVENOUS; SUBCUTANEOUS at 15:41

## 2025-08-20 RX ADMIN — ENOXAPARIN SODIUM 40 MG: 100 INJECTION SUBCUTANEOUS at 17:19

## 2025-08-21 ENCOUNTER — APPOINTMENT (OUTPATIENT)
Dept: CT IMAGING | Facility: HOSPITAL | Age: 70
End: 2025-08-21
Payer: MEDICARE

## 2025-08-21 LAB
ANION GAP SERPL CALCULATED.3IONS-SCNC: 10 MMOL/L (ref 5–15)
ANION GAP SERPL CALCULATED.3IONS-SCNC: 15 MMOL/L (ref 5–15)
BUN SERPL-MCNC: 7 MG/DL (ref 8–23)
BUN SERPL-MCNC: 8 MG/DL (ref 8–23)
BUN/CREAT SERPL: 13.7 (ref 7–25)
BUN/CREAT SERPL: 18.2 (ref 7–25)
CALCIUM SPEC-SCNC: 7.6 MG/DL (ref 8.6–10.5)
CALCIUM SPEC-SCNC: 7.9 MG/DL (ref 8.6–10.5)
CHLORIDE SERPL-SCNC: 101 MMOL/L (ref 98–107)
CHLORIDE SERPL-SCNC: 101 MMOL/L (ref 98–107)
CO2 SERPL-SCNC: 24 MMOL/L (ref 22–29)
CO2 SERPL-SCNC: 26 MMOL/L (ref 22–29)
CREAT SERPL-MCNC: 0.44 MG/DL (ref 0.57–1)
CREAT SERPL-MCNC: 0.51 MG/DL (ref 0.57–1)
EGFRCR SERPLBLD CKD-EPI 2021: 103.6 ML/MIN/1.73
EGFRCR SERPLBLD CKD-EPI 2021: 99.9 ML/MIN/1.73
GLUCOSE SERPL-MCNC: 114 MG/DL (ref 65–99)
GLUCOSE SERPL-MCNC: 89 MG/DL (ref 65–99)
POTASSIUM SERPL-SCNC: 3.4 MMOL/L (ref 3.5–5.2)
POTASSIUM SERPL-SCNC: 3.4 MMOL/L (ref 3.5–5.2)
POTASSIUM SERPL-SCNC: 3.9 MMOL/L (ref 3.5–5.2)
POTASSIUM SERPL-SCNC: 3.9 MMOL/L (ref 3.5–5.2)
SODIUM SERPL-SCNC: 137 MMOL/L (ref 136–145)
SODIUM SERPL-SCNC: 140 MMOL/L (ref 136–145)

## 2025-08-21 PROCEDURE — 25010000002 ENOXAPARIN PER 10 MG: Performed by: STUDENT IN AN ORGANIZED HEALTH CARE EDUCATION/TRAINING PROGRAM

## 2025-08-21 PROCEDURE — 80048 BASIC METABOLIC PNL TOTAL CA: CPT | Performed by: INTERNAL MEDICINE

## 2025-08-21 PROCEDURE — 97530 THERAPEUTIC ACTIVITIES: CPT

## 2025-08-21 PROCEDURE — 74177 CT ABD & PELVIS W/CONTRAST: CPT

## 2025-08-21 PROCEDURE — 97116 GAIT TRAINING THERAPY: CPT

## 2025-08-21 PROCEDURE — 25010000002 HYDROMORPHONE PER 4 MG: Performed by: STUDENT IN AN ORGANIZED HEALTH CARE EDUCATION/TRAINING PROGRAM

## 2025-08-21 PROCEDURE — 25510000001 IOPAMIDOL 61 % SOLUTION: Performed by: STUDENT IN AN ORGANIZED HEALTH CARE EDUCATION/TRAINING PROGRAM

## 2025-08-21 PROCEDURE — 25010000002 ONDANSETRON PER 1 MG: Performed by: STUDENT IN AN ORGANIZED HEALTH CARE EDUCATION/TRAINING PROGRAM

## 2025-08-21 PROCEDURE — 84132 ASSAY OF SERUM POTASSIUM: CPT | Performed by: STUDENT IN AN ORGANIZED HEALTH CARE EDUCATION/TRAINING PROGRAM

## 2025-08-21 PROCEDURE — 25810000003 LACTATED RINGERS PER 1000 ML: Performed by: INTERNAL MEDICINE

## 2025-08-21 PROCEDURE — 84132 ASSAY OF SERUM POTASSIUM: CPT | Performed by: INTERNAL MEDICINE

## 2025-08-21 PROCEDURE — 25010000002 POTASSIUM CHLORIDE 10 MEQ/100ML SOLUTION: Performed by: STUDENT IN AN ORGANIZED HEALTH CARE EDUCATION/TRAINING PROGRAM

## 2025-08-21 PROCEDURE — 99024 POSTOP FOLLOW-UP VISIT: CPT | Performed by: STUDENT IN AN ORGANIZED HEALTH CARE EDUCATION/TRAINING PROGRAM

## 2025-08-21 RX ORDER — HYDROMORPHONE HYDROCHLORIDE 1 MG/ML
0.5 INJECTION, SOLUTION INTRAMUSCULAR; INTRAVENOUS; SUBCUTANEOUS
Status: DISCONTINUED | OUTPATIENT
Start: 2025-08-21 | End: 2025-08-21

## 2025-08-21 RX ORDER — ENOXAPARIN SODIUM 100 MG/ML
40 INJECTION SUBCUTANEOUS EVERY 12 HOURS
Status: DISCONTINUED | OUTPATIENT
Start: 2025-08-21 | End: 2025-08-27 | Stop reason: HOSPADM

## 2025-08-21 RX ORDER — POTASSIUM CHLORIDE 7.45 MG/ML
10 INJECTION INTRAVENOUS
Status: DISCONTINUED | OUTPATIENT
Start: 2025-08-21 | End: 2025-08-21

## 2025-08-21 RX ORDER — HYDROCODONE BITARTRATE AND ACETAMINOPHEN 5; 325 MG/1; MG/1
1 TABLET ORAL EVERY 6 HOURS PRN
Refills: 0 | Status: DISCONTINUED | OUTPATIENT
Start: 2025-08-21 | End: 2025-08-22

## 2025-08-21 RX ORDER — HYDROMORPHONE HYDROCHLORIDE 1 MG/ML
0.5 INJECTION, SOLUTION INTRAMUSCULAR; INTRAVENOUS; SUBCUTANEOUS EVERY 4 HOURS PRN
Status: DISCONTINUED | OUTPATIENT
Start: 2025-08-21 | End: 2025-08-25

## 2025-08-21 RX ORDER — HYDROCODONE BITARTRATE AND ACETAMINOPHEN 5; 325 MG/1; MG/1
2 TABLET ORAL EVERY 6 HOURS PRN
Refills: 0 | Status: DISCONTINUED | OUTPATIENT
Start: 2025-08-21 | End: 2025-08-22

## 2025-08-21 RX ORDER — DEXTROSE MONOHYDRATE, SODIUM CHLORIDE, AND POTASSIUM CHLORIDE 50; 1.49; 4.5 G/1000ML; G/1000ML; G/1000ML
50 INJECTION, SOLUTION INTRAVENOUS CONTINUOUS
Status: DISPENSED | OUTPATIENT
Start: 2025-08-21 | End: 2025-08-22

## 2025-08-21 RX ORDER — IOPAMIDOL 612 MG/ML
85 INJECTION, SOLUTION INTRAVASCULAR
Status: COMPLETED | OUTPATIENT
Start: 2025-08-21 | End: 2025-08-21

## 2025-08-21 RX ORDER — POTASSIUM CHLORIDE 7.45 MG/ML
10 INJECTION INTRAVENOUS
Status: COMPLETED | OUTPATIENT
Start: 2025-08-21 | End: 2025-08-21

## 2025-08-21 RX ADMIN — ONDANSETRON 4 MG: 2 INJECTION INTRAMUSCULAR; INTRAVENOUS at 06:39

## 2025-08-21 RX ADMIN — HYDROMORPHONE HYDROCHLORIDE 0.5 MG: 1 INJECTION, SOLUTION INTRAMUSCULAR; INTRAVENOUS; SUBCUTANEOUS at 20:24

## 2025-08-21 RX ADMIN — HYDROCODONE BITARTRATE AND ACETAMINOPHEN 2 TABLET: 5; 325 TABLET ORAL at 23:48

## 2025-08-21 RX ADMIN — Medication 10 ML: at 20:20

## 2025-08-21 RX ADMIN — IOPAMIDOL 85 ML: 612 INJECTION, SOLUTION INTRAVENOUS at 08:18

## 2025-08-21 RX ADMIN — HYDROCODONE BITARTRATE AND ACETAMINOPHEN 2 TABLET: 5; 325 TABLET ORAL at 09:55

## 2025-08-21 RX ADMIN — POTASSIUM CHLORIDE 10 MEQ: 7.46 INJECTION, SOLUTION INTRAVENOUS at 09:50

## 2025-08-21 RX ADMIN — PANTOPRAZOLE SODIUM 40 MG: 40 INJECTION, POWDER, FOR SOLUTION INTRAVENOUS at 06:38

## 2025-08-21 RX ADMIN — Medication 10 ML: at 09:51

## 2025-08-21 RX ADMIN — SODIUM CHLORIDE, POTASSIUM CHLORIDE, SODIUM LACTATE AND CALCIUM CHLORIDE 50 ML/HR: 600; 310; 30; 20 INJECTION, SOLUTION INTRAVENOUS at 04:20

## 2025-08-21 RX ADMIN — SCOPOLAMINE 1 PATCH: 1.5 PATCH, EXTENDED RELEASE TRANSDERMAL at 16:58

## 2025-08-21 RX ADMIN — HYDROMORPHONE HYDROCHLORIDE 0.25 MG: 1 INJECTION, SOLUTION INTRAMUSCULAR; INTRAVENOUS; SUBCUTANEOUS at 00:10

## 2025-08-21 RX ADMIN — ENOXAPARIN SODIUM 40 MG: 100 INJECTION SUBCUTANEOUS at 18:54

## 2025-08-21 RX ADMIN — HYDROMORPHONE HYDROCHLORIDE 0.25 MG: 1 INJECTION, SOLUTION INTRAMUSCULAR; INTRAVENOUS; SUBCUTANEOUS at 04:15

## 2025-08-21 RX ADMIN — ONDANSETRON 4 MG: 2 INJECTION INTRAMUSCULAR; INTRAVENOUS at 20:20

## 2025-08-21 RX ADMIN — HYDROMORPHONE HYDROCHLORIDE 0.5 MG: 1 INJECTION, SOLUTION INTRAMUSCULAR; INTRAVENOUS; SUBCUTANEOUS at 09:55

## 2025-08-21 RX ADMIN — DEXTROSE MONOHYDRATE, SODIUM CHLORIDE, AND POTASSIUM CHLORIDE 50 ML/HR: 50; 1.49; 4.5 INJECTION, SOLUTION INTRAVENOUS at 09:50

## 2025-08-21 RX ADMIN — POTASSIUM CHLORIDE 10 MEQ: 7.46 INJECTION, SOLUTION INTRAVENOUS at 14:56

## 2025-08-21 RX ADMIN — POTASSIUM CHLORIDE 10 MEQ: 7.46 INJECTION, SOLUTION INTRAVENOUS at 16:53

## 2025-08-21 RX ADMIN — LIDOCAINE 2 PATCH: 4 PATCH TOPICAL at 09:51

## 2025-08-21 RX ADMIN — POTASSIUM CHLORIDE 10 MEQ: 7.46 INJECTION, SOLUTION INTRAVENOUS at 12:19

## 2025-08-21 RX ADMIN — ONDANSETRON 4 MG: 2 INJECTION INTRAMUSCULAR; INTRAVENOUS at 00:08

## 2025-08-21 RX ADMIN — OXYCODONE HYDROCHLORIDE 10 MG: 10 TABLET ORAL at 00:08

## 2025-08-21 RX ADMIN — HYDROMORPHONE HYDROCHLORIDE 0.5 MG: 1 INJECTION, SOLUTION INTRAMUSCULAR; INTRAVENOUS; SUBCUTANEOUS at 13:34

## 2025-08-21 RX ADMIN — ATORVASTATIN CALCIUM 40 MG: 20 TABLET, FILM COATED ORAL at 09:51

## 2025-08-22 ENCOUNTER — APPOINTMENT (OUTPATIENT)
Dept: GENERAL RADIOLOGY | Facility: HOSPITAL | Age: 70
End: 2025-08-22
Payer: MEDICARE

## 2025-08-22 PROCEDURE — 25810000003 LACTATED RINGERS SOLUTION: Performed by: STUDENT IN AN ORGANIZED HEALTH CARE EDUCATION/TRAINING PROGRAM

## 2025-08-22 PROCEDURE — 25010000002 METRONIDAZOLE 500 MG/100ML SOLUTION: Performed by: STUDENT IN AN ORGANIZED HEALTH CARE EDUCATION/TRAINING PROGRAM

## 2025-08-22 PROCEDURE — 25010000002 HYDROMORPHONE 1 MG/ML SOLUTION: Performed by: STUDENT IN AN ORGANIZED HEALTH CARE EDUCATION/TRAINING PROGRAM

## 2025-08-22 PROCEDURE — 25010000002 ENOXAPARIN PER 10 MG: Performed by: STUDENT IN AN ORGANIZED HEALTH CARE EDUCATION/TRAINING PROGRAM

## 2025-08-22 PROCEDURE — 25010000002 HYDROMORPHONE PER 4 MG: Performed by: STUDENT IN AN ORGANIZED HEALTH CARE EDUCATION/TRAINING PROGRAM

## 2025-08-22 PROCEDURE — 99024 POSTOP FOLLOW-UP VISIT: CPT | Performed by: STUDENT IN AN ORGANIZED HEALTH CARE EDUCATION/TRAINING PROGRAM

## 2025-08-22 PROCEDURE — 25010000002 CEFTRIAXONE PER 250 MG: Performed by: STUDENT IN AN ORGANIZED HEALTH CARE EDUCATION/TRAINING PROGRAM

## 2025-08-22 PROCEDURE — 97530 THERAPEUTIC ACTIVITIES: CPT

## 2025-08-22 PROCEDURE — 25010000002 ONDANSETRON PER 1 MG: Performed by: STUDENT IN AN ORGANIZED HEALTH CARE EDUCATION/TRAINING PROGRAM

## 2025-08-22 PROCEDURE — 97116 GAIT TRAINING THERAPY: CPT

## 2025-08-22 RX ORDER — PROCHLORPERAZINE EDISYLATE 5 MG/ML
10 INJECTION INTRAMUSCULAR; INTRAVENOUS EVERY 6 HOURS PRN
Status: DISCONTINUED | OUTPATIENT
Start: 2025-08-22 | End: 2025-08-23

## 2025-08-22 RX ORDER — METRONIDAZOLE 500 MG/100ML
500 INJECTION, SOLUTION INTRAVENOUS EVERY 8 HOURS
Status: COMPLETED | OUTPATIENT
Start: 2025-08-22 | End: 2025-08-25

## 2025-08-22 RX ORDER — DEXTROSE MONOHYDRATE, SODIUM CHLORIDE, AND POTASSIUM CHLORIDE 50; 1.49; 4.5 G/1000ML; G/1000ML; G/1000ML
75 INJECTION, SOLUTION INTRAVENOUS CONTINUOUS
Status: DISPENSED | OUTPATIENT
Start: 2025-08-22 | End: 2025-08-23

## 2025-08-22 RX ADMIN — HYDROMORPHONE HYDROCHLORIDE 0.5 MG: 1 INJECTION, SOLUTION INTRAMUSCULAR; INTRAVENOUS; SUBCUTANEOUS at 20:06

## 2025-08-22 RX ADMIN — ENOXAPARIN SODIUM 40 MG: 100 INJECTION SUBCUTANEOUS at 18:46

## 2025-08-22 RX ADMIN — ONDANSETRON 4 MG: 2 INJECTION INTRAMUSCULAR; INTRAVENOUS at 03:09

## 2025-08-22 RX ADMIN — ONDANSETRON 4 MG: 2 INJECTION INTRAMUSCULAR; INTRAVENOUS at 19:57

## 2025-08-22 RX ADMIN — SODIUM CHLORIDE, POTASSIUM CHLORIDE, SODIUM LACTATE AND CALCIUM CHLORIDE 1000 ML: 600; 310; 30; 20 INJECTION, SOLUTION INTRAVENOUS at 10:07

## 2025-08-22 RX ADMIN — SODIUM CHLORIDE 2000 MG: 900 INJECTION INTRAVENOUS at 12:26

## 2025-08-22 RX ADMIN — HYDROMORPHONE HYDROCHLORIDE 0.5 MG: 1 INJECTION, SOLUTION INTRAMUSCULAR; INTRAVENOUS; SUBCUTANEOUS at 03:10

## 2025-08-22 RX ADMIN — ATORVASTATIN CALCIUM 40 MG: 20 TABLET, FILM COATED ORAL at 08:48

## 2025-08-22 RX ADMIN — DEXTROSE MONOHYDRATE, SODIUM CHLORIDE, AND POTASSIUM CHLORIDE 50 ML/HR: 50; 1.49; 4.5 INJECTION, SOLUTION INTRAVENOUS at 05:23

## 2025-08-22 RX ADMIN — POTASSIUM CHLORIDE, DEXTROSE MONOHYDRATE AND SODIUM CHLORIDE 75 ML/HR: 150; 5; 450 INJECTION, SOLUTION INTRAVENOUS at 22:57

## 2025-08-22 RX ADMIN — HYDROMORPHONE HYDROCHLORIDE 0.5 MG: 1 INJECTION, SOLUTION INTRAMUSCULAR; INTRAVENOUS; SUBCUTANEOUS at 07:02

## 2025-08-22 RX ADMIN — ENOXAPARIN SODIUM 40 MG: 100 INJECTION SUBCUTANEOUS at 05:23

## 2025-08-22 RX ADMIN — METRONIDAZOLE 500 MG: 500 INJECTION, SOLUTION INTRAVENOUS at 19:57

## 2025-08-22 RX ADMIN — ONDANSETRON 4 MG: 2 INJECTION INTRAMUSCULAR; INTRAVENOUS at 10:07

## 2025-08-22 RX ADMIN — METRONIDAZOLE 500 MG: 500 INJECTION, SOLUTION INTRAVENOUS at 12:30

## 2025-08-22 RX ADMIN — Medication 10 ML: at 08:48

## 2025-08-22 RX ADMIN — LIDOCAINE 2 PATCH: 4 PATCH TOPICAL at 08:48

## 2025-08-22 RX ADMIN — PANTOPRAZOLE SODIUM 40 MG: 40 INJECTION, POWDER, FOR SOLUTION INTRAVENOUS at 08:48

## 2025-08-22 RX ADMIN — Medication 10 ML: at 22:25

## 2025-08-22 RX ADMIN — POTASSIUM CHLORIDE, DEXTROSE MONOHYDRATE AND SODIUM CHLORIDE 125 ML/HR: 150; 5; 450 INJECTION, SOLUTION INTRAVENOUS at 12:40

## 2025-08-22 RX ADMIN — TRAZODONE HYDROCHLORIDE 300 MG: 100 TABLET ORAL at 22:24

## 2025-08-22 RX ADMIN — POTASSIUM CHLORIDE, DEXTROSE MONOHYDRATE AND SODIUM CHLORIDE 125 ML/HR: 150; 5; 450 INJECTION, SOLUTION INTRAVENOUS at 12:27

## 2025-08-23 ENCOUNTER — APPOINTMENT (OUTPATIENT)
Dept: GENERAL RADIOLOGY | Facility: HOSPITAL | Age: 70
End: 2025-08-23
Payer: MEDICARE

## 2025-08-23 LAB
ALBUMIN SERPL-MCNC: 2.6 G/DL (ref 3.5–5.2)
ALBUMIN/GLOB SERPL: 1 G/DL
ALP SERPL-CCNC: 79 U/L (ref 39–117)
ALT SERPL W P-5'-P-CCNC: 8 U/L (ref 1–33)
ANION GAP SERPL CALCULATED.3IONS-SCNC: 13 MMOL/L (ref 5–15)
ANION GAP SERPL CALCULATED.3IONS-SCNC: 7.2 MMOL/L (ref 5–15)
AST SERPL-CCNC: 18 U/L (ref 1–32)
BILIRUB CONJ SERPL-MCNC: 0.2 MG/DL (ref 0–0.3)
BILIRUB SERPL-MCNC: 0.4 MG/DL (ref 0–1.2)
BUN SERPL-MCNC: 4 MG/DL (ref 8–23)
BUN SERPL-MCNC: 5 MG/DL (ref 8–23)
BUN/CREAT SERPL: 10.6 (ref 7–25)
BUN/CREAT SERPL: 8.5 (ref 7–25)
CA-I SERPL ISE-MCNC: 1.08 MMOL/L (ref 1.15–1.35)
CALCIUM SPEC-SCNC: 7.5 MG/DL (ref 8.6–10.5)
CALCIUM SPEC-SCNC: 8 MG/DL (ref 8.6–10.5)
CHLORIDE SERPL-SCNC: 101 MMOL/L (ref 98–107)
CHLORIDE SERPL-SCNC: 104 MMOL/L (ref 98–107)
CHOLEST SERPL-MCNC: 82 MG/DL (ref 0–200)
CO2 SERPL-SCNC: 24 MMOL/L (ref 22–29)
CO2 SERPL-SCNC: 25.8 MMOL/L (ref 22–29)
CREAT SERPL-MCNC: 0.47 MG/DL (ref 0.57–1)
CREAT SERPL-MCNC: 0.47 MG/DL (ref 0.57–1)
CRP SERPL-MCNC: 0.33 MG/DL (ref 0–0.5)
DEPRECATED RDW RBC AUTO: 51.2 FL (ref 37–54)
EGFRCR SERPLBLD CKD-EPI 2021: 101.9 ML/MIN/1.73
EGFRCR SERPLBLD CKD-EPI 2021: 101.9 ML/MIN/1.73
ERYTHROCYTE [DISTWIDTH] IN BLOOD BY AUTOMATED COUNT: 14.5 % (ref 12.3–15.4)
GLOBULIN UR ELPH-MCNC: 2.5 GM/DL
GLUCOSE BLDC GLUCOMTR-MCNC: 108 MG/DL (ref 65–99)
GLUCOSE BLDC GLUCOMTR-MCNC: 145 MG/DL (ref 65–99)
GLUCOSE SERPL-MCNC: 119 MG/DL (ref 65–99)
GLUCOSE SERPL-MCNC: 128 MG/DL (ref 65–99)
HCT VFR BLD AUTO: 29.3 % (ref 34–46.6)
HGB BLD-MCNC: 9.2 G/DL (ref 12–15.9)
MAGNESIUM SERPL-MCNC: 1.3 MG/DL (ref 1.6–2.4)
MAGNESIUM SERPL-MCNC: 1.3 MG/DL (ref 1.6–2.4)
MCH RBC QN AUTO: 30 PG (ref 26.6–33)
MCHC RBC AUTO-ENTMCNC: 31.4 G/DL (ref 31.5–35.7)
MCV RBC AUTO: 95.4 FL (ref 79–97)
PHOSPHATE SERPL-MCNC: 2.2 MG/DL (ref 2.5–4.5)
PHOSPHATE SERPL-MCNC: 2.4 MG/DL (ref 2.5–4.5)
PLATELET # BLD AUTO: 241 10*3/MM3 (ref 140–450)
PMV BLD AUTO: 9.7 FL (ref 6–12)
POTASSIUM SERPL-SCNC: 3.1 MMOL/L (ref 3.5–5.2)
POTASSIUM SERPL-SCNC: 3.1 MMOL/L (ref 3.5–5.2)
PREALB SERPL-MCNC: 11.1 MG/DL (ref 20–40)
PROT SERPL-MCNC: 5.1 G/DL (ref 6–8.5)
RBC # BLD AUTO: 3.07 10*6/MM3 (ref 3.77–5.28)
SODIUM SERPL-SCNC: 137 MMOL/L (ref 136–145)
SODIUM SERPL-SCNC: 138 MMOL/L (ref 136–145)
TRIGL SERPL-MCNC: 96 MG/DL (ref 0–150)
WBC NRBC COR # BLD AUTO: 5.22 10*3/MM3 (ref 3.4–10.8)

## 2025-08-23 PROCEDURE — 84478 ASSAY OF TRIGLYCERIDES: CPT | Performed by: STUDENT IN AN ORGANIZED HEALTH CARE EDUCATION/TRAINING PROGRAM

## 2025-08-23 PROCEDURE — 74018 RADEX ABDOMEN 1 VIEW: CPT

## 2025-08-23 PROCEDURE — 25010000002 ENOXAPARIN PER 10 MG: Performed by: STUDENT IN AN ORGANIZED HEALTH CARE EDUCATION/TRAINING PROGRAM

## 2025-08-23 PROCEDURE — 99024 POSTOP FOLLOW-UP VISIT: CPT | Performed by: STUDENT IN AN ORGANIZED HEALTH CARE EDUCATION/TRAINING PROGRAM

## 2025-08-23 PROCEDURE — 82248 BILIRUBIN DIRECT: CPT | Performed by: STUDENT IN AN ORGANIZED HEALTH CARE EDUCATION/TRAINING PROGRAM

## 2025-08-23 PROCEDURE — 25010000002 METRONIDAZOLE 500 MG/100ML SOLUTION: Performed by: STUDENT IN AN ORGANIZED HEALTH CARE EDUCATION/TRAINING PROGRAM

## 2025-08-23 PROCEDURE — 84134 ASSAY OF PREALBUMIN: CPT | Performed by: STUDENT IN AN ORGANIZED HEALTH CARE EDUCATION/TRAINING PROGRAM

## 2025-08-23 PROCEDURE — 86140 C-REACTIVE PROTEIN: CPT | Performed by: STUDENT IN AN ORGANIZED HEALTH CARE EDUCATION/TRAINING PROGRAM

## 2025-08-23 PROCEDURE — 25010000002 POTASSIUM CHLORIDE 10 MEQ/100ML SOLUTION: Performed by: STUDENT IN AN ORGANIZED HEALTH CARE EDUCATION/TRAINING PROGRAM

## 2025-08-23 PROCEDURE — 80053 COMPREHEN METABOLIC PANEL: CPT | Performed by: STUDENT IN AN ORGANIZED HEALTH CARE EDUCATION/TRAINING PROGRAM

## 2025-08-23 PROCEDURE — 84100 ASSAY OF PHOSPHORUS: CPT | Performed by: STUDENT IN AN ORGANIZED HEALTH CARE EDUCATION/TRAINING PROGRAM

## 2025-08-23 PROCEDURE — 25010000002 DEXAMETHASONE PER 1 MG: Performed by: STUDENT IN AN ORGANIZED HEALTH CARE EDUCATION/TRAINING PROGRAM

## 2025-08-23 PROCEDURE — 25810000003 SODIUM CHLORIDE 0.9 % SOLUTION: Performed by: STUDENT IN AN ORGANIZED HEALTH CARE EDUCATION/TRAINING PROGRAM

## 2025-08-23 PROCEDURE — 25010000002 POTASSIUM CHLORIDE PER 2 MEQ OF POTASSIUM: Performed by: STUDENT IN AN ORGANIZED HEALTH CARE EDUCATION/TRAINING PROGRAM

## 2025-08-23 PROCEDURE — 25010000002 MAGNESIUM SULFATE 2 GM/50ML SOLUTION: Performed by: STUDENT IN AN ORGANIZED HEALTH CARE EDUCATION/TRAINING PROGRAM

## 2025-08-23 PROCEDURE — 25010000002 MAGNESIUM SULFATE PER 500 MG OF MAGNESIUM: Performed by: STUDENT IN AN ORGANIZED HEALTH CARE EDUCATION/TRAINING PROGRAM

## 2025-08-23 PROCEDURE — 25010000002 HYDROMORPHONE PER 4 MG: Performed by: STUDENT IN AN ORGANIZED HEALTH CARE EDUCATION/TRAINING PROGRAM

## 2025-08-23 PROCEDURE — 25010000002 PROCHLORPERAZINE 10 MG/2ML SOLUTION: Performed by: STUDENT IN AN ORGANIZED HEALTH CARE EDUCATION/TRAINING PROGRAM

## 2025-08-23 PROCEDURE — 25010000002 CALCIUM GLUCONATE PER 10 ML: Performed by: STUDENT IN AN ORGANIZED HEALTH CARE EDUCATION/TRAINING PROGRAM

## 2025-08-23 PROCEDURE — 25010000002 ONDANSETRON PER 1 MG: Performed by: STUDENT IN AN ORGANIZED HEALTH CARE EDUCATION/TRAINING PROGRAM

## 2025-08-23 PROCEDURE — C1751 CATH, INF, PER/CENT/MIDLINE: HCPCS

## 2025-08-23 PROCEDURE — 82948 REAGENT STRIP/BLOOD GLUCOSE: CPT

## 2025-08-23 PROCEDURE — 83735 ASSAY OF MAGNESIUM: CPT | Performed by: STUDENT IN AN ORGANIZED HEALTH CARE EDUCATION/TRAINING PROGRAM

## 2025-08-23 PROCEDURE — 82465 ASSAY BLD/SERUM CHOLESTEROL: CPT | Performed by: STUDENT IN AN ORGANIZED HEALTH CARE EDUCATION/TRAINING PROGRAM

## 2025-08-23 PROCEDURE — 82330 ASSAY OF CALCIUM: CPT | Performed by: STUDENT IN AN ORGANIZED HEALTH CARE EDUCATION/TRAINING PROGRAM

## 2025-08-23 PROCEDURE — 85027 COMPLETE CBC AUTOMATED: CPT | Performed by: STUDENT IN AN ORGANIZED HEALTH CARE EDUCATION/TRAINING PROGRAM

## 2025-08-23 PROCEDURE — 25010000002 CEFTRIAXONE PER 250 MG: Performed by: STUDENT IN AN ORGANIZED HEALTH CARE EDUCATION/TRAINING PROGRAM

## 2025-08-23 RX ORDER — SODIUM CHLORIDE 0.9 % (FLUSH) 0.9 %
10 SYRINGE (ML) INJECTION AS NEEDED
Status: DISCONTINUED | OUTPATIENT
Start: 2025-08-23 | End: 2025-08-27 | Stop reason: SDUPTHER

## 2025-08-23 RX ORDER — SODIUM CHLORIDE 0.9 % (FLUSH) 0.9 %
20 SYRINGE (ML) INJECTION AS NEEDED
Status: DISCONTINUED | OUTPATIENT
Start: 2025-08-23 | End: 2025-08-27 | Stop reason: SDUPTHER

## 2025-08-23 RX ORDER — SODIUM CHLORIDE 0.9 % (FLUSH) 0.9 %
10 SYRINGE (ML) INJECTION EVERY 12 HOURS SCHEDULED
Status: DISCONTINUED | OUTPATIENT
Start: 2025-08-23 | End: 2025-08-27 | Stop reason: SDUPTHER

## 2025-08-23 RX ORDER — SCOPOLAMINE 1 MG/3D
1 PATCH, EXTENDED RELEASE TRANSDERMAL
Status: DISCONTINUED | OUTPATIENT
Start: 2025-08-23 | End: 2025-08-26

## 2025-08-23 RX ORDER — SODIUM CHLORIDE 9 MG/ML
40 INJECTION, SOLUTION INTRAVENOUS AS NEEDED
Status: DISCONTINUED | OUTPATIENT
Start: 2025-08-23 | End: 2025-08-27 | Stop reason: SDUPTHER

## 2025-08-23 RX ORDER — SODIUM CHLORIDE 0.9 % (FLUSH) 0.9 %
10 SYRINGE (ML) INJECTION EVERY 12 HOURS SCHEDULED
Status: DISCONTINUED | OUTPATIENT
Start: 2025-08-23 | End: 2025-08-27 | Stop reason: HOSPADM

## 2025-08-23 RX ORDER — POTASSIUM CHLORIDE 7.45 MG/ML
10 INJECTION INTRAVENOUS
Status: COMPLETED | OUTPATIENT
Start: 2025-08-23 | End: 2025-08-24

## 2025-08-23 RX ORDER — SODIUM CHLORIDE 0.9 % (FLUSH) 0.9 %
20 SYRINGE (ML) INJECTION AS NEEDED
Status: DISCONTINUED | OUTPATIENT
Start: 2025-08-23 | End: 2025-08-27 | Stop reason: HOSPADM

## 2025-08-23 RX ORDER — MAGNESIUM SULFATE HEPTAHYDRATE 40 MG/ML
2 INJECTION, SOLUTION INTRAVENOUS
Status: COMPLETED | OUTPATIENT
Start: 2025-08-23 | End: 2025-08-24

## 2025-08-23 RX ORDER — SODIUM CHLORIDE 0.9 % (FLUSH) 0.9 %
10 SYRINGE (ML) INJECTION AS NEEDED
Status: DISCONTINUED | OUTPATIENT
Start: 2025-08-23 | End: 2025-08-27 | Stop reason: HOSPADM

## 2025-08-23 RX ORDER — FENTANYL/ROPIVACAINE/NS/PF 2-625MCG/1
15 PLASTIC BAG, INJECTION (ML) EPIDURAL ONCE
Status: COMPLETED | OUTPATIENT
Start: 2025-08-23 | End: 2025-08-23

## 2025-08-23 RX ORDER — SODIUM CHLORIDE 9 MG/ML
40 INJECTION, SOLUTION INTRAVENOUS AS NEEDED
Status: DISCONTINUED | OUTPATIENT
Start: 2025-08-23 | End: 2025-08-27 | Stop reason: HOSPADM

## 2025-08-23 RX ORDER — DEXAMETHASONE SODIUM PHOSPHATE 4 MG/ML
2 INJECTION, SOLUTION INTRA-ARTICULAR; INTRALESIONAL; INTRAMUSCULAR; INTRAVENOUS; SOFT TISSUE ONCE
Status: COMPLETED | OUTPATIENT
Start: 2025-08-23 | End: 2025-08-23

## 2025-08-23 RX ADMIN — POTASSIUM PHOSPHATE 15 MMOL: 236; 224 INJECTION, SOLUTION INTRAVENOUS at 20:28

## 2025-08-23 RX ADMIN — POTASSIUM CHLORIDE 10 MEQ: 7.46 INJECTION, SOLUTION INTRAVENOUS at 20:28

## 2025-08-23 RX ADMIN — Medication 10 ML: at 08:10

## 2025-08-23 RX ADMIN — HYDROMORPHONE HYDROCHLORIDE 0.5 MG: 1 INJECTION, SOLUTION INTRAMUSCULAR; INTRAVENOUS; SUBCUTANEOUS at 23:49

## 2025-08-23 RX ADMIN — MAGNESIUM SULFATE HEPTAHYDRATE 2 G: 40 INJECTION, SOLUTION INTRAVENOUS at 20:29

## 2025-08-23 RX ADMIN — POTASSIUM PHOSPHATE, MONOBASIC POTASSIUM PHOSPHATE, DIBASIC INJECTION,: 236; 224 SOLUTION, CONCENTRATE INTRAVENOUS at 18:37

## 2025-08-23 RX ADMIN — ONDANSETRON 4 MG: 2 INJECTION INTRAMUSCULAR; INTRAVENOUS at 07:20

## 2025-08-23 RX ADMIN — PANTOPRAZOLE SODIUM 40 MG: 40 INJECTION, POWDER, FOR SOLUTION INTRAVENOUS at 07:14

## 2025-08-23 RX ADMIN — PHENOL 1 SPRAY: 1.5 LIQUID ORAL at 20:46

## 2025-08-23 RX ADMIN — POTASSIUM CHLORIDE 10 MEQ: 7.46 INJECTION, SOLUTION INTRAVENOUS at 22:53

## 2025-08-23 RX ADMIN — METRONIDAZOLE 500 MG: 500 INJECTION, SOLUTION INTRAVENOUS at 04:12

## 2025-08-23 RX ADMIN — ENOXAPARIN SODIUM 40 MG: 100 INJECTION SUBCUTANEOUS at 18:29

## 2025-08-23 RX ADMIN — HYDROMORPHONE HYDROCHLORIDE 0.5 MG: 1 INJECTION, SOLUTION INTRAMUSCULAR; INTRAVENOUS; SUBCUTANEOUS at 17:06

## 2025-08-23 RX ADMIN — HYDROMORPHONE HYDROCHLORIDE 0.5 MG: 1 INJECTION, SOLUTION INTRAMUSCULAR; INTRAVENOUS; SUBCUTANEOUS at 07:20

## 2025-08-23 RX ADMIN — ATORVASTATIN CALCIUM 40 MG: 20 TABLET, FILM COATED ORAL at 08:06

## 2025-08-23 RX ADMIN — PHENOL 1 SPRAY: 1.5 LIQUID ORAL at 17:00

## 2025-08-23 RX ADMIN — LIDOCAINE 2 PATCH: 4 PATCH TOPICAL at 08:06

## 2025-08-23 RX ADMIN — DEXAMETHASONE SODIUM PHOSPHATE 2 MG: 4 INJECTION, SOLUTION INTRAMUSCULAR; INTRAVENOUS at 14:18

## 2025-08-23 RX ADMIN — MAGNESIUM SULFATE HEPTAHYDRATE 2 G: 40 INJECTION, SOLUTION INTRAVENOUS at 23:44

## 2025-08-23 RX ADMIN — Medication 10 ML: at 20:47

## 2025-08-23 RX ADMIN — POTASSIUM CHLORIDE 10 MEQ: 7.46 INJECTION, SOLUTION INTRAVENOUS at 21:39

## 2025-08-23 RX ADMIN — SODIUM CHLORIDE 2000 MG: 900 INJECTION INTRAVENOUS at 10:30

## 2025-08-23 RX ADMIN — METRONIDAZOLE 500 MG: 500 INJECTION, SOLUTION INTRAVENOUS at 12:16

## 2025-08-23 RX ADMIN — Medication 10 ML: at 20:46

## 2025-08-23 RX ADMIN — POTASSIUM CHLORIDE 10 MEQ: 7.46 INJECTION, SOLUTION INTRAVENOUS at 23:48

## 2025-08-23 RX ADMIN — ENOXAPARIN SODIUM 40 MG: 100 INJECTION SUBCUTANEOUS at 07:14

## 2025-08-23 RX ADMIN — METRONIDAZOLE 500 MG: 500 INJECTION, SOLUTION INTRAVENOUS at 20:19

## 2025-08-23 RX ADMIN — Medication 10 ML: at 20:48

## 2025-08-23 RX ADMIN — MAGNESIUM SULFATE HEPTAHYDRATE 2 G: 40 INJECTION, SOLUTION INTRAVENOUS at 21:39

## 2025-08-23 RX ADMIN — PROCHLORPERAZINE EDISYLATE 10 MG: 5 INJECTION INTRAMUSCULAR; INTRAVENOUS at 12:16

## 2025-08-23 RX ADMIN — SCOPOLAMINE 1 PATCH: 1.5 PATCH, EXTENDED RELEASE TRANSDERMAL at 14:18

## 2025-08-24 LAB
ALBUMIN SERPL-MCNC: 2.3 G/DL (ref 3.5–5.2)
ALBUMIN/GLOB SERPL: 1 G/DL
ALP SERPL-CCNC: 70 U/L (ref 39–117)
ALT SERPL W P-5'-P-CCNC: 7 U/L (ref 1–33)
ANION GAP SERPL CALCULATED.3IONS-SCNC: 9.1 MMOL/L (ref 5–15)
AST SERPL-CCNC: 15 U/L (ref 1–32)
BILIRUB SERPL-MCNC: 0.3 MG/DL (ref 0–1.2)
BUN SERPL-MCNC: 4 MG/DL (ref 8–23)
BUN/CREAT SERPL: 10.5 (ref 7–25)
CALCIUM SPEC-SCNC: 7.5 MG/DL (ref 8.6–10.5)
CHLORIDE SERPL-SCNC: 102 MMOL/L (ref 98–107)
CO2 SERPL-SCNC: 23.9 MMOL/L (ref 22–29)
CREAT SERPL-MCNC: 0.38 MG/DL (ref 0.57–1)
DEPRECATED RDW RBC AUTO: 50.1 FL (ref 37–54)
EGFRCR SERPLBLD CKD-EPI 2021: 107.3 ML/MIN/1.73
ERYTHROCYTE [DISTWIDTH] IN BLOOD BY AUTOMATED COUNT: 14.5 % (ref 12.3–15.4)
GLOBULIN UR ELPH-MCNC: 2.4 GM/DL
GLUCOSE BLDC GLUCOMTR-MCNC: 120 MG/DL (ref 65–99)
GLUCOSE BLDC GLUCOMTR-MCNC: 121 MG/DL (ref 65–99)
GLUCOSE BLDC GLUCOMTR-MCNC: 139 MG/DL (ref 65–99)
GLUCOSE SERPL-MCNC: 119 MG/DL (ref 65–99)
HCT VFR BLD AUTO: 28.4 % (ref 34–46.6)
HGB BLD-MCNC: 9.2 G/DL (ref 12–15.9)
MAGNESIUM SERPL-MCNC: 2.3 MG/DL (ref 1.6–2.4)
MCH RBC QN AUTO: 31.1 PG (ref 26.6–33)
MCHC RBC AUTO-ENTMCNC: 32.4 G/DL (ref 31.5–35.7)
MCV RBC AUTO: 95.9 FL (ref 79–97)
PHOSPHATE SERPL-MCNC: 2.5 MG/DL (ref 2.5–4.5)
PLATELET # BLD AUTO: 227 10*3/MM3 (ref 140–450)
PMV BLD AUTO: 9.6 FL (ref 6–12)
POTASSIUM SERPL-SCNC: 3.7 MMOL/L (ref 3.5–5.2)
POTASSIUM SERPL-SCNC: 3.9 MMOL/L (ref 3.5–5.2)
PROT SERPL-MCNC: 4.7 G/DL (ref 6–8.5)
RBC # BLD AUTO: 2.96 10*6/MM3 (ref 3.77–5.28)
SODIUM SERPL-SCNC: 135 MMOL/L (ref 136–145)
WBC NRBC COR # BLD AUTO: 6.57 10*3/MM3 (ref 3.4–10.8)

## 2025-08-24 PROCEDURE — 25010000002 METOCLOPRAMIDE PER 10 MG: Performed by: STUDENT IN AN ORGANIZED HEALTH CARE EDUCATION/TRAINING PROGRAM

## 2025-08-24 PROCEDURE — 99024 POSTOP FOLLOW-UP VISIT: CPT | Performed by: STUDENT IN AN ORGANIZED HEALTH CARE EDUCATION/TRAINING PROGRAM

## 2025-08-24 PROCEDURE — 80053 COMPREHEN METABOLIC PANEL: CPT | Performed by: STUDENT IN AN ORGANIZED HEALTH CARE EDUCATION/TRAINING PROGRAM

## 2025-08-24 PROCEDURE — 82948 REAGENT STRIP/BLOOD GLUCOSE: CPT | Performed by: STUDENT IN AN ORGANIZED HEALTH CARE EDUCATION/TRAINING PROGRAM

## 2025-08-24 PROCEDURE — 25010000002 POTASSIUM CHLORIDE 10 MEQ/100ML SOLUTION: Performed by: STUDENT IN AN ORGANIZED HEALTH CARE EDUCATION/TRAINING PROGRAM

## 2025-08-24 PROCEDURE — 82948 REAGENT STRIP/BLOOD GLUCOSE: CPT

## 2025-08-24 PROCEDURE — 25010000002 MAGNESIUM SULFATE PER 500 MG OF MAGNESIUM: Performed by: STUDENT IN AN ORGANIZED HEALTH CARE EDUCATION/TRAINING PROGRAM

## 2025-08-24 PROCEDURE — 25010000002 HYDROMORPHONE PER 4 MG: Performed by: STUDENT IN AN ORGANIZED HEALTH CARE EDUCATION/TRAINING PROGRAM

## 2025-08-24 PROCEDURE — 25010000002 METRONIDAZOLE 500 MG/100ML SOLUTION: Performed by: STUDENT IN AN ORGANIZED HEALTH CARE EDUCATION/TRAINING PROGRAM

## 2025-08-24 PROCEDURE — 25010000002 ONDANSETRON PER 1 MG: Performed by: STUDENT IN AN ORGANIZED HEALTH CARE EDUCATION/TRAINING PROGRAM

## 2025-08-24 PROCEDURE — 25010000002 CALCIUM GLUCONATE PER 10 ML: Performed by: STUDENT IN AN ORGANIZED HEALTH CARE EDUCATION/TRAINING PROGRAM

## 2025-08-24 PROCEDURE — 25010000002 POTASSIUM CHLORIDE PER 2 MEQ OF POTASSIUM: Performed by: STUDENT IN AN ORGANIZED HEALTH CARE EDUCATION/TRAINING PROGRAM

## 2025-08-24 PROCEDURE — 25010000002 CEFTRIAXONE PER 250 MG: Performed by: STUDENT IN AN ORGANIZED HEALTH CARE EDUCATION/TRAINING PROGRAM

## 2025-08-24 PROCEDURE — 85027 COMPLETE CBC AUTOMATED: CPT | Performed by: STUDENT IN AN ORGANIZED HEALTH CARE EDUCATION/TRAINING PROGRAM

## 2025-08-24 PROCEDURE — 83735 ASSAY OF MAGNESIUM: CPT | Performed by: STUDENT IN AN ORGANIZED HEALTH CARE EDUCATION/TRAINING PROGRAM

## 2025-08-24 PROCEDURE — 84100 ASSAY OF PHOSPHORUS: CPT | Performed by: STUDENT IN AN ORGANIZED HEALTH CARE EDUCATION/TRAINING PROGRAM

## 2025-08-24 PROCEDURE — 25010000002 ENOXAPARIN PER 10 MG: Performed by: STUDENT IN AN ORGANIZED HEALTH CARE EDUCATION/TRAINING PROGRAM

## 2025-08-24 PROCEDURE — 84132 ASSAY OF SERUM POTASSIUM: CPT | Performed by: STUDENT IN AN ORGANIZED HEALTH CARE EDUCATION/TRAINING PROGRAM

## 2025-08-24 RX ORDER — METOCLOPRAMIDE HYDROCHLORIDE 5 MG/ML
5 INJECTION INTRAMUSCULAR; INTRAVENOUS EVERY 6 HOURS
Status: DISCONTINUED | OUTPATIENT
Start: 2025-08-24 | End: 2025-08-26

## 2025-08-24 RX ADMIN — Medication 10 ML: at 09:33

## 2025-08-24 RX ADMIN — Medication 10 ML: at 20:28

## 2025-08-24 RX ADMIN — ENOXAPARIN SODIUM 40 MG: 100 INJECTION SUBCUTANEOUS at 20:26

## 2025-08-24 RX ADMIN — Medication 10 ML: at 20:29

## 2025-08-24 RX ADMIN — METOCLOPRAMIDE 5 MG: 5 INJECTION, SOLUTION INTRAMUSCULAR; INTRAVENOUS at 22:32

## 2025-08-24 RX ADMIN — POTASSIUM PHOSPHATE, MONOBASIC POTASSIUM PHOSPHATE, DIBASIC INJECTION,: 236; 224 SOLUTION, CONCENTRATE INTRAVENOUS at 19:26

## 2025-08-24 RX ADMIN — ONDANSETRON 4 MG: 2 INJECTION INTRAMUSCULAR; INTRAVENOUS at 20:26

## 2025-08-24 RX ADMIN — METRONIDAZOLE 500 MG: 500 INJECTION, SOLUTION INTRAVENOUS at 20:26

## 2025-08-24 RX ADMIN — METRONIDAZOLE 500 MG: 500 INJECTION, SOLUTION INTRAVENOUS at 12:30

## 2025-08-24 RX ADMIN — PANTOPRAZOLE SODIUM 40 MG: 40 INJECTION, POWDER, FOR SOLUTION INTRAVENOUS at 05:49

## 2025-08-24 RX ADMIN — SODIUM CHLORIDE 2000 MG: 900 INJECTION INTRAVENOUS at 09:42

## 2025-08-24 RX ADMIN — POTASSIUM CHLORIDE 10 MEQ: 7.46 INJECTION, SOLUTION INTRAVENOUS at 01:02

## 2025-08-24 RX ADMIN — METRONIDAZOLE 500 MG: 500 INJECTION, SOLUTION INTRAVENOUS at 04:45

## 2025-08-24 RX ADMIN — Medication 10 ML: at 09:32

## 2025-08-24 RX ADMIN — LIDOCAINE 2 PATCH: 4 PATCH TOPICAL at 12:36

## 2025-08-24 RX ADMIN — ENOXAPARIN SODIUM 40 MG: 100 INJECTION SUBCUTANEOUS at 05:54

## 2025-08-24 RX ADMIN — TRAZODONE HYDROCHLORIDE 300 MG: 100 TABLET ORAL at 20:26

## 2025-08-24 RX ADMIN — POTASSIUM CHLORIDE 10 MEQ: 7.46 INJECTION, SOLUTION INTRAVENOUS at 02:13

## 2025-08-24 RX ADMIN — HYDROMORPHONE HYDROCHLORIDE 0.5 MG: 1 INJECTION, SOLUTION INTRAMUSCULAR; INTRAVENOUS; SUBCUTANEOUS at 09:52

## 2025-08-25 ENCOUNTER — APPOINTMENT (OUTPATIENT)
Dept: GENERAL RADIOLOGY | Facility: HOSPITAL | Age: 70
End: 2025-08-25
Payer: MEDICARE

## 2025-08-25 LAB
ALBUMIN SERPL-MCNC: 2.4 G/DL (ref 3.5–5.2)
ALBUMIN/GLOB SERPL: 0.9 G/DL
ALP SERPL-CCNC: 66 U/L (ref 39–117)
ALT SERPL W P-5'-P-CCNC: 6 U/L (ref 1–33)
ANION GAP SERPL CALCULATED.3IONS-SCNC: 7.5 MMOL/L (ref 5–15)
AST SERPL-CCNC: 13 U/L (ref 1–32)
BILIRUB CONJ SERPL-MCNC: 0.2 MG/DL (ref 0–0.3)
BILIRUB SERPL-MCNC: 0.3 MG/DL (ref 0–1.2)
BUN SERPL-MCNC: 3 MG/DL (ref 8–23)
BUN/CREAT SERPL: 8.3 (ref 7–25)
CA-I SERPL ISE-MCNC: 1.16 MMOL/L (ref 1.15–1.35)
CALCIUM SPEC-SCNC: 8.1 MG/DL (ref 8.6–10.5)
CHLORIDE SERPL-SCNC: 106 MMOL/L (ref 98–107)
CO2 SERPL-SCNC: 26.5 MMOL/L (ref 22–29)
CREAT SERPL-MCNC: 0.36 MG/DL (ref 0.57–1)
CRP SERPL-MCNC: <0.3 MG/DL (ref 0–0.5)
EGFRCR SERPLBLD CKD-EPI 2021: 108.7 ML/MIN/1.73
GLOBULIN UR ELPH-MCNC: 2.6 GM/DL
GLUCOSE BLDC GLUCOMTR-MCNC: 115 MG/DL (ref 65–99)
GLUCOSE BLDC GLUCOMTR-MCNC: 120 MG/DL (ref 65–99)
GLUCOSE BLDC GLUCOMTR-MCNC: 123 MG/DL (ref 65–99)
GLUCOSE BLDC GLUCOMTR-MCNC: 125 MG/DL (ref 65–99)
GLUCOSE BLDC GLUCOMTR-MCNC: 128 MG/DL (ref 65–99)
GLUCOSE SERPL-MCNC: 125 MG/DL (ref 65–99)
INR PPP: 1.2 (ref 0.9–1.1)
MAGNESIUM SERPL-MCNC: 2 MG/DL (ref 1.6–2.4)
PHOSPHATE SERPL-MCNC: 2.5 MG/DL (ref 2.5–4.5)
POTASSIUM SERPL-SCNC: 3.7 MMOL/L (ref 3.5–5.2)
PREALB SERPL-MCNC: 11.3 MG/DL (ref 20–40)
PROT SERPL-MCNC: 5 G/DL (ref 6–8.5)
PROTHROMBIN TIME: 15.1 SECONDS (ref 11.7–14.2)
SODIUM SERPL-SCNC: 140 MMOL/L (ref 136–145)
TRIGL SERPL-MCNC: 119 MG/DL (ref 0–150)

## 2025-08-25 PROCEDURE — 83735 ASSAY OF MAGNESIUM: CPT | Performed by: STUDENT IN AN ORGANIZED HEALTH CARE EDUCATION/TRAINING PROGRAM

## 2025-08-25 PROCEDURE — 25010000002 MAGNESIUM SULFATE PER 500 MG OF MAGNESIUM: Performed by: STUDENT IN AN ORGANIZED HEALTH CARE EDUCATION/TRAINING PROGRAM

## 2025-08-25 PROCEDURE — 25010000002 ENOXAPARIN PER 10 MG: Performed by: STUDENT IN AN ORGANIZED HEALTH CARE EDUCATION/TRAINING PROGRAM

## 2025-08-25 PROCEDURE — 82248 BILIRUBIN DIRECT: CPT | Performed by: STUDENT IN AN ORGANIZED HEALTH CARE EDUCATION/TRAINING PROGRAM

## 2025-08-25 PROCEDURE — 86140 C-REACTIVE PROTEIN: CPT | Performed by: STUDENT IN AN ORGANIZED HEALTH CARE EDUCATION/TRAINING PROGRAM

## 2025-08-25 PROCEDURE — 25010000002 POTASSIUM CHLORIDE PER 2 MEQ OF POTASSIUM: Performed by: STUDENT IN AN ORGANIZED HEALTH CARE EDUCATION/TRAINING PROGRAM

## 2025-08-25 PROCEDURE — 25010000002 METOCLOPRAMIDE PER 10 MG: Performed by: STUDENT IN AN ORGANIZED HEALTH CARE EDUCATION/TRAINING PROGRAM

## 2025-08-25 PROCEDURE — 84134 ASSAY OF PREALBUMIN: CPT | Performed by: STUDENT IN AN ORGANIZED HEALTH CARE EDUCATION/TRAINING PROGRAM

## 2025-08-25 PROCEDURE — 84478 ASSAY OF TRIGLYCERIDES: CPT | Performed by: STUDENT IN AN ORGANIZED HEALTH CARE EDUCATION/TRAINING PROGRAM

## 2025-08-25 PROCEDURE — 80053 COMPREHEN METABOLIC PANEL: CPT | Performed by: STUDENT IN AN ORGANIZED HEALTH CARE EDUCATION/TRAINING PROGRAM

## 2025-08-25 PROCEDURE — 97530 THERAPEUTIC ACTIVITIES: CPT

## 2025-08-25 PROCEDURE — 82948 REAGENT STRIP/BLOOD GLUCOSE: CPT

## 2025-08-25 PROCEDURE — 85610 PROTHROMBIN TIME: CPT | Performed by: STUDENT IN AN ORGANIZED HEALTH CARE EDUCATION/TRAINING PROGRAM

## 2025-08-25 PROCEDURE — 99024 POSTOP FOLLOW-UP VISIT: CPT | Performed by: STUDENT IN AN ORGANIZED HEALTH CARE EDUCATION/TRAINING PROGRAM

## 2025-08-25 PROCEDURE — 25010000002 METRONIDAZOLE 500 MG/100ML SOLUTION: Performed by: STUDENT IN AN ORGANIZED HEALTH CARE EDUCATION/TRAINING PROGRAM

## 2025-08-25 PROCEDURE — 82330 ASSAY OF CALCIUM: CPT | Performed by: STUDENT IN AN ORGANIZED HEALTH CARE EDUCATION/TRAINING PROGRAM

## 2025-08-25 PROCEDURE — 25010000002 CALCIUM GLUCONATE PER 10 ML: Performed by: STUDENT IN AN ORGANIZED HEALTH CARE EDUCATION/TRAINING PROGRAM

## 2025-08-25 PROCEDURE — 82948 REAGENT STRIP/BLOOD GLUCOSE: CPT | Performed by: STUDENT IN AN ORGANIZED HEALTH CARE EDUCATION/TRAINING PROGRAM

## 2025-08-25 PROCEDURE — 84100 ASSAY OF PHOSPHORUS: CPT | Performed by: STUDENT IN AN ORGANIZED HEALTH CARE EDUCATION/TRAINING PROGRAM

## 2025-08-25 PROCEDURE — 74018 RADEX ABDOMEN 1 VIEW: CPT

## 2025-08-25 RX ORDER — HYDROCODONE BITARTRATE AND ACETAMINOPHEN 5; 325 MG/1; MG/1
1 TABLET ORAL EVERY 6 HOURS PRN
Refills: 0 | Status: DISCONTINUED | OUTPATIENT
Start: 2025-08-25 | End: 2025-08-27 | Stop reason: HOSPADM

## 2025-08-25 RX ORDER — HYDROXYZINE HYDROCHLORIDE 25 MG/1
25 TABLET, FILM COATED ORAL 3 TIMES DAILY PRN
Status: DISCONTINUED | OUTPATIENT
Start: 2025-08-25 | End: 2025-08-27 | Stop reason: HOSPADM

## 2025-08-25 RX ORDER — HYDROXYZINE HCL 10 MG/5 ML
25 SOLUTION, ORAL ORAL EVERY 6 HOURS PRN
Status: DISCONTINUED | OUTPATIENT
Start: 2025-08-25 | End: 2025-08-25

## 2025-08-25 RX ADMIN — Medication 10 ML: at 11:04

## 2025-08-25 RX ADMIN — METOCLOPRAMIDE 5 MG: 5 INJECTION, SOLUTION INTRAMUSCULAR; INTRAVENOUS at 18:05

## 2025-08-25 RX ADMIN — AMITRIPTYLINE HYDROCHLORIDE 25 MG: 25 TABLET ORAL at 10:36

## 2025-08-25 RX ADMIN — I.V. FAT EMULSION 50 G: 20 EMULSION INTRAVENOUS at 10:36

## 2025-08-25 RX ADMIN — METRONIDAZOLE 500 MG: 500 INJECTION, SOLUTION INTRAVENOUS at 05:28

## 2025-08-25 RX ADMIN — Medication 10 ML: at 20:01

## 2025-08-25 RX ADMIN — ENOXAPARIN SODIUM 40 MG: 100 INJECTION SUBCUTANEOUS at 05:28

## 2025-08-25 RX ADMIN — HYDROXYZINE HYDROCHLORIDE 25 MG: 25 TABLET ORAL at 18:05

## 2025-08-25 RX ADMIN — METOCLOPRAMIDE 5 MG: 5 INJECTION, SOLUTION INTRAMUSCULAR; INTRAVENOUS at 05:28

## 2025-08-25 RX ADMIN — METOCLOPRAMIDE 5 MG: 5 INJECTION, SOLUTION INTRAMUSCULAR; INTRAVENOUS at 22:17

## 2025-08-25 RX ADMIN — ATORVASTATIN CALCIUM 40 MG: 20 TABLET, FILM COATED ORAL at 10:36

## 2025-08-25 RX ADMIN — PANTOPRAZOLE SODIUM 40 MG: 40 INJECTION, POWDER, FOR SOLUTION INTRAVENOUS at 10:36

## 2025-08-25 RX ADMIN — HYDROXYZINE HYDROCHLORIDE 25 MG: 10 SYRUP ORAL at 01:34

## 2025-08-25 RX ADMIN — ENOXAPARIN SODIUM 40 MG: 100 INJECTION SUBCUTANEOUS at 18:05

## 2025-08-25 RX ADMIN — POTASSIUM PHOSPHATE, MONOBASIC POTASSIUM PHOSPHATE, DIBASIC INJECTION,: 236; 224 SOLUTION, CONCENTRATE INTRAVENOUS at 18:03

## 2025-08-25 RX ADMIN — Medication 1 PACKET: at 18:05

## 2025-08-25 RX ADMIN — METOCLOPRAMIDE 5 MG: 5 INJECTION, SOLUTION INTRAMUSCULAR; INTRAVENOUS at 11:04

## 2025-08-25 RX ADMIN — LIDOCAINE 2 PATCH: 4 PATCH TOPICAL at 10:34

## 2025-08-25 RX ADMIN — TRAZODONE HYDROCHLORIDE 300 MG: 100 TABLET ORAL at 20:40

## 2025-08-26 LAB
ALBUMIN SERPL-MCNC: 2.3 G/DL (ref 3.5–5.2)
ALBUMIN/GLOB SERPL: 1.1 G/DL
ALP SERPL-CCNC: 59 U/L (ref 39–117)
ALT SERPL W P-5'-P-CCNC: 5 U/L (ref 1–33)
ANION GAP SERPL CALCULATED.3IONS-SCNC: 8.1 MMOL/L (ref 5–15)
AST SERPL-CCNC: 10 U/L (ref 1–32)
BILIRUB SERPL-MCNC: 0.3 MG/DL (ref 0–1.2)
BUN SERPL-MCNC: 5 MG/DL (ref 8–23)
BUN/CREAT SERPL: 12.8 (ref 7–25)
CALCIUM SPEC-SCNC: 7.7 MG/DL (ref 8.6–10.5)
CHLORIDE SERPL-SCNC: 108 MMOL/L (ref 98–107)
CO2 SERPL-SCNC: 24.9 MMOL/L (ref 22–29)
CREAT SERPL-MCNC: 0.39 MG/DL (ref 0.57–1)
EGFRCR SERPLBLD CKD-EPI 2021: 106.6 ML/MIN/1.73
GLOBULIN UR ELPH-MCNC: 2.1 GM/DL
GLUCOSE BLDC GLUCOMTR-MCNC: 110 MG/DL (ref 65–99)
GLUCOSE BLDC GLUCOMTR-MCNC: 118 MG/DL (ref 65–99)
GLUCOSE BLDC GLUCOMTR-MCNC: 126 MG/DL (ref 65–99)
GLUCOSE BLDC GLUCOMTR-MCNC: 135 MG/DL (ref 65–99)
GLUCOSE SERPL-MCNC: 113 MG/DL (ref 65–99)
MAGNESIUM SERPL-MCNC: 2 MG/DL (ref 1.6–2.4)
PHOSPHATE SERPL-MCNC: 3.3 MG/DL (ref 2.5–4.5)
POTASSIUM SERPL-SCNC: 4 MMOL/L (ref 3.5–5.2)
PROT SERPL-MCNC: 4.4 G/DL (ref 6–8.5)
SODIUM SERPL-SCNC: 141 MMOL/L (ref 136–145)

## 2025-08-26 PROCEDURE — 25010000002 CALCIUM GLUCONATE PER 10 ML: Performed by: STUDENT IN AN ORGANIZED HEALTH CARE EDUCATION/TRAINING PROGRAM

## 2025-08-26 PROCEDURE — 25010000002 METOCLOPRAMIDE PER 10 MG: Performed by: STUDENT IN AN ORGANIZED HEALTH CARE EDUCATION/TRAINING PROGRAM

## 2025-08-26 PROCEDURE — 80053 COMPREHEN METABOLIC PANEL: CPT | Performed by: STUDENT IN AN ORGANIZED HEALTH CARE EDUCATION/TRAINING PROGRAM

## 2025-08-26 PROCEDURE — 25010000002 MAGNESIUM SULFATE PER 500 MG OF MAGNESIUM: Performed by: STUDENT IN AN ORGANIZED HEALTH CARE EDUCATION/TRAINING PROGRAM

## 2025-08-26 PROCEDURE — 84100 ASSAY OF PHOSPHORUS: CPT | Performed by: STUDENT IN AN ORGANIZED HEALTH CARE EDUCATION/TRAINING PROGRAM

## 2025-08-26 PROCEDURE — 25010000002 ENOXAPARIN PER 10 MG: Performed by: STUDENT IN AN ORGANIZED HEALTH CARE EDUCATION/TRAINING PROGRAM

## 2025-08-26 PROCEDURE — 83735 ASSAY OF MAGNESIUM: CPT | Performed by: STUDENT IN AN ORGANIZED HEALTH CARE EDUCATION/TRAINING PROGRAM

## 2025-08-26 PROCEDURE — 25010000002 POTASSIUM CHLORIDE PER 2 MEQ OF POTASSIUM: Performed by: STUDENT IN AN ORGANIZED HEALTH CARE EDUCATION/TRAINING PROGRAM

## 2025-08-26 PROCEDURE — 82948 REAGENT STRIP/BLOOD GLUCOSE: CPT | Performed by: STUDENT IN AN ORGANIZED HEALTH CARE EDUCATION/TRAINING PROGRAM

## 2025-08-26 RX ORDER — METOCLOPRAMIDE 10 MG/1
5 TABLET ORAL
Status: DISCONTINUED | OUTPATIENT
Start: 2025-08-26 | End: 2025-08-27 | Stop reason: HOSPADM

## 2025-08-26 RX ORDER — ONDANSETRON 2 MG/ML
4 INJECTION INTRAMUSCULAR; INTRAVENOUS EVERY 6 HOURS PRN
Status: DISCONTINUED | OUTPATIENT
Start: 2025-08-26 | End: 2025-08-27 | Stop reason: HOSPADM

## 2025-08-26 RX ADMIN — SCOPOLAMINE 1 PATCH: 1.5 PATCH, EXTENDED RELEASE TRANSDERMAL at 13:57

## 2025-08-26 RX ADMIN — Medication 10 ML: at 10:29

## 2025-08-26 RX ADMIN — Medication 10 ML: at 21:10

## 2025-08-26 RX ADMIN — Medication 1 PACKET: at 10:01

## 2025-08-26 RX ADMIN — METOCLOPRAMIDE 5 MG: 5 INJECTION, SOLUTION INTRAMUSCULAR; INTRAVENOUS at 10:02

## 2025-08-26 RX ADMIN — PANTOPRAZOLE SODIUM 40 MG: 40 INJECTION, POWDER, FOR SOLUTION INTRAVENOUS at 05:37

## 2025-08-26 RX ADMIN — ENOXAPARIN SODIUM 40 MG: 100 INJECTION SUBCUTANEOUS at 18:54

## 2025-08-26 RX ADMIN — AMITRIPTYLINE HYDROCHLORIDE 25 MG: 25 TABLET ORAL at 10:02

## 2025-08-26 RX ADMIN — ATORVASTATIN CALCIUM 40 MG: 20 TABLET, FILM COATED ORAL at 10:02

## 2025-08-26 RX ADMIN — HYDROXYZINE HYDROCHLORIDE 25 MG: 25 TABLET ORAL at 10:02

## 2025-08-26 RX ADMIN — LIDOCAINE 2 PATCH: 4 PATCH TOPICAL at 10:01

## 2025-08-26 RX ADMIN — POTASSIUM PHOSPHATE, MONOBASIC POTASSIUM PHOSPHATE, DIBASIC INJECTION,: 236; 224 SOLUTION, CONCENTRATE INTRAVENOUS at 18:53

## 2025-08-26 RX ADMIN — METOCLOPRAMIDE 5 MG: 5 INJECTION, SOLUTION INTRAMUSCULAR; INTRAVENOUS at 05:37

## 2025-08-26 RX ADMIN — METOCLOPRAMIDE 5 MG: 10 TABLET ORAL at 18:54

## 2025-08-26 RX ADMIN — ENOXAPARIN SODIUM 40 MG: 100 INJECTION SUBCUTANEOUS at 05:37

## 2025-08-26 RX ADMIN — HYDROXYZINE HYDROCHLORIDE 25 MG: 25 TABLET ORAL at 18:54

## 2025-08-26 RX ADMIN — TRAZODONE HYDROCHLORIDE 300 MG: 100 TABLET ORAL at 21:05

## 2025-08-27 ENCOUNTER — READMISSION MANAGEMENT (OUTPATIENT)
Dept: CALL CENTER | Facility: HOSPITAL | Age: 70
End: 2025-08-27
Payer: MEDICARE

## 2025-08-27 VITALS
BODY MASS INDEX: 47.37 KG/M2 | HEART RATE: 78 BPM | DIASTOLIC BLOOD PRESSURE: 64 MMHG | RESPIRATION RATE: 18 BRPM | WEIGHT: 257.4 LBS | OXYGEN SATURATION: 97 % | SYSTOLIC BLOOD PRESSURE: 103 MMHG | HEIGHT: 62 IN | TEMPERATURE: 97.6 F

## 2025-08-27 LAB
ALBUMIN SERPL-MCNC: 2.2 G/DL (ref 3.5–5.2)
ALBUMIN/GLOB SERPL: 1 G/DL
ALP SERPL-CCNC: 55 U/L (ref 39–117)
ALT SERPL W P-5'-P-CCNC: 8 U/L (ref 1–33)
ANION GAP SERPL CALCULATED.3IONS-SCNC: 7.2 MMOL/L (ref 5–15)
AST SERPL-CCNC: 11 U/L (ref 1–32)
BILIRUB SERPL-MCNC: 0.3 MG/DL (ref 0–1.2)
BUN SERPL-MCNC: 8 MG/DL (ref 8–23)
BUN/CREAT SERPL: 17.4 (ref 7–25)
CALCIUM SPEC-SCNC: 8 MG/DL (ref 8.6–10.5)
CHLORIDE SERPL-SCNC: 108 MMOL/L (ref 98–107)
CO2 SERPL-SCNC: 24.8 MMOL/L (ref 22–29)
CREAT SERPL-MCNC: 0.46 MG/DL (ref 0.57–1)
EGFRCR SERPLBLD CKD-EPI 2021: 102.5 ML/MIN/1.73
GLOBULIN UR ELPH-MCNC: 2.3 GM/DL
GLUCOSE BLDC GLUCOMTR-MCNC: 105 MG/DL (ref 65–99)
GLUCOSE BLDC GLUCOMTR-MCNC: 107 MG/DL (ref 65–99)
GLUCOSE BLDC GLUCOMTR-MCNC: 107 MG/DL (ref 65–99)
GLUCOSE BLDC GLUCOMTR-MCNC: 111 MG/DL (ref 65–99)
GLUCOSE SERPL-MCNC: 107 MG/DL (ref 65–99)
MAGNESIUM SERPL-MCNC: 1.9 MG/DL (ref 1.6–2.4)
PHOSPHATE SERPL-MCNC: 3.1 MG/DL (ref 2.5–4.5)
POTASSIUM SERPL-SCNC: 4 MMOL/L (ref 3.5–5.2)
PROT SERPL-MCNC: 4.5 G/DL (ref 6–8.5)
SODIUM SERPL-SCNC: 140 MMOL/L (ref 136–145)

## 2025-08-27 PROCEDURE — 83735 ASSAY OF MAGNESIUM: CPT | Performed by: STUDENT IN AN ORGANIZED HEALTH CARE EDUCATION/TRAINING PROGRAM

## 2025-08-27 PROCEDURE — 84100 ASSAY OF PHOSPHORUS: CPT | Performed by: STUDENT IN AN ORGANIZED HEALTH CARE EDUCATION/TRAINING PROGRAM

## 2025-08-27 PROCEDURE — 80053 COMPREHEN METABOLIC PANEL: CPT | Performed by: STUDENT IN AN ORGANIZED HEALTH CARE EDUCATION/TRAINING PROGRAM

## 2025-08-27 PROCEDURE — 97530 THERAPEUTIC ACTIVITIES: CPT

## 2025-08-27 PROCEDURE — 82948 REAGENT STRIP/BLOOD GLUCOSE: CPT | Performed by: STUDENT IN AN ORGANIZED HEALTH CARE EDUCATION/TRAINING PROGRAM

## 2025-08-27 PROCEDURE — 82948 REAGENT STRIP/BLOOD GLUCOSE: CPT

## 2025-08-27 PROCEDURE — 25010000002 ENOXAPARIN PER 10 MG: Performed by: STUDENT IN AN ORGANIZED HEALTH CARE EDUCATION/TRAINING PROGRAM

## 2025-08-27 RX ORDER — METOCLOPRAMIDE 5 MG/1
5 TABLET ORAL
Qty: 30 TABLET | Refills: 0 | Status: SHIPPED | OUTPATIENT
Start: 2025-08-27

## 2025-08-27 RX ORDER — ONDANSETRON 4 MG/1
4 TABLET, FILM COATED ORAL EVERY 12 HOURS PRN
Qty: 20 TABLET | Refills: 0 | Status: SHIPPED | OUTPATIENT
Start: 2025-08-27

## 2025-08-27 RX ORDER — HYDROCODONE BITARTRATE AND ACETAMINOPHEN 5; 325 MG/1; MG/1
1 TABLET ORAL EVERY 6 HOURS PRN
Qty: 15 TABLET | Refills: 0 | Status: SHIPPED | OUTPATIENT
Start: 2025-08-27

## 2025-08-27 RX ADMIN — LIDOCAINE 2 PATCH: 4 PATCH TOPICAL at 09:09

## 2025-08-27 RX ADMIN — Medication 10 ML: at 09:00

## 2025-08-27 RX ADMIN — Medication 1 PACKET: at 09:09

## 2025-08-27 RX ADMIN — METOCLOPRAMIDE 5 MG: 10 TABLET ORAL at 11:00

## 2025-08-27 RX ADMIN — PANTOPRAZOLE SODIUM 40 MG: 40 INJECTION, POWDER, FOR SOLUTION INTRAVENOUS at 09:09

## 2025-08-27 RX ADMIN — ENOXAPARIN SODIUM 40 MG: 100 INJECTION SUBCUTANEOUS at 05:52

## 2025-08-27 RX ADMIN — METOCLOPRAMIDE 5 MG: 10 TABLET ORAL at 09:08

## 2025-08-27 RX ADMIN — I.V. FAT EMULSION 50 G: 20 EMULSION INTRAVENOUS at 09:09

## 2025-08-27 RX ADMIN — AMITRIPTYLINE HYDROCHLORIDE 25 MG: 25 TABLET ORAL at 09:08

## 2025-08-27 RX ADMIN — ATORVASTATIN CALCIUM 40 MG: 20 TABLET, FILM COATED ORAL at 09:08

## 2025-08-28 ENCOUNTER — TELEPHONE (OUTPATIENT)
Dept: SURGERY | Facility: CLINIC | Age: 70
End: 2025-08-28
Payer: MEDICARE

## 2025-08-29 ENCOUNTER — TELEPHONE (OUTPATIENT)
Dept: SURGERY | Facility: CLINIC | Age: 70
End: 2025-08-29
Payer: MEDICARE

## (undated) DEVICE — APPL CHLORAPREP HI/LITE 26ML ORNG

## (undated) DEVICE — SUT SILK 2/0 TIES 18IN A185H

## (undated) DEVICE — PK PROC MAJ 40

## (undated) DEVICE — ADAPT CLN BIOGUARD AIR/H2O DISP

## (undated) DEVICE — TUBING, SUCTION, 1/4" X 10', STRAIGHT: Brand: MEDLINE

## (undated) DEVICE — DRSNG WND BORDR/ADHS NONADHR/GZ LF 4X14IN STRL

## (undated) DEVICE — SUT PDS 3/0 SH 27IN DYED Z316H

## (undated) DEVICE — 1LYRTR 16FR10ML100%SIL UMS SNP: Brand: MEDLINE INDUSTRIES, INC.

## (undated) DEVICE — BLCK/BITE BLOX W/DENTL/RIM W/STRAP 54F

## (undated) DEVICE — KT ORCA ORCAPOD DISP STRL

## (undated) DEVICE — NDL HYPO PRECISIONGLIDE REG 25G 1 1/2

## (undated) DEVICE — ANTIBACTERIAL UNDYED BRAIDED (POLYGLACTIN 910), SYNTHETIC ABSORBABLE SUTURE: Brand: COATED VICRYL

## (undated) DEVICE — PATIENT RETURN ELECTRODE, SINGLE-USE, CONTACT QUALITY MONITORING, ADULT, WITH 9FT CORD, FOR PATIENTS WEIGING OVER 33LBS. (15KG): Brand: MEGADYNE

## (undated) DEVICE — SUT PDS 0 CT1 36IN Z346H

## (undated) DEVICE — SPONGE,LAP,18"X18",DLX,XR,ST,5/PK,40/PK: Brand: MEDLINE

## (undated) DEVICE — ENSEAL 20 CM SHAFT, LARGE JAW: Brand: ENSEAL X1

## (undated) DEVICE — GLV SURG BIOGEL LTX PF 7 1/2

## (undated) DEVICE — GAUZE,SPONGE,4"X4",12PLY,STRL,LF,10/TRAY: Brand: MEDLINE

## (undated) DEVICE — COVER,MAYO STAND,STERILE: Brand: MEDLINE

## (undated) DEVICE — LN SMPL CO2 SHTRM SD STREAM W/M LUER

## (undated) DEVICE — SUT GUT CHRM 3/0 SH 36IN G172H

## (undated) DEVICE — PREP SOL POVIDONE/IODINE BT 4OZ

## (undated) DEVICE — PENCL SMOKE/EVAC MEGADYNE TELESCP 10FT

## (undated) DEVICE — SUT SILK 0 TIES 18IN SA66G

## (undated) DEVICE — SUT SILK 3/0 TIES 18IN A184H

## (undated) DEVICE — YANKAUER,BULB TIP,W/O VENT,RIGID,STERILE: Brand: MEDLINE

## (undated) DEVICE — Device

## (undated) DEVICE — 3M™ IOBAN™ 2 ANTIMICROBIAL INCISE DRAPE 6650EZ: Brand: IOBAN™ 2

## (undated) DEVICE — PROXIMATE RH ROTATING HEAD SKIN STAPLERS (35 REGULAR) CONTAINS 35 STAINLESS STEEL STAPLES: Brand: PROXIMATE

## (undated) DEVICE — SUT SILK 2/0 SH CR8 18IN CR8 C012D

## (undated) DEVICE — COUNT NDL MAG FM/BLCK 40COUNT

## (undated) DEVICE — DEFENDO AIR WATER SUCTION AND BIOPSY VALVE KIT FOR  OLYMPUS: Brand: DEFENDO AIR/WATER/SUCTION AND BIOPSY VALVE

## (undated) DEVICE — ELECTRD BLD EZ CLN MOD XLNG 2.75IN

## (undated) DEVICE — RESERVOIR,SUCTION,100CC,SILICONE: Brand: MEDLINE

## (undated) DEVICE — SENSR O2 OXIMAX FNGR A/ 18IN NONSTR

## (undated) DEVICE — WOUND RETRACTOR AND PROTECTOR: Brand: ALEXIS O WOUND PROTECTOR-RETRACTOR

## (undated) DEVICE — COLOSTOMY/ILEOSTOMY KIT, FLEXWEAR: Brand: NEW IMAGE

## (undated) DEVICE — SUT SILK 3/0 SH CR8 18IN C013D

## (undated) DEVICE — CANN O2 ETCO2 FITS ALL CONN CO2 SMPL A/ 7IN DISP LF